# Patient Record
Sex: FEMALE | Race: WHITE | NOT HISPANIC OR LATINO | Employment: OTHER | URBAN - METROPOLITAN AREA
[De-identification: names, ages, dates, MRNs, and addresses within clinical notes are randomized per-mention and may not be internally consistent; named-entity substitution may affect disease eponyms.]

---

## 2018-08-19 PROCEDURE — 93005 ELECTROCARDIOGRAM TRACING: CPT | Mod: S$GLB,,, | Performed by: FAMILY MEDICINE

## 2018-08-19 PROCEDURE — 93010 ELECTROCARDIOGRAM REPORT: CPT | Mod: S$GLB,,, | Performed by: INTERNAL MEDICINE

## 2018-08-20 ENCOUNTER — OFFICE VISIT (OUTPATIENT)
Dept: FAMILY MEDICINE | Facility: CLINIC | Age: 58
End: 2018-08-20
Payer: COMMERCIAL

## 2018-08-20 ENCOUNTER — LAB VISIT (OUTPATIENT)
Dept: LAB | Facility: HOSPITAL | Age: 58
End: 2018-08-20
Attending: FAMILY MEDICINE
Payer: COMMERCIAL

## 2018-08-20 VITALS
WEIGHT: 153.69 LBS | SYSTOLIC BLOOD PRESSURE: 126 MMHG | HEART RATE: 86 BPM | DIASTOLIC BLOOD PRESSURE: 80 MMHG | HEIGHT: 64 IN | TEMPERATURE: 96 F | OXYGEN SATURATION: 99 % | BODY MASS INDEX: 26.24 KG/M2

## 2018-08-20 DIAGNOSIS — R42 DIZZINESS: Primary | ICD-10-CM

## 2018-08-20 DIAGNOSIS — R42 DIZZINESS: ICD-10-CM

## 2018-08-20 DIAGNOSIS — Z85.41 HX OF CERVICAL CANCER: ICD-10-CM

## 2018-08-20 DIAGNOSIS — Z85.43 HX OF OVARIAN CANCER: ICD-10-CM

## 2018-08-20 DIAGNOSIS — Z72.0 TOBACCO ABUSE: ICD-10-CM

## 2018-08-20 LAB
ALBUMIN SERPL BCP-MCNC: 3.6 G/DL
ALP SERPL-CCNC: 135 U/L
ALT SERPL W/O P-5'-P-CCNC: 14 U/L
ANION GAP SERPL CALC-SCNC: 8 MMOL/L
AST SERPL-CCNC: 18 U/L
BASOPHILS # BLD AUTO: 0.12 K/UL
BASOPHILS NFR BLD: 0.9 %
BILIRUB SERPL-MCNC: 0.3 MG/DL
BUN SERPL-MCNC: 10 MG/DL
CALCIUM SERPL-MCNC: 9.6 MG/DL
CHLORIDE SERPL-SCNC: 105 MMOL/L
CHOLEST SERPL-MCNC: 200 MG/DL
CHOLEST/HDLC SERPL: 4.1 {RATIO}
CO2 SERPL-SCNC: 27 MMOL/L
CREAT SERPL-MCNC: 0.8 MG/DL
DIFFERENTIAL METHOD: ABNORMAL
EOSINOPHIL # BLD AUTO: 0.1 K/UL
EOSINOPHIL NFR BLD: 0.7 %
ERYTHROCYTE [DISTWIDTH] IN BLOOD BY AUTOMATED COUNT: 13.8 %
EST. GFR  (AFRICAN AMERICAN): >60 ML/MIN/1.73 M^2
EST. GFR  (NON AFRICAN AMERICAN): >60 ML/MIN/1.73 M^2
ESTIMATED AVG GLUCOSE: 105 MG/DL
GLUCOSE SERPL-MCNC: 88 MG/DL
HBA1C MFR BLD HPLC: 5.3 %
HCT VFR BLD AUTO: 39.2 %
HDLC SERPL-MCNC: 49 MG/DL
HDLC SERPL: 24.5 %
HGB BLD-MCNC: 12.1 G/DL
IMM GRANULOCYTES # BLD AUTO: 0.06 K/UL
IMM GRANULOCYTES NFR BLD AUTO: 0.5 %
LDLC SERPL CALC-MCNC: 110.6 MG/DL
LYMPHOCYTES # BLD AUTO: 2.7 K/UL
LYMPHOCYTES NFR BLD: 21.3 %
MCH RBC QN AUTO: 29.7 PG
MCHC RBC AUTO-ENTMCNC: 30.9 G/DL
MCV RBC AUTO: 96 FL
MONOCYTES # BLD AUTO: 0.8 K/UL
MONOCYTES NFR BLD: 6.3 %
NEUTROPHILS # BLD AUTO: 9 K/UL
NEUTROPHILS NFR BLD: 70.3 %
NONHDLC SERPL-MCNC: 151 MG/DL
NRBC BLD-RTO: 0 /100 WBC
PLATELET # BLD AUTO: 366 K/UL
PMV BLD AUTO: 11.5 FL
POTASSIUM SERPL-SCNC: 5 MMOL/L
PROT SERPL-MCNC: 7.3 G/DL
RBC # BLD AUTO: 4.07 M/UL
SODIUM SERPL-SCNC: 140 MMOL/L
TRIGL SERPL-MCNC: 202 MG/DL
WBC # BLD AUTO: 12.79 K/UL

## 2018-08-20 PROCEDURE — 3008F BODY MASS INDEX DOCD: CPT | Mod: CPTII,S$GLB,, | Performed by: FAMILY MEDICINE

## 2018-08-20 PROCEDURE — 83036 HEMOGLOBIN GLYCOSYLATED A1C: CPT

## 2018-08-20 PROCEDURE — 99999 PR PBB SHADOW E&M-NEW PATIENT-LVL IV: CPT | Mod: PBBFAC,,, | Performed by: FAMILY MEDICINE

## 2018-08-20 PROCEDURE — 99204 OFFICE O/P NEW MOD 45 MIN: CPT | Mod: S$GLB,,, | Performed by: FAMILY MEDICINE

## 2018-08-20 PROCEDURE — 36415 COLL VENOUS BLD VENIPUNCTURE: CPT | Mod: PO

## 2018-08-20 PROCEDURE — 80061 LIPID PANEL: CPT

## 2018-08-20 PROCEDURE — 85025 COMPLETE CBC W/AUTO DIFF WBC: CPT

## 2018-08-20 PROCEDURE — 80053 COMPREHEN METABOLIC PANEL: CPT

## 2018-08-20 RX ORDER — OFLOXACIN 3 MG/ML
5 SOLUTION AURICULAR (OTIC) 2 TIMES DAILY
Refills: 0 | COMMUNITY
Start: 2018-08-13 | End: 2018-08-21

## 2018-08-20 NOTE — PROGRESS NOTES
Subjective:       Patient ID: Iza Esquivel is a 58 y.o. female.    Chief Complaint: Dizziness and Walking To the Left     58 y old female with Tobacco abuse now with Dizzy speells over the last 4 weeks . Mainly when she stands up . She feels  That she sways to the left . Last 5 -10 min .  Also has occur while she walks . Seen at  last week and she was told her r ear was clogged. Started on topical otic abx which  Might have help . No falls, no n/v . + decreased hearing (R ear)       Review of Systems   Constitutional: Negative.    HENT: Negative.    Eyes: Negative.    Respiratory: Negative.    Cardiovascular: Negative.    Gastrointestinal: Negative.    Genitourinary: Negative.    Musculoskeletal: Negative.    Skin: Negative.    Hematological: Negative.        Objective:      Physical Exam   Constitutional: She is oriented to person, place, and time. She appears well-developed and well-nourished. No distress.   HENT:   Head: Normocephalic and atraumatic.   Right Ear: External ear normal.   Left Ear: External ear normal.   Mouth/Throat: No oropharyngeal exudate.   Eyes: Conjunctivae and EOM are normal. Pupils are equal, round, and reactive to light. Right eye exhibits no discharge. Left eye exhibits no discharge. No scleral icterus.   Neck: Normal range of motion. Neck supple. No JVD present. No tracheal deviation present. No thyromegaly present.   Cardiovascular: Normal rate, regular rhythm and normal heart sounds. Exam reveals no gallop and no friction rub.   No murmur heard.  Pulmonary/Chest: Effort normal and breath sounds normal. No stridor. No respiratory distress. She has no wheezes. She has no rales. She exhibits no tenderness.   Abdominal: Soft. Bowel sounds are normal. She exhibits no distension. There is no tenderness. There is no rebound and no guarding.   Musculoskeletal: Normal range of motion.   Lymphadenopathy:     She has no cervical adenopathy.   Neurological: She is alert and oriented to  person, place, and time.   Skin: Skin is warm and dry. She is not diaphoretic.   Psychiatric: She has a normal mood and affect. Her behavior is normal. Judgment and thought content normal.       Assessment:       Iza was seen today for dizziness and walking to the left.    Diagnoses and all orders for this visit:    Dizziness  -     CBC auto differential; Future  -     Comprehensive metabolic panel; Future  -     Hemoglobin A1c; Future  -     Lipid panel; Future  -     IN OFFICE EKG 12-LEAD (to Muse)    Tobacco abuse    Hx of ovarian cancer    Hx of cervical cancer      Plan:     Iza was seen today for dizziness and walking to the left.    Diagnoses and all orders for this visit:    Tobacco abuse    Hx of ovarian cancer    Hx of cervical cancer     Neg orthostatics  Assistance with smoking cessation was offered, including:  []  Medications  []  Counseling  []  Printed Information on Smoking Cessation  []  Referral to a Smoking Cessation Program    Patient was counseled regarding smoking for 15  Minutes.    Declined f.u with GYN at this time

## 2018-08-21 ENCOUNTER — TELEPHONE (OUTPATIENT)
Dept: FAMILY MEDICINE | Facility: CLINIC | Age: 58
End: 2018-08-21

## 2018-08-21 ENCOUNTER — OFFICE VISIT (OUTPATIENT)
Dept: CARDIOLOGY | Facility: CLINIC | Age: 58
End: 2018-08-21
Payer: COMMERCIAL

## 2018-08-21 VITALS
HEIGHT: 64 IN | DIASTOLIC BLOOD PRESSURE: 78 MMHG | SYSTOLIC BLOOD PRESSURE: 154 MMHG | WEIGHT: 153.25 LBS | BODY MASS INDEX: 26.16 KG/M2 | HEART RATE: 76 BPM

## 2018-08-21 DIAGNOSIS — Z72.0 TOBACCO ABUSE: ICD-10-CM

## 2018-08-21 DIAGNOSIS — Z85.41 HX OF CERVICAL CANCER: ICD-10-CM

## 2018-08-21 DIAGNOSIS — E78.49 OTHER HYPERLIPIDEMIA: ICD-10-CM

## 2018-08-21 DIAGNOSIS — R94.31 NONSPECIFIC ABNORMAL ELECTROCARDIOGRAM (ECG) (EKG): ICD-10-CM

## 2018-08-21 DIAGNOSIS — R94.31 ABNORMAL EKG: Primary | ICD-10-CM

## 2018-08-21 DIAGNOSIS — R09.89 CAROTID BRUIT, UNSPECIFIED LATERALITY: ICD-10-CM

## 2018-08-21 DIAGNOSIS — R42 DIZZINESS: Primary | ICD-10-CM

## 2018-08-21 PROCEDURE — 99244 OFF/OP CNSLTJ NEW/EST MOD 40: CPT | Mod: S$GLB,,, | Performed by: INTERNAL MEDICINE

## 2018-08-21 PROCEDURE — 99999 PR PBB SHADOW E&M-EST. PATIENT-LVL III: CPT | Mod: PBBFAC,,, | Performed by: INTERNAL MEDICINE

## 2018-08-21 RX ORDER — PRAVASTATIN SODIUM 40 MG/1
40 TABLET ORAL DAILY
Qty: 90 TABLET | Refills: 3 | Status: ON HOLD | OUTPATIENT
Start: 2018-08-21 | End: 2020-05-26 | Stop reason: HOSPADM

## 2018-08-21 RX ORDER — ASPIRIN 81 MG/1
81 TABLET ORAL DAILY
Qty: 30 TABLET | Refills: 0 | Status: SHIPPED | OUTPATIENT
Start: 2018-08-21 | End: 2024-03-12

## 2018-08-21 NOTE — PROGRESS NOTES
Subjective:   Patient ID:  Iza Esquivel is a 58 y.o. female who presents for cardiac consult of Abnormal ECG      HPI  The patient came in today for cardiac consult of Abnormal ECG    Referring Physician: Park Gomez MD   Reason for consult: abrnomal ECG and dizziness    8/21/18  This is a 58 year old female pt with h/o cervical cancer, tobacco abuse, HLD presents for initial CV evaluation for abnormal ECG and dizziness.    Pt had been evaluated by Dr. Fink for dizziness, had ECG sinus rhythm abnormal R wave progression which prompted her to visit here.  Has not visited a doctor > 30-40 years.   Pt has been getting up and walking to the left due to dizziness. Symptoms have been occurring a few times while getting up. Unsure if she is dry or drinks a lot of water. No syncope but felt presyncopal.   Smokes about a pack a day, since age 12-13 years old. Bp elevated today, was normal yesterday, has high salt intake.     Patient feels no chest pain, no sob, no leg swelling, no PND, no palpitation,no syncope.     FH - father - had TIAs with [facility],     History reviewed. No pertinent past medical history.    Past Surgical History:   Procedure Laterality Date    LYMPH NODE BIOPSY         Social History     Tobacco Use    Smoking status: Current Every Day Smoker     Packs/day: 1.00     Years: 40.00     Pack years: 40.00    Smokeless tobacco: Never Used   Substance Use Topics    Alcohol use: No     Frequency: Never    Drug use: Not on file       Family History   Problem Relation Age of Onset    Cancer Mother         ?    Cancer Father         ?    Diabetes Brother     Hypertension Brother        Patient's Medications   New Prescriptions    ASPIRIN (ECOTRIN) 81 MG EC TABLET    Take 1 tablet (81 mg total) by mouth once daily.    PRAVASTATIN (PRAVACHOL) 40 MG TABLET    Take 1 tablet (40 mg total) by mouth once daily.   Previous Medications    No medications on file   Modified Medications    No  "medications on file   Discontinued Medications    OFLOXACIN (FLOXIN) 0.3 % OTIC SOLUTION    Place 5 drops into both ears 2 (two) times daily.       Review of Systems   Constitutional: Negative.  Negative for chills, diaphoresis and weight loss.   HENT: Negative.    Eyes: Negative.    Respiratory: Negative.    Cardiovascular: Negative.  Negative for chest pain and leg swelling.   Gastrointestinal: Negative.  Negative for heartburn and nausea.   Genitourinary: Negative.    Musculoskeletal: Negative.    Skin: Negative.    Neurological: Positive for dizziness. Negative for headaches.   Endo/Heme/Allergies: Negative.    Psychiatric/Behavioral: Negative.    All 12 systems otherwise negative.      Wt Readings from Last 3 Encounters:   08/21/18 69.5 kg (153 lb 3.5 oz)   08/20/18 69.7 kg (153 lb 10.6 oz)     Temp Readings from Last 3 Encounters:   08/20/18 96.1 °F (35.6 °C) (Tympanic)     BP Readings from Last 3 Encounters:   08/21/18 (!) 154/78   08/20/18 126/80     Pulse Readings from Last 3 Encounters:   08/21/18 76   08/20/18 86       BP (!) 154/78 (BP Method: Medium (Manual))   Pulse 76   Ht 5' 4" (1.626 m)   Wt 69.5 kg (153 lb 3.5 oz)   BMI 26.30 kg/m²     Objective:   Physical Exam   Constitutional: She is oriented to person, place, and time. She appears well-developed and well-nourished. No distress.   HENT:   Head: Normocephalic and atraumatic.   Nose: Nose normal.   Mouth/Throat: Oropharynx is clear and moist.   Eyes: Conjunctivae and EOM are normal. No scleral icterus.   Neck: Normal range of motion. Neck supple. No JVD present. Carotid bruit is present. No thyromegaly present.   Cardiovascular: Normal rate, regular rhythm, S1 normal and S2 normal. Exam reveals no gallop, no S3, no S4 and no friction rub.   Murmur heard.  Pulmonary/Chest: Effort normal and breath sounds normal. No stridor. No respiratory distress. She has no wheezes. She has no rales. She exhibits no tenderness.   Abdominal: Soft. Bowel sounds " are normal. She exhibits no distension and no mass. There is no tenderness. There is no rebound.   Genitourinary:   Genitourinary Comments: Deferred   Musculoskeletal: Normal range of motion. She exhibits no edema, tenderness or deformity.   Lymphadenopathy:     She has no cervical adenopathy.   Neurological: She is alert and oriented to person, place, and time. She exhibits normal muscle tone. Coordination normal.   Skin: Skin is warm and dry. No rash noted. She is not diaphoretic. No erythema. No pallor.   Psychiatric: She has a normal mood and affect. Her behavior is normal. Judgment and thought content normal.   Nursing note and vitals reviewed.      Lab Results   Component Value Date     08/20/2018    K 5.0 08/20/2018     08/20/2018    CO2 27 08/20/2018    BUN 10 08/20/2018    CREATININE 0.8 08/20/2018    GLU 88 08/20/2018    HGBA1C 5.3 08/20/2018    AST 18 08/20/2018    ALT 14 08/20/2018    ALBUMIN 3.6 08/20/2018    PROT 7.3 08/20/2018    BILITOT 0.3 08/20/2018    WBC 12.79 (H) 08/20/2018    HGB 12.1 08/20/2018    HCT 39.2 08/20/2018    MCV 96 08/20/2018     (H) 08/20/2018    CHOL 200 (H) 08/20/2018    HDL 49 08/20/2018    LDLCALC 110.6 08/20/2018    TRIG 202 (H) 08/20/2018     Assessment:      1. Dizziness    2. Nonspecific abnormal electrocardiogram (ECG) (EKG)    3. Tobacco abuse    4. Hx of cervical cancer    5. Other hyperlipidemia    6. Carotid bruit, unspecified laterality        Plan:   1. Dizziness  - check Carotid U/S - bruit on right  - check 2D ECHO to r/o valve disease   - discussed may need ENT eval if workup neg  - start low dose asa and statin for suspected carotid artery disease    2. HLD  - start statin, CMP, CK, Lipids in 3 months  - low jan diet    3. Tobacco abuse  - discussed smoking cessation, pt is not interested    4. Elevated BP  - low salt diet  - f/u next week with PCP to recheck    5. Carotid bruit  - work up as above     6. Abnormal ECG   - poor RWP, check 2D  ECHO, discussed will order stress test if any LV dysfunction or WMA    Thank you for allowing me to participate in this patient's care. Please do not hesitate to contact me with any questions or concerns. Consult note has been forwarded to the referral physician.

## 2018-08-28 ENCOUNTER — OFFICE VISIT (OUTPATIENT)
Dept: FAMILY MEDICINE | Facility: CLINIC | Age: 58
End: 2018-08-28
Payer: COMMERCIAL

## 2018-08-28 ENCOUNTER — LAB VISIT (OUTPATIENT)
Dept: LAB | Facility: HOSPITAL | Age: 58
End: 2018-08-28
Attending: FAMILY MEDICINE
Payer: COMMERCIAL

## 2018-08-28 VITALS
SYSTOLIC BLOOD PRESSURE: 138 MMHG | TEMPERATURE: 97 F | DIASTOLIC BLOOD PRESSURE: 74 MMHG | HEART RATE: 77 BPM | BODY MASS INDEX: 25.45 KG/M2 | WEIGHT: 149.06 LBS | HEIGHT: 64 IN | OXYGEN SATURATION: 99 %

## 2018-08-28 DIAGNOSIS — D72.829 LEUKOCYTOSIS, UNSPECIFIED TYPE: ICD-10-CM

## 2018-08-28 DIAGNOSIS — D72.829 LEUKOCYTOSIS, UNSPECIFIED TYPE: Primary | ICD-10-CM

## 2018-08-28 DIAGNOSIS — Z72.0 TOBACCO ABUSE: ICD-10-CM

## 2018-08-28 DIAGNOSIS — E78.5 DYSLIPIDEMIA: ICD-10-CM

## 2018-08-28 DIAGNOSIS — I10 HYPERTENSION, UNSPECIFIED TYPE: ICD-10-CM

## 2018-08-28 PROBLEM — R42 DIZZINESS: Status: RESOLVED | Noted: 2018-08-21 | Resolved: 2018-08-28

## 2018-08-28 LAB
BASOPHILS # BLD AUTO: 0.09 K/UL
BASOPHILS NFR BLD: 1.1 %
DIFFERENTIAL METHOD: ABNORMAL
EOSINOPHIL # BLD AUTO: 0.1 K/UL
EOSINOPHIL NFR BLD: 1.4 %
ERYTHROCYTE [DISTWIDTH] IN BLOOD BY AUTOMATED COUNT: 13.6 %
HCT VFR BLD AUTO: 38.7 %
HGB BLD-MCNC: 12.2 G/DL
IMM GRANULOCYTES # BLD AUTO: 0.02 K/UL
IMM GRANULOCYTES NFR BLD AUTO: 0.2 %
LYMPHOCYTES # BLD AUTO: 2.7 K/UL
LYMPHOCYTES NFR BLD: 32 %
MCH RBC QN AUTO: 29.4 PG
MCHC RBC AUTO-ENTMCNC: 31.5 G/DL
MCV RBC AUTO: 93 FL
MONOCYTES # BLD AUTO: 0.7 K/UL
MONOCYTES NFR BLD: 8.4 %
NEUTROPHILS # BLD AUTO: 4.9 K/UL
NEUTROPHILS NFR BLD: 56.9 %
NRBC BLD-RTO: 0 /100 WBC
PLATELET # BLD AUTO: 371 K/UL
PMV BLD AUTO: 11.4 FL
RBC # BLD AUTO: 4.15 M/UL
WBC # BLD AUTO: 8.57 K/UL

## 2018-08-28 PROCEDURE — 85025 COMPLETE CBC W/AUTO DIFF WBC: CPT

## 2018-08-28 PROCEDURE — 36415 COLL VENOUS BLD VENIPUNCTURE: CPT | Mod: PO

## 2018-08-28 PROCEDURE — 99999 PR PBB SHADOW E&M-EST. PATIENT-LVL III: CPT | Mod: PBBFAC,,, | Performed by: FAMILY MEDICINE

## 2018-08-28 PROCEDURE — 3008F BODY MASS INDEX DOCD: CPT | Mod: CPTII,S$GLB,, | Performed by: FAMILY MEDICINE

## 2018-08-28 PROCEDURE — 99214 OFFICE O/P EST MOD 30 MIN: CPT | Mod: S$GLB,,, | Performed by: FAMILY MEDICINE

## 2018-08-28 NOTE — PROGRESS NOTES
Subjective:       Patient ID: Iza Esquivel is a 58 y.o. female.    Chief Complaint: Follow-up    58 y old female with tobacco abuse her to discuss most recent labs which showed dlp and leukocytosis , Seen by card . Started on statin and aspirin . NO longer feeling dizzy. Not interested in smoking cessation.Denies any URI like symptoms when she had labs done . No  Night sweats , fever ,weight loss etc .       Review of Systems   Constitutional: Negative.  Negative for activity change and unexpected weight change.   HENT: Negative.  Negative for hearing loss, rhinorrhea and trouble swallowing.    Eyes: Negative.  Negative for discharge and visual disturbance.   Respiratory: Negative.  Negative for chest tightness and wheezing.    Cardiovascular: Negative.  Negative for chest pain and palpitations.   Gastrointestinal: Negative.  Negative for blood in stool, constipation, diarrhea and vomiting.   Endocrine: Negative for polydipsia and polyuria.   Genitourinary: Negative.  Negative for difficulty urinating, dysuria, hematuria and menstrual problem.   Musculoskeletal: Negative.  Negative for arthralgias, joint swelling and neck pain.   Skin: Negative.    Neurological: Negative for weakness and headaches.   Hematological: Negative.    Psychiatric/Behavioral: Negative for confusion and dysphoric mood.       Objective:      Physical Exam   Constitutional: She is oriented to person, place, and time. She appears well-developed and well-nourished. No distress.   HENT:   Head: Normocephalic and atraumatic.   Right Ear: External ear normal.   Left Ear: External ear normal.   Mouth/Throat: No oropharyngeal exudate.   Eyes: Conjunctivae and EOM are normal. Pupils are equal, round, and reactive to light. Right eye exhibits no discharge. Left eye exhibits no discharge. No scleral icterus.   Neck: Normal range of motion. Neck supple. No JVD present. No tracheal deviation present. No thyromegaly present.   Cardiovascular: Normal  rate, regular rhythm and normal heart sounds. Exam reveals no gallop and no friction rub.   No murmur heard.  Pulmonary/Chest: Effort normal and breath sounds normal. No stridor. No respiratory distress. She has no wheezes. She has no rales. She exhibits no tenderness.   Abdominal: Soft. Bowel sounds are normal. She exhibits no distension. There is no tenderness. There is no rebound and no guarding.   Musculoskeletal: Normal range of motion.   Lymphadenopathy:     She has no cervical adenopathy.   Neurological: She is alert and oriented to person, place, and time.   Skin: Skin is warm and dry. She is not diaphoretic.   Psychiatric: She has a normal mood and affect. Her behavior is normal. Judgment and thought content normal.       Assessment:       1. Leukocytosis, unspecified type    2. Dyslipidemia    3. Tobacco abuse    4. Hypertension, unspecified type        Plan:     Iza was seen today for follow-up.    Diagnoses and all orders for this visit:    Leukocytosis, unspecified type  -     CBC auto differential; Future    Dyslipidemia    Tobacco abuse    Hypertension, unspecified type     Rep CBC   Cont statin and aspirin  Assistance with smoking cessation was offered, including:  []  Medications  []  Counseling  []  Printed Information on Smoking Cessation  []  Referral to a Smoking Cessation Program    Patient was counseled regarding smoking for 15 minutes.  Keep log at home . F.u in 2 w

## 2018-08-31 DIAGNOSIS — Z12.39 BREAST CANCER SCREENING: ICD-10-CM

## 2018-09-04 ENCOUNTER — PATIENT MESSAGE (OUTPATIENT)
Dept: CARDIOLOGY | Facility: CLINIC | Age: 58
End: 2018-09-04

## 2018-09-11 ENCOUNTER — PATIENT MESSAGE (OUTPATIENT)
Dept: ADMINISTRATIVE | Facility: OTHER | Age: 58
End: 2018-09-11

## 2018-11-27 ENCOUNTER — PATIENT OUTREACH (OUTPATIENT)
Dept: ADMINISTRATIVE | Facility: HOSPITAL | Age: 58
End: 2018-11-27

## 2019-01-23 ENCOUNTER — PATIENT OUTREACH (OUTPATIENT)
Dept: ADMINISTRATIVE | Facility: HOSPITAL | Age: 59
End: 2019-01-23

## 2019-02-09 ENCOUNTER — HOSPITAL ENCOUNTER (OUTPATIENT)
Facility: HOSPITAL | Age: 59
Discharge: HOME | End: 2019-02-10
Attending: FAMILY MEDICINE | Admitting: INTERNAL MEDICINE

## 2019-02-09 DIAGNOSIS — R55 SYNCOPE: ICD-10-CM

## 2019-02-09 DIAGNOSIS — R94.31 EKG ABNORMALITIES: ICD-10-CM

## 2019-02-09 DIAGNOSIS — R55 NEAR SYNCOPE: Primary | ICD-10-CM

## 2019-02-09 PROBLEM — E87.1 HYPONATREMIA: Status: ACTIVE | Noted: 2019-02-09

## 2019-02-09 PROBLEM — Z72.0 TOBACCO ABUSE: Chronic | Status: ACTIVE | Noted: 2018-08-20

## 2019-02-09 PROBLEM — E78.2 MIXED HYPERLIPIDEMIA: Chronic | Status: ACTIVE | Noted: 2018-08-21

## 2019-02-09 PROBLEM — N28.9 ACUTE RENAL INSUFFICIENCY: Status: ACTIVE | Noted: 2019-02-09

## 2019-02-09 LAB
ALBUMIN SERPL BCP-MCNC: 3.6 G/DL
ALLENS TEST: ABNORMAL
ALP SERPL-CCNC: 112 U/L
ALT SERPL W/O P-5'-P-CCNC: 15 U/L
AMPHET+METHAMPHET UR QL: NEGATIVE
ANION GAP SERPL CALC-SCNC: 10 MMOL/L
APAP SERPL-MCNC: <3 UG/ML
APTT BLDCRRT: 33.7 SEC
AST SERPL-CCNC: 46 U/L
BARBITURATES UR QL SCN>200 NG/ML: NEGATIVE
BASOPHILS # BLD AUTO: 0.02 K/UL
BASOPHILS NFR BLD: 0.2 %
BENZODIAZ UR QL SCN>200 NG/ML: NEGATIVE
BILIRUB SERPL-MCNC: 0.3 MG/DL
BILIRUB UR QL STRIP: NEGATIVE
BNP SERPL-MCNC: 13 PG/ML
BUN SERPL-MCNC: 21 MG/DL
BZE UR QL SCN: NEGATIVE
CALCIUM SERPL-MCNC: 9.2 MG/DL
CANNABINOIDS UR QL SCN: NEGATIVE
CHLORIDE SERPL-SCNC: 96 MMOL/L
CLARITY UR: CLEAR
CO2 SERPL-SCNC: 25 MMOL/L
COLOR UR: YELLOW
CREAT SERPL-MCNC: 1.2 MG/DL
CREAT UR-MCNC: 229 MG/DL
DELSYS: ABNORMAL
DIFFERENTIAL METHOD: ABNORMAL
EOSINOPHIL # BLD AUTO: 0 K/UL
EOSINOPHIL NFR BLD: 0 %
ERYTHROCYTE [DISTWIDTH] IN BLOOD BY AUTOMATED COUNT: 14.6 %
EST. GFR  (AFRICAN AMERICAN): 57 ML/MIN/1.73 M^2
EST. GFR  (NON AFRICAN AMERICAN): 49 ML/MIN/1.73 M^2
ETHANOL SERPL-MCNC: <10 MG/DL
GLUCOSE SERPL-MCNC: 94 MG/DL
GLUCOSE UR QL STRIP: NEGATIVE
HCO3 UR-SCNC: 22.5 MMOL/L (ref 24–28)
HCT VFR BLD AUTO: 38.1 %
HGB BLD-MCNC: 12.5 G/DL
HGB UR QL STRIP: NEGATIVE
INR PPP: 1
KETONES UR QL STRIP: ABNORMAL
LEUKOCYTE ESTERASE UR QL STRIP: NEGATIVE
LYMPHOCYTES # BLD AUTO: 1.2 K/UL
LYMPHOCYTES NFR BLD: 12 %
MAGNESIUM SERPL-MCNC: 2.2 MG/DL
MCH RBC QN AUTO: 29.4 PG
MCHC RBC AUTO-ENTMCNC: 32.8 G/DL
MCV RBC AUTO: 90 FL
METHADONE UR QL SCN>300 NG/ML: NEGATIVE
MODE: ABNORMAL
MONOCYTES # BLD AUTO: 0.7 K/UL
MONOCYTES NFR BLD: 6.6 %
NEUTROPHILS # BLD AUTO: 8 K/UL
NEUTROPHILS NFR BLD: 81.2 %
NITRITE UR QL STRIP: NEGATIVE
OPIATES UR QL SCN: NEGATIVE
PCO2 BLDA: 32.2 MMHG (ref 35–45)
PCP UR QL SCN>25 NG/ML: NEGATIVE
PH SMN: 7.45 [PH] (ref 7.35–7.45)
PH UR STRIP: 6 [PH] (ref 5–8)
PLATELET # BLD AUTO: 253 K/UL
PMV BLD AUTO: 10.7 FL
PO2 BLDA: 57 MMHG (ref 80–100)
POC BE: -1 MMOL/L
POC SATURATED O2: 91 % (ref 95–100)
POTASSIUM SERPL-SCNC: 3.8 MMOL/L
PROT SERPL-MCNC: 7.5 G/DL
PROT UR QL STRIP: ABNORMAL
PROTHROMBIN TIME: 10.8 SEC
RBC # BLD AUTO: 4.25 M/UL
SALICYLATES SERPL-MCNC: <5 MG/DL
SAMPLE: ABNORMAL
SITE: ABNORMAL
SODIUM SERPL-SCNC: 131 MMOL/L
SP GR UR STRIP: >=1.03 (ref 1–1.03)
T4 FREE SERPL-MCNC: 0.71 NG/DL
TOXICOLOGY INFORMATION: NORMAL
TROPONIN I SERPL DL<=0.01 NG/ML-MCNC: 0.01 NG/ML
TSH SERPL DL<=0.005 MIU/L-ACNC: 4.4 UIU/ML
URN SPEC COLLECT METH UR: ABNORMAL
UROBILINOGEN UR STRIP-ACNC: NEGATIVE EU/DL
WBC # BLD AUTO: 9.8 K/UL

## 2019-02-09 PROCEDURE — 99285 EMERGENCY DEPT VISIT HI MDM: CPT | Mod: 25

## 2019-02-09 PROCEDURE — 81003 URINALYSIS AUTO W/O SCOPE: CPT | Mod: 59

## 2019-02-09 PROCEDURE — 82607 VITAMIN B-12: CPT

## 2019-02-09 PROCEDURE — 85730 THROMBOPLASTIN TIME PARTIAL: CPT

## 2019-02-09 PROCEDURE — 25000003 PHARM REV CODE 250: Performed by: NURSE PRACTITIONER

## 2019-02-09 PROCEDURE — 84443 ASSAY THYROID STIM HORMONE: CPT

## 2019-02-09 PROCEDURE — 80053 COMPREHEN METABOLIC PANEL: CPT

## 2019-02-09 PROCEDURE — 83880 ASSAY OF NATRIURETIC PEPTIDE: CPT

## 2019-02-09 PROCEDURE — G0378 HOSPITAL OBSERVATION PER HR: HCPCS

## 2019-02-09 PROCEDURE — 84484 ASSAY OF TROPONIN QUANT: CPT

## 2019-02-09 PROCEDURE — 93010 ELECTROCARDIOGRAM REPORT: CPT | Mod: 76,,, | Performed by: INTERNAL MEDICINE

## 2019-02-09 PROCEDURE — 96372 THER/PROPH/DIAG INJ SC/IM: CPT

## 2019-02-09 PROCEDURE — 84439 ASSAY OF FREE THYROXINE: CPT

## 2019-02-09 PROCEDURE — 80307 DRUG TEST PRSMV CHEM ANLYZR: CPT

## 2019-02-09 PROCEDURE — 82746 ASSAY OF FOLIC ACID SERUM: CPT

## 2019-02-09 PROCEDURE — 99900035 HC TECH TIME PER 15 MIN (STAT)

## 2019-02-09 PROCEDURE — 93010 EKG 12-LEAD: ICD-10-PCS | Mod: 76,,, | Performed by: INTERNAL MEDICINE

## 2019-02-09 PROCEDURE — 85610 PROTHROMBIN TIME: CPT

## 2019-02-09 PROCEDURE — 80329 ANALGESICS NON-OPIOID 1 OR 2: CPT

## 2019-02-09 PROCEDURE — 36600 WITHDRAWAL OF ARTERIAL BLOOD: CPT

## 2019-02-09 PROCEDURE — 63600175 PHARM REV CODE 636 W HCPCS: Performed by: NURSE PRACTITIONER

## 2019-02-09 PROCEDURE — 25000003 PHARM REV CODE 250: Performed by: FAMILY MEDICINE

## 2019-02-09 PROCEDURE — 83735 ASSAY OF MAGNESIUM: CPT

## 2019-02-09 PROCEDURE — 80320 DRUG SCREEN QUANTALCOHOLS: CPT

## 2019-02-09 PROCEDURE — 85025 COMPLETE CBC W/AUTO DIFF WBC: CPT

## 2019-02-09 RX ORDER — ONDANSETRON 2 MG/ML
4 INJECTION INTRAMUSCULAR; INTRAVENOUS EVERY 6 HOURS PRN
Status: DISCONTINUED | OUTPATIENT
Start: 2019-02-09 | End: 2019-02-10 | Stop reason: HOSPADM

## 2019-02-09 RX ORDER — FAMOTIDINE 20 MG/1
20 TABLET, FILM COATED ORAL DAILY
Status: DISCONTINUED | OUTPATIENT
Start: 2019-02-09 | End: 2019-02-10 | Stop reason: HOSPADM

## 2019-02-09 RX ORDER — SODIUM CHLORIDE 0.9 % (FLUSH) 0.9 %
5 SYRINGE (ML) INJECTION
Status: DISCONTINUED | OUTPATIENT
Start: 2019-02-09 | End: 2019-02-10 | Stop reason: HOSPADM

## 2019-02-09 RX ORDER — ASPIRIN 81 MG/1
81 TABLET ORAL DAILY
Status: DISCONTINUED | OUTPATIENT
Start: 2019-02-10 | End: 2019-02-10 | Stop reason: HOSPADM

## 2019-02-09 RX ORDER — IBUPROFEN 200 MG
1 TABLET ORAL DAILY
Status: DISCONTINUED | OUTPATIENT
Start: 2019-02-10 | End: 2019-02-10 | Stop reason: HOSPADM

## 2019-02-09 RX ORDER — PRAVASTATIN SODIUM 20 MG/1
40 TABLET ORAL DAILY
Status: DISCONTINUED | OUTPATIENT
Start: 2019-02-10 | End: 2019-02-10 | Stop reason: HOSPADM

## 2019-02-09 RX ORDER — ENOXAPARIN SODIUM 100 MG/ML
40 INJECTION SUBCUTANEOUS EVERY 24 HOURS
Status: DISCONTINUED | OUTPATIENT
Start: 2019-02-09 | End: 2019-02-10 | Stop reason: HOSPADM

## 2019-02-09 RX ORDER — ACETAMINOPHEN 325 MG/1
650 TABLET ORAL EVERY 6 HOURS PRN
Status: DISCONTINUED | OUTPATIENT
Start: 2019-02-09 | End: 2019-02-10 | Stop reason: HOSPADM

## 2019-02-09 RX ORDER — SODIUM CHLORIDE 9 MG/ML
INJECTION, SOLUTION INTRAVENOUS CONTINUOUS
Status: DISCONTINUED | OUTPATIENT
Start: 2019-02-09 | End: 2019-02-10 | Stop reason: HOSPADM

## 2019-02-09 RX ADMIN — SODIUM CHLORIDE 1000 ML: 0.9 INJECTION, SOLUTION INTRAVENOUS at 05:02

## 2019-02-09 RX ADMIN — FAMOTIDINE 20 MG: 20 TABLET, FILM COATED ORAL at 09:02

## 2019-02-09 RX ADMIN — ENOXAPARIN SODIUM 40 MG: 100 INJECTION SUBCUTANEOUS at 09:02

## 2019-02-09 NOTE — ED PROVIDER NOTES
SCRIBE #1 NOTE: I, Tiara Hendricks, am scribing for, and in the presence of, Ramandeep Harvey MD. I have scribed the entire note.      History      Chief Complaint   Patient presents with    Dizziness     reports dizziness with fatigue       Review of patient's allergies indicates:  No Known Allergies     HPI   HPI    2/9/2019, 3:58 PM   History obtained from the patient      History of Present Illness: Iza Esquivel is a 58 y.o. female patient who presents to the Emergency Department for light-headedness which onset gradually 2 days ago. Symptoms are constant and moderate in severity. Exacerbated by positional changes, no mitigating factors. Pt reports she feels off-balance and as if she is about to pass out when she walks. Associated sxs include fatigue. Pt reports she has slept most of the past 24 hours. Patient denies any fever, chills, diaphoresis, CP, palpitations, SOB, BLE edema/pain, n/v/d, cough, congestion, sore throat, HA, extremity weakness/numbness, visual disturbances, dysuria, and all other sxs at this time. Pt denies any medications changes or herbal supplement use. Pt reports she was told she may have a carotid blockage in august and was told to f/u for an US but she was unable to afford it. She has had no f/u with her PCP or cardiology since. No further complaints or concerns at this time.       Arrival mode: Personal vehicle     PCP: Park Gomez MD       Past Medical History:  History reviewed. No pertinent medical history.    Past Surgical History:  Past Surgical History:   Procedure Laterality Date    LYMPH NODE BIOPSY           Family History:  Family History   Problem Relation Age of Onset    Cancer Mother         ?    Cancer Father         ?    Transient ischemic attack Father         Carotid artery stenosis, CEA    Diabetes Brother     Hypertension Brother        Social History:  Social History     Tobacco Use    Smoking status: Current Every Day Smoker     Packs/day:  1.00     Years: 40.00     Pack years: 40.00    Smokeless tobacco: Never Used   Substance and Sexual Activity    Alcohol use: No     Frequency: Never    Drug use: unknown    Sexual activity: No       ROS   Review of Systems   Constitutional: Positive for fatigue. Negative for chills, diaphoresis and fever.   HENT: Negative for congestion and sore throat.    Eyes: Negative for visual disturbance.   Respiratory: Negative for cough and shortness of breath.    Cardiovascular: Negative for chest pain, palpitations and leg swelling.   Gastrointestinal: Negative for diarrhea, nausea and vomiting.   Genitourinary: Negative for dysuria.   Musculoskeletal: Negative for back pain and myalgias.   Skin: Negative for rash.   Neurological: Positive for light-headedness. Negative for weakness and headaches.   Hematological: Does not bruise/bleed easily.   All other systems reviewed and are negative.    Physical Exam      Initial Vitals [02/09/19 1556]   BP Pulse Resp Temp SpO2   129/66 93 20 98.8 °F (37.1 °C) (!) 94 %      MAP       --          Physical Exam  Nursing Notes and Vital Signs Reviewed.  Constitutional: Patient is in no acute distress. Well-developed and well-nourished.  Head: Atraumatic. Normocephalic.  Eyes: PERRL. EOM intact. Conjunctivae are not pale. No scleral icterus.  ENT: Mucous membranes are moist. Oropharynx is clear and symmetric.    Neck: Supple. Full ROM. No lymphadenopathy. No carotid bruit.   Cardiovascular: Regular rate. Regular rhythm. No murmurs, rubs, or gallops. Distal pulses are 2+ and symmetric.  Pulmonary/Chest: No respiratory distress. Clear to auscultation bilaterally. No wheezing or rales.  Abdominal: Soft and non-distended.  There is no tenderness.  No rebound, guarding, or rigidity.   Musculoskeletal: Moves all extremities. No obvious deformities. No edema. No calf tenderness.  Skin: Warm and dry.  Neurological: Patient is alert and oriented to person, place and time. Pupils ERRL and EOM  "normal. Cranial nerves II-XII are intact. Strength is full bilaterally; it is equal and 5/5 in bilateral upper and lower extremities. There is no pronator drift of outstretched arms. Light touch sense is intact. No dysmetria. Speech is clear and normal. No acute focal neurological deficits noted.  Psychiatric: Normal affect. Good eye contact. Appropriate in content.    ED Course    Procedures  ED Vital Signs:  Vitals:    02/09/19 1556 02/09/19 1647 02/09/19 1703 02/09/19 1725   BP: 129/66   (!) 133/58   Pulse: 93 81 79 77   Resp: 20  (!) 28    Temp: 98.8 °F (37.1 °C)      TempSrc: Oral      SpO2: (!) 94%  (!) 93%    Weight: 66.3 kg (146 lb 0.9 oz)      Height: 5' 4" (1.626 m)       02/09/19 1727 02/09/19 1729 02/09/19 1731 02/09/19 1801   BP: (!) 118/59 (!) 114/57 132/60 (!) 112/59   Pulse: 89 92 78 73   Resp:   20 20   Temp:       TempSrc:       SpO2:   99% 99%   Weight:       Height:        02/09/19 1816 02/09/19 1901 02/09/19 2100 02/09/19 2151   BP: 126/60 (!) 125/59  (!) 151/63   Pulse: 72 74 63 74   Resp: (!) 24 20  14   Temp:    96.8 °F (36 °C)   TempSrc:       SpO2: 99% 100%  96%   Weight:       Height:        02/09/19 2300 02/09/19 2323   BP:  (!) 95/46   Pulse: 62 69   Resp:  18   Temp:  98.9 °F (37.2 °C)   TempSrc:  Oral   SpO2:  98%   Weight:     Height:         Abnormal Lab Results:  Labs Reviewed   APTT - Abnormal; Notable for the following components:       Result Value    aPTT 33.7 (*)     All other components within normal limits   CBC W/ AUTO DIFFERENTIAL - Abnormal; Notable for the following components:    RDW 14.6 (*)     Gran # (ANC) 8.0 (*)     Gran% 81.2 (*)     Lymph% 12.0 (*)     All other components within normal limits   COMPREHENSIVE METABOLIC PANEL - Abnormal; Notable for the following components:    Sodium 131 (*)     BUN, Bld 21 (*)     AST 46 (*)     eGFR if  57 (*)     eGFR if non  49 (*)     All other components within normal limits   SALICYLATE LEVEL " - Abnormal; Notable for the following components:    Salicylate Lvl <5.0 (*)     All other components within normal limits   TSH - Abnormal; Notable for the following components:    TSH 4.399 (*)     All other components within normal limits   URINALYSIS, REFLEX TO URINE CULTURE - Abnormal; Notable for the following components:    Specific Gravity, UA >=1.030 (*)     Protein, UA Trace (*)     Ketones, UA Trace (*)     All other components within normal limits    Narrative:     Preferred Collection Type->Urine, Clean Catch   ACETAMINOPHEN LEVEL - Abnormal; Notable for the following components:    Acetaminophen (Tylenol), Serum <3.0 (*)     All other components within normal limits   ISTAT PROCEDURE - Abnormal; Notable for the following components:    POC PH 7.452 (*)     POC PCO2 32.2 (*)     POC PO2 57 (*)     POC HCO3 22.5 (*)     POC SATURATED O2 91 (*)     All other components within normal limits   B-TYPE NATRIURETIC PEPTIDE   MAGNESIUM   DRUG SCREEN PANEL, URINE EMERGENCY    Narrative:     Preferred Collection Type->Urine, Clean Catch   ALCOHOL,MEDICAL (ETHANOL)   PROTIME-INR   TROPONIN I   T4, FREE   VITAMIN B12   FOLATE        All Lab Results:  Results for orders placed or performed during the hospital encounter of 02/09/19   APTT   Result Value Ref Range    aPTT 33.7 (H) 21.0 - 32.0 sec   Brain natriuretic peptide   Result Value Ref Range    BNP 13 0 - 99 pg/mL   Magnesium   Result Value Ref Range    Magnesium 2.2 1.6 - 2.6 mg/dL   CBC auto differential   Result Value Ref Range    WBC 9.80 3.90 - 12.70 K/uL    RBC 4.25 4.00 - 5.40 M/uL    Hemoglobin 12.5 12.0 - 16.0 g/dL    Hematocrit 38.1 37.0 - 48.5 %    MCV 90 82 - 98 fL    MCH 29.4 27.0 - 31.0 pg    MCHC 32.8 32.0 - 36.0 g/dL    RDW 14.6 (H) 11.5 - 14.5 %    Platelets 253 150 - 350 K/uL    MPV 10.7 9.2 - 12.9 fL    Gran # (ANC) 8.0 (H) 1.8 - 7.7 K/uL    Lymph # 1.2 1.0 - 4.8 K/uL    Mono # 0.7 0.3 - 1.0 K/uL    Eos # 0.0 0.0 - 0.5 K/uL    Baso # 0.02  0.00 - 0.20 K/uL    Gran% 81.2 (H) 38.0 - 73.0 %    Lymph% 12.0 (L) 18.0 - 48.0 %    Mono% 6.6 4.0 - 15.0 %    Eosinophil% 0.0 0.0 - 8.0 %    Basophil% 0.2 0.0 - 1.9 %    Differential Method Automated    Comprehensive metabolic panel   Result Value Ref Range    Sodium 131 (L) 136 - 145 mmol/L    Potassium 3.8 3.5 - 5.1 mmol/L    Chloride 96 95 - 110 mmol/L    CO2 25 23 - 29 mmol/L    Glucose 94 70 - 110 mg/dL    BUN, Bld 21 (H) 6 - 20 mg/dL    Creatinine 1.2 0.5 - 1.4 mg/dL    Calcium 9.2 8.7 - 10.5 mg/dL    Total Protein 7.5 6.0 - 8.4 g/dL    Albumin 3.6 3.5 - 5.2 g/dL    Total Bilirubin 0.3 0.1 - 1.0 mg/dL    Alkaline Phosphatase 112 55 - 135 U/L    AST 46 (H) 10 - 40 U/L    ALT 15 10 - 44 U/L    Anion Gap 10 8 - 16 mmol/L    eGFR if African American 57 (A) >60 mL/min/1.73 m^2    eGFR if non African American 49 (A) >60 mL/min/1.73 m^2   Drug screen panel, emergency   Result Value Ref Range    Benzodiazepines Negative     Methadone metabolites Negative     Cocaine (Metab.) Negative     Opiate Scrn, Ur Negative     Barbiturate Screen, Ur Negative     Amphetamine Screen, Ur Negative     THC Negative     Phencyclidine Negative     Creatinine, Random Ur 229.0 15.0 - 325.0 mg/dL    Toxicology Information SEE COMMENT    Ethanol   Result Value Ref Range    Alcohol, Medical, Serum <10 <10 mg/dL   Protime-INR   Result Value Ref Range    Prothrombin Time 10.8 9.0 - 12.5 sec    INR 1.0 0.8 - 1.2   Salicylate level   Result Value Ref Range    Salicylate Lvl <5.0 (L) 15.0 - 30.0 mg/dL   Troponin I   Result Value Ref Range    Troponin I 0.014 0.000 - 0.026 ng/mL   TSH   Result Value Ref Range    TSH 4.399 (H) 0.400 - 4.000 uIU/mL   Urinalysis, Reflex to Urine Culture Urine, Clean Catch   Result Value Ref Range    Specimen UA Urine, Clean Catch     Color, UA Yellow Yellow, Straw, [place]    Appearance, UA Clear Clear    pH, UA 6.0 5.0 - 8.0    Specific Gravity, UA >=1.030 (A) 1.005 - 1.030    Protein, UA Trace (A) Negative     Glucose, UA Negative Negative    Ketones, UA Trace (A) Negative    Bilirubin (UA) Negative Negative    Occult Blood UA Negative Negative    Nitrite, UA Negative Negative    Urobilinogen, UA Negative <2.0 EU/dL    Leukocytes, UA Negative Negative   Acetaminophen level   Result Value Ref Range    Acetaminophen (Tylenol), Serum <3.0 (L) 10.0 - 20.0 ug/mL   T4, free   Result Value Ref Range    Free T4 0.71 0.71 - 1.51 ng/dL   ISTAT PROCEDURE   Result Value Ref Range    POC PH 7.452 (H) 7.35 - 7.45    POC PCO2 32.2 (L) 35 - 45 mmHg    POC PO2 57 (LL) 80 - 100 mmHg    POC HCO3 22.5 (L) 24 - 28 mmol/L    POC BE -1 -2 to 2 mmol/L    POC SATURATED O2 91 (L) 95 - 100 %    Sample ARTERIAL     Site LR     Allens Test Pass     DelSys Room Air     Mode SPONT        Imaging Results:  Imaging Results          US Carotid Bilateral (Final result)  Result time 02/09/19 17:56:59    Final result by Patrice Colvin MD (02/09/19 17:56:59)                 Impression:      Impression:    1. Calcific plaque in the right internal carotid artery causing a 50-69% luminal diameter stenosis.  2. Calcific atherosclerotic plaque in the left internal carotid artery causing a 50-69% luminal diameter stenosis.  Stenosis of 50-69%-validated velocity measurements with angiographic measurements, velocity criteria are extrapolated from diameter data as defined by the Society of Radiologists in Ultrasound Consensus Conference Radiology 2003; 229;340-346.      Electronically signed by: Patrice Colvin MD  Date:    02/09/2019  Time:    17:56             Narrative:    EXAMINATION:  US CAROTID BILATERAL    CLINICAL HISTORY:  Syncope and collapse    COMPARISON:  None    FINDINGS:  Sonographic evaluation of the carotid systems was performed.    There is some marginal shadowing calcific plaque in the origin of the right internal carotid artery.  There is some shadowing calcific plaque in the origin of the left internal carotid artery.    The peak systolic velocity in  the right internal carotid artery was approximately 126 cm/sec.  The right ICA to CCA peak systolic velocity measurement is 1.7.    The peak systolic velocity in the left internal carotid artery was approximately 147 cm/sec.  The left ICA to CCA peak systolic velocity ratio is 1.4.    Antegrade flow noted in both vertebral arteries.                               CT Head Without Contrast (Final result)  Result time 02/09/19 17:31:36    Final result by Patrice Colvin MD (02/09/19 17:31:36)                 Impression:      1. No acute intracranial process.  2. Bilaterally symmetric white matter disease as described above which can be seen with chronic small vessel ischemic changes of the white matter.  All CT scans at this facility are performed  using dose modulation techniques as appropriate to performed exam including the following:  automated exposure control; adjustment of mA and/or kV according to the patients size (this includes techniques or standardized protocols for targeted exams where dose is matched to indication/reason for exam: i.e. extremities or head);  iterative reconstruction technique.      Electronically signed by: Patrice Colvin MD  Date:    02/09/2019  Time:    17:31             Narrative:    EXAMINATION:  CT HEAD WITHOUT CONTRAST    CLINICAL HISTORY:  Confusion/delirium, altered LOC, unexplained;    TECHNIQUE:  Axial CT imaging was performed through the head without intravenous contrast.    COMPARISON:  None    FINDINGS:  No hydrocephalus, midline shift, mass effect, or acute intracranial hemorrhage. There is symmetric marked low density seen within the subcortical and periventricular white matter.  Basilar cisterns and posterior fossa are normal. The mastoid air cells are clear.  There is mucosal thickening throughout the ethmoid sinuses, sphenoid sinuses, and maxillary sinuses.  There are small air-fluid levels in the bilateral maxillary sinuses.  The skull is intact.                                X-Ray Chest AP Portable (Final result)  Result time 02/09/19 16:41:00    Final result by Patrice Colvin MD (02/09/19 16:41:00)                 Impression:      A chest CT can be performed for further evaluation of the possible nodule/infiltrates in the right upper lobe.      Electronically signed by: Patrice Colvin MD  Date:    02/09/2019  Time:    16:41             Narrative:    EXAMINATION:  XR CHEST AP PORTABLE    CLINICAL HISTORY:  near syncope;    COMPARISON:  None.    FINDINGS:  There is a focal area of subtle increased density which overlies the costal cartilage of the right 1st rib which may be some heavy calcification of the cartilage or could be a true pulmonary nodule.  There is some subtle reticular nodular opacity in the periphery of the right upper lobe.  Left lung appears clear.  No pleural effusion or pneumothorax.  Normal heart size.                               The EKG was ordered, reviewed, and independently interpreted by the ED provider.  Interpretation time: 1619  Rate: 80 BPM  Rhythm: normal sinus rhythm  Interpretation: Possible LAE. Incomplete RBBB. Septal infarct. Inferior lead ischemia. No STEMI.  When compared to EKG performed 8/2018, ischemia is now present in inferior leads.     The EKG was ordered, reviewed, and independently interpreted by the ED provider.  Interpretation time: 1647  Rate: 81 BPM  Rhythm: normal sinus rhythm  Interpretation: Possible LAE. Septal infarct. No STEMI.         The Emergency Provider reviewed the vital signs and test results, which are outlined above.    ED Discussion     6:11 PM: Discussed case with Florentino Villanueva NP ([facility]). Dr. Fabian agrees with current care and management of pt and accepts admission.   Admitting Service: Hospital medicine   Admitting Physician: Dr. Fabian  Admit to: Telemetry (Observation)    6:18 PM: Re-evaluated pt. I have discussed test results, shared treatment plan, and the need for admission with patient and family at  bedside. Pt and family express understanding at this time and agree with all information. All questions answered. Pt and family have no further questions or concerns at this time. Pt is ready for admit.    ED Course as of Feb 10 0035   Sat Feb 09, 2019   4067 Have sent message to hospitalist on-call for admission, awaiting reply  [YOLIS]      ED Course User Index  [YOLIS] Ramandeep Harvey MD       ED Medication(s):  Medications   sodium chloride 0.9% flush 5 mL (not administered)   acetaminophen tablet 650 mg (not administered)   ondansetron injection 4 mg (not administered)   promethazine (PHENERGAN) 6.25 mg in dextrose 5 % 50 mL IVPB (not administered)   enoxaparin injection 40 mg (40 mg Subcutaneous Given 2/9/19 2153)   aspirin EC tablet 81 mg (not administered)   pravastatin tablet 40 mg (not administered)   famotidine tablet 20 mg (20 mg Oral Given 2/9/19 2153)   nicotine 21 mg/24 hr 1 patch (not administered)   0.9%  NaCl infusion (not administered)   sodium chloride 0.9% bolus 1,000 mL (1,000 mLs Intravenous New Bag 2/9/19 1749)   sodium chloride 0.9% bolus 1,000 mL (1,000 mLs Intravenous New Bag 2/9/19 1749)             Medical Decision Making    Medical Decision Making:   Clinical Tests:   Lab Tests: Ordered and Reviewed  Radiological Study: Ordered and Reviewed  Medical Tests: Ordered and Reviewed  ED Management:  This is a 58-year-old  female with history of hypertension presented to emergency department for generalized weakness and near syncopal episodes for the past 2 days.  Patient had a syncopal workup done which assessed laboratory and radiology of neurologic, cardiac and most other etiologies.  Patient was found to have have positive orthostatics and was given IV fluids.  However upon my evaluation of patient's EKG, there was a noted EKG change from previous EKGs done here in the emergency department.  Initial 1 showed inferior wall ST depressions with slight reciprocal changes however patient did  not have any chest pain and was re-evaluated for this approximately 2-3 times.  Repeat EKG had improved however.  Patient had a negative CT head, carotid Dopplers.  I voiced my concern about patient's clinical status to her and family at bedside and recommended admission.  At time of admission, I voiced my concern of a potential cardiac etiology of her near syncopal episode as well as weakness. In the emergency department patient was vitally stable. Patient was admitted to Hospital Medicine and was stable upon admission.           Scribe Attestation:   Scribe #1: I performed the above scribed service and the documentation accurately describes the services I performed. I attest to the accuracy of the note.    Attending:   Physician Attestation Statement for Scribe #1: I, Ramandeep Harvey MD, personally performed the services described in this documentation, as scribed by Tiara Hendricks, in my presence, and it is both accurate and complete.          Clinical Impression       ICD-10-CM ICD-9-CM   1. Near syncope R55 780.2   2. EKG abnormalities R94.31 794.31       Disposition:   Disposition: Placed in Observation  Condition: Ramandeep Ferguson MD  02/10/19 0036

## 2019-02-10 VITALS
WEIGHT: 146.06 LBS | HEART RATE: 84 BPM | OXYGEN SATURATION: 91 % | TEMPERATURE: 98 F | RESPIRATION RATE: 16 BRPM | DIASTOLIC BLOOD PRESSURE: 51 MMHG | HEIGHT: 64 IN | BODY MASS INDEX: 24.94 KG/M2 | SYSTOLIC BLOOD PRESSURE: 81 MMHG

## 2019-02-10 LAB
ANION GAP SERPL CALC-SCNC: 9 MMOL/L
BASOPHILS # BLD AUTO: 0.02 K/UL
BASOPHILS NFR BLD: 0.3 %
BUN SERPL-MCNC: 15 MG/DL
CALCIUM SERPL-MCNC: 7.9 MG/DL
CHLORIDE SERPL-SCNC: 105 MMOL/L
CHOLEST SERPL-MCNC: 131 MG/DL
CHOLEST/HDLC SERPL: 4 {RATIO}
CO2 SERPL-SCNC: 22 MMOL/L
CREAT SERPL-MCNC: 0.8 MG/DL
DIASTOLIC DYSFUNCTION: YES
DIFFERENTIAL METHOD: ABNORMAL
EOSINOPHIL # BLD AUTO: 0 K/UL
EOSINOPHIL NFR BLD: 0 %
ERYTHROCYTE [DISTWIDTH] IN BLOOD BY AUTOMATED COUNT: 14.8 %
EST. GFR  (AFRICAN AMERICAN): >60 ML/MIN/1.73 M^2
EST. GFR  (NON AFRICAN AMERICAN): >60 ML/MIN/1.73 M^2
ESTIMATED PA SYSTOLIC PRESSURE: 16.16
FOLATE SERPL-MCNC: 12 NG/ML
GLUCOSE SERPL-MCNC: 79 MG/DL
HCT VFR BLD AUTO: 33.1 %
HDLC SERPL-MCNC: 33 MG/DL
HDLC SERPL: 25.2 %
HGB BLD-MCNC: 10.6 G/DL
LDLC SERPL CALC-MCNC: 74 MG/DL
LYMPHOCYTES # BLD AUTO: 1.5 K/UL
LYMPHOCYTES NFR BLD: 25.2 %
MCH RBC QN AUTO: 28.9 PG
MCHC RBC AUTO-ENTMCNC: 32 G/DL
MCV RBC AUTO: 90 FL
MONOCYTES # BLD AUTO: 0.5 K/UL
MONOCYTES NFR BLD: 7.9 %
NEUTROPHILS # BLD AUTO: 4.1 K/UL
NEUTROPHILS NFR BLD: 66.6 %
NONHDLC SERPL-MCNC: 98 MG/DL
PLATELET # BLD AUTO: 219 K/UL
PMV BLD AUTO: 10.7 FL
POTASSIUM SERPL-SCNC: 3.6 MMOL/L
RBC # BLD AUTO: 3.67 M/UL
RETIRED EF AND QEF - SEE NOTES: 60 (ref 55–65)
SODIUM SERPL-SCNC: 136 MMOL/L
TRIGL SERPL-MCNC: 120 MG/DL
VIT B12 SERPL-MCNC: 174 PG/ML
WBC # BLD AUTO: 6.1 K/UL

## 2019-02-10 PROCEDURE — 25000003 PHARM REV CODE 250: Performed by: NURSE PRACTITIONER

## 2019-02-10 PROCEDURE — 93306 TTE W/DOPPLER COMPLETE: CPT | Mod: 26,,, | Performed by: INTERNAL MEDICINE

## 2019-02-10 PROCEDURE — 93306 2D ECHO WITH COLOR FLOW DOPPLER: ICD-10-PCS | Mod: 26,,, | Performed by: INTERNAL MEDICINE

## 2019-02-10 PROCEDURE — 93306 TTE W/DOPPLER COMPLETE: CPT

## 2019-02-10 PROCEDURE — 36415 COLL VENOUS BLD VENIPUNCTURE: CPT

## 2019-02-10 PROCEDURE — 80061 LIPID PANEL: CPT

## 2019-02-10 PROCEDURE — 85025 COMPLETE CBC W/AUTO DIFF WBC: CPT

## 2019-02-10 PROCEDURE — G0378 HOSPITAL OBSERVATION PER HR: HCPCS

## 2019-02-10 PROCEDURE — 27000221 HC OXYGEN, UP TO 24 HOURS

## 2019-02-10 PROCEDURE — 94761 N-INVAS EAR/PLS OXIMETRY MLT: CPT

## 2019-02-10 PROCEDURE — 80048 BASIC METABOLIC PNL TOTAL CA: CPT

## 2019-02-10 RX ADMIN — FAMOTIDINE 20 MG: 20 TABLET, FILM COATED ORAL at 08:02

## 2019-02-10 RX ADMIN — PRAVASTATIN SODIUM 40 MG: 20 TABLET ORAL at 08:02

## 2019-02-10 RX ADMIN — ASPIRIN 81 MG: 81 TABLET, COATED ORAL at 08:02

## 2019-02-10 RX ADMIN — SODIUM CHLORIDE: 0.9 INJECTION, SOLUTION INTRAVENOUS at 10:02

## 2019-02-10 NOTE — PLAN OF CARE
Problem: Adult Inpatient Plan of Care  Goal: Patient-Specific Goal (Individualization)  Outcome: Ongoing (interventions implemented as appropriate)  Pt AAO x4.  VSS  Pt able to make needs known.  Pt remained afebrile throughout this shift.   Pt ambulates in room, gait steady   Pt remained free of falls this shift.   Pt denies pain this shift.  Plan of care reviewed. Patient verbalizes understanding.   Pt moving/turing independent. Frequent weight shifting encouraged.  Patient sinus rhythm on monitor.   Bed low, side rails up x 2, wheels locked, call light in reach.   Hourly rounding completed.   Will continue to monitor.

## 2019-02-10 NOTE — ASSESSMENT & PLAN NOTE
- Initial cr. 1.2 (mildly up from 0.8 in 8/2018) GFR 49, likely secondary to mild dehydration.  - Gentle IV hydration.  - Repeat BMP in AM.

## 2019-02-10 NOTE — DISCHARGE SUMMARY
"[facility] Hospital Medicine  Discharge Summary      Patient Name: Iza Esquivel  MRN: [id number]  Admission Date: 2/9/2019  Hospital Length of Stay: 0 days  Discharge Date and Time:  02/10/2019 1:30 PM  Attending Physician: No att. providers found   Discharging Provider: XIMENA Shabazz  Primary Care Provider: Park Gomez MD      HPI:   Ms. Esquivel is a 59 yo female  with a PMHx of HLD, right carotid bruit, and tobacco use, who presented to the ED with c/o intermittent lightheadedness that has progressively worsened over the past 2 days.  Associated fatigue.  Aggravated by positional changes, no alleviating factors.  She reports lightheadedness occurs when she goes from sitting to standing position, and feels like she is off balance like she is "about to pass out".  Episodes last ~5-10 minutes before spontaneously resolving.  Denies any HA, AMS, visual disturbances, dizziness/vertigo, syncope, focal deficits, weakness, CP, palpitations, SOB, N/V/D, dysuria, hematuria, back pain, neck pain or stiffness, rhinorrhea, PND, ear pain or drainage, sore throat, sinus pressure, diaphoresis, fever, or chills.  Patient was evaluated by Dr. Goldstein (Cardiology) in 8/2018 for similar symptoms, and was noted to have a right carotid bruit.  Carotid US and TTE were ordered, but patient failed to complete testing.  She has not f/u with Cardiology or PCP since 8/2018.  Work-up in ED resulted Na 131, cr. 1.2, BUN 21, troponin negative, CXR unremarkable, CT of head negative for acute process, EKG unrevealing.  Patient received 2L IVF bolus in ED.  Hospital Medicine was called for admission.  Currently, patient appears comfortable in NAD.  Denies any lightheadedness at present.         * No surgery found *      Hospital Course:   Patient was kept on OBS for near syncope under the care of Hospital Medicine. CT head showed No acute intracranial process. Carotid US showed calcific plaque in the right " internal carotid artery causing a 50-69% luminal diameter stenosis. Calcific atherosclerotic plaque in the left internal carotid artery causing a 50-69% luminal diameter stenosis. Orthostatic bp with signif. BAN improved with IV fluids - BUN decreased to 15, eGFR increased to >60. 2D echo with normal EF and no diastolic or systolic dysfunction. Patient and sister report pt is back to baseline. She reports she has felt bad for about a week - thinks she had the flu. She was sleeping 23 hours/day and did not eat or drink, which caused her to get weak. Sister confirms information. We discussed importance of keeping hydrated - she verbalized understanding. Discussed orthostatic hypotension and need to rise slowly, she reports she recently read a magazine article about that and has been rising slowly since then. Discussed pts carotid arteries - she will f/u OP with her PCP and/or vascular. Discussed possible consequences in detail and pt reports she understands consequences of not following up. Patient was seen and examined today and deemed stable for discharge home.      Assistance with smoking cessation was offered, including:  [x]  Medications  [x]  Counseling  [x]  Printed Information on Smoking Cessation  []  Referral to a Smoking Cessation Program    Patient was counseled regarding smoking for >10 minutes.       Consults:     No new Assessment & Plan notes have been filed under this hospital service since the last note was generated.  Service: Hospital Medicine    Final Active Diagnoses:    Diagnosis Date Noted POA    PRINCIPAL PROBLEM:  Near syncope [R55] 02/09/2019 Yes    Hyponatremia [E87.1] 02/09/2019 Yes    Acute renal insufficiency [N28.9] 02/09/2019 Yes    Bruit of right carotid artery [R09.89] 08/21/2018 Yes    Mixed hyperlipidemia [E78.2] 08/21/2018 Yes     Chronic    Tobacco abuse [Z72.0] 08/20/2018 Yes     Chronic      Problems Resolved During this Admission:       Discharged Condition:  stable    Disposition: Home or Self Care    Follow Up:  Follow-up Information     Park Gomez MD In 3 days.    Specialty:  Family Medicine  Why:  hospital follow up  Contact information:  [address] [facility] [address] [phone]                 Patient Instructions:      Diet Cardiac     Notify your health care provider if you experience any of the following:  increased confusion or weakness     Notify your health care provider if you experience any of the following:  persistent dizziness, light-headedness, or visual disturbances     Activity as tolerated       Significant Diagnostic Studies: Labs:   BMP:   Recent Labs   Lab 02/09/19  1700 02/10/19  0454   GLU 94 79   * 136   K 3.8 3.6   CL 96 105   CO2 25 22*   BUN 21* 15   CREATININE 1.2 0.8   CALCIUM 9.2 7.9*   MG 2.2  --    , CMP   Recent Labs   Lab 02/09/19  1700 02/10/19  0454   * 136   K 3.8 3.6   CL 96 105   CO2 25 22*   GLU 94 79   BUN 21* 15   CREATININE 1.2 0.8   CALCIUM 9.2 7.9*   PROT 7.5  --    ALBUMIN 3.6  --    BILITOT 0.3  --    ALKPHOS 112  --    AST 46*  --    ALT 15  --    ANIONGAP 10 9   ESTGFRAFRICA 57* >60   EGFRNONAA 49* >60   , CBC   Recent Labs   Lab 02/09/19  1700 02/10/19  0454   WBC 9.80 6.10   HGB 12.5 10.6*   HCT 38.1 33.1*    219    and All labs within the past 24 hours have been reviewed    Pending Diagnostic Studies:     None         Medications:  Reconciled Home Medications:      Medication List      CONTINUE taking these medications    aspirin 81 MG EC tablet  Commonly known as:  ECOTRIN  Take 1 tablet (81 mg total) by mouth once daily.     pravastatin 40 MG tablet  Commonly known as:  PRAVACHOL  Take 1 tablet (40 mg total) by mouth once daily.            Indwelling Lines/Drains at time of discharge:   Lines/Drains/Airways          None          Time spent on the discharge of patient: 50 minutes  Patient was seen and examined on the date of discharge and determined to be suitable  for discharge.         BLAYNE ShabazzP-C  Department of Hospital Medicine  [facility]

## 2019-02-10 NOTE — ASSESSMENT & PLAN NOTE
- Appears to be a chronic issue over the past ~7 months.  Possible orthostasis vs. SOCORRO?  - CT of head negative for acute process.  - Check orthostatic VS.  - IV hydration.  - Neuro checks.  - Carotid US and TTE.

## 2019-02-10 NOTE — HOSPITAL COURSE
Patient was kept on OBS for near syncope under the care of [facility] Medicine. CT head showed No acute intracranial process. Carotid US showed calcific plaque in the right internal carotid artery causing a 50-69% luminal diameter stenosis. Calcific atherosclerotic plaque in the left internal carotid artery causing a 50-69% luminal diameter stenosis. Orthostatic bp with signif. BAN improved with IV fluids - BUN decreased to 15, eGFR increased to >60. 2D echo with normal EF and no diastolic or systolic dysfunction. Patient and sister report pt is back to baseline. She reports she has felt bad for about a week - thinks she had the flu. She was sleeping 23 hours/day and did not eat or drink, which caused her to get weak. Sister confirms information. We discussed importance of keeping hydrated - she verbalized understanding. Discussed orthostatic hypotension and need to rise slowly, she reports she recently read a magazine article about that and has been rising slowly since then. Discussed pts carotid arteries - she will f/u OP with her PCP and/or vascular. Discussed possible consequences in detail and pt reports she understands consequences of not following up. Patient was seen and examined today and deemed stable for discharge home.      Assistance with smoking cessation was offered, including:  [x]  Medications  [x]  Counseling  [x]  Printed Information on Smoking Cessation  []  Referral to a Smoking Cessation Program    Patient was counseled regarding smoking for >10 minutes.

## 2019-02-10 NOTE — SUBJECTIVE & OBJECTIVE
Past Medical History:   Diagnosis Date    Cervical cancer     Hyperlipidemia     Right carotid bruit        Past Surgical History:   Procedure Laterality Date    LYMPH NODE BIOPSY         Review of patient's allergies indicates:  No Known Allergies    No current facility-administered medications on file prior to encounter.      Current Outpatient Medications on File Prior to Encounter   Medication Sig    aspirin (ECOTRIN) 81 MG EC tablet Take 1 tablet (81 mg total) by mouth once daily.    pravastatin (PRAVACHOL) 40 MG tablet Take 1 tablet (40 mg total) by mouth once daily.     Family History     Problem Relation (Age of Onset)    Cancer Mother, Father    TIA, SOCORRO, CEA Father    Diabetes Brother    Hypertension Brother        Tobacco Use    Smoking status: Current Every Day Smoker     Packs/day: 1.00     Years: 40.00     Pack years: 40.00    Smokeless tobacco: Never Used   Substance and Sexual Activity    Alcohol use: No     Frequency: Never    Drug use: Not on file    Sexual activity: No     Review of Systems   Constitutional: Positive for fatigue. Negative for activity change, chills, diaphoresis, fever and unexpected weight change.   HENT: Negative for congestion and sore throat.    Eyes: Negative for photophobia and visual disturbance.   Respiratory: Negative for cough, shortness of breath and wheezing.    Cardiovascular: Negative for chest pain, palpitations and leg swelling.   Gastrointestinal: Negative for abdominal distention, abdominal pain, blood in stool, constipation, diarrhea, nausea and vomiting.   Genitourinary: Negative for difficulty urinating, dysuria, frequency, hematuria and urgency.   Musculoskeletal: Negative for arthralgias, back pain, gait problem, myalgias, neck pain and neck stiffness.   Skin: Negative for pallor, rash and wound.   Neurological: Positive for light-headedness. Negative for dizziness, tremors, syncope, facial asymmetry, speech difficulty, weakness, numbness and  headaches.   Psychiatric/Behavioral: Negative for confusion. The patient is not nervous/anxious.    All other systems reviewed and are negative.    Objective:     Vital Signs (Most Recent):  Temp: 98.8 °F (37.1 °C) (02/09/19 1556)  Pulse: 74 (02/09/19 1901)  Resp: 20 (02/09/19 1901)  BP: (!) 125/59 (02/09/19 1901)  SpO2: 100 % (02/09/19 1901) Vital Signs (24h Range):  Temp:  [98.8 °F (37.1 °C)] 98.8 °F (37.1 °C)  Pulse:  [72-93] 74  Resp:  [20-28] 20  SpO2:  [93 %-100 %] 100 %  BP: (112-133)/(57-66) 125/59     Weight: 66.3 kg (146 lb 0.9 oz)  Body mass index is 25.07 kg/m².    Physical Exam   Constitutional: She is oriented to person, place, and time. She appears well-developed and well-nourished. No distress.   HENT:   Head: Normocephalic and atraumatic.   Eyes: Conjunctivae are normal.   PERRL; EOM intact.   Neck: Normal range of motion. Neck supple. Normal carotid pulses and no JVD present. Carotid bruit is present (right).   Cardiovascular: Normal rate, regular rhythm, S1 normal, S2 normal and intact distal pulses.  No extrasystoles are present. Exam reveals no gallop and no friction rub.   No murmur heard.  Pulses:       Carotid pulses are 2+ on the right side, and 2+ on the left side.       Radial pulses are 2+ on the right side, and 2+ on the left side.        Dorsalis pedis pulses are 2+ on the right side, and 2+ on the left side.        Posterior tibial pulses are 2+ on the right side, and 2+ on the left side.   Pulmonary/Chest: Effort normal and breath sounds normal. No accessory muscle usage. No tachypnea. No respiratory distress. She has no wheezes. She has no rhonchi. She has no rales.   Abdominal: Soft. Bowel sounds are normal. She exhibits no distension. There is no tenderness. There is no rebound, no guarding and no CVA tenderness.   Musculoskeletal: Normal range of motion. She exhibits no edema, tenderness or deformity.   Neurological: She is alert and oriented to person, place, and time. She has  normal strength. No cranial nerve deficit or sensory deficit. Coordination and gait normal. GCS eye subscore is 4. GCS verbal subscore is 5. GCS motor subscore is 6.   No acute focal deficits appreciated.    Skin: Skin is warm, dry and intact. Capillary refill takes less than 2 seconds. No rash noted. She is not diaphoretic. No cyanosis or erythema.   Psychiatric: She has a normal mood and affect. Her speech is normal and behavior is normal. Cognition and memory are normal.   Nursing note and vitals reviewed.          Significant Labs:   Results for orders placed or performed during the hospital encounter of 02/09/19   APTT   Result Value Ref Range    aPTT 33.7 (H) 21.0 - 32.0 sec   Brain natriuretic peptide   Result Value Ref Range    BNP 13 0 - 99 pg/mL   Magnesium   Result Value Ref Range    Magnesium 2.2 1.6 - 2.6 mg/dL   CBC auto differential   Result Value Ref Range    WBC 9.80 3.90 - 12.70 K/uL    RBC 4.25 4.00 - 5.40 M/uL    Hemoglobin 12.5 12.0 - 16.0 g/dL    Hematocrit 38.1 37.0 - 48.5 %    MCV 90 82 - 98 fL    MCH 29.4 27.0 - 31.0 pg    MCHC 32.8 32.0 - 36.0 g/dL    RDW 14.6 (H) 11.5 - 14.5 %    Platelets 253 150 - 350 K/uL    MPV 10.7 9.2 - 12.9 fL    Gran # (ANC) 8.0 (H) 1.8 - 7.7 K/uL    Lymph # 1.2 1.0 - 4.8 K/uL    Mono # 0.7 0.3 - 1.0 K/uL    Eos # 0.0 0.0 - 0.5 K/uL    Baso # 0.02 0.00 - 0.20 K/uL    Gran% 81.2 (H) 38.0 - 73.0 %    Lymph% 12.0 (L) 18.0 - 48.0 %    Mono% 6.6 4.0 - 15.0 %    Eosinophil% 0.0 0.0 - 8.0 %    Basophil% 0.2 0.0 - 1.9 %    Differential Method Automated    Comprehensive metabolic panel   Result Value Ref Range    Sodium 131 (L) 136 - 145 mmol/L    Potassium 3.8 3.5 - 5.1 mmol/L    Chloride 96 95 - 110 mmol/L    CO2 25 23 - 29 mmol/L    Glucose 94 70 - 110 mg/dL    BUN, Bld 21 (H) 6 - 20 mg/dL    Creatinine 1.2 0.5 - 1.4 mg/dL    Calcium 9.2 8.7 - 10.5 mg/dL    Total Protein 7.5 6.0 - 8.4 g/dL    Albumin 3.6 3.5 - 5.2 g/dL    Total Bilirubin 0.3 0.1 - 1.0 mg/dL    Alkaline  Phosphatase 112 55 - 135 U/L    AST 46 (H) 10 - 40 U/L    ALT 15 10 - 44 U/L    Anion Gap 10 8 - 16 mmol/L    eGFR if African American 57 (A) >60 mL/min/1.73 m^2    eGFR if non African American 49 (A) >60 mL/min/1.73 m^2   Drug screen panel, emergency   Result Value Ref Range    Benzodiazepines Negative     Methadone metabolites Negative     Cocaine (Metab.) Negative     Opiate Scrn, Ur Negative     Barbiturate Screen, Ur Negative     Amphetamine Screen, Ur Negative     THC Negative     Phencyclidine Negative     Creatinine, Random Ur 229.0 15.0 - 325.0 mg/dL    Toxicology Information SEE COMMENT    Ethanol   Result Value Ref Range    Alcohol, Medical, Serum <10 <10 mg/dL   Protime-INR   Result Value Ref Range    Prothrombin Time 10.8 9.0 - 12.5 sec    INR 1.0 0.8 - 1.2   Salicylate level   Result Value Ref Range    Salicylate Lvl <5.0 (L) 15.0 - 30.0 mg/dL   Troponin I   Result Value Ref Range    Troponin I 0.014 0.000 - 0.026 ng/mL   TSH   Result Value Ref Range    TSH 4.399 (H) 0.400 - 4.000 uIU/mL   Urinalysis, Reflex to Urine Culture Urine, Clean Catch   Result Value Ref Range    Specimen UA Urine, Clean Catch     Color, UA Yellow Yellow, Straw, [place]    Appearance, UA Clear Clear    pH, UA 6.0 5.0 - 8.0    Specific Gravity, UA >=1.030 (A) 1.005 - 1.030    Protein, UA Trace (A) Negative    Glucose, UA Negative Negative    Ketones, UA Trace (A) Negative    Bilirubin (UA) Negative Negative    Occult Blood UA Negative Negative    Nitrite, UA Negative Negative    Urobilinogen, UA Negative <2.0 EU/dL    Leukocytes, UA Negative Negative   Acetaminophen level   Result Value Ref Range    Acetaminophen (Tylenol), Serum <3.0 (L) 10.0 - 20.0 ug/mL   T4, free   Result Value Ref Range    Free T4 0.71 0.71 - 1.51 ng/dL   ISTAT PROCEDURE   Result Value Ref Range    POC PH 7.452 (H) 7.35 - 7.45    POC PCO2 32.2 (L) 35 - 45 mmHg    POC PO2 57 (LL) 80 - 100 mmHg    POC HCO3 22.5 (L) 24 - 28 mmol/L    POC BE -1 -2 to 2 mmol/L     POC SATURATED O2 91 (L) 95 - 100 %    Sample ARTERIAL     Site LR     Allens Test Pass     DelSys Room Air     Mode SPONT       All pertinent labs within the past 24 hours have been reviewed.    Significant Imaging:   Imaging Results          US Carotid Bilateral (Final result)  Result time 02/09/19 17:56:59    Final result by Patrice Colvin MD (02/09/19 17:56:59)                 Impression:      Impression:    1. Calcific plaque in the right internal carotid artery causing a 50-69% luminal diameter stenosis.  2. Calcific atherosclerotic plaque in the left internal carotid artery causing a 50-69% luminal diameter stenosis.  Stenosis of 50-69%-validated velocity measurements with angiographic measurements, velocity criteria are extrapolated from diameter data as defined by the Society of Radiologists in Ultrasound Consensus Conference Radiology 2003; 229;340-346.      Electronically signed by: Patrice Colvin MD  Date:    02/09/2019  Time:    17:56             Narrative:    EXAMINATION:  US CAROTID BILATERAL    CLINICAL HISTORY:  Syncope and collapse    COMPARISON:  None    FINDINGS:  Sonographic evaluation of the carotid systems was performed.    There is some marginal shadowing calcific plaque in the origin of the right internal carotid artery.  There is some shadowing calcific plaque in the origin of the left internal carotid artery.    The peak systolic velocity in the right internal carotid artery was approximately 126 cm/sec.  The right ICA to CCA peak systolic velocity measurement is 1.7.    The peak systolic velocity in the left internal carotid artery was approximately 147 cm/sec.  The left ICA to CCA peak systolic velocity ratio is 1.4.    Antegrade flow noted in both vertebral arteries.                               CT Head Without Contrast (Final result)  Result time 02/09/19 17:31:36    Final result by Patrice Colvin MD (02/09/19 17:31:36)                 Impression:      1. No acute intracranial  process.  2. Bilaterally symmetric white matter disease as described above which can be seen with chronic small vessel ischemic changes of the white matter.  All CT scans at this facility are performed  using dose modulation techniques as appropriate to performed exam including the following:  automated exposure control; adjustment of mA and/or kV according to the patients size (this includes techniques or standardized protocols for targeted exams where dose is matched to indication/reason for exam: i.e. extremities or head);  iterative reconstruction technique.      Electronically signed by: Patrice Colvin MD  Date:    02/09/2019  Time:    17:31             Narrative:    EXAMINATION:  CT HEAD WITHOUT CONTRAST    CLINICAL HISTORY:  Confusion/delirium, altered LOC, unexplained;    TECHNIQUE:  Axial CT imaging was performed through the head without intravenous contrast.    COMPARISON:  None    FINDINGS:  No hydrocephalus, midline shift, mass effect, or acute intracranial hemorrhage. There is symmetric marked low density seen within the subcortical and periventricular white matter.  Basilar cisterns and posterior fossa are normal. The mastoid air cells are clear.  There is mucosal thickening throughout the ethmoid sinuses, sphenoid sinuses, and maxillary sinuses.  There are small air-fluid levels in the bilateral maxillary sinuses.  The skull is intact.                               X-Ray Chest AP Portable (Final result)  Result time 02/09/19 16:41:00    Final result by Patrice Colvin MD (02/09/19 16:41:00)                 Impression:      A chest CT can be performed for further evaluation of the possible nodule/infiltrates in the right upper lobe.      Electronically signed by: Patrice Colvin MD  Date:    02/09/2019  Time:    16:41             Narrative:    EXAMINATION:  XR CHEST AP PORTABLE    CLINICAL HISTORY:  near syncope;    COMPARISON:  None.    FINDINGS:  There is a focal area of subtle increased density which  overlies the costal cartilage of the right 1st rib which may be some heavy calcification of the cartilage or could be a true pulmonary nodule.  There is some subtle reticular nodular opacity in the periphery of the right upper lobe.  Left lung appears clear.  No pleural effusion or pneumothorax.  Normal heart size.                               I have reviewed all pertinent imaging results/findings within the past 24 hours.     EKG: (personally reviewed)  Normal sinus rhythm, unchanged septal T wave inversions, no acute ischemic ST-T changes.  No significant change from previous tracings.

## 2019-02-10 NOTE — ASSESSMENT & PLAN NOTE
- Carotid bruit noted in 8/2018 during cardiology evaluation.  Carotid US and TTE were ordered, however, patient failed to complete testing.  - Will obtain 2D echo and carotid US.

## 2019-02-10 NOTE — PROGRESS NOTES
02/10/19 1123 02/10/19 1125 02/10/19 1127   Vital Signs   Pulse 65 71 84   SpO2 --  (!) 91 % --    O2 Device (Oxygen Therapy) --  room air --    BP (!) 92/51 (!) 95/51 (!) 81/51   MAP (mmHg) 68 67 62   Patient Position Lying Sitting Standing     Notified ASHLEY Solomon NP that of pt's orthostatic BP results. Okay to discharge per SHAWNA Solomon. Confirmed through read back.

## 2019-02-10 NOTE — HPI
"Ms. Esquivel is a 57 yo female with a PMHx of HLD, right carotid bruit, and tobacco use, who presented to the ED with c/o intermittent lightheadedness that has progressively worsened over the past 2 days.  Associated fatigue.  Aggravated by positional changes, no alleviating factors.  She reports lightheadedness occurs when she goes from sitting to standing position, and feels like she is off balance like she is "about to pass out".  Episodes last ~5-10 minutes before spontaneously resolving.  Denies any HA, AMS, visual disturbances, dizziness/vertigo, syncope, focal deficits, weakness, CP, palpitations, SOB, N/V/D, dysuria, hematuria, back pain, neck pain or stiffness, rhinorrhea, PND, ear pain or drainage, sore throat, sinus pressure, diaphoresis, fever, or chills.  Patient was evaluated by Dr. Goldstein (Cardiology) in 8/2018 for similar symptoms, and was noted to have a right carotid bruit.  Carotid US and TTE were ordered, but patient failed to complete testing.  She has not f/u with Cardiology or PCP since 8/2018.  Work-up in ED resulted Na 131, cr. 1.2, BUN 21, troponin negative, CXR unremarkable, CT of head negative for acute process, EKG unrevealing.  Patient received 2L IVF bolus in ED.  Hospital Medicine was called for admission.  Currently, patient appears comfortable in NAD.  Denies any lightheadedness at present.       "

## 2019-02-10 NOTE — H&P
"[facility] Hospital Medicine  History & Physical    Patient Name: Iza Esquivel  MRN: [id number]  Admission Date: 2/9/2019  Attending Physician: Renan Fabian MD   Primary Care Provider: Park Gomez MD         Patient information was obtained from patient, past medical records and ER records.     Subjective:     Principal Problem:Near syncope    Chief Complaint:   Chief Complaint   Patient presents with    Dizziness     reports dizziness with fatigue        HPI: Ms. Esquivel is a 59 yo female  with a PMHx of HLD, right carotid bruit, and tobacco use, who presented to the ED with c/o intermittent lightheadedness that has progressively worsened over the past 2 days.  Associated fatigue.  Aggravated by positional changes, no alleviating factors.  She reports lightheadedness occurs when she goes from sitting to standing position, and feels like she is off balance like she is "about to pass out".  Episodes last ~5-10 minutes before spontaneously resolving.  Denies any HA, AMS, visual disturbances, dizziness/vertigo, syncope, focal deficits, weakness, CP, palpitations, SOB, N/V/D, dysuria, hematuria, back pain, neck pain or stiffness, rhinorrhea, PND, ear pain or drainage, sore throat, sinus pressure, diaphoresis, fever, or chills.  Patient was evaluated by Dr. Goldstein (Cardiology) in 8/2018 for similar symptoms, and was noted to have a right carotid bruit.  Carotid US and TTE were ordered, but patient failed to complete testing.  She has not f/u with Cardiology or PCP since 8/2018.  Work-up in ED resulted Na 131, cr. 1.2, BUN 21, troponin negative, CXR unremarkable, CT of head negative for acute process, EKG unrevealing.  Patient received 2L IVF bolus in ED.  Hospital Medicine was called for admission.  Currently, patient appears comfortable in NAD.  Denies any lightheadedness at present.         Past Medical History:   Diagnosis Date    Cervical cancer     Hyperlipidemia     Right carotid " bruit        Past Surgical History:   Procedure Laterality Date    LYMPH NODE BIOPSY         Review of patient's allergies indicates:  No Known Allergies    No current facility-administered medications on file prior to encounter.      Current Outpatient Medications on File Prior to Encounter   Medication Sig    aspirin (ECOTRIN) 81 MG EC tablet Take 1 tablet (81 mg total) by mouth once daily.    pravastatin (PRAVACHOL) 40 MG tablet Take 1 tablet (40 mg total) by mouth once daily.     Family History     Problem Relation (Age of Onset)    Cancer Mother, Father    TIA, SOCORRO, CEA Father    Diabetes Brother    Hypertension Brother        Tobacco Use    Smoking status: Current Every Day Smoker     Packs/day: 1.00     Years: 40.00     Pack years: 40.00    Smokeless tobacco: Never Used   Substance and Sexual Activity    Alcohol use: No     Frequency: Never    Drug use: Not on file    Sexual activity: No     Review of Systems   Constitutional: Positive for fatigue. Negative for activity change, chills, diaphoresis, fever and unexpected weight change.   HENT: Negative for congestion and sore throat.    Eyes: Negative for photophobia and visual disturbance.   Respiratory: Negative for cough, shortness of breath and wheezing.    Cardiovascular: Negative for chest pain, palpitations and leg swelling.   Gastrointestinal: Negative for abdominal distention, abdominal pain, blood in stool, constipation, diarrhea, nausea and vomiting.   Genitourinary: Negative for difficulty urinating, dysuria, frequency, hematuria and urgency.   Musculoskeletal: Negative for arthralgias, back pain, gait problem, myalgias, neck pain and neck stiffness.   Skin: Negative for pallor, rash and wound.   Neurological: Positive for light-headedness. Negative for dizziness, tremors, syncope, facial asymmetry, speech difficulty, weakness, numbness and headaches.   Psychiatric/Behavioral: Negative for confusion. The patient is not nervous/anxious.     All other systems reviewed and are negative.    Objective:     Vital Signs (Most Recent):  Temp: 98.8 °F (37.1 °C) (02/09/19 1556)  Pulse: 74 (02/09/19 1901)  Resp: 20 (02/09/19 1901)  BP: (!) 125/59 (02/09/19 1901)  SpO2: 100 % (02/09/19 1901) Vital Signs (24h Range):  Temp:  [98.8 °F (37.1 °C)] 98.8 °F (37.1 °C)  Pulse:  [72-93] 74  Resp:  [20-28] 20  SpO2:  [93 %-100 %] 100 %  BP: (112-133)/(57-66) 125/59     Weight: 66.3 kg (146 lb 0.9 oz)  Body mass index is 25.07 kg/m².    Physical Exam   Constitutional: She is oriented to person, place, and time. She appears well-developed and well-nourished. No distress.   HENT:   Head: Normocephalic and atraumatic.   Eyes: Conjunctivae are normal.   PERRL; EOM intact.   Neck: Normal range of motion. Neck supple. Normal carotid pulses and no JVD present. Carotid bruit is present (right).   Cardiovascular: Normal rate, regular rhythm, S1 normal, S2 normal and intact distal pulses.  No extrasystoles are present. Exam reveals no gallop and no friction rub.   No murmur heard.  Pulses:       Carotid pulses are 2+ on the right side, and 2+ on the left side.       Radial pulses are 2+ on the right side, and 2+ on the left side.        Dorsalis pedis pulses are 2+ on the right side, and 2+ on the left side.        Posterior tibial pulses are 2+ on the right side, and 2+ on the left side.   Pulmonary/Chest: Effort normal and breath sounds normal. No accessory muscle usage. No tachypnea. No respiratory distress. She has no wheezes. She has no rhonchi. She has no rales.   Abdominal: Soft. Bowel sounds are normal. She exhibits no distension. There is no tenderness. There is no rebound, no guarding and no CVA tenderness.   Musculoskeletal: Normal range of motion. She exhibits no edema, tenderness or deformity.   Neurological: She is alert and oriented to person, place, and time. She has normal strength. No cranial nerve deficit or sensory deficit. Coordination and gait normal. GCS eye  subscore is 4. GCS verbal subscore is 5. GCS motor subscore is 6.   No acute focal deficits appreciated.    Skin: Skin is warm, dry and intact. Capillary refill takes less than 2 seconds. No rash noted. She is not diaphoretic. No cyanosis or erythema.   Psychiatric: She has a normal mood and affect. Her speech is normal and behavior is normal. Cognition and memory are normal.   Nursing note and vitals reviewed.          Significant Labs:   Results for orders placed or performed during the hospital encounter of 02/09/19   APTT   Result Value Ref Range    aPTT 33.7 (H) 21.0 - 32.0 sec   Brain natriuretic peptide   Result Value Ref Range    BNP 13 0 - 99 pg/mL   Magnesium   Result Value Ref Range    Magnesium 2.2 1.6 - 2.6 mg/dL   CBC auto differential   Result Value Ref Range    WBC 9.80 3.90 - 12.70 K/uL    RBC 4.25 4.00 - 5.40 M/uL    Hemoglobin 12.5 12.0 - 16.0 g/dL    Hematocrit 38.1 37.0 - 48.5 %    MCV 90 82 - 98 fL    MCH 29.4 27.0 - 31.0 pg    MCHC 32.8 32.0 - 36.0 g/dL    RDW 14.6 (H) 11.5 - 14.5 %    Platelets 253 150 - 350 K/uL    MPV 10.7 9.2 - 12.9 fL    Gran # (ANC) 8.0 (H) 1.8 - 7.7 K/uL    Lymph # 1.2 1.0 - 4.8 K/uL    Mono # 0.7 0.3 - 1.0 K/uL    Eos # 0.0 0.0 - 0.5 K/uL    Baso # 0.02 0.00 - 0.20 K/uL    Gran% 81.2 (H) 38.0 - 73.0 %    Lymph% 12.0 (L) 18.0 - 48.0 %    Mono% 6.6 4.0 - 15.0 %    Eosinophil% 0.0 0.0 - 8.0 %    Basophil% 0.2 0.0 - 1.9 %    Differential Method Automated    Comprehensive metabolic panel   Result Value Ref Range    Sodium 131 (L) 136 - 145 mmol/L    Potassium 3.8 3.5 - 5.1 mmol/L    Chloride 96 95 - 110 mmol/L    CO2 25 23 - 29 mmol/L    Glucose 94 70 - 110 mg/dL    BUN, Bld 21 (H) 6 - 20 mg/dL    Creatinine 1.2 0.5 - 1.4 mg/dL    Calcium 9.2 8.7 - 10.5 mg/dL    Total Protein 7.5 6.0 - 8.4 g/dL    Albumin 3.6 3.5 - 5.2 g/dL    Total Bilirubin 0.3 0.1 - 1.0 mg/dL    Alkaline Phosphatase 112 55 - 135 U/L    AST 46 (H) 10 - 40 U/L    ALT 15 10 - 44 U/L    Anion Gap 10 8 - 16  mmol/L    eGFR if African American 57 (A) >60 mL/min/1.73 m^2    eGFR if non African American 49 (A) >60 mL/min/1.73 m^2   Drug screen panel, emergency   Result Value Ref Range    Benzodiazepines Negative     Methadone metabolites Negative     Cocaine (Metab.) Negative     Opiate Scrn, Ur Negative     Barbiturate Screen, Ur Negative     Amphetamine Screen, Ur Negative     THC Negative     Phencyclidine Negative     Creatinine, Random Ur 229.0 15.0 - 325.0 mg/dL    Toxicology Information SEE COMMENT    Ethanol   Result Value Ref Range    Alcohol, Medical, Serum <10 <10 mg/dL   Protime-INR   Result Value Ref Range    Prothrombin Time 10.8 9.0 - 12.5 sec    INR 1.0 0.8 - 1.2   Salicylate level   Result Value Ref Range    Salicylate Lvl <5.0 (L) 15.0 - 30.0 mg/dL   Troponin I   Result Value Ref Range    Troponin I 0.014 0.000 - 0.026 ng/mL   TSH   Result Value Ref Range    TSH 4.399 (H) 0.400 - 4.000 uIU/mL   Urinalysis, Reflex to Urine Culture Urine, Clean Catch   Result Value Ref Range    Specimen UA Urine, Clean Catch     Color, UA Yellow Yellow, Straw, [place]    Appearance, UA Clear Clear    pH, UA 6.0 5.0 - 8.0    Specific Gravity, UA >=1.030 (A) 1.005 - 1.030    Protein, UA Trace (A) Negative    Glucose, UA Negative Negative    Ketones, UA Trace (A) Negative    Bilirubin (UA) Negative Negative    Occult Blood UA Negative Negative    Nitrite, UA Negative Negative    Urobilinogen, UA Negative <2.0 EU/dL    Leukocytes, UA Negative Negative   Acetaminophen level   Result Value Ref Range    Acetaminophen (Tylenol), Serum <3.0 (L) 10.0 - 20.0 ug/mL   T4, free   Result Value Ref Range    Free T4 0.71 0.71 - 1.51 ng/dL   ISTAT PROCEDURE   Result Value Ref Range    POC PH 7.452 (H) 7.35 - 7.45    POC PCO2 32.2 (L) 35 - 45 mmHg    POC PO2 57 (LL) 80 - 100 mmHg    POC HCO3 22.5 (L) 24 - 28 mmol/L    POC BE -1 -2 to 2 mmol/L    POC SATURATED O2 91 (L) 95 - 100 %    Sample ARTERIAL     Site LR     Allens Test Pass     DelSys  Room Air     Mode SPONT       All pertinent labs within the past 24 hours have been reviewed.    Significant Imaging:   Imaging Results          US Carotid Bilateral (Final result)  Result time 02/09/19 17:56:59    Final result by Patrice Colvin MD (02/09/19 17:56:59)                 Impression:      Impression:    1. Calcific plaque in the right internal carotid artery causing a 50-69% luminal diameter stenosis.  2. Calcific atherosclerotic plaque in the left internal carotid artery causing a 50-69% luminal diameter stenosis.  Stenosis of 50-69%-validated velocity measurements with angiographic measurements, velocity criteria are extrapolated from diameter data as defined by the Society of Radiologists in Ultrasound Consensus Conference Radiology 2003; 229;340-346.      Electronically signed by: Patrice Colvin MD  Date:    02/09/2019  Time:    17:56             Narrative:    EXAMINATION:  US CAROTID BILATERAL    CLINICAL HISTORY:  Syncope and collapse    COMPARISON:  None    FINDINGS:  Sonographic evaluation of the carotid systems was performed.    There is some marginal shadowing calcific plaque in the origin of the right internal carotid artery.  There is some shadowing calcific plaque in the origin of the left internal carotid artery.    The peak systolic velocity in the right internal carotid artery was approximately 126 cm/sec.  The right ICA to CCA peak systolic velocity measurement is 1.7.    The peak systolic velocity in the left internal carotid artery was approximately 147 cm/sec.  The left ICA to CCA peak systolic velocity ratio is 1.4.    Antegrade flow noted in both vertebral arteries.                               CT Head Without Contrast (Final result)  Result time 02/09/19 17:31:36    Final result by Patrice Colvin MD (02/09/19 17:31:36)                 Impression:      1. No acute intracranial process.  2. Bilaterally symmetric white matter disease as described above which can be seen with chronic  small vessel ischemic changes of the white matter.  All CT scans at this facility are performed  using dose modulation techniques as appropriate to performed exam including the following:  automated exposure control; adjustment of mA and/or kV according to the patients size (this includes techniques or standardized protocols for targeted exams where dose is matched to indication/reason for exam: i.e. extremities or head);  iterative reconstruction technique.      Electronically signed by: Patrice Colvin MD  Date:    02/09/2019  Time:    17:31             Narrative:    EXAMINATION:  CT HEAD WITHOUT CONTRAST    CLINICAL HISTORY:  Confusion/delirium, altered LOC, unexplained;    TECHNIQUE:  Axial CT imaging was performed through the head without intravenous contrast.    COMPARISON:  None    FINDINGS:  No hydrocephalus, midline shift, mass effect, or acute intracranial hemorrhage. There is symmetric marked low density seen within the subcortical and periventricular white matter.  Basilar cisterns and posterior fossa are normal. The mastoid air cells are clear.  There is mucosal thickening throughout the ethmoid sinuses, sphenoid sinuses, and maxillary sinuses.  There are small air-fluid levels in the bilateral maxillary sinuses.  The skull is intact.                               X-Ray Chest AP Portable (Final result)  Result time 02/09/19 16:41:00    Final result by Patrice Colvin MD (02/09/19 16:41:00)                 Impression:      A chest CT can be performed for further evaluation of the possible nodule/infiltrates in the right upper lobe.      Electronically signed by: Patrice Colvin MD  Date:    02/09/2019  Time:    16:41             Narrative:    EXAMINATION:  XR CHEST AP PORTABLE    CLINICAL HISTORY:  near syncope;    COMPARISON:  None.    FINDINGS:  There is a focal area of subtle increased density which overlies the costal cartilage of the right 1st rib which may be some heavy calcification of the cartilage or  could be a true pulmonary nodule.  There is some subtle reticular nodular opacity in the periphery of the right upper lobe.  Left lung appears clear.  No pleural effusion or pneumothorax.  Normal heart size.                               I have reviewed all pertinent imaging results/findings within the past 24 hours.     EKG: (personally reviewed)  Normal sinus rhythm, unchanged septal T wave inversions, no acute ischemic ST-T changes.  No significant change from previous tracings.           Assessment/Plan:     * Near syncope    - Appears to be a chronic issue over the past ~7 months.  Possible orthostasis vs. SOCORRO?  - CT of head negative for acute process.  - Check orthostatic VS.  - IV hydration.  - Neuro checks.  - Carotid US and TTE.     Acute renal insufficiency    - Initial cr. 1.2 (mildly up from 0.8 in 8/2018) GFR 49, likely secondary to mild dehydration.  - Gentle IV hydration.  - Repeat BMP in AM.     Hyponatremia    - Initial Na 131, likely due to dehydration.  - IV hydration.  - Repeat BMP in AM.     Bruit of right carotid artery    - Carotid bruit noted in 8/2018 during cardiology evaluation.  Carotid US and TTE were ordered, however, patient failed to complete testing.  - Will obtain 2D echo and carotid US.     Mixed hyperlipidemia    - Continue statin.  - Repeat FLP.     Tobacco abuse    - Counseled on cessation.  - Nicotine patch.       VTE Risk Mitigation (From admission, onward)        Ordered     enoxaparin injection 40 mg  Daily      02/09/19 2042     IP VTE LOW RISK PATIENT  Once      02/09/19 1828     Place sequential compression device  Until discontinued      02/09/19 1828             Chela Trejo NP  Department of Hospital Medicine   [facility]

## 2019-05-14 DIAGNOSIS — Z12.11 COLON CANCER SCREENING: ICD-10-CM

## 2019-10-29 ENCOUNTER — PATIENT OUTREACH (OUTPATIENT)
Dept: ADMINISTRATIVE | Facility: HOSPITAL | Age: 59
End: 2019-10-29

## 2019-12-06 ENCOUNTER — PATIENT OUTREACH (OUTPATIENT)
Dept: ADMINISTRATIVE | Facility: HOSPITAL | Age: 59
End: 2019-12-06

## 2019-12-27 ENCOUNTER — PATIENT OUTREACH (OUTPATIENT)
Dept: ADMINISTRATIVE | Facility: HOSPITAL | Age: 59
End: 2019-12-27

## 2020-01-22 ENCOUNTER — PATIENT OUTREACH (OUTPATIENT)
Dept: ADMINISTRATIVE | Facility: HOSPITAL | Age: 60
End: 2020-01-22

## 2020-03-12 ENCOUNTER — PATIENT OUTREACH (OUTPATIENT)
Dept: ADMINISTRATIVE | Facility: HOSPITAL | Age: 60
End: 2020-03-12

## 2020-05-25 ENCOUNTER — HOSPITAL ENCOUNTER (INPATIENT)
Facility: HOSPITAL | Age: 60
LOS: 1 days | Discharge: HOME | DRG: 247 | End: 2020-05-26
Attending: EMERGENCY MEDICINE | Admitting: INTERNAL MEDICINE
Payer: COMMERCIAL

## 2020-05-25 DIAGNOSIS — I21.3 STEMI (ST ELEVATION MYOCARDIAL INFARCTION): Primary | ICD-10-CM

## 2020-05-25 DIAGNOSIS — I25.10 CAD (CORONARY ARTERY DISEASE): ICD-10-CM

## 2020-05-25 DIAGNOSIS — R07.9 CHEST PAIN: ICD-10-CM

## 2020-05-25 DIAGNOSIS — I21.3 ST ELEVATION MYOCARDIAL INFARCTION (STEMI), UNSPECIFIED ARTERY: ICD-10-CM

## 2020-05-25 PROBLEM — I70.219 ATHEROSCLEROTIC PVD WITH INTERMITTENT CLAUDICATION: Status: ACTIVE | Noted: 2020-05-25

## 2020-05-25 LAB
ALBUMIN SERPL BCP-MCNC: 3.2 G/DL (ref 3.5–5.2)
ALP SERPL-CCNC: 126 U/L (ref 55–135)
ALT SERPL W/O P-5'-P-CCNC: 11 U/L (ref 10–44)
ANION GAP SERPL CALC-SCNC: 10 MMOL/L (ref 8–16)
ANISOCYTOSIS BLD QL SMEAR: SLIGHT
AST SERPL-CCNC: 21 U/L (ref 10–40)
BASOPHILS # BLD AUTO: 0.08 K/UL (ref 0–0.2)
BASOPHILS NFR BLD: 0.7 % (ref 0–1.9)
BILIRUB SERPL-MCNC: 0.1 MG/DL (ref 0.1–1)
BUN SERPL-MCNC: 7 MG/DL (ref 8–23)
CALCIUM SERPL-MCNC: 8.8 MG/DL (ref 8.7–10.5)
CATH EF QUANTITATIVE: 55 %
CHLORIDE SERPL-SCNC: 105 MMOL/L (ref 95–110)
CO2 SERPL-SCNC: 27 MMOL/L (ref 23–29)
CREAT SERPL-MCNC: 1.1 MG/DL (ref 0.5–1.4)
DACRYOCYTES BLD QL SMEAR: ABNORMAL
DIFFERENTIAL METHOD: ABNORMAL
EOSINOPHIL # BLD AUTO: 0.1 K/UL (ref 0–0.5)
EOSINOPHIL NFR BLD: 0.7 % (ref 0–8)
ERYTHROCYTE [DISTWIDTH] IN BLOOD BY AUTOMATED COUNT: 13.7 % (ref 11.5–14.5)
EST. GFR  (AFRICAN AMERICAN): >60 ML/MIN/1.73 M^2
EST. GFR  (NON AFRICAN AMERICAN): 54 ML/MIN/1.73 M^2
GLUCOSE SERPL-MCNC: 106 MG/DL (ref 70–110)
HCT VFR BLD AUTO: 35.5 % (ref 37–48.5)
HGB BLD-MCNC: 11.1 G/DL (ref 12–16)
IMM GRANULOCYTES # BLD AUTO: 0.04 K/UL (ref 0–0.04)
IMM GRANULOCYTES NFR BLD AUTO: 0.3 % (ref 0–0.5)
INR PPP: 1 (ref 0.8–1.2)
LYMPHOCYTES # BLD AUTO: 4.2 K/UL (ref 1–4.8)
LYMPHOCYTES NFR BLD: 34.1 % (ref 18–48)
MCH RBC QN AUTO: 28.7 PG (ref 27–31)
MCHC RBC AUTO-ENTMCNC: 31.3 G/DL (ref 32–36)
MCV RBC AUTO: 92 FL (ref 82–98)
MONOCYTES # BLD AUTO: 0.7 K/UL (ref 0.3–1)
MONOCYTES NFR BLD: 5.7 % (ref 4–15)
NEUTROPHILS # BLD AUTO: 7.1 K/UL (ref 1.8–7.7)
NEUTROPHILS NFR BLD: 58.5 % (ref 38–73)
NRBC BLD-RTO: 0 /100 WBC
PLATELET # BLD AUTO: 358 K/UL (ref 150–350)
PLATELET BLD QL SMEAR: ABNORMAL
PMV BLD AUTO: 10.1 FL (ref 9.2–12.9)
POC ACTIVATED CLOTTING TIME K: 186 SEC (ref 74–137)
POC ACTIVATED CLOTTING TIME K: 268 SEC (ref 74–137)
POIKILOCYTOSIS BLD QL SMEAR: SLIGHT
POTASSIUM SERPL-SCNC: 3.3 MMOL/L (ref 3.5–5.1)
PROT SERPL-MCNC: 6.9 G/DL (ref 6–8.4)
PROTHROMBIN TIME: 10.5 SEC (ref 9–12.5)
RBC # BLD AUTO: 3.87 M/UL (ref 4–5.4)
SAMPLE: ABNORMAL
SAMPLE: ABNORMAL
SARS-COV-2 RDRP RESP QL NAA+PROBE: NEGATIVE
SODIUM SERPL-SCNC: 142 MMOL/L (ref 136–145)
SPHEROCYTES BLD QL SMEAR: ABNORMAL
STOMATOCYTES BLD QL SMEAR: PRESENT
TROPONIN I SERPL DL<=0.01 NG/ML-MCNC: 1.64 NG/ML (ref 0–0.03)
WBC # BLD AUTO: 12.18 K/UL (ref 3.9–12.7)

## 2020-05-25 PROCEDURE — 25000003 PHARM REV CODE 250

## 2020-05-25 PROCEDURE — 93458 PR CATH PLACE/CORON ANGIO, IMG SUPER/INTERP,W LEFT HEART VENTRICULOGRAPHY: ICD-10-PCS | Mod: 26,59,51, | Performed by: INTERNAL MEDICINE

## 2020-05-25 PROCEDURE — 63600175 PHARM REV CODE 636 W HCPCS: Mod: JG | Performed by: INTERNAL MEDICINE

## 2020-05-25 PROCEDURE — 85025 COMPLETE CBC W/AUTO DIFF WBC: CPT

## 2020-05-25 PROCEDURE — U0002 COVID-19 LAB TEST NON-CDC: HCPCS

## 2020-05-25 PROCEDURE — C1874 STENT, COATED/COV W/DEL SYS: HCPCS | Performed by: INTERNAL MEDICINE

## 2020-05-25 PROCEDURE — 99291 CRITICAL CARE FIRST HOUR: CPT | Mod: 25

## 2020-05-25 PROCEDURE — 75625 CONTRAST EXAM ABDOMINL AORTA: CPT | Mod: 26,59,, | Performed by: INTERNAL MEDICINE

## 2020-05-25 PROCEDURE — 93010 EKG 12-LEAD: ICD-10-PCS | Mod: 59,,, | Performed by: INTERNAL MEDICINE

## 2020-05-25 PROCEDURE — 36415 COLL VENOUS BLD VENIPUNCTURE: CPT

## 2020-05-25 PROCEDURE — 85347 COAGULATION TIME ACTIVATED: CPT | Performed by: INTERNAL MEDICINE

## 2020-05-25 PROCEDURE — 92941 PRQ TRLML REVSC TOT OCCL AMI: CPT | Mod: LC,,, | Performed by: INTERNAL MEDICINE

## 2020-05-25 PROCEDURE — 25000003 PHARM REV CODE 250: Performed by: INTERNAL MEDICINE

## 2020-05-25 PROCEDURE — 99152 PR MOD CONSCIOUS SEDATION, SAME PHYS, 5+ YRS, FIRST 15 MIN: ICD-10-PCS | Mod: ,,, | Performed by: INTERNAL MEDICINE

## 2020-05-25 PROCEDURE — C1769 GUIDE WIRE: HCPCS | Performed by: INTERNAL MEDICINE

## 2020-05-25 PROCEDURE — 75625 CONTRAST EXAM ABDOMINL AORTA: CPT | Mod: 59 | Performed by: INTERNAL MEDICINE

## 2020-05-25 PROCEDURE — 75716 PR  ANGIO EXTERMITY BILAT: ICD-10-PCS | Mod: 26,59,, | Performed by: INTERNAL MEDICINE

## 2020-05-25 PROCEDURE — 84484 ASSAY OF TROPONIN QUANT: CPT

## 2020-05-25 PROCEDURE — 25000003 PHARM REV CODE 250: Performed by: EMERGENCY MEDICINE

## 2020-05-25 PROCEDURE — 99153 MOD SED SAME PHYS/QHP EA: CPT | Performed by: INTERNAL MEDICINE

## 2020-05-25 PROCEDURE — 63600175 PHARM REV CODE 636 W HCPCS: Performed by: INTERNAL MEDICINE

## 2020-05-25 PROCEDURE — 75716 ARTERY X-RAYS ARMS/LEGS: CPT | Mod: 59 | Performed by: INTERNAL MEDICINE

## 2020-05-25 PROCEDURE — 93010 ELECTROCARDIOGRAM REPORT: CPT | Mod: 59,76,, | Performed by: INTERNAL MEDICINE

## 2020-05-25 PROCEDURE — 93010 ELECTROCARDIOGRAM REPORT: CPT | Mod: 59,,, | Performed by: INTERNAL MEDICINE

## 2020-05-25 PROCEDURE — C9606 PERC D-E COR REVASC W AMI S: HCPCS | Mod: LC | Performed by: INTERNAL MEDICINE

## 2020-05-25 PROCEDURE — 99152 MOD SED SAME PHYS/QHP 5/>YRS: CPT | Performed by: INTERNAL MEDICINE

## 2020-05-25 PROCEDURE — 99223 PR INITIAL HOSPITAL CARE,LEVL III: ICD-10-PCS | Mod: 25,,, | Performed by: INTERNAL MEDICINE

## 2020-05-25 PROCEDURE — 75625 PR  ANGIO AORTOGRAM ABD SERIAL: ICD-10-PCS | Mod: 26,59,, | Performed by: INTERNAL MEDICINE

## 2020-05-25 PROCEDURE — 93458 L HRT ARTERY/VENTRICLE ANGIO: CPT | Mod: 59 | Performed by: INTERNAL MEDICINE

## 2020-05-25 PROCEDURE — 27201423 OPTIME MED/SURG SUP & DEVICES STERILE SUPPLY: Performed by: INTERNAL MEDICINE

## 2020-05-25 PROCEDURE — 63600175 PHARM REV CODE 636 W HCPCS: Mod: JG

## 2020-05-25 PROCEDURE — 93005 ELECTROCARDIOGRAM TRACING: CPT

## 2020-05-25 PROCEDURE — 92941 PR AMI ANY METHOD: ICD-10-PCS | Mod: LC,,, | Performed by: INTERNAL MEDICINE

## 2020-05-25 PROCEDURE — C1887 CATHETER, GUIDING: HCPCS | Performed by: INTERNAL MEDICINE

## 2020-05-25 PROCEDURE — 99223 1ST HOSP IP/OBS HIGH 75: CPT | Mod: 25,,, | Performed by: INTERNAL MEDICINE

## 2020-05-25 PROCEDURE — 75716 ARTERY X-RAYS ARMS/LEGS: CPT | Mod: 26,59,, | Performed by: INTERNAL MEDICINE

## 2020-05-25 PROCEDURE — 25500020 PHARM REV CODE 255

## 2020-05-25 PROCEDURE — 93458 L HRT ARTERY/VENTRICLE ANGIO: CPT | Mod: 26,59,51, | Performed by: INTERNAL MEDICINE

## 2020-05-25 PROCEDURE — 85610 PROTHROMBIN TIME: CPT

## 2020-05-25 PROCEDURE — 80053 COMPREHEN METABOLIC PANEL: CPT

## 2020-05-25 PROCEDURE — 99152 MOD SED SAME PHYS/QHP 5/>YRS: CPT | Mod: ,,, | Performed by: INTERNAL MEDICINE

## 2020-05-25 PROCEDURE — C1725 CATH, TRANSLUMIN NON-LASER: HCPCS | Performed by: INTERNAL MEDICINE

## 2020-05-25 PROCEDURE — 20000000 HC ICU ROOM

## 2020-05-25 PROCEDURE — C1894 INTRO/SHEATH, NON-LASER: HCPCS | Performed by: INTERNAL MEDICINE

## 2020-05-25 DEVICE — STENT RONYX30018UX RESOLUTE ONYX 3.00X18
Type: IMPLANTABLE DEVICE | Site: CORONARY | Status: FUNCTIONAL
Brand: RESOLUTE ONYX™

## 2020-05-25 RX ORDER — ASPIRIN 81 MG/1
81 TABLET ORAL DAILY
Status: DISCONTINUED | OUTPATIENT
Start: 2020-05-26 | End: 2020-05-26 | Stop reason: HOSPADM

## 2020-05-25 RX ORDER — HEPARIN SODIUM 1000 [USP'U]/ML
INJECTION INTRAVENOUS; SUBCUTANEOUS
Status: DISCONTINUED | OUTPATIENT
Start: 2020-05-25 | End: 2020-05-25

## 2020-05-25 RX ORDER — SODIUM CHLORIDE 9 MG/ML
INJECTION, SOLUTION INTRAVENOUS
Status: DISCONTINUED | OUTPATIENT
Start: 2020-05-25 | End: 2020-05-25

## 2020-05-25 RX ORDER — MIDAZOLAM HYDROCHLORIDE 1 MG/ML
INJECTION, SOLUTION INTRAMUSCULAR; INTRAVENOUS
Status: DISCONTINUED | OUTPATIENT
Start: 2020-05-25 | End: 2020-05-25

## 2020-05-25 RX ORDER — LIDOCAINE HYDROCHLORIDE 20 MG/ML
INJECTION, SOLUTION EPIDURAL; INFILTRATION; INTRACAUDAL; PERINEURAL
Status: DISCONTINUED | OUTPATIENT
Start: 2020-05-25 | End: 2020-05-25

## 2020-05-25 RX ORDER — ACETAMINOPHEN 325 MG/1
650 TABLET ORAL EVERY 6 HOURS PRN
Status: DISCONTINUED | OUTPATIENT
Start: 2020-05-25 | End: 2020-05-26 | Stop reason: HOSPADM

## 2020-05-25 RX ORDER — METOPROLOL TARTRATE 25 MG/1
25 TABLET, FILM COATED ORAL 2 TIMES DAILY
Status: DISCONTINUED | OUTPATIENT
Start: 2020-05-25 | End: 2020-05-26 | Stop reason: HOSPADM

## 2020-05-25 RX ORDER — TIROFIBAN HYDROCHLORIDE 50 UG/ML
0.07 INJECTION INTRAVENOUS CONTINUOUS
Status: ACTIVE | OUTPATIENT
Start: 2020-05-25 | End: 2020-05-26

## 2020-05-25 RX ORDER — TIROFIBAN HYDROCHLORIDE 50 UG/ML
INJECTION INTRAVENOUS
Status: DISCONTINUED | OUTPATIENT
Start: 2020-05-25 | End: 2020-05-25

## 2020-05-25 RX ORDER — DIPHENHYDRAMINE HYDROCHLORIDE 50 MG/ML
INJECTION INTRAMUSCULAR; INTRAVENOUS
Status: DISCONTINUED | OUTPATIENT
Start: 2020-05-25 | End: 2020-05-25

## 2020-05-25 RX ORDER — FENTANYL CITRATE 50 UG/ML
INJECTION, SOLUTION INTRAMUSCULAR; INTRAVENOUS
Status: DISCONTINUED | OUTPATIENT
Start: 2020-05-25 | End: 2020-05-25

## 2020-05-25 RX ORDER — ATORVASTATIN CALCIUM 40 MG/1
80 TABLET, FILM COATED ORAL DAILY
Status: DISCONTINUED | OUTPATIENT
Start: 2020-05-26 | End: 2020-05-26 | Stop reason: HOSPADM

## 2020-05-25 RX ORDER — SODIUM CHLORIDE 9 MG/ML
INJECTION, SOLUTION INTRAVENOUS CONTINUOUS
Status: ACTIVE | OUTPATIENT
Start: 2020-05-25 | End: 2020-05-26

## 2020-05-25 RX ORDER — TIROFIBAN HYDROCHLORIDE 50 UG/ML
25 INJECTION INTRAVENOUS ONCE
Status: COMPLETED | OUTPATIENT
Start: 2020-05-25 | End: 2020-05-25

## 2020-05-25 RX ADMIN — TIROFIBAN 0.07 MCG/KG/MIN: 5 INJECTION, SOLUTION INTRAVENOUS at 09:05

## 2020-05-25 RX ADMIN — TIROFIBAN 1655 MCG: 5 INJECTION, SOLUTION INTRAVENOUS at 09:05

## 2020-05-25 RX ADMIN — SODIUM CHLORIDE: 0.9 INJECTION, SOLUTION INTRAVENOUS at 09:05

## 2020-05-25 RX ADMIN — METOPROLOL TARTRATE 25 MG: 25 TABLET ORAL at 09:05

## 2020-05-25 RX ADMIN — NITROGLYCERIN 1 INCH: 20 OINTMENT TOPICAL at 07:05

## 2020-05-25 NOTE — Clinical Note
210 ml injected throughout the case. 140 mL total wasted during the case. 350 mL total used in the case.

## 2020-05-26 ENCOUNTER — TELEPHONE (OUTPATIENT)
Dept: CARDIOLOGY | Facility: HOSPITAL | Age: 60
End: 2020-05-26

## 2020-05-26 VITALS
TEMPERATURE: 99 F | WEIGHT: 146 LBS | DIASTOLIC BLOOD PRESSURE: 59 MMHG | SYSTOLIC BLOOD PRESSURE: 125 MMHG | OXYGEN SATURATION: 96 % | HEIGHT: 64 IN | HEART RATE: 65 BPM | RESPIRATION RATE: 21 BRPM | BODY MASS INDEX: 24.92 KG/M2

## 2020-05-26 LAB
ANION GAP SERPL CALC-SCNC: 10 MMOL/L (ref 8–16)
AORTIC ROOT ANNULUS: 3.06 CM
ASCENDING AORTA: 2.67 CM
AV INDEX (PROSTH): 0.81
AV MEAN GRADIENT: 4 MMHG
AV PEAK GRADIENT: 8 MMHG
AV VALVE AREA: 2.18 CM2
AV VELOCITY RATIO: 0.74
BASOPHILS # BLD AUTO: 0.08 K/UL (ref 0–0.2)
BASOPHILS NFR BLD: 0.7 % (ref 0–1.9)
BILIRUB UR QL STRIP: NEGATIVE
BSA FOR ECHO PROCEDURE: 1.73 M2
BUN SERPL-MCNC: 7 MG/DL (ref 8–23)
CALCIUM SERPL-MCNC: 8.5 MG/DL (ref 8.7–10.5)
CHLORIDE SERPL-SCNC: 104 MMOL/L (ref 95–110)
CHOLEST SERPL-MCNC: 164 MG/DL (ref 120–199)
CHOLEST/HDLC SERPL: 3.7 {RATIO} (ref 2–5)
CLARITY UR: CLEAR
CO2 SERPL-SCNC: 27 MMOL/L (ref 23–29)
COLOR UR: YELLOW
CREAT SERPL-MCNC: 0.8 MG/DL (ref 0.5–1.4)
CV ECHO LV RWT: 0.54 CM
DIFFERENTIAL METHOD: ABNORMAL
DOP CALC AO PEAK VEL: 1.42 M/S
DOP CALC AO VTI: 28.94 CM
DOP CALC LVOT AREA: 2.7 CM2
DOP CALC LVOT DIAMETER: 1.85 CM
DOP CALC LVOT PEAK VEL: 1.05 M/S
DOP CALC LVOT STROKE VOLUME: 63.16 CM3
DOP CALCLVOT PEAK VEL VTI: 23.51 CM
E WAVE DECELERATION TIME: 181.13 MSEC
E/A RATIO: 0.84
E/E' RATIO: 9.8 M/S
ECHO LV POSTERIOR WALL: 1.16 CM (ref 0.6–1.1)
EOSINOPHIL # BLD AUTO: 0 K/UL (ref 0–0.5)
EOSINOPHIL NFR BLD: 0.1 % (ref 0–8)
ERYTHROCYTE [DISTWIDTH] IN BLOOD BY AUTOMATED COUNT: 13.9 % (ref 11.5–14.5)
EST. GFR  (AFRICAN AMERICAN): >60 ML/MIN/1.73 M^2
EST. GFR  (NON AFRICAN AMERICAN): >60 ML/MIN/1.73 M^2
FRACTIONAL SHORTENING: 30 % (ref 28–44)
GLUCOSE SERPL-MCNC: 100 MG/DL (ref 70–110)
GLUCOSE UR QL STRIP: NEGATIVE
HCT VFR BLD AUTO: 35 % (ref 37–48.5)
HDLC SERPL-MCNC: 44 MG/DL (ref 40–75)
HDLC SERPL: 26.8 % (ref 20–50)
HGB BLD-MCNC: 10.6 G/DL (ref 12–16)
HGB UR QL STRIP: NEGATIVE
IMM GRANULOCYTES # BLD AUTO: 0.05 K/UL (ref 0–0.04)
IMM GRANULOCYTES NFR BLD AUTO: 0.4 % (ref 0–0.5)
INTERVENTRICULAR SEPTUM: 1.1 CM (ref 0.6–1.1)
IVRT: 88.49 MSEC
KETONES UR QL STRIP: NEGATIVE
LA MAJOR: 4.34 CM
LA MINOR: 3.43 CM
LA WIDTH: 3.83 CM
LDLC SERPL CALC-MCNC: 87.8 MG/DL (ref 63–159)
LEFT ATRIUM SIZE: 2.42 CM
LEFT ATRIUM VOLUME INDEX: 17.6 ML/M2
LEFT ATRIUM VOLUME: 30.19 CM3
LEFT INTERNAL DIMENSION IN SYSTOLE: 3.01 CM (ref 2.1–4)
LEFT VENTRICLE DIASTOLIC VOLUME INDEX: 47.79 ML/M2
LEFT VENTRICLE DIASTOLIC VOLUME: 81.78 ML
LEFT VENTRICLE MASS INDEX: 98 G/M2
LEFT VENTRICLE SYSTOLIC VOLUME INDEX: 20.6 ML/M2
LEFT VENTRICLE SYSTOLIC VOLUME: 35.18 ML
LEFT VENTRICULAR INTERNAL DIMENSION IN DIASTOLE: 4.27 CM (ref 3.5–6)
LEFT VENTRICULAR MASS: 167.49 G
LEUKOCYTE ESTERASE UR QL STRIP: NEGATIVE
LV LATERAL E/E' RATIO: 9.8 M/S
LV SEPTAL E/E' RATIO: 9.8 M/S
LYMPHOCYTES # BLD AUTO: 2.4 K/UL (ref 1–4.8)
LYMPHOCYTES NFR BLD: 19.9 % (ref 18–48)
MCH RBC QN AUTO: 28.3 PG (ref 27–31)
MCHC RBC AUTO-ENTMCNC: 30.3 G/DL (ref 32–36)
MCV RBC AUTO: 93 FL (ref 82–98)
MONOCYTES # BLD AUTO: 0.5 K/UL (ref 0.3–1)
MONOCYTES NFR BLD: 4.3 % (ref 4–15)
MV PEAK A VEL: 1.17 M/S
MV PEAK E VEL: 0.98 M/S
NEUTROPHILS # BLD AUTO: 8.9 K/UL (ref 1.8–7.7)
NEUTROPHILS NFR BLD: 74.6 % (ref 38–73)
NITRITE UR QL STRIP: NEGATIVE
NONHDLC SERPL-MCNC: 120 MG/DL
NRBC BLD-RTO: 0 /100 WBC
PH UR STRIP: 7 [PH] (ref 5–8)
PISA TR MAX VEL: 2.44 M/S
PLATELET # BLD AUTO: 360 K/UL (ref 150–350)
PMV BLD AUTO: 10.4 FL (ref 9.2–12.9)
POC ACTIVATED CLOTTING TIME K: 142 SEC (ref 74–137)
POC ACTIVATED CLOTTING TIME K: 164 SEC (ref 74–137)
POC ACTIVATED CLOTTING TIME K: 219 SEC (ref 74–137)
POTASSIUM SERPL-SCNC: 3.7 MMOL/L (ref 3.5–5.1)
PROT UR QL STRIP: NEGATIVE
PULM VEIN S/D RATIO: 1.36
PV PEAK D VEL: 0.47 M/S
PV PEAK S VEL: 0.64 M/S
RA MAJOR: 3.14 CM
RA PRESSURE: 3 MMHG
RBC # BLD AUTO: 3.75 M/UL (ref 4–5.4)
SAMPLE: ABNORMAL
SINUS: 2.97 CM
SODIUM SERPL-SCNC: 141 MMOL/L (ref 136–145)
SP GR UR STRIP: <=1.005 (ref 1–1.03)
STJ: 2.81 CM
TDI LATERAL: 0.1 M/S
TDI SEPTAL: 0.1 M/S
TDI: 0.1 M/S
TR MAX PG: 24 MMHG
TRICUSPID ANNULAR PLANE SYSTOLIC EXCURSION: 2.39 CM
TRIGL SERPL-MCNC: 161 MG/DL (ref 30–150)
TROPONIN I SERPL DL<=0.01 NG/ML-MCNC: 5.05 NG/ML (ref 0–0.03)
TROPONIN I SERPL DL<=0.01 NG/ML-MCNC: 6.07 NG/ML (ref 0–0.03)
TV REST PULMONARY ARTERY PRESSURE: 27 MMHG
URN SPEC COLLECT METH UR: ABNORMAL
UROBILINOGEN UR STRIP-ACNC: NEGATIVE EU/DL
WBC # BLD AUTO: 11.93 K/UL (ref 3.9–12.7)

## 2020-05-26 PROCEDURE — 93010 EKG 12-LEAD: ICD-10-PCS | Mod: ,,, | Performed by: INTERNAL MEDICINE

## 2020-05-26 PROCEDURE — 93005 ELECTROCARDIOGRAM TRACING: CPT

## 2020-05-26 PROCEDURE — 84484 ASSAY OF TROPONIN QUANT: CPT | Mod: 91

## 2020-05-26 PROCEDURE — 99238 PR HOSPITAL DISCHARGE DAY,<30 MIN: ICD-10-PCS | Mod: ,,, | Performed by: INTERNAL MEDICINE

## 2020-05-26 PROCEDURE — 25000003 PHARM REV CODE 250: Performed by: INTERNAL MEDICINE

## 2020-05-26 PROCEDURE — 81003 URINALYSIS AUTO W/O SCOPE: CPT

## 2020-05-26 PROCEDURE — 80048 BASIC METABOLIC PNL TOTAL CA: CPT

## 2020-05-26 PROCEDURE — 25000003 PHARM REV CODE 250: Performed by: PHYSICIAN ASSISTANT

## 2020-05-26 PROCEDURE — 99238 HOSP IP/OBS DSCHRG MGMT 30/<: CPT | Mod: ,,, | Performed by: INTERNAL MEDICINE

## 2020-05-26 PROCEDURE — 85025 COMPLETE CBC W/AUTO DIFF WBC: CPT

## 2020-05-26 PROCEDURE — 36415 COLL VENOUS BLD VENIPUNCTURE: CPT

## 2020-05-26 PROCEDURE — 99233 SBSQ HOSP IP/OBS HIGH 50: CPT | Mod: ,,, | Performed by: PHYSICIAN ASSISTANT

## 2020-05-26 PROCEDURE — 99233 PR SUBSEQUENT HOSPITAL CARE,LEVL III: ICD-10-PCS | Mod: ,,, | Performed by: PHYSICIAN ASSISTANT

## 2020-05-26 PROCEDURE — 93010 ELECTROCARDIOGRAM REPORT: CPT | Mod: ,,, | Performed by: INTERNAL MEDICINE

## 2020-05-26 PROCEDURE — 80061 LIPID PANEL: CPT

## 2020-05-26 RX ORDER — ATORVASTATIN CALCIUM 80 MG/1
80 TABLET, FILM COATED ORAL DAILY
Qty: 90 TABLET | Refills: 3 | Status: SHIPPED | OUTPATIENT
Start: 2020-05-27 | End: 2020-06-18 | Stop reason: SDUPTHER

## 2020-05-26 RX ORDER — METOPROLOL TARTRATE 25 MG/1
25 TABLET, FILM COATED ORAL 2 TIMES DAILY
Qty: 60 TABLET | Refills: 11 | Status: SHIPPED | OUTPATIENT
Start: 2020-05-26 | End: 2020-06-18 | Stop reason: SDUPTHER

## 2020-05-26 RX ORDER — FAMOTIDINE 20 MG/1
20 TABLET, FILM COATED ORAL 2 TIMES DAILY
Status: DISCONTINUED | OUTPATIENT
Start: 2020-05-26 | End: 2020-05-26 | Stop reason: HOSPADM

## 2020-05-26 RX ORDER — ENOXAPARIN SODIUM 100 MG/ML
40 INJECTION SUBCUTANEOUS EVERY 24 HOURS
Status: DISCONTINUED | OUTPATIENT
Start: 2020-05-26 | End: 2020-05-26 | Stop reason: HOSPADM

## 2020-05-26 RX ORDER — LOSARTAN POTASSIUM 25 MG/1
25 TABLET ORAL DAILY
Qty: 90 TABLET | Refills: 3 | Status: SHIPPED | OUTPATIENT
Start: 2020-05-27 | End: 2020-06-18 | Stop reason: SDUPTHER

## 2020-05-26 RX ADMIN — FAMOTIDINE 20 MG: 20 TABLET ORAL at 09:05

## 2020-05-26 RX ADMIN — METOPROLOL TARTRATE 25 MG: 25 TABLET ORAL at 09:05

## 2020-05-26 RX ADMIN — ASPIRIN 81 MG: 81 TABLET, COATED ORAL at 09:05

## 2020-05-26 RX ADMIN — SODIUM CHLORIDE: 0.9 INJECTION, SOLUTION INTRAVENOUS at 02:05

## 2020-05-26 RX ADMIN — TICAGRELOR 90 MG: 90 TABLET ORAL at 09:05

## 2020-05-26 RX ADMIN — LOSARTAN POTASSIUM 25 MG: 25 TABLET, FILM COATED ORAL at 01:05

## 2020-05-26 RX ADMIN — ATORVASTATIN CALCIUM 80 MG: 40 TABLET, FILM COATED ORAL at 09:05

## 2020-05-26 NOTE — ASSESSMENT & PLAN NOTE
5/25:   Status post left heart catheterization  100% occlusion of left circumflex corrected with PCI   2 vessel coronary artery disease  Currently on aspirin and Brilinta  High-intensity statin  Currently on Aggrastat infusion   metoprolol b.i.d.

## 2020-05-26 NOTE — SUBJECTIVE & OBJECTIVE
Review of Systems   Constitutional: Negative for fever and malaise/fatigue.   HENT: Negative for sinus pain and sore throat.    Respiratory: Negative for cough, shortness of breath and wheezing.    Cardiovascular: Negative for chest pain, palpitations, claudication and leg swelling.   Gastrointestinal: Negative for abdominal pain, nausea and vomiting.   Genitourinary: Negative for dysuria and urgency.   Musculoskeletal: Negative for back pain and neck pain.   Neurological: Negative for sensory change, weakness and headaches.   Psychiatric/Behavioral: The patient is not nervous/anxious and does not have insomnia.          Objective:     Vital Signs (Most Recent):  Temp: 98 °F (36.7 °C) (05/26/20 0715)  Pulse: 67 (05/26/20 0800)  Resp: (!) 22 (05/26/20 0800)  BP: (!) 163/73 (05/26/20 0800)  SpO2: 99 % (05/26/20 0800) Vital Signs (24h Range):  Temp:  [98 °F (36.7 °C)-98.4 °F (36.9 °C)] 98 °F (36.7 °C)  Pulse:  [53-84] 67  Resp:  [13-33] 22  SpO2:  [98 %-100 %] 99 %  BP: (103-166)/(51-99) 163/73     Weight: 66.2 kg (146 lb)  Body mass index is 25.06 kg/m².      Intake/Output Summary (Last 24 hours) at 5/26/2020 0839  Last data filed at 5/26/2020 0800  Gross per 24 hour   Intake 966.61 ml   Output 825 ml   Net 141.61 ml       Physical Exam   Constitutional: She is oriented to person, place, and time. She appears well-developed and well-nourished.   HENT:   Head: Normocephalic and atraumatic.   Eyes: Pupils are equal, round, and reactive to light. Conjunctivae and EOM are normal.   Neck: Normal range of motion.   Cardiovascular: Normal rate, regular rhythm, normal heart sounds and intact distal pulses. Exam reveals no friction rub.   No murmur heard.  Pulmonary/Chest: Effort normal and breath sounds normal. No respiratory distress. She has no wheezes. She has no rales.   Abdominal: Soft. Bowel sounds are normal. She exhibits no distension. There is no tenderness.   Musculoskeletal: Normal range of motion. She exhibits no  edema or deformity.   Neurological: She is alert and oriented to person, place, and time. No cranial nerve deficit.   Skin: Skin is warm and dry. No rash noted.   Psychiatric: She has a normal mood and affect. Her behavior is normal. Judgment and thought content normal.   Nursing note and vitals reviewed.      Lines/Drains/Airways     Peripheral Intravenous Line                 Peripheral IV - Single Lumen 05/25/20 1917 20 G Right Hand less than 1 day         Peripheral IV - Single Lumen 05/25/20 1919 18 G Left Hand less than 1 day                Significant Labs:    CBC/Anemia Profile:  Recent Labs   Lab 05/25/20 1927 05/26/20  0203   WBC 12.18 11.93   HGB 11.1* 10.6*   HCT 35.5* 35.0*   * 360*   MCV 92 93   RDW 13.7 13.9        Chemistries:  Recent Labs   Lab 05/25/20 1927 05/26/20  0203    141   K 3.3* 3.7    104   CO2 27 27   BUN 7* 7*   CREATININE 1.1 0.8   CALCIUM 8.8 8.5*   ALBUMIN 3.2*  --    PROT 6.9  --    BILITOT 0.1  --    ALKPHOS 126  --    ALT 11  --    AST 21  --        All pertinent labs within the past 24 hours have been reviewed.    Significant Imaging:  I have reviewed all pertinent imaging results/findings within the past 24 hours.

## 2020-05-26 NOTE — CONSULTS
[facility] -   Critical Care Medicine  Consult Note    Patient Name: Iza Esquivel  MRN: [id number]  Admission Date: 5/25/2020  Hospital Length of Stay: 0 days  Code Status: Full Code  Attending Physician: Shelly Jarvis MD   Primary Care Provider: Provider Notinsystem   Principal Problem: STEMI (ST elevation myocardial infarction)      Subjective:     HPI:  Patient is a 59-year-old  female with no reported PMH who presents to the Emergency Department for chest pain, onset 3 days PTA.  Patient reports symptoms have been on and off for the past 2-3 months however worsened on Friday.  She states that she went to the ED in [place], [place] and was discharged home from the ED. Symptoms initially improved however starting earlier today began to progressively worsened.  She reports that pain is substernal and radiates to the right arm.  Describes pain as being both sharp and dull in nature without any alleviating or exacerbating factors.  She was given ASA and 2 doses of nitro EN route to the hospital. Patient is a current smoker and reports smoking 1 pack per day since she was 10.    In the ED, EKG was concerning for ST elevation in lateral leads and troponin: 1.6.  Code STEMI was activated and she was taken to cath lab.  Catheterization showed 100% occlusion of left circumflex artery which was corrected with PCI.  Patient admitted to ICU further monitoring.              Hospital/ICU Course:  No notes on file    Past Medical History:   Diagnosis Date    Atherosclerotic PVD with intermittent claudication 5/25/2020    Cervical cancer     Hyperlipidemia     Right carotid bruit        Past Surgical History:   Procedure Laterality Date    LYMPH NODE BIOPSY         Review of patient's allergies indicates:  No Known Allergies    Family History     Problem Relation (Age of Onset)    Cancer Mother, Father    Diabetes Brother    Hypertension Brother    Transient ischemic attack Father        Tobacco Use     Smoking status: Current Every Day Smoker     Packs/day: 1.00     Years: 40.00     Pack years: 40.00    Smokeless tobacco: Never Used   Substance and Sexual Activity    Alcohol use: No     Frequency: Never    Drug use: Not on file    Sexual activity: Never         Review of Systems   Constitutional: Negative for fatigue and fever.   HENT: Negative for sinus pressure.    Eyes: Negative for visual disturbance.   Respiratory: Negative for shortness of breath.    Cardiovascular: Positive for chest pain.   Gastrointestinal: Negative for nausea and vomiting.   Genitourinary: Negative for difficulty urinating.   Musculoskeletal: Negative for back pain.   Skin: Negative for rash.   Neurological: Negative for headaches.     Objective:     Vital Signs (Most Recent):  Temp: 98.3 °F (36.8 °C) (05/25/20 2200)  Pulse: 70 (05/25/20 2200)  Resp: (!) 21 (05/25/20 2200)  BP: (!) 145/62 (05/25/20 2200)  SpO2: 100 % (05/25/20 2200) Vital Signs (24h Range):  Temp:  [98.2 °F (36.8 °C)-98.4 °F (36.9 °C)] 98.3 °F (36.8 °C)  Pulse:  [70-84] 70  Resp:  [18-33] 21  SpO2:  [98 %-100 %] 100 %  BP: (116-166)/(62-99) 145/62     Weight: 66.2 kg (146 lb)  Body mass index is 25.06 kg/m².    No intake or output data in the 24 hours ending 05/25/20 2217    Physical Exam   Constitutional: She is oriented to person, place, and time. She appears well-developed and well-nourished. She is cooperative. No distress.   HENT:   Head: Normocephalic and atraumatic.   Eyes: Pupils are equal, round, and reactive to light. Conjunctivae are normal. Right eye exhibits no discharge. Left eye exhibits no discharge.   Neck: Normal range of motion. No thyromegaly present.   Cardiovascular: Normal rate, regular rhythm and normal heart sounds. Exam reveals no gallop and no friction rub.   No murmur heard.  Pulmonary/Chest: Effort normal and breath sounds normal. No stridor. No respiratory distress. She has no wheezes. She has no rales.   Abdominal: Soft. Bowel sounds  are normal. She exhibits no distension and no mass. There is no tenderness. There is no guarding.   Musculoskeletal: Normal range of motion. She exhibits no edema.   Neurological: She is alert and oriented to person, place, and time.   Skin: Skin is warm. She is not diaphoretic. No erythema.   Psychiatric: She has a normal mood and affect. Her behavior is normal. Judgment and thought content normal.       Vents:       Lines/Drains/Airways     Peripheral Intravenous Line                 Peripheral IV - Single Lumen 05/25/20 1917 20 G Right Hand less than 1 day         Peripheral IV - Single Lumen 05/25/20 1919 18 G Left Hand less than 1 day                Significant Labs:    CBC/Anemia Profile:  Recent Labs   Lab 05/25/20 1927   WBC 12.18   HGB 11.1*   HCT 35.5*   *   MCV 92   RDW 13.7        Chemistries:  Recent Labs   Lab 05/25/20 1927      K 3.3*      CO2 27   BUN 7*   CREATININE 1.1   CALCIUM 8.8   ALBUMIN 3.2*   PROT 6.9   BILITOT 0.1   ALKPHOS 126   ALT 11   AST 21       Results for orders placed or performed during the hospital encounter of 05/25/20   CBC auto differential   Result Value Ref Range    WBC 12.18 3.90 - 12.70 K/uL    RBC 3.87 (L) 4.00 - 5.40 M/uL    Hemoglobin 11.1 (L) 12.0 - 16.0 g/dL    Hematocrit 35.5 (L) 37.0 - 48.5 %    Mean Corpuscular Volume 92 82 - 98 fL    Mean Corpuscular Hemoglobin 28.7 27.0 - 31.0 pg    Mean Corpuscular Hemoglobin Conc 31.3 (L) 32.0 - 36.0 g/dL    RDW 13.7 11.5 - 14.5 %    Platelets 358 (H) 150 - 350 K/uL    MPV 10.1 9.2 - 12.9 fL    Immature Granulocytes 0.3 0.0 - 0.5 %    Gran # (ANC) 7.1 1.8 - 7.7 K/uL    Immature Grans (Abs) 0.04 0.00 - 0.04 K/uL    Lymph # 4.2 1.0 - 4.8 K/uL    Mono # 0.7 0.3 - 1.0 K/uL    Eos # 0.1 0.0 - 0.5 K/uL    Baso # 0.08 0.00 - 0.20 K/uL    nRBC 0 0 /100 WBC    Gran% 58.5 38.0 - 73.0 %    Lymph% 34.1 18.0 - 48.0 %    Mono% 5.7 4.0 - 15.0 %    Eosinophil% 0.7 0.0 - 8.0 %    Basophil% 0.7 0.0 - 1.9 %    Platelet  Estimate Appears normal     Aniso Slight     Poik Slight     Tear Drop Cells Occasional     Stomatocytes Present     Spherocytes Occasional     Differential Method Automated    Comprehensive metabolic panel   Result Value Ref Range    Sodium 142 136 - 145 mmol/L    Potassium 3.3 (L) 3.5 - 5.1 mmol/L    Chloride 105 95 - 110 mmol/L    CO2 27 23 - 29 mmol/L    Glucose 106 70 - 110 mg/dL    BUN, Bld 7 (L) 8 - 23 mg/dL    Creatinine 1.1 0.5 - 1.4 mg/dL    Calcium 8.8 8.7 - 10.5 mg/dL    Total Protein 6.9 6.0 - 8.4 g/dL    Albumin 3.2 (L) 3.5 - 5.2 g/dL    Total Bilirubin 0.1 0.1 - 1.0 mg/dL    Alkaline Phosphatase 126 55 - 135 U/L    AST 21 10 - 40 U/L    ALT 11 10 - 44 U/L    Anion Gap 10 8 - 16 mmol/L    eGFR if African American >60 >60 mL/min/1.73 m^2    eGFR if non African American 54 (A) >60 mL/min/1.73 m^2   Protime-INR   Result Value Ref Range    Prothrombin Time 10.5 9.0 - 12.5 sec    INR 1.0 0.8 - 1.2   Troponin I   Result Value Ref Range    Troponin I 1.643 (H) 0.000 - 0.026 ng/mL   COVID-19 Rapid Screening   Result Value Ref Range    SARS-CoV-2 RNA, Amplification, Qual Negative Negative   Cardiac catheterization   Result Value Ref Range    Cath EF Quantitative 55 %   ISTAT ACT-K   Result Value Ref Range    POC ACTIVATED CLOTTING TIME K 186 (H) 74 - 137 sec    Sample unknown    ISTAT ACT-K   Result Value Ref Range    POC ACTIVATED CLOTTING TIME K 268 (H) 74 - 137 sec    Sample unknown         Significant Imaging:   Cardiac catheterization  · LVEDP (Pre): 16  · The left ventricular systolic function is normal.  · LVEDP (Post): 18  · LV end diastolic pressure is elevated.  · The ejection fraction is calculated to be 55%.  · The left ventricular ejection fraction is grossly normal.  · No aortic valve stenosis.  · No mitral valve stenosis.  · No mitral valve prolapse evident. The annulus is normal. There is normal   mitral valve motion.  · Mid Cx lesion , 100% stenosed reduced to 0%..  · A stent was successfully  placed at 14 GREGORIO for 20 sec.  · Prox R AMADEO to Mid R AMADEO lesion , 90% stenosed.  · Mid L AMADEO to Dist L AMADEO lesion , 80% stenosed.  · Mid R CFA to Dist R CFA lesion , 50% stenosed.  · Prox L SFA lesion , 100% stenosed.  · Estimated blood loss: none  · Two vessel coronary artery disease.  · Successful PCI.     I certify that I was present for catheter insertion, catheter   manipulation, angiography, and angiographic interpretation of this   patient.    Procedure Log documented by Documenter: Wendi Card RN and verified by   Shabana Jarvis MD.    Date: 5/25/2020  Time: 9:04 PM          ABG  No results for input(s): PH, PO2, PCO2, HCO3, BE in the last 168 hours.  Assessment/Plan:     Cardiac/Vascular  * STEMI (ST elevation myocardial infarction)   5/25:   Status post left heart catheterization  100% occlusion of left circumflex corrected with PCI   2 vessel coronary artery disease  Currently on aspirin and Brilinta  High-intensity statin  Currently on Aggrastat infusion   metoprolol b.i.d.      Atherosclerotic PVD with intermittent claudication  5/25:    100% occluded L SFA   currently on DATP and statin   will need outpatient follow-up    Mixed hyperlipidemia   5/25:   Lipitor 80 mg daily    Other  Chest pain   5/25:   See STEMI    Tobacco abuse   5/25:   Long smoking history  Counseled on cessation  May need nicotine patch        Critical Care Daily Checklist:    A: Awake: RASS Goal/Actual Goal:    Actual:     B: Spontaneous Breathing Trial Performed?     C: SAT & SBT Coordinated?  no                      D: Delirium: CAM-ICU     E: Early Mobility Performed? No   F: Feeding Goal:    Status:     Current Diet Order   Procedures    Diet Cardiac      AS: Analgesia/Sedation Tylenol PRN   T: Thromboembolic Prophylaxis aggrastat   H: HOB > 300 No   U: Stress Ulcer Prophylaxis (if needed) none   G: Glucose Control none   B: Bowel Function     I: Indwelling Catheter (Lines & Villegas) Necessity peripheral   D: De-escalation of  Antimicrobials/Pharmacotherapies aggrastat drip    Plan for the day/ETD Monitor in ICU overnight, possible step down in am    Code Status:  Family/Goals of Care: Full Code       Critical Care Time: 60 minutes  Critical secondary to Patient has a condition that poses threat to life and bodily function: Acute Myocardial Infarction     Critical care was time spent personally by me on the following activities: development of treatment plan with patient or surrogate and bedside caregivers, discussions with consultants, evaluation of patient's response to treatment, examination of patient, ordering and performing treatments and interventions, ordering and review of laboratory studies, ordering and review of radiographic studies, pulse oximetry, re-evaluation of patient's condition. This critical care time did not overlap with that of any other provider or involve time for any procedures.    Thank you for your consult. I will follow-up with patient. Please contact us if you have any additional questions.     Brett Sorenson MD  Critical Care Medicine  [facility]

## 2020-05-26 NOTE — DISCHARGE SUMMARY
[facility] -   Cardiology  Discharge Summary      Patient Name: Iza Esquivel  MRN: [id number]  Admission Date: 5/25/2020  Hospital Length of Stay: 1 days  Discharge Date and Time:  05/26/2020 3:15 PM  Attending Physician: Shelly Jarvis MD    Discharging Provider: Erica Dunne PA-C  Primary Care Physician: Urszula Notinsystem    HPI:   A 60 yo female with tobacco use cervical cancer hlp presents with stutterng symptom sof chest pain and shortness of breath since Friday that was aggravated today associated with rt arm pain that was severe she presented via [email or web address] showed posterolateral stemi .she was taken emergently to cath lab.she has also limiting claudication no hsitory of bleeding or bruising.    Procedure(s) (LRB):  CATHETERIZATION, HEART, LEFT (Left)  Angioplasty-coronary  Repair, Chronic Total Occlusion, Coronary  Stent, Drug Eluting, Single Vessel, Coronary  Aortogram, Abdominal (N/A)     Indwelling Lines/Drains at time of discharge:  Lines/Drains/Airways     None                 Hospital Course:  5/26/2020-Patient seen and examined today, s/p [facility] with successful PCI of LCX. Feels well. No chest pain or SOB. Labs reviewed. Troponin 1.643>6.072, repeat pending. Echo ordered. Needs carotid U/S and BLE arterial U/S/GONZÁLEZ.    Patient seen and examined today by myself and Dr. Jarvis and deemed suitable for discharge. Ambulated in ICU without any issues. Troponin trending down. Carotid U/S and BLE arterial U/S reviewed and results discussed with patient. She was educated about post-cath restrictions and importance of medication compliance. She will be discharged home on ASA, Brilinta, Losartan, BB, and statin and will need to follow-up with Dr. Jarvis in 1 week. Counseled on smoking cessation. Cardiac rehab to be addressed as OP.    Consults:   Consults (From admission, onward)        Status Ordering Provider     Inpatient consult to Pulmonology  Once     Provider:  Brett Sorenson MD    Completed  KEVAN MIRAMONTES     Inpatient consult to Social Work  Once     Provider:  (Not yet assigned)    Acknowledged MATILDA HAND          Significant Diagnostic Studies: LHC, EKG, labs    Pending Diagnostic Studies:     None          Final Active Diagnoses:    Diagnosis Date Noted POA    PRINCIPAL PROBLEM:  STEMI (ST elevation myocardial infarction) [I21.3] 05/25/2020 Yes    Chest pain [R07.9] 05/25/2020 Yes    Atherosclerotic PVD with intermittent claudication [I70.219] 05/25/2020 Yes    Mixed hyperlipidemia [E78.2] 08/21/2018 Yes     Chronic    Bruit of right carotid artery [R09.89] 08/21/2018 Yes    Tobacco abuse [Z72.0] 08/20/2018 Yes     Chronic      Problems Resolved During this Admission:     No new Assessment & Plan notes have been filed under this hospital service since the last note was generated.  Service: Cardiology      Discharged Condition: good    Disposition: Home or Self Care    Follow Up:  Follow-up Information     Shabana Miramontes MD In 1 week.    Specialty:  Cardiology  Contact information:  [address]  [place] [address] [phone]                 Patient Instructions:      Other restrictions (specify):   Order Comments: No heavy bending or lifting  No strenuous activity  Showers only x 5 days; no baths  Not cleared to return to work until seen in clinic     Notify your health care provider if you experience any of the following:  temperature >100.4     Notify your health care provider if you experience any of the following:  persistent nausea and vomiting or diarrhea     Notify your health care provider if you experience any of the following:  severe uncontrolled pain     Notify your health care provider if you experience any of the following:  redness, tenderness, or signs of infection (pain, swelling, redness, odor or green/yellow discharge around incision site)     Notify your health care provider if you experience any of the following:  difficulty breathing or increased  cough     Notify your health care provider if you experience any of the following:  severe persistent headache     Notify your health care provider if you experience any of the following:  worsening rash     Notify your health care provider if you experience any of the following:  persistent dizziness, light-headedness, or visual disturbances     Notify your health care provider if you experience any of the following:  increased confusion or weakness     Notify your health care provider if you experience any of the following:     Remove dressing in 24 hours     Activity as tolerated     Medications:  Reconciled Home Medications:      Medication List      START taking these medications    atorvastatin 80 MG tablet  Commonly known as:  LIPITOR  Take 1 tablet (80 mg total) by mouth once daily.  Start taking on:  May 27, 2020  Replaces:  pravastatin 40 MG tablet     losartan 25 MG tablet  Commonly known as:  COZAAR  Take 1 tablet (25 mg total) by mouth once daily.  Start taking on:  May 27, 2020     metoprolol tartrate 25 MG tablet  Commonly known as:  LOPRESSOR  Take 1 tablet (25 mg total) by mouth 2 (two) times daily.     ticagrelor 90 mg tablet  Commonly known as:  BRILINTA  Take 1 tablet (90 mg total) by mouth 2 (two) times daily.        CONTINUE taking these medications    aspirin 81 MG EC tablet  Commonly known as:  ECOTRIN  Take 1 tablet (81 mg total) by mouth once daily.        STOP taking these medications    pravastatin 40 MG tablet  Commonly known as:  PRAVACHOL  Replaced by:  atorvastatin 80 MG tablet            Time spent on the discharge of patient: 25 minutes    Erica Dunne PA-C  Cardiology  [facility]

## 2020-05-26 NOTE — SUBJECTIVE & OBJECTIVE
Review of Systems   Constitution: Negative.   HENT: Negative.    Eyes: Negative.    Cardiovascular: Positive for claudication.   Respiratory: Negative.    Endocrine: Negative.    Hematologic/Lymphatic: Negative.    Skin: Negative.    Musculoskeletal: Positive for arthritis.   Gastrointestinal: Negative.    Genitourinary: Negative.    Neurological: Negative.    Psychiatric/Behavioral: Negative.    Allergic/Immunologic: Negative.      Objective:     Vital Signs (Most Recent):  Temp: 98 °F (36.7 °C) (05/26/20 0715)  Pulse: 70 (05/26/20 0939)  Resp: (!) 22 (05/26/20 0800)  BP: 132/66 (05/26/20 0939)  SpO2: 99 % (05/26/20 0800) Vital Signs (24h Range):  Temp:  [98 °F (36.7 °C)-98.4 °F (36.9 °C)] 98 °F (36.7 °C)  Pulse:  [53-84] 70  Resp:  [13-33] 22  SpO2:  [98 %-100 %] 99 %  BP: (103-166)/(51-99) 132/66     Weight: 66.2 kg (146 lb)  Body mass index is 25.06 kg/m².     SpO2: 99 %  O2 Device (Oxygen Therapy): nasal cannula      Intake/Output Summary (Last 24 hours) at 5/26/2020 1051  Last data filed at 5/26/2020 0800  Gross per 24 hour   Intake 966.61 ml   Output 825 ml   Net 141.61 ml       Lines/Drains/Airways     Peripheral Intravenous Line                 Peripheral IV - Single Lumen 05/25/20 1917 20 G Right Hand less than 1 day         Peripheral IV - Single Lumen 05/25/20 1919 18 G Left Hand less than 1 day                Physical Exam   Constitutional: She is oriented to person, place, and time. She appears well-developed and well-nourished. No distress.   HENT:   Head: Normocephalic and atraumatic.   Eyes: Pupils are equal, round, and reactive to light. Right eye exhibits no discharge. Left eye exhibits no discharge.   Neck: Neck supple. No JVD present.   Cardiovascular: Normal rate, regular rhythm, S1 normal, S2 normal, normal heart sounds and intact distal pulses.   No murmur heard.  Pulses:       Carotid pulses are on the right side with bruit.  Pulmonary/Chest: Effort normal and breath sounds normal. No  respiratory distress. She has no wheezes. She has no rales.   Abdominal: Soft. She exhibits no distension.   Musculoskeletal: She exhibits no edema.   Neurological: She is alert and oriented to person, place, and time.   Skin: Skin is warm and dry. She is not diaphoretic. No erythema.   Right groin access site C/D/I; no bleeding erythema or drainage   Psychiatric: She has a normal mood and affect. Her behavior is normal. Thought content normal.   Nursing note and vitals reviewed.      Significant Labs:   CMP   Recent Labs   Lab 05/25/20 1927 05/26/20  0203    141   K 3.3* 3.7    104   CO2 27 27    100   BUN 7* 7*   CREATININE 1.1 0.8   CALCIUM 8.8 8.5*   PROT 6.9  --    ALBUMIN 3.2*  --    BILITOT 0.1  --    ALKPHOS 126  --    AST 21  --    ALT 11  --    ANIONGAP 10 10   ESTGFRAFRICA >60 >60   EGFRNONAA 54* >60   , CBC   Recent Labs   Lab 05/25/20 1927 05/26/20  0203   WBC 12.18 11.93   HGB 11.1* 10.6*   HCT 35.5* 35.0*   * 360*   , Troponin   Recent Labs   Lab 05/25/20 1927 05/26/20  0203   TROPONINI 1.643* 6.072*    and All pertinent lab results from the last 24 hours have been reviewed.    Significant Imaging: Echocardiogram:   2D echo with color flow doppler:   Results for orders placed or performed during the hospital encounter of 02/09/19   2D echo with color flow doppler   Result Value Ref Range    QEF 60 55 - 65    Diastolic Dysfunction Yes (A)     Est. PA Systolic Pressure 16.16     Narrative    Date of Procedure: 02/10/2019        TEST DESCRIPTION       Aorta: The aortic root is normal in size, measuring 2.9 cm at sinotubular junction and 2.9 cm at Sinuses of Valsalva. The proximal ascending aorta is normal in size, measuring 2.9 cm across.     Left Atrium: The left atrial volume index is normal, measuring 22.55 cc/m2.     Left Ventricle: The left ventricle is normal in size, with an end-diastolic diameter of 4.3 cm, and an end-systolic diameter of 2.8 cm. LV wall thickness is  normal, with the septum measuring 1.0 cm and the posterior wall measuring 0.7 cm across. Relative   wall thickness was normal at 0.33, and the LV mass index was 75.1 g/m2 consistent with normal left ventricular mass. There are no regional wall motion abnormalities. Left ventricular systolic function appears normal. Visually estimated ejection fraction   is 60-65%. The LV Doppler derived stroke volume equals 61.0 ccs.     Diastolic indices: E wave velocity 0.8 m/s, E/A ratio 0.9,  msec., E/e' ratio(avg) 7. There is diastolic dysfunction secondary to relaxation abnormality.     Right Atrium: The right atrium is normal in size, measuring 4.5 cm in length and 3.4 cm in width in the apical view.     Right Ventricle: The right ventricle is normal in size. Global right ventricular systolic function appears normal. Tricuspid annular plane systolic excursion (TAPSE) is 1.7 cm. The estimated PA systolic pressure is greater than 16 mmHg.     Aortic Valve:  Aortic valve is normal in structure with normal leaflet mobility. The mean gradient obtained across the aortic valve is 3 mmHg.     Mitral Valve:  Mitral valve is normal in structure with normal leaflet mobility. The pressure half time is 84 msec. The calculated mitral valve area is 2.62 cm2.     Tricuspid Valve:  Tricuspid valve is normal in structure with normal leaflet mobility.     Pulmonary Valve:  Pulmonary valve is normal in structure with normal leaflet mobility.     Intracavitary: There is no evidence of pericardial effusion, intracavity mass, thrombi, or vegetation.         CONCLUSIONS     1 - No wall motion abnormalities.     2 - Normal left ventricular systolic function (EF 60-65%).     3 - Impaired LV relaxation, normal LAP (grade 1 diastolic dysfunction).     4 - Normal right ventricular systolic function .     5 - The estimated PA systolic pressure is greater than 16 mmHg.             This document has been electronically    SIGNED BY: Damian Estrada  MD On: 02/10/2019 12:10   , EKG: Reviewed and X-Ray: CXR: X-Ray Chest 1 View (CXR): No results found for this visit on 05/25/20. and X-Ray Chest PA and Lateral (CXR): No results found for this visit on 05/25/20.

## 2020-05-26 NOTE — PLAN OF CARE
"SW met with patient at bedside to complete discharge planning assessment. Patient lives with her sister and niece and she states she's independent with her needs. Patient denies any medical assistive equipment use at this time. Patient denies any other Home Health and Outpatient services at this time. Patient would like to send all her medications to [facility] in [place]. Patient denies having a PCP and denies any help needed with one. SW asked patient if she had insurance, She stated: "Yes, I have [employer] I pay for it every month." However, No insurance listed in the system. Patient denies LW/POA. No other CM needs identified at this time. Patient denies any post hospital needs or services at this time. Transitional Care Folder, Discharge Planning Begins on Admission pamphlet, [facility] Bedside Delivery pamphlet, Advance Directive information given to patient. Sw placed contact information on white board. Sw instructed patient or family to call with any questions or concerns.     D/C plan: Home with Family  Transportation:[place]  Bedside: No             05/26/20 1429   Discharge Assessment   Assessment Type Discharge Planning Assessment   Confirmed/corrected address and phone number on facesheet? Yes   Assessment information obtained from? Patient   Expected Length of Stay (days) 1   Communicated expected length of stay with patient/caregiver yes   Prior to hospitilization cognitive status: Alert/Oriented   Prior to hospitalization functional status: Independent   Current cognitive status: Alert/Oriented   Current Functional Status: Independent;Assistive Equipment   Lives With sibling(s)   Able to Return to Prior Arrangements yes   Is patient able to care for self after discharge? Yes   Who are your caregiver(s) and their phone number(s)? Ninoska Thomson [phone]   Patient's perception of discharge disposition admitted as an inpatient   Readmission Within the Last 30 Days no previous admission in last 30 days "   Patient currently being followed by outpatient case management? No   Patient currently receives any other outside agency services? No   Equipment Currently Used at Home none   Do you have any problems affording any of your prescribed medications? TBD   Is the patient taking medications as prescribed? yes   Does the patient have transportation home? Yes   Transportation Anticipated family or friend will provide   Does the patient receive services at the [facility]? No   Discharge Plan A Home   Discharge Plan B Home with family   DME Needed Upon Discharge  none   Patient/Family in Agreement with Plan yes

## 2020-05-26 NOTE — SUBJECTIVE & OBJECTIVE
Past Medical History:   Diagnosis Date    Cervical cancer     Hyperlipidemia     Right carotid bruit        Past Surgical History:   Procedure Laterality Date    LYMPH NODE BIOPSY         Review of patient's allergies indicates:  No Known Allergies    No current facility-administered medications on file prior to encounter.      Current Outpatient Medications on File Prior to Encounter   Medication Sig    aspirin (ECOTRIN) 81 MG EC tablet Take 1 tablet (81 mg total) by mouth once daily.    pravastatin (PRAVACHOL) 40 MG tablet Take 1 tablet (40 mg total) by mouth once daily.     Family History     Problem Relation (Age of Onset)    Cancer Mother, Father    Diabetes Brother    Hypertension Brother    Transient ischemic attack Father        Tobacco Use    Smoking status: Current Every Day Smoker     Packs/day: 1.00     Years: 40.00     Pack years: 40.00    Smokeless tobacco: Never Used   Substance and Sexual Activity    Alcohol use: No     Frequency: Never    Drug use: Not on file    Sexual activity: Never     Review of Systems   Constitution: Negative for diaphoresis, malaise/fatigue and weight gain.   HENT: Negative for hoarse voice.    Eyes: Negative for double vision and visual disturbance.   Cardiovascular: Positive for chest pain and claudication. Negative for cyanosis, dyspnea on exertion, irregular heartbeat, leg swelling, near-syncope, orthopnea, palpitations, paroxysmal nocturnal dyspnea and syncope.   Respiratory: Positive for shortness of breath. Negative for cough, hemoptysis and snoring.    Hematologic/Lymphatic: Negative for bleeding problem. Does not bruise/bleed easily.   Skin: Negative for color change and poor wound healing.   Musculoskeletal: Negative for muscle cramps, muscle weakness and myalgias.   Gastrointestinal: Negative for bloating, abdominal pain, change in bowel habit, diarrhea, heartburn, hematemesis, hematochezia, melena and nausea.   Neurological: Negative for excessive  daytime sleepiness, dizziness, headaches, light-headedness, loss of balance, numbness and weakness.   Psychiatric/Behavioral: Negative for memory loss. The patient does not have insomnia.    Allergic/Immunologic: Negative for hives.     Objective:     Vital Signs (Most Recent):  Temp: 98.2 °F (36.8 °C) (05/25/20 1918)  Pulse: 70 (05/25/20 1918)  Resp: (!) 33 (05/25/20 1918)  BP: 116/64 (05/25/20 1918)  SpO2: 100 % (05/25/20 1918) Vital Signs (24h Range):  Temp:  [98.2 °F (36.8 °C)-98.4 °F (36.9 °C)] 98.2 °F (36.8 °C)  Pulse:  [70-84] 70  Resp:  [18-33] 33  SpO2:  [98 %-100 %] 100 %  BP: (116-166)/(64-99) 116/64     Weight: 66.2 kg (146 lb)  Body mass index is 25.06 kg/m².    SpO2: 100 %  O2 Device (Oxygen Therapy): nasal cannula    No intake or output data in the 24 hours ending 05/25/20 2049    Lines/Drains/Airways     Peripheral Intravenous Line                 Peripheral IV - Single Lumen 05/25/20 1917 20 G Right Hand less than 1 day         Peripheral IV - Single Lumen 05/25/20 1919 18 G Left Hand less than 1 day                Physical Exam   Constitutional: She is oriented to person, place, and time. She appears well-developed and well-nourished. She does not appear ill. She appears distressed.   HENT:   Head: Normocephalic and atraumatic.   Eyes: Pupils are equal, round, and reactive to light. EOM are normal. No scleral icterus.   Neck: Normal range of motion. Neck supple. Normal carotid pulses, no hepatojugular reflux and no JVD present. Carotid bruit is not present. No tracheal deviation present. No thyromegaly present.   Cardiovascular: Normal rate, regular rhythm and normal heart sounds. Exam reveals no gallop and no friction rub.   No murmur heard.  Pulses:       Carotid pulses are 2+ on the right side, and 2+ on the left side.       Radial pulses are 2+ on the right side, and 2+ on the left side.        Femoral pulses are 1+ on the right side, and 1+ on the left side.       Popliteal pulses are 1+ on  the right side, and 1+ on the left side.        Dorsalis pedis pulses are 1+ on the right side, and 1+ on the left side.        Posterior tibial pulses are 1+ on the right side, and 1+ on the left side.   Pulmonary/Chest: Effort normal and breath sounds normal. No respiratory distress. She has no wheezes. She has no rhonchi. She has no rales. She exhibits no tenderness.   Abdominal: Soft. Normal appearance, normal aorta and bowel sounds are normal. She exhibits no distension, no abdominal bruit, no ascites and no pulsatile midline mass. There is no hepatomegaly. There is no tenderness.   Musculoskeletal: She exhibits no edema.        Right shoulder: She exhibits no deformity.   Neurological: She is alert and oriented to person, place, and time. She has normal strength. No cranial nerve deficit. Coordination normal.   Skin: Skin is warm and dry. No rash noted. She is not diaphoretic. No cyanosis or erythema. Nails show no clubbing.   Psychiatric: She has a normal mood and affect. Her speech is normal and behavior is normal.   Nursing note and vitals reviewed.      Significant Labs:   Recent Lab Results       05/25/20 2018 05/25/20 2000 05/25/20 1927 05/25/20  1925        Albumin     3.2       Alkaline Phosphatase     126       ALT     11       Anion Gap     10       Aniso     Slight       AST     21       Baso #     0.08       Basophil%     0.7       BILIRUBIN TOTAL     0.1  Comment:  For infants and newborns, interpretation of results should be based  on gestational age, weight and in agreement with clinical  observations.  Premature Infant recommended reference ranges:  Up to 24 hours.............<8.0 mg/dL  Up to 48 hours............<12.0 mg/dL  3-5 days..................<15.0 mg/dL  6-29 days.................<15.0 mg/dL         BUN, Bld     7       Calcium     8.8       Chloride     105       CO2     27       Creatinine     1.1       Differential Method     Automated       eGFR if       >60       eGFR if non      54  Comment:  Calculation used to obtain the estimated glomerular filtration  rate (eGFR) is the CKD-EPI equation.          Eos #     0.1       Eosinophil%     0.7       Glucose     106       Gran # (ANC)     7.1       Gran%     58.5       Hematocrit     35.5       Hemoglobin     11.1       Immature Grans (Abs)     0.04  Comment:  Mild elevation in immature granulocytes is non specific and   can be seen in a variety of conditions including stress response,   acute inflammation, trauma and pregnancy. Correlation with other   laboratory and clinical findings is essential.         Immature Granulocytes     0.3       INR     1.0  Comment:  Coumadin Therapy:  2.0 - 3.0 for INR for all indicators except mechanical heart valves  and antiphospholipid syndromes which should use 2.5 - 3.5.         Lymph #     4.2       Lymph%     34.1       MCH     28.7       MCHC     31.3       MCV     92       Mono #     0.7       Mono%     5.7       MPV     10.1       nRBC     0       Platelet Estimate     Appears normal       Platelets     358       POC ACTIVATED CLOTTING TIME K 268 186         Poik     Slight       Potassium     3.3       PROTEIN TOTAL     6.9       Protime     10.5       RBC     3.87       RDW     13.7       Sample unknown unknown         SARS-CoV-2 RNA, Amplification, Qual       Negative  Comment:  This test utilizes isothermal nucleic acid amplification   technology to detect the SARS-CoV-2 RdRp nucleic acid segment.   The analytical sensitivity (limit of detection) is 125 genome   equivalents/mL.   A POSITIVE result implies infection with the SARS-CoV-2 virus;  the patient is presumed to be contagious.    A NEGATIVE result means that SARS-CoV-2 nucleic acids are not  present above the limit of detection. A NEGATIVE result should be   treated as presumptive. It does not rule out the possibility of   COVID-19 and should not be the sole basis for treatment decisions.   If  COVID-19 is strongly suspected based on clinical and exposure   history, re-testing using an alternate molecular assay should be   considered.   This test is only for use under the Food and Drug   Administration s Emergency Use Authorization (EUA).   Commercial kits are provided by [employer].   Performance characteristics of the EUA have been independently  verified by [facility] Department of  Pathology and Laboratory Medicine.   _________________________________________________________________  The ID NOW COVID-19 Letter of Authorization, along with the   authorized Fact Sheet for Healthcare Providers, the authorized Fact  Sheet for Patients, and authorized labeling are available on the FDA   website:  www.fda.gov/MedicalDevices/Safety/EmergencySituations/mdz210749.htm       Sodium     142       Spherocytes     Occasional       Stomatocytes     Present       Tear Drop Cells     Occasional       Troponin I     1.643  Comment:  The reference interval for Troponin I represents the 99th percentile   cutoff   for our facility and is consistent with 3rd generation assay   performance.         WBC     12.18             Significant Imaging:

## 2020-05-26 NOTE — SUBJECTIVE & OBJECTIVE
Past Medical History:   Diagnosis Date    Atherosclerotic PVD with intermittent claudication 5/25/2020    Cervical cancer     Hyperlipidemia     Right carotid bruit        Past Surgical History:   Procedure Laterality Date    LYMPH NODE BIOPSY         Review of patient's allergies indicates:  No Known Allergies    Family History     Problem Relation (Age of Onset)    Cancer Mother, Father    Diabetes Brother    Hypertension Brother    Transient ischemic attack Father        Tobacco Use    Smoking status: Current Every Day Smoker     Packs/day: 1.00     Years: 40.00     Pack years: 40.00    Smokeless tobacco: Never Used   Substance and Sexual Activity    Alcohol use: No     Frequency: Never    Drug use: Not on file    Sexual activity: Never         Review of Systems   Constitutional: Negative for fatigue and fever.   HENT: Negative for sinus pressure.    Eyes: Negative for visual disturbance.   Respiratory: Negative for shortness of breath.    Cardiovascular: Positive for chest pain.   Gastrointestinal: Negative for nausea and vomiting.   Genitourinary: Negative for difficulty urinating.   Musculoskeletal: Negative for back pain.   Skin: Negative for rash.   Neurological: Negative for headaches.     Objective:     Vital Signs (Most Recent):  Temp: 98.3 °F (36.8 °C) (05/25/20 2200)  Pulse: 70 (05/25/20 2200)  Resp: (!) 21 (05/25/20 2200)  BP: (!) 145/62 (05/25/20 2200)  SpO2: 100 % (05/25/20 2200) Vital Signs (24h Range):  Temp:  [98.2 °F (36.8 °C)-98.4 °F (36.9 °C)] 98.3 °F (36.8 °C)  Pulse:  [70-84] 70  Resp:  [18-33] 21  SpO2:  [98 %-100 %] 100 %  BP: (116-166)/(62-99) 145/62     Weight: 66.2 kg (146 lb)  Body mass index is 25.06 kg/m².    No intake or output data in the 24 hours ending 05/25/20 2217    Physical Exam   Constitutional: She is oriented to person, place, and time. She appears well-developed and well-nourished. She is cooperative. No distress.   HENT:   Head: Normocephalic and atraumatic.    Eyes: Pupils are equal, round, and reactive to light. Conjunctivae are normal. Right eye exhibits no discharge. Left eye exhibits no discharge.   Neck: Normal range of motion. No thyromegaly present.   Cardiovascular: Normal rate, regular rhythm and normal heart sounds. Exam reveals no gallop and no friction rub.   No murmur heard.  Pulmonary/Chest: Effort normal and breath sounds normal. No stridor. No respiratory distress. She has no wheezes. She has no rales.   Abdominal: Soft. Bowel sounds are normal. She exhibits no distension and no mass. There is no tenderness. There is no guarding.   Musculoskeletal: Normal range of motion. She exhibits no edema.   Neurological: She is alert and oriented to person, place, and time.   Skin: Skin is warm. She is not diaphoretic. No erythema.   Psychiatric: She has a normal mood and affect. Her behavior is normal. Judgment and thought content normal.       Vents:       Lines/Drains/Airways     Peripheral Intravenous Line                 Peripheral IV - Single Lumen 05/25/20 1917 20 G Right Hand less than 1 day         Peripheral IV - Single Lumen 05/25/20 1919 18 G Left Hand less than 1 day                Significant Labs:    CBC/Anemia Profile:  Recent Labs   Lab 05/25/20 1927   WBC 12.18   HGB 11.1*   HCT 35.5*   *   MCV 92   RDW 13.7        Chemistries:  Recent Labs   Lab 05/25/20 1927      K 3.3*      CO2 27   BUN 7*   CREATININE 1.1   CALCIUM 8.8   ALBUMIN 3.2*   PROT 6.9   BILITOT 0.1   ALKPHOS 126   ALT 11   AST 21       Results for orders placed or performed during the hospital encounter of 05/25/20   CBC auto differential   Result Value Ref Range    WBC 12.18 3.90 - 12.70 K/uL    RBC 3.87 (L) 4.00 - 5.40 M/uL    Hemoglobin 11.1 (L) 12.0 - 16.0 g/dL    Hematocrit 35.5 (L) 37.0 - 48.5 %    Mean Corpuscular Volume 92 82 - 98 fL    Mean Corpuscular Hemoglobin 28.7 27.0 - 31.0 pg    Mean Corpuscular Hemoglobin Conc 31.3 (L) 32.0 - 36.0 g/dL    RDW  13.7 11.5 - 14.5 %    Platelets 358 (H) 150 - 350 K/uL    MPV 10.1 9.2 - 12.9 fL    Immature Granulocytes 0.3 0.0 - 0.5 %    Gran # (ANC) 7.1 1.8 - 7.7 K/uL    Immature Grans (Abs) 0.04 0.00 - 0.04 K/uL    Lymph # 4.2 1.0 - 4.8 K/uL    Mono # 0.7 0.3 - 1.0 K/uL    Eos # 0.1 0.0 - 0.5 K/uL    Baso # 0.08 0.00 - 0.20 K/uL    nRBC 0 0 /100 WBC    Gran% 58.5 38.0 - 73.0 %    Lymph% 34.1 18.0 - 48.0 %    Mono% 5.7 4.0 - 15.0 %    Eosinophil% 0.7 0.0 - 8.0 %    Basophil% 0.7 0.0 - 1.9 %    Platelet Estimate Appears normal     Aniso Slight     Poik Slight     Tear Drop Cells Occasional     Stomatocytes Present     Spherocytes Occasional     Differential Method Automated    Comprehensive metabolic panel   Result Value Ref Range    Sodium 142 136 - 145 mmol/L    Potassium 3.3 (L) 3.5 - 5.1 mmol/L    Chloride 105 95 - 110 mmol/L    CO2 27 23 - 29 mmol/L    Glucose 106 70 - 110 mg/dL    BUN, Bld 7 (L) 8 - 23 mg/dL    Creatinine 1.1 0.5 - 1.4 mg/dL    Calcium 8.8 8.7 - 10.5 mg/dL    Total Protein 6.9 6.0 - 8.4 g/dL    Albumin 3.2 (L) 3.5 - 5.2 g/dL    Total Bilirubin 0.1 0.1 - 1.0 mg/dL    Alkaline Phosphatase 126 55 - 135 U/L    AST 21 10 - 40 U/L    ALT 11 10 - 44 U/L    Anion Gap 10 8 - 16 mmol/L    eGFR if African American >60 >60 mL/min/1.73 m^2    eGFR if non African American 54 (A) >60 mL/min/1.73 m^2   Protime-INR   Result Value Ref Range    Prothrombin Time 10.5 9.0 - 12.5 sec    INR 1.0 0.8 - 1.2   Troponin I   Result Value Ref Range    Troponin I 1.643 (H) 0.000 - 0.026 ng/mL   COVID-19 Rapid Screening   Result Value Ref Range    SARS-CoV-2 RNA, Amplification, Qual Negative Negative   Cardiac catheterization   Result Value Ref Range    Cath EF Quantitative 55 %   ISTAT ACT-K   Result Value Ref Range    POC ACTIVATED CLOTTING TIME K 186 (H) 74 - 137 sec    Sample unknown    ISTAT ACT-K   Result Value Ref Range    POC ACTIVATED CLOTTING TIME K 268 (H) 74 - 137 sec    Sample unknown         Significant Imaging:    Cardiac catheterization  · LVEDP (Pre): 16  · The left ventricular systolic function is normal.  · LVEDP (Post): 18  · LV end diastolic pressure is elevated.  · The ejection fraction is calculated to be 55%.  · The left ventricular ejection fraction is grossly normal.  · No aortic valve stenosis.  · No mitral valve stenosis.  · No mitral valve prolapse evident. The annulus is normal. There is normal   mitral valve motion.  · Mid Cx lesion , 100% stenosed reduced to 0%..  · A stent was successfully placed at 14 GREGORIO for 20 sec.  · Prox R AMADEO to Mid R AMADEO lesion , 90% stenosed.  · Mid L AMADEO to Dist L AMADEO lesion , 80% stenosed.  · Mid R CFA to Dist R CFA lesion , 50% stenosed.  · Prox L SFA lesion , 100% stenosed.  · Estimated blood loss: none  · Two vessel coronary artery disease.  · Successful PCI.     I certify that I was present for catheter insertion, catheter   manipulation, angiography, and angiographic interpretation of this   patient.    Procedure Log documented by Documenter: Wendi Card RN and verified by   Shabana Jarvis MD.    Date: 5/25/2020  Time: 9:04 PM

## 2020-05-26 NOTE — HPI
Patient is a 59-year-old  female with no reported PMH who presents to the Emergency Department for chest pain, onset 3 days PTA.  Patient reports symptoms have been on and off for the past 2-3 months however worsened on Friday.  She states that she went to the ED in [place], [place] and was discharged home from the ED. Symptoms initially improved however starting earlier today began to progressively worsened.  She reports that pain is substernal and radiates to the right arm.  Describes pain as being both sharp and dull in nature without any alleviating or exacerbating factors.  She was given ASA and 2 doses of nitro EN route to the hospital. Patient is a current smoker and reports smoking 1 pack per day since she was 10.    In the ED, EKG was concerning for ST elevation in lateral leads and troponin: 1.6.  Code STEMI was activated and she was taken to cath lab.  Catheterization showed 100% occlusion of left circumflex artery which was corrected with PCI.  Patient admitted to ICU further monitoring.

## 2020-05-26 NOTE — OP NOTE
INPATIENT Operative Note         SUMMARY     Surgery Date: 5/25/2020     Surgeon(s) and Role:     * Shelly Jarvis MD - Primary    ASSISTANT:none    Pre-op Diagnosis:  stemi      Post-op Diagnosis:  stemi lcx stent cad    Procedure(s) (LRB):  CATHETERIZATION, HEART, LEFT (Left)  Angioplasty-coronary  Repair, Chronic Total Occlusion, Coronary  Stent, Drug Eluting, Single Vessel, Coronary  Aortogram, Abdominal (N/A)    COMPLICATION:none    Anesthesia: RN IV Sedation    Findings/Key Components:  Occluded lcx treated with stent jodi resolute 3.0x18.  Lad non obs diseaase.  rca non obs cad  lvf normal  Occluded lsfa   severe calcified stenosis of both ilioac arteries.   Estimated Blood Loss: < 50 ML.         SPECIMEN: NONE    Devices/Prostetics: resolute 3.0x18    PLAN:  Routine post stent care.

## 2020-05-26 NOTE — PROGRESS NOTES
[facility] -   Cardiology  Progress Note    Patient Name: zIa Esquivel  MRN: [id number]  Admission Date: 5/25/2020  Hospital Length of Stay: 1 days  Code Status: Full Code   Attending Physician: Shelly Jarvis MD   Primary Care Physician: Provider Notinsystem  Expected Discharge Date:   Principal Problem:STEMI (ST elevation myocardial infarction)    Subjective:   HPI:  A 58 yo female with tobacco use cervical cancer hlp presents with stutterng symptom sof chest pain and shortness of breath since Friday that was aggravated today associated with rt arm pain that was severe she presented via [email or web address] showed posterolateral stemi .she was taken emergently to cath lab.she has also limiting claudication no hsitory of bleeding or bruising.    Hospital Course:   5/26/2020-Patient seen and examined today, s/p [facility] with successful PCI of LCX. Feels well. No chest pain or SOB. Labs reviewed. Troponin 1.643>6.072, repeat pending. Echo ordered. Needs carotid U/S and BLE arterial U/S/GONZÁLEZ.        Review of Systems   Constitution: Negative.   HENT: Negative.    Eyes: Negative.    Cardiovascular: Positive for claudication.   Respiratory: Negative.    Endocrine: Negative.    Hematologic/Lymphatic: Negative.    Skin: Negative.    Musculoskeletal: Positive for arthritis.   Gastrointestinal: Negative.    Genitourinary: Negative.    Neurological: Negative.    Psychiatric/Behavioral: Negative.    Allergic/Immunologic: Negative.      Objective:     Vital Signs (Most Recent):  Temp: 98 °F (36.7 °C) (05/26/20 0715)  Pulse: 70 (05/26/20 0939)  Resp: (!) 22 (05/26/20 0800)  BP: 132/66 (05/26/20 0939)  SpO2: 99 % (05/26/20 0800) Vital Signs (24h Range):  Temp:  [98 °F (36.7 °C)-98.4 °F (36.9 °C)] 98 °F (36.7 °C)  Pulse:  [53-84] 70  Resp:  [13-33] 22  SpO2:  [98 %-100 %] 99 %  BP: (103-166)/(51-99) 132/66     Weight: 66.2 kg (146 lb)  Body mass index is 25.06 kg/m².     SpO2: 99 %  O2 Device (Oxygen Therapy): nasal  cannula      Intake/Output Summary (Last 24 hours) at 5/26/2020 1051  Last data filed at 5/26/2020 0800  Gross per 24 hour   Intake 966.61 ml   Output 825 ml   Net 141.61 ml       Lines/Drains/Airways     Peripheral Intravenous Line                 Peripheral IV - Single Lumen 05/25/20 1917 20 G Right Hand less than 1 day         Peripheral IV - Single Lumen 05/25/20 1919 18 G Left Hand less than 1 day                Physical Exam   Constitutional: She is oriented to person, place, and time. She appears well-developed and well-nourished. No distress.   HENT:   Head: Normocephalic and atraumatic.   Eyes: Pupils are equal, round, and reactive to light. Right eye exhibits no discharge. Left eye exhibits no discharge.   Neck: Neck supple. No JVD present.   Cardiovascular: Normal rate, regular rhythm, S1 normal, S2 normal, normal heart sounds and intact distal pulses.   No murmur heard.  Pulses:       Carotid pulses are on the right side with bruit.  Pulmonary/Chest: Effort normal and breath sounds normal. No respiratory distress. She has no wheezes. She has no rales.   Abdominal: Soft. She exhibits no distension.   Musculoskeletal: She exhibits no edema.   Neurological: She is alert and oriented to person, place, and time.   Skin: Skin is warm and dry. She is not diaphoretic. No erythema.   Right groin access site C/D/I; no bleeding erythema or drainage   Psychiatric: She has a normal mood and affect. Her behavior is normal. Thought content normal.   Nursing note and vitals reviewed.      Significant Labs:   CMP   Recent Labs   Lab 05/25/20 1927 05/26/20  0203    141   K 3.3* 3.7    104   CO2 27 27    100   BUN 7* 7*   CREATININE 1.1 0.8   CALCIUM 8.8 8.5*   PROT 6.9  --    ALBUMIN 3.2*  --    BILITOT 0.1  --    ALKPHOS 126  --    AST 21  --    ALT 11  --    ANIONGAP 10 10   ESTGFRAFRICA >60 >60   EGFRNONAA 54* >60   , CBC   Recent Labs   Lab 05/25/20 1927 05/26/20  0203   WBC 12.18 11.93   HGB  11.1* 10.6*   HCT 35.5* 35.0*   * 360*   , Troponin   Recent Labs   Lab 05/25/20  1927 05/26/20  0203   TROPONINI 1.643* 6.072*    and All pertinent lab results from the last 24 hours have been reviewed.    Significant Imaging: Echocardiogram:   2D echo with color flow doppler:   Results for orders placed or performed during the hospital encounter of 02/09/19   2D echo with color flow doppler   Result Value Ref Range    QEF 60 55 - 65    Diastolic Dysfunction Yes (A)     Est. PA Systolic Pressure 16.16     Narrative    Date of Procedure: 02/10/2019        TEST DESCRIPTION       Aorta: The aortic root is normal in size, measuring 2.9 cm at sinotubular junction and 2.9 cm at Sinuses of Valsalva. The proximal ascending aorta is normal in size, measuring 2.9 cm across.     Left Atrium: The left atrial volume index is normal, measuring 22.55 cc/m2.     Left Ventricle: The left ventricle is normal in size, with an end-diastolic diameter of 4.3 cm, and an end-systolic diameter of 2.8 cm. LV wall thickness is normal, with the septum measuring 1.0 cm and the posterior wall measuring 0.7 cm across. Relative   wall thickness was normal at 0.33, and the LV mass index was 75.1 g/m2 consistent with normal left ventricular mass. There are no regional wall motion abnormalities. Left ventricular systolic function appears normal. Visually estimated ejection fraction   is 60-65%. The LV Doppler derived stroke volume equals 61.0 ccs.     Diastolic indices: E wave velocity 0.8 m/s, E/A ratio 0.9,  msec., E/e' ratio(avg) 7. There is diastolic dysfunction secondary to relaxation abnormality.     Right Atrium: The right atrium is normal in size, measuring 4.5 cm in length and 3.4 cm in width in the apical view.     Right Ventricle: The right ventricle is normal in size. Global right ventricular systolic function appears normal. Tricuspid annular plane systolic excursion (TAPSE) is 1.7 cm. The estimated PA systolic pressure is  greater than 16 mmHg.     Aortic Valve:  Aortic valve is normal in structure with normal leaflet mobility. The mean gradient obtained across the aortic valve is 3 mmHg.     Mitral Valve:  Mitral valve is normal in structure with normal leaflet mobility. The pressure half time is 84 msec. The calculated mitral valve area is 2.62 cm2.     Tricuspid Valve:  Tricuspid valve is normal in structure with normal leaflet mobility.     Pulmonary Valve:  Pulmonary valve is normal in structure with normal leaflet mobility.     Intracavitary: There is no evidence of pericardial effusion, intracavity mass, thrombi, or vegetation.         CONCLUSIONS     1 - No wall motion abnormalities.     2 - Normal left ventricular systolic function (EF 60-65%).     3 - Impaired LV relaxation, normal LAP (grade 1 diastolic dysfunction).     4 - Normal right ventricular systolic function .     5 - The estimated PA systolic pressure is greater than 16 mmHg.             This document has been electronically    SIGNED BY: Damian Estrada MD On: 02/10/2019 12:10   , EKG: Reviewed and X-Ray: CXR: X-Ray Chest 1 View (CXR): No results found for this visit on 05/25/20. and X-Ray Chest PA and Lateral (CXR): No results found for this visit on 05/25/20.    Assessment and Plan:   Patient who presents with STEMI s/p [facility] with PCI of LCX. Stable, no chest pain. Continue meds. Add Losartan 25 mg daily. Check carotid U/S and BLE arterial US/GONZÁLEZ. Repeat troponin, will d/c this afternoon if trending down.    * STEMI (ST elevation myocardial infarction)  Stuttering course since Friday . Has acute posterolateral infarct.for direct pci per stemi protocol.    5/26/2020  -s/p [facility] with successful PCI of LCX  -Remains stable, no chest pain or SOB  -Continue ASA, BB, Brilinta, statin  -Add Losartan 25 mg daily  -Smoking cessation  -Cardiac rehab to be addressed as OP  -Repeat troponin pending, will plan on d/c this afternoon if trending down    Atherosclerotic PVD with  intermittent claudication  -Very limiting  -Check GONZÁLEZ/BLE arterial U/S  -Continue ASA, statin  -Smoking cessation    Chest pain  -See plan under STEMI    Bruit of right carotid artery  -Asymptomatic  -Continue ASA, statin  -Check carotid U/S  -Smoking cessation    Mixed hyperlipidemia  -Continue statin    Tobacco abuse  -Counsled about smoking cessation        VTE Risk Mitigation (From admission, onward)         Ordered     enoxaparin injection 40 mg  Daily      05/26/20 0806                Erica Dunne PA-C  Cardiology  [facility]

## 2020-05-26 NOTE — ASSESSMENT & PLAN NOTE
Stuttering course since Friday . Has acute posterolateral infarct.for direct pci per stemi protocol.    5/26/2020  -s/p [facility] with successful PCI of LCX  -Remains stable, no chest pain or SOB  -Continue ASA, BB, Brilinta, statin  -Add Losartan 25 mg daily  -Smoking cessation  -Cardiac rehab to be addressed as OP  -Repeat troponin pending, will plan on d/c this afternoon if trending down

## 2020-05-26 NOTE — HPI
A 58 yo female with tobacco use cervical cancer hlp presents with stutterng symptom sof chest pain and shortness of breath since Friday that was aggravated today associated with rt arm pain that was severe she presented via ems.ekg showed posterolateral stemi .she was taken emergently to cath lab.she has also limiting claudication no hsitory of bleeding or bruising.

## 2020-05-26 NOTE — ASSESSMENT & PLAN NOTE
100% occluded L SFA  Currently on DATP and statin  GONZÁLEZ today.  Will need outpatient follow-up

## 2020-05-26 NOTE — PROGRESS NOTES
[facility] -   Critical Care Medicine  Progress Note    Patient Name: Iza Esquivel  MRN: [id number]  Admission Date: 5/25/2020  Hospital Length of Stay: 1 days  Code Status: Full Code  Attending Provider: Shelly Jarvis MD  Primary Care Provider: Provider Notinsystem   Principal Problem: STEMI (ST elevation myocardial infarction)    Subjective:     HPI:  Patient is a 59-year-old  female with no reported PMH who presents to the Emergency Department for chest pain, onset 3 days PTA.  Patient reports symptoms have been on and off for the past 2-3 months however worsened on Friday.  She states that she went to the ED in [place], [place] and was discharged home from the ED. Symptoms initially improved however starting earlier today began to progressively worsened.  She reports that pain is substernal and radiates to the right arm.  Describes pain as being both sharp and dull in nature without any alleviating or exacerbating factors.  She was given ASA and 2 doses of nitro EN route to the hospital. Patient is a current smoker and reports smoking 1 pack per day since she was 10.    In the ED, EKG was concerning for ST elevation in lateral leads and troponin: 1.6.  Code STEMI was activated and she was taken to cath lab.  Catheterization showed 100% occlusion of left circumflex artery which was corrected with PCI.  Patient admitted to ICU further monitoring.              Hospital/ICU Course:  5/25: Admitted to ICU for monitoring following PCI  5/26: Seen and examined at bedside. Uneventful night and reports feeling well without complaints. She is thankful for the intervention last night and is worried about what would have happened if she had stayed at home any longer. Will work to wean oxygen today. GONZÁLEZ today. Plan to move to floor or d/c home per cardiology recs.    Review of Systems   Constitutional: Negative for fever and malaise/fatigue.   HENT: Negative for sinus pain and sore throat.    Respiratory:  Negative for cough, shortness of breath and wheezing.    Cardiovascular: Negative for chest pain, palpitations, claudication and leg swelling.   Gastrointestinal: Negative for abdominal pain, nausea and vomiting.   Genitourinary: Negative for dysuria and urgency.   Musculoskeletal: Negative for back pain and neck pain.   Neurological: Negative for sensory change, weakness and headaches.   Psychiatric/Behavioral: The patient is not nervous/anxious and does not have insomnia.          Objective:     Vital Signs (Most Recent):  Temp: 98 °F (36.7 °C) (05/26/20 0715)  Pulse: 67 (05/26/20 0800)  Resp: (!) 22 (05/26/20 0800)  BP: (!) 163/73 (05/26/20 0800)  SpO2: 99 % (05/26/20 0800) Vital Signs (24h Range):  Temp:  [98 °F (36.7 °C)-98.4 °F (36.9 °C)] 98 °F (36.7 °C)  Pulse:  [53-84] 67  Resp:  [13-33] 22  SpO2:  [98 %-100 %] 99 %  BP: (103-166)/(51-99) 163/73     Weight: 66.2 kg (146 lb)  Body mass index is 25.06 kg/m².      Intake/Output Summary (Last 24 hours) at 5/26/2020 0839  Last data filed at 5/26/2020 0800  Gross per 24 hour   Intake 966.61 ml   Output 825 ml   Net 141.61 ml       Physical Exam   Constitutional: She is oriented to person, place, and time. She appears well-developed and well-nourished.   HENT:   Head: Normocephalic and atraumatic.   Eyes: Pupils are equal, round, and reactive to light. Conjunctivae and EOM are normal.   Neck: Normal range of motion.   Cardiovascular: Normal rate, regular rhythm, normal heart sounds and intact distal pulses. Exam reveals no friction rub.   No murmur heard.  Pulmonary/Chest: Effort normal and breath sounds normal. No respiratory distress. She has no wheezes. She has no rales.   Abdominal: Soft. Bowel sounds are normal. She exhibits no distension. There is no tenderness.   Musculoskeletal: Normal range of motion. She exhibits no edema or deformity.   Neurological: She is alert and oriented to person, place, and time. No cranial nerve deficit.   Skin: Skin is warm and  dry. No rash noted.   Psychiatric: She has a normal mood and affect. Her behavior is normal. Judgment and thought content normal.   Nursing note and vitals reviewed.      Lines/Drains/Airways     Peripheral Intravenous Line                 Peripheral IV - Single Lumen 05/25/20 1917 20 G Right Hand less than 1 day         Peripheral IV - Single Lumen 05/25/20 1919 18 G Left Hand less than 1 day                Significant Labs:    CBC/Anemia Profile:  Recent Labs   Lab 05/25/20 1927 05/26/20  0203   WBC 12.18 11.93   HGB 11.1* 10.6*   HCT 35.5* 35.0*   * 360*   MCV 92 93   RDW 13.7 13.9        Chemistries:  Recent Labs   Lab 05/25/20 1927 05/26/20  0203    141   K 3.3* 3.7    104   CO2 27 27   BUN 7* 7*   CREATININE 1.1 0.8   CALCIUM 8.8 8.5*   ALBUMIN 3.2*  --    PROT 6.9  --    BILITOT 0.1  --    ALKPHOS 126  --    ALT 11  --    AST 21  --        All pertinent labs within the past 24 hours have been reviewed.    Significant Imaging:  I have reviewed all pertinent imaging results/findings within the past 24 hours.      ABG  No results for input(s): PH, PO2, PCO2, HCO3, BE in the last 168 hours.  Assessment/Plan:     Cardiac/Vascular  * STEMI (ST elevation myocardial infarction)  S/p left heart cath and PCI to L circ  2 vessel coronary artery disease  Currently on DAP with ASA and Brilinta  High-intensity statin and BB  Completed Aggrastat infusion per post- cath protocol  Carotid US today        Atherosclerotic PVD with intermittent claudication  100% occluded L SFA  Currently on DATP and statin  GONZÁLEZ today.  Will need outpatient follow-up    Mixed hyperlipidemia  Lipitor 80 mg daily    Other  Chest pain  Resolved post cath    Tobacco abuse  Long smoking history  Counseled on cessation  May need nicotine patch     Critical Care Daily Checklist:    A: Awake: RASS Goal/Actual Goal:    Actual: Alex Agitation Sedation Scale (RASS): Alert and calm   B: Spontaneous Breathing Trial Performed?     C:  SAT & SBT Coordinated?  n/a                      D: Delirium: CAM-ICU Overall CAM-ICU: Negative   E: Early Mobility Performed? Yes   F: Feeding Goal:    Status:     Current Diet Order   Procedures    Diet Cardiac      AS: Analgesia/Sedation n/a   T: Thromboembolic Prophylaxis DAP + Lovenox SC   H: HOB > 300 Yes   U: Stress Ulcer Prophylaxis (if needed) n/a   G: Glucose Control Well controlled without correction   B: Bowel Function     I: Indwelling Catheter (Lines & Villegas) Necessity reviewed   D: De-escalation of Antimicrobials/Pharmacotherapies n/a    Plan for the day/ETD Plan to move to the floor    Code Status:  Family/Goals of Care: Full Code  Possible discharge home today per cardiology.       40 min total time spent. >50% time spent in face to face discussion, coordination of care and treatment plan, symptom assessment, chart review, and discussion with medical team.     I discussed case in detail with Dr. Zavala.     Praveena Le PA-C  Critical Care Medicine  [facility]

## 2020-05-26 NOTE — HOSPITAL COURSE
5/26/2020-Patient seen and examined today, s/p [facility] with successful PCI of LCX. Feels well. No chest pain or SOB. Labs reviewed. Troponin 1.643>6.072, repeat pending. Echo ordered. Needs carotid U/S and BLE arterial U/S/GONZÁLEZ.    Patient seen and examined today by myself and Dr. Jarvis and deemed suitable for discharge. Ambulated in ICU without any issues. Troponin trending down. Carotid U/S and BLE arterial U/S reviewed and results discussed with patient. She was educated about post-cath restrictions and importance of medication compliance. She will be discharged home on ASA, Brilinta, Losartan, BB, and statin and will need to follow-up with Dr. Jarvis in 1 week. Counseled on smoking cessation. Cardiac rehab to be addressed as OP.

## 2020-05-26 NOTE — NURSING
PT DISCHARGED HOME WITH ALL BELONGINGS INCLUDING CELL PHONE,  AND WALLET.  PT MEDICATIONS DELIVERED TO BEDSIDE BY [facility].  PT DISCHARGED VIA WHEELCHAIR TO PERSONAL VEHICLE.  SISTER (EULOGIO) PRESENT TO PICK PT UP.  DISCHARGE INSTRUCTIONS REVIEWED AND IV'S REMOVED PRIOR TO DISCHARGE.

## 2020-05-26 NOTE — HOSPITAL COURSE
5/25: Admitted to ICU for monitoring following PCI  5/26: Seen and examined at bedside. Uneventful night and reports feeling well without complaints. She is thankful for the intervention last night and is worried about what would have happened if she had stayed at home any longer. Will work to wean oxygen today. GONZÁLEZ today. Plan to move to floor or d/c home per cardiology recs.

## 2020-05-26 NOTE — ASSESSMENT & PLAN NOTE
S/p left heart cath and PCI to L circ  2 vessel coronary artery disease  Currently on DAP with ASA and Brilinta  High-intensity statin and BB  Completed Aggrastat infusion per post- cath protocol  Carotid US today

## 2020-05-26 NOTE — ED PROVIDER NOTES
SCRIBE #1 NOTE: I, Albert Sanchez, am scribing for, and in the presence of, Cole Trivedi Jr., MD. I have scribed the entire note.      History      Chief Complaint   Patient presents with    Chest Pain       Review of patient's allergies indicates:  No Known Allergies     HPI   HPI    5/25/2020, 7:14 PM   History obtained from the patient      History of Present Illness: Iza Esquivel is a 59 y.o. female patient who presents to the Emergency Department for chest pain, onset 3 days PTA. Pt states that her pain radiates the her RUE. Symptoms are constant and moderate in severity. No mitigating or exacerbating factors reported. Patient denies any fever, chills, n/v/d, SOB, weakness, numbness, dizziness, headache, and all other sxs at this time. Pt was given ASA and 2 doses of NTG en route to the ED, with mild improvement of sxs. Pt states that she presented to another hospital 3 days ago for her chest pain, and was discharged after a negative workup. No further complaints or concerns at this time.     Arrival mode: AASI    PCP: Provider Notinsystem       Past Medical History:  Past Medical History:   Diagnosis Date    Atherosclerotic PVD with intermittent claudication 5/25/2020    Cervical cancer     Hyperlipidemia     Right carotid bruit        Past Surgical History:  Past Surgical History:   Procedure Laterality Date    ABDOMINAL AORTOGRAPHY N/A 5/25/2020    Procedure: Aortogram, Abdominal;  Surgeon: Shelly Jarvis MD;  Location: [facility] CATH LAB;  Service: Cardiology;  Laterality: N/A;    LEFT HEART CATHETERIZATION Left 5/25/2020    Procedure: CATHETERIZATION, HEART, LEFT;  Surgeon: Shelly Jarvis MD;  Location: [facility] CATH LAB;  Service: Cardiology;  Laterality: Left;    LYMPH NODE BIOPSY      PERCUTANEOUS TRANSLUMINAL BALLOON ANGIOPLASTY OF CORONARY ARTERY  5/25/2020    Procedure: Angioplasty-coronary;  Surgeon: Shelly Jarvis MD;  Location: [facility] CATH LAB;  Service: Cardiology;;         Family  History:  Family History   Problem Relation Age of Onset    Cancer Mother         ?    Cancer Father         ?    Transient ischemic attack Father         Carotid artery stenosis, CEA    Diabetes Brother     Hypertension Brother        Social History:  Social History     Tobacco Use    Smoking status: Current Every Day Smoker     Packs/day: 1.00     Years: 40.00     Pack years: 40.00    Smokeless tobacco: Never Used   Substance and Sexual Activity    Alcohol use: No     Frequency: Never    Drug use: Not on file    Sexual activity: Never       ROS   Review of Systems   Constitutional: Negative for chills, diaphoresis, fatigue and fever.   HENT: Negative for sore throat.    Respiratory: Negative for shortness of breath.    Cardiovascular: Positive for chest pain.   Gastrointestinal: Negative for diarrhea, nausea and vomiting.   Genitourinary: Negative for dysuria.   Musculoskeletal: Positive for myalgias (RUE). Negative for back pain.   Skin: Negative for rash.   Neurological: Negative for dizziness, seizures, weakness, light-headedness, numbness and headaches.   Hematological: Does not bruise/bleed easily.   All other systems reviewed and are negative.    Physical Exam      Initial Vitals [05/25/20 1903]   BP Pulse Resp Temp SpO2   (!) 166/99 84 18 98.4 °F (36.9 °C) 98 %      MAP       --          Physical Exam  Nursing Notes and Vital Signs Reviewed.  Constitutional: Patient is in no acute distress. Well-developed and well-nourished.  Head: Atraumatic. Normocephalic.  Eyes: PERRL. EOM intact. Conjunctivae are not pale. No scleral icterus.  ENT: Mucous membranes are moist. Oropharynx is clear and symmetric.    Neck: Supple. Full ROM. No lymphadenopathy.  Cardiovascular: Regular rate. Regular rhythm. No murmurs, rubs, or gallops. Distal pulses are 2+ and symmetric.  Pulmonary/Chest: No respiratory distress. Clear to auscultation bilaterally. No wheezing or rales.  Abdominal: Soft and non-distended.  There is  no tenderness.  No rebound, guarding, or rigidity.   Musculoskeletal: Moves all extremities. No obvious deformities. No edema.  Skin: Warm and dry.  Neurological:  Alert, awake, and appropriate.  Normal speech.  No acute focal neurological deficits are appreciated.  Psychiatric: Normal affect. Good eye contact. Appropriate in content.    ED Course    Critical Care  Date/Time: 5/25/2020 7:31 PM  Performed by: Cole Trivedi Jr., MD  Authorized by: Cole Trivedi Jr., MD   Direct patient critical care time: 15 minutes  Additional history critical care time: 5 minutes  Ordering / reviewing critical care time: 5 minutes  Documentation critical care time: 5 minutes  Consulting other physicians critical care time: 5 minutes  Other critical care time: 10 minutes  Total critical care time (exclusive of procedural time) : 45 minutes  Critical care time was exclusive of separately billable procedures and treating other patients and teaching time.  Critical care was necessary to treat or prevent imminent or life-threatening deterioration of the following conditions: cardiac failure and circulatory failure (STEMI).  Critical care was time spent personally by me on the following activities: blood draw for specimens, development of treatment plan with patient or surrogate, discussions with consultants, interpretation of cardiac output measurements, evaluation of patient's response to treatment, examination of patient, obtaining history from patient or surrogate, ordering and performing treatments and interventions, pulse oximetry and re-evaluation of patient's condition.        ED Vital Signs:  Vitals:    05/26/20 0615 05/26/20 0630 05/26/20 0700 05/26/20 0715   BP: 126/62 132/62 (!) 147/69 (!) 135/56   Pulse: 61 60 60 63   Resp: 20 19 19 (!) 22   Temp:    98 °F (36.7 °C)   TempSrc:    Oral   SpO2: 98% 98% 99% 99%   Weight:       Height:        05/26/20 0800 05/26/20 0900 05/26/20 0905 05/26/20 0939   BP: (!) 163/73 132/66  132/66  "  Pulse: 67 67 61 70   Resp: (!) 22 (!) 22 19    Temp:       TempSrc:       SpO2: 99% 99% 99%    Weight: 66.2 kg (146 lb)      Height: 5' 4" (1.626 m)       05/26/20 1000 05/26/20 1105 05/26/20 1200 05/26/20 1300   BP: (!) 149/61 (!) 110/59 (!) 125/59 (!) 120/101   Pulse: 62 64 (!) 59 69   Resp: (!) 26 (!) 23 (!) 24 (!) 23   Temp:  98.8 °F (37.1 °C)     TempSrc:  Oral     SpO2: 97% 96% 96% 95%   Weight:       Height:        05/26/20 1400 05/26/20 1505 05/26/20 1622   BP: (!) 115/48 (!) 104/42 (!) 125/59   Pulse: 61 60 65   Resp: (!) 28 20 (!) 21   Temp:  98.5 °F (36.9 °C)    TempSrc:  Oral    SpO2: 97% 95% 96%   Weight:      Height:          Abnormal Lab Results:  Labs Reviewed - No data to display       Imaging Results:  Imaging Results    None        The EKG was ordered, reviewed, and independently interpreted by the ED provider.  Interpretation time: 19:10  Rate: 55 BPM  Rhythm: sinus bradycardia  Interpretation: ST elevation consider lateral injury or acute infarct. Acute MI.           The Emergency Provider reviewed the vital signs and test results, which are outlined above.    ED Discussion     7:28 PM: Discussed pt's case with Dr. Jarvis (Cardiology) who recommends taking the pt to cath lab. Dr. Jarvis agrees with current care and management of pt and accepts admission.   Admitting Service: Cardiology  Admitting Physician: Dr. Jarvis  Admit to: Cath Lab    7:29 PM: Re-evaluated pt. I have discussed test results, shared treatment plan, and the need for admission with patient at bedside. Pt expresses understanding at this time and agree with all information. All questions answered. Pt has no further questions or concerns at this time. Pt is ready for admit.         ED Medication(s):  Medications   0.9%  NaCl infusion ( Intravenous Stopped 5/26/20 0600)   tirofiban 12.5 mg in sodium chloride 0.9% 250 mL infusion (1,655 mcg Intravenous New Bag 5/25/20 2392)     Followed by   tirofiban 12.5 mg in sodium chloride 0.9% " 250 mL infusion (0 mcg/kg/min × 66.2 kg Intravenous Stopped 5/26/20 0830)   nitroGLYCERIN 2% TD oint ointment 1 inch (1 inch Topical (Top) Given 5/25/20 1920)       Follow-up Information     Shabana Jarvis MD In 1 week.    Specialty:  Cardiology  Contact information:  [address] [address] [facility]  [place] [address]  [phone]                  Discharge Medication List as of 5/26/2020  3:44 PM      START taking these medications    Details   atorvastatin (LIPITOR) 80 MG tablet Take 1 tablet (80 mg total) by mouth once daily., Starting Wed 5/27/2020, Until Thu 5/27/2021, Normal      losartan (COZAAR) 25 MG tablet Take 1 tablet (25 mg total) by mouth once daily., Starting Wed 5/27/2020, Until Thu 5/27/2021, Normal      metoprolol tartrate (LOPRESSOR) 25 MG tablet Take 1 tablet (25 mg total) by mouth 2 (two) times daily., Starting Tue 5/26/2020, Until Wed 5/26/2021, Normal      ticagrelor (BRILINTA) 90 mg tablet Take 1 tablet (90 mg total) by mouth 2 (two) times daily., Starting Tue 5/26/2020, Until Wed 5/26/2021, Normal               Medical Decision Making    Medical Decision Making:   Clinical Tests:   Lab Tests: Ordered  Medical Tests: Ordered and Reviewed           Scribe Attestation:   Scribe #1: I performed the above scribed service and the documentation accurately describes the services I performed. I attest to the accuracy of the note.    Attending:   Physician Attestation Statement for Scribe #1: I, Cole Trivedi Jr., MD, personally performed the services described in this documentation, as scribed by Albert Sanchez, in my presence, and it is both accurate and complete.          Clinical Impression       ICD-10-CM ICD-9-CM   1. STEMI (ST elevation myocardial infarction) I21.3 410.90   2. Chest pain R07.9 786.50   3. ST elevation myocardial infarction (STEMI), unspecified artery I21.3 410.90   4. CAD (coronary artery disease) I25.10 414.00       Disposition:   Disposition: Admitted  Condition: Serious         Cole  Shikha Resendez MD  05/29/20 7455

## 2020-05-26 NOTE — H&P
[facility] -   Cardiology  History and Physical     Patient Name: Iza Esquivel  MRN: [id number]  Admission Date: 5/25/2020  Code Status: Prior   Attending Provider: No att. providers found   Primary Care Physician: Provider [facility]  Principal Problem:STEMI (ST elevation myocardial infarction)    Patient information was obtained from patient and ER records.     Subjective:     Chief Complaint:  stemi      HPI:  A 60 yo female with tobacco use cervical cancer hlp presents with stutterng symptom sof chest pain and shortness of breath since Friday that was aggravated today associated with rt arm pain that was severe she presented via ems.ekg showed posterolateral stemi .she was taken emergently to cath lab.she has also limiting claudication no hsitory of bleeding or bruising.    Past Medical History:   Diagnosis Date    Cervical cancer     Hyperlipidemia     Right carotid bruit        Past Surgical History:   Procedure Laterality Date    LYMPH NODE BIOPSY         Review of patient's allergies indicates:  No Known Allergies    No current facility-administered medications on file prior to encounter.      Current Outpatient Medications on File Prior to Encounter   Medication Sig    aspirin (ECOTRIN) 81 MG EC tablet Take 1 tablet (81 mg total) by mouth once daily.    pravastatin (PRAVACHOL) 40 MG tablet Take 1 tablet (40 mg total) by mouth once daily.     Family History     Problem Relation (Age of Onset)    Cancer Mother, Father    Diabetes Brother    Hypertension Brother    Transient ischemic attack Father        Tobacco Use    Smoking status: Current Every Day Smoker     Packs/day: 1.00     Years: 40.00     Pack years: 40.00    Smokeless tobacco: Never Used   Substance and Sexual Activity    Alcohol use: No     Frequency: Never    Drug use: Not on file    Sexual activity: Never     Review of Systems   Constitution: Negative for diaphoresis, malaise/fatigue and weight gain.   HENT: Negative  for hoarse voice.    Eyes: Negative for double vision and visual disturbance.   Cardiovascular: Positive for chest pain and claudication. Negative for cyanosis, dyspnea on exertion, irregular heartbeat, leg swelling, near-syncope, orthopnea, palpitations, paroxysmal nocturnal dyspnea and syncope.   Respiratory: Positive for shortness of breath. Negative for cough, hemoptysis and snoring.    Hematologic/Lymphatic: Negative for bleeding problem. Does not bruise/bleed easily.   Skin: Negative for color change and poor wound healing.   Musculoskeletal: Negative for muscle cramps, muscle weakness and myalgias.   Gastrointestinal: Negative for bloating, abdominal pain, change in bowel habit, diarrhea, heartburn, hematemesis, hematochezia, melena and nausea.   Neurological: Negative for excessive daytime sleepiness, dizziness, headaches, light-headedness, loss of balance, numbness and weakness.   Psychiatric/Behavioral: Negative for memory loss. The patient does not have insomnia.    Allergic/Immunologic: Negative for hives.     Objective:     Vital Signs (Most Recent):  Temp: 98.2 °F (36.8 °C) (05/25/20 1918)  Pulse: 70 (05/25/20 1918)  Resp: (!) 33 (05/25/20 1918)  BP: 116/64 (05/25/20 1918)  SpO2: 100 % (05/25/20 1918) Vital Signs (24h Range):  Temp:  [98.2 °F (36.8 °C)-98.4 °F (36.9 °C)] 98.2 °F (36.8 °C)  Pulse:  [70-84] 70  Resp:  [18-33] 33  SpO2:  [98 %-100 %] 100 %  BP: (116-166)/(64-99) 116/64     Weight: 66.2 kg (146 lb)  Body mass index is 25.06 kg/m².    SpO2: 100 %  O2 Device (Oxygen Therapy): nasal cannula    No intake or output data in the 24 hours ending 05/25/20 2049    Lines/Drains/Airways     Peripheral Intravenous Line                 Peripheral IV - Single Lumen 05/25/20 1917 20 G Right Hand less than 1 day         Peripheral IV - Single Lumen 05/25/20 1919 18 G Left Hand less than 1 day                Physical Exam   Constitutional: She is oriented to person, place, and time. She appears  well-developed and well-nourished. She does not appear ill. She appears distressed.   HENT:   Head: Normocephalic and atraumatic.   Eyes: Pupils are equal, round, and reactive to light. EOM are normal. No scleral icterus.   Neck: Normal range of motion. Neck supple. Normal carotid pulses, no hepatojugular reflux and no JVD present. Carotid bruit is not present. No tracheal deviation present. No thyromegaly present.   Cardiovascular: Normal rate, regular rhythm and normal heart sounds. Exam reveals no gallop and no friction rub.   No murmur heard.  Pulses:       Carotid pulses are 2+ on the right side, and 2+ on the left side.       Radial pulses are 2+ on the right side, and 2+ on the left side.        Femoral pulses are 1+ on the right side, and 1+ on the left side.       Popliteal pulses are 1+ on the right side, and 1+ on the left side.        Dorsalis pedis pulses are 1+ on the right side, and 1+ on the left side.        Posterior tibial pulses are 1+ on the right side, and 1+ on the left side.   Pulmonary/Chest: Effort normal and breath sounds normal. No respiratory distress. She has no wheezes. She has no rhonchi. She has no rales. She exhibits no tenderness.   Abdominal: Soft. Normal appearance, normal aorta and bowel sounds are normal. She exhibits no distension, no abdominal bruit, no ascites and no pulsatile midline mass. There is no hepatomegaly. There is no tenderness.   Musculoskeletal: She exhibits no edema.        Right shoulder: She exhibits no deformity.   Neurological: She is alert and oriented to person, place, and time. She has normal strength. No cranial nerve deficit. Coordination normal.   Skin: Skin is warm and dry. No rash noted. She is not diaphoretic. No cyanosis or erythema. Nails show no clubbing.   Psychiatric: She has a normal mood and affect. Her speech is normal and behavior is normal.   Nursing note and vitals reviewed.      Significant Labs:   Recent Lab Results        05/25/20 2018 05/25/20 2000 05/25/20 1927 05/25/20  1925        Albumin     3.2       Alkaline Phosphatase     126       ALT     11       Anion Gap     10       Aniso     Slight       AST     21       Baso #     0.08       Basophil%     0.7       BILIRUBIN TOTAL     0.1  Comment:  For infants and newborns, interpretation of results should be based  on gestational age, weight and in agreement with clinical  observations.  Premature Infant recommended reference ranges:  Up to 24 hours.............<8.0 mg/dL  Up to 48 hours............<12.0 mg/dL  3-5 days..................<15.0 mg/dL  6-29 days.................<15.0 mg/dL         BUN, Bld     7       Calcium     8.8       Chloride     105       CO2     27       Creatinine     1.1       Differential Method     Automated       eGFR if      >60       eGFR if non      54  Comment:  Calculation used to obtain the estimated glomerular filtration  rate (eGFR) is the CKD-EPI equation.          Eos #     0.1       Eosinophil%     0.7       Glucose     106       Gran # (ANC)     7.1       Gran%     58.5       Hematocrit     35.5       Hemoglobin     11.1       Immature Grans (Abs)     0.04  Comment:  Mild elevation in immature granulocytes is non specific and   can be seen in a variety of conditions including stress response,   acute inflammation, trauma and pregnancy. Correlation with other   laboratory and clinical findings is essential.         Immature Granulocytes     0.3       INR     1.0  Comment:  Coumadin Therapy:  2.0 - 3.0 for INR for all indicators except mechanical heart valves  and antiphospholipid syndromes which should use 2.5 - 3.5.         Lymph #     4.2       Lymph%     34.1       MCH     28.7       MCHC     31.3       MCV     92       Mono #     0.7       Mono%     5.7       MPV     10.1       nRBC     0       Platelet Estimate     Appears normal       Platelets     358       POC ACTIVATED CLOTTING TIME K 268 129          Poik     Slight       Potassium     3.3       PROTEIN TOTAL     6.9       Protime     10.5       RBC     3.87       RDW     13.7       Sample unknown unknown         SARS-CoV-2 RNA, Amplification, Qual       Negative  Comment:  This test utilizes isothermal nucleic acid amplification   technology to detect the SARS-CoV-2 RdRp nucleic acid segment.   The analytical sensitivity (limit of detection) is 125 genome   equivalents/mL.   A POSITIVE result implies infection with the SARS-CoV-2 virus;  the patient is presumed to be contagious.    A NEGATIVE result means that SARS-CoV-2 nucleic acids are not  present above the limit of detection. A NEGATIVE result should be   treated as presumptive. It does not rule out the possibility of   COVID-19 and should not be the sole basis for treatment decisions.   If COVID-19 is strongly suspected based on clinical and exposure   history, re-testing using an alternate molecular assay should be   considered.   This test is only for use under the Food and Drug   Administration s Emergency Use Authorization (EUA).   Commercial kits are provided by [employer].   Performance characteristics of the EUA have been independently  verified by [facility] Department of  Pathology and Laboratory Medicine.   _________________________________________________________________  The ID NOW COVID-19 Letter of Authorization, along with the   authorized Fact Sheet for Healthcare Providers, the authorized Fact  Sheet for Patients, and authorized labeling are available on the FDA   website:  www.fda.gov/MedicalDevices/Safety/EmergencySituations/heh830788.htm       Sodium     142       Spherocytes     Occasional       Stomatocytes     Present       Tear Drop Cells     Occasional       Troponin I     1.643  Comment:  The reference interval for Troponin I represents the 99th percentile   cutoff   for our facility and is consistent with 3rd generation assay   performance.         WBC      12.18             Significant Imaging:     Assessment and Plan:     * STEMI (ST elevation myocardial infarction)  Stuttering course since Friday . Has acute posterolateral infarct.for direct pci per stemi protocol.    Atherosclerotic PVD with intermittent claudication  Very limiting    Bruit of right carotid artery  Asymptomatic on statins counsled about smoking cessation    Mixed hyperlipidemia  On statins    Tobacco abuse  counsled about smoking cessation        VTE Risk Mitigation (From admission, onward)    None        I have explained the risks, benefits , and alternatives of the procedure in detail.the patient voices understanding and all questions have been answered.the patient agrees to proceed as planned.    Due to her extensive pvd and carotid disease will need non invasive eval and statins high dose as well as smoking cessation and antiplatelet therapy.  Shabana Jarvis MD  Cardiology   [facility]

## 2020-05-26 NOTE — ASSESSMENT & PLAN NOTE
Stuttering course since Friday . Has acute posterolateral infarct.for direct pci per stemi protocol.

## 2020-05-27 ENCOUNTER — PATIENT OUTREACH (OUTPATIENT)
Dept: ADMINISTRATIVE | Facility: CLINIC | Age: 60
End: 2020-05-27

## 2020-05-27 NOTE — PATIENT INSTRUCTIONS
Discharge Instructions for Heart Attack  You have had a heart attack (acute myocardial infarction). A heart attack occurs when a vessel that sends blood to your heart suddenly becomes blocked. This causes your heart not to work as well as it should. Follow these guidelines for home care and lifestyle changes.  Home care  · Take your medicines exactly as directed. Dont skip doses. Talk with your healthcare provider if your medicines aren't working for you. Together you can come up with another treatment plan.  · Remember that recovery after a heart attack takes time. Plan to rest for at least 4 to 8 weeks while you recover. Then return to normal activity when your doctor says its OK.  · Ask your doctor about joining a heart rehabilitation program. This can help strengthen your heart and lungs and give you more energy and confidence.  · Tell your doctor if you are feeling depressed. Feelings of sadness are common after a heart attack. But it is important to speak to someone or seek counseling if you are feeling overwhelmed by these feelings.  · Call 911 right away if you have chest pain or pain that goes to your shoulder, neck, or back. Don't drive yourself to the hospital.  · Ask your family members to learn CPR. This is an important skill that can save lives when it's needed.  · Learn to take your own blood pressure and pulse. Keep a record of your results. Ask your doctor when you should seek emergency medical attention. He or she will tell you which blood pressure reading is dangerous.  Lifestyle changes  Your heart attack might have been caused by cardiovascular disease. Your healthcare provider will work with you to make changes to your lifestyle. This will help the heart disease from getting worse. These changes will most likely be a combination of diet and exercise.  Diet  Your healthcare provider will tell you what changes you need to make to your diet. You may need to see a registered dietitian for help  with these diet changes. These changes may include:  · Cutting back on how much fat and cholesterol you eat  · Cutting back on how much salt (sodium) you eat, especially if you have high blood pressure  · Eating more fresh vegetables and fruits  · Eating lean proteins such as fish, poultry, beans, and peas, and eating less red meat and processed meats  · Using low-fat dairy products  · Using vegetable and nut oils in limited amounts  · Limiting how many sweets and processed foods such as chips, cookies, and baked goods you eat  · Limiting how often you eat out. And when you do eat out, making better food choices.  · Not eating fried or greasy foods, or foods high in saturated fat  Exercise  Your healthcare provider may tell you to get more exercise if you haven't been physically active. Depending on your case, your provider may recommend that you get moderate to vigorous physical activity for at least 40 minutes each day, and for at least 3 to 4 days each week. A few examples of moderate to vigorous activity include:  · Walking at a brisk pace, about 3 to 4 miles per hour  · Jogging or running  · Swimming or water aerobics  · Hiking  · Dancing  · Martial arts  · Tennis  · Riding a bicycle or stationary bike  Other changes  Your healthcare provider may also recommend that you:  · Lose weight. If you are overweight or obese, your provider will work with you to lose extra pounds. Making diet changes and getting more exercise can help. A good goal is to lose your 10% of your body weight in one year.  · Stop smoking. Sign up for a stop-smoking program to make it more likely for you to quit for good. You can join a stop-smoking support group. Or ask your doctor about nicotine replacement products.  · Learn to manage stress. Stress management techniques to help you deal with stress in your home and work life. This will help you feel better emotionally and ease the strain on your heart.  Follow-up  Make a follow-up  appointment as directed by our staff.     When to seek medical advice  Call 911 right away if you have:  · Chest pain that goes to your neck, jaw, back, or shoulder  · Shortness of breath  Otherwise, call your doctor immediately if you have:  · Lightheadedness, dizziness, or fainting  · Feeling of irregular heartbeat or fast pulse.   Date Last Reviewed: 10/1/2016  © 0489-7156 [employer]. [address] [place] [address], [place] [address]. All rights reserved. This information is not intended as a substitute for professional medical care. Always follow your healthcare professional's instructions.

## 2020-06-01 ENCOUNTER — TELEPHONE (OUTPATIENT)
Dept: FAMILY MEDICINE | Facility: CLINIC | Age: 60
End: 2020-06-01

## 2020-06-01 ENCOUNTER — OFFICE VISIT (OUTPATIENT)
Dept: FAMILY MEDICINE | Facility: CLINIC | Age: 60
End: 2020-06-01
Attending: FAMILY MEDICINE
Payer: COMMERCIAL

## 2020-06-01 ENCOUNTER — HOSPITAL ENCOUNTER (OUTPATIENT)
Dept: RADIOLOGY | Facility: HOSPITAL | Age: 60
Discharge: HOME | End: 2020-06-01
Attending: FAMILY MEDICINE
Payer: COMMERCIAL

## 2020-06-01 VITALS
TEMPERATURE: 98 F | DIASTOLIC BLOOD PRESSURE: 80 MMHG | HEART RATE: 81 BPM | WEIGHT: 158.94 LBS | BODY MASS INDEX: 27.28 KG/M2 | OXYGEN SATURATION: 98 % | SYSTOLIC BLOOD PRESSURE: 130 MMHG

## 2020-06-01 DIAGNOSIS — I73.9 CLAUDICATION IN PERIPHERAL VASCULAR DISEASE: ICD-10-CM

## 2020-06-01 DIAGNOSIS — R06.02 SOB (SHORTNESS OF BREATH): Primary | ICD-10-CM

## 2020-06-01 DIAGNOSIS — I25.10 CORONARY ARTERY DISEASE, ANGINA PRESENCE UNSPECIFIED, UNSPECIFIED VESSEL OR LESION TYPE, UNSPECIFIED WHETHER NATIVE OR TRANSPLANTED HEART: ICD-10-CM

## 2020-06-01 DIAGNOSIS — Z72.0 TOBACCO ABUSE: ICD-10-CM

## 2020-06-01 DIAGNOSIS — R06.02 SOB (SHORTNESS OF BREATH): ICD-10-CM

## 2020-06-01 PROCEDURE — 99214 PR OFFICE/OUTPT VISIT, EST, LEVL IV, 30-39 MIN: ICD-10-PCS | Mod: S$GLB,,, | Performed by: FAMILY MEDICINE

## 2020-06-01 PROCEDURE — 99999 PR PBB SHADOW E&M-EST. PATIENT-LVL III: CPT | Mod: PBBFAC,,, | Performed by: FAMILY MEDICINE

## 2020-06-01 PROCEDURE — 71046 X-RAY EXAM CHEST 2 VIEWS: CPT | Mod: TC,PO

## 2020-06-01 PROCEDURE — 93010 EKG 12-LEAD: ICD-10-PCS | Mod: S$GLB,,, | Performed by: INTERNAL MEDICINE

## 2020-06-01 PROCEDURE — 93005 ELECTROCARDIOGRAM TRACING: CPT | Mod: S$GLB,,, | Performed by: FAMILY MEDICINE

## 2020-06-01 PROCEDURE — 99999 PR PBB SHADOW E&M-EST. PATIENT-LVL III: ICD-10-PCS | Mod: PBBFAC,,, | Performed by: FAMILY MEDICINE

## 2020-06-01 PROCEDURE — 71046 X-RAY EXAM CHEST 2 VIEWS: CPT | Mod: 26,,, | Performed by: RADIOLOGY

## 2020-06-01 PROCEDURE — 93010 ELECTROCARDIOGRAM REPORT: CPT | Mod: S$GLB,,, | Performed by: INTERNAL MEDICINE

## 2020-06-01 PROCEDURE — 99214 OFFICE O/P EST MOD 30 MIN: CPT | Mod: S$GLB,,, | Performed by: FAMILY MEDICINE

## 2020-06-01 PROCEDURE — 71046 XR CHEST PA AND LATERAL: ICD-10-PCS | Mod: 26,,, | Performed by: RADIOLOGY

## 2020-06-01 PROCEDURE — 93005 EKG 12-LEAD: ICD-10-PCS | Mod: S$GLB,,, | Performed by: FAMILY MEDICINE

## 2020-06-01 NOTE — PROGRESS NOTES
Subjective:       Patient ID: Iza Esquivel is a 59 y.o. female.    Chief Complaint: Follow-up    59 y old female with tobacco abuse, dlp and htn here for f.u on Hosp on 5/25 due to a 3 d hx of CP and sob . Ems done by EKG  demonstrated posterolateral stemi. She was transferred to cath labs which demonstrated  And  Occluded lcx  , s/p stent jodi resolute 3.0x18. No obstructive dx on LAD and RCA , normal EF and Occluded lsfa with severely calcified  iliac artery stenosis. Discharged on 5/26 . Doing well except for orthopnea   . No cp . She does have sever calf pain with ambulation . She prefers to quit smoking on her own. Labs during hosp were also remarkable for reduced GFR and normocytic anemia      Review of Systems   Constitutional: Negative.  Negative for activity change and unexpected weight change.   HENT: Negative.  Negative for hearing loss, rhinorrhea and trouble swallowing.    Eyes: Negative.  Negative for discharge and visual disturbance.   Respiratory: Positive for chest tightness. Negative for wheezing.    Cardiovascular: Positive for chest pain and palpitations.   Gastrointestinal: Positive for diarrhea. Negative for blood in stool, constipation and vomiting.   Endocrine: Negative for polydipsia and polyuria.   Genitourinary: Negative.  Negative for difficulty urinating, dysuria, hematuria and menstrual problem.   Musculoskeletal: Negative.  Negative for arthralgias, joint swelling and neck pain.   Skin: Negative.    Neurological: Negative for weakness and headaches.   Hematological: Negative.    Psychiatric/Behavioral: Negative for confusion and dysphoric mood.       Objective:      Physical Exam   Constitutional: She is oriented to person, place, and time. She appears well-developed and well-nourished. No distress.   HENT:   Head: Normocephalic and atraumatic.   Right Ear: External ear normal.   Left Ear: External ear normal.   Mouth/Throat: No oropharyngeal exudate.   Eyes: Pupils are equal, round,  and reactive to light. Conjunctivae and EOM are normal. Right eye exhibits no discharge. Left eye exhibits no discharge. No scleral icterus.   Neck: Normal range of motion. Neck supple. No JVD present. No tracheal deviation present. No thyromegaly present.   Cardiovascular: Normal rate, regular rhythm and normal heart sounds. Exam reveals no gallop and no friction rub.   No murmur heard.  Pulmonary/Chest: Effort normal and breath sounds normal. No stridor. No respiratory distress. She has no wheezes. She has no rales. She exhibits no tenderness.   Abdominal: Soft. Bowel sounds are normal. She exhibits no distension. There is no tenderness. There is no rebound and no guarding.   Musculoskeletal: Normal range of motion.   Lymphadenopathy:     She has no cervical adenopathy.   Neurological: She is alert and oriented to person, place, and time.   Skin: Skin is warm and dry. She is not diaphoretic.   Psychiatric: She has a normal mood and affect. Her behavior is normal. Judgment and thought content normal.       Assessment:       Iza was seen today for follow-up.    Diagnoses and all orders for this visit:    SOB (shortness of breath)  -     X-Ray Chest PA And Lateral; Future  -     IN OFFICE EKG 12-LEAD (to Muse)  -     CBC auto differential; Future  -     Comprehensive metabolic panel; Future    Coronary artery disease, angina presence unspecified, unspecified vessel or lesion type, unspecified whether native or transplanted heart    Tobacco abuse    Claudication in peripheral vascular disease      Plan:     Iza was seen today for follow-up.    Diagnoses and all orders for this visit:    SOB (shortness of breath)  -     X-Ray Chest PA And Lateral; Future  -     IN OFFICE EKG 12-LEAD (to Muse)  -     CBC auto differential; Future  -     Comprehensive metabolic panel; Future    Coronary artery disease, angina presence unspecified, unspecified vessel or lesion type, unspecified whether native or transplanted  heart    Tobacco abuse     sinus bradycardia noticed on ekg   Cont meds. F.u with card   Stop smoking

## 2020-06-02 ENCOUNTER — TELEPHONE (OUTPATIENT)
Dept: FAMILY MEDICINE | Facility: CLINIC | Age: 60
End: 2020-06-02

## 2020-06-02 ENCOUNTER — PATIENT MESSAGE (OUTPATIENT)
Dept: PULMONOLOGY | Facility: CLINIC | Age: 60
End: 2020-06-02

## 2020-06-02 DIAGNOSIS — R93.89 ABNORMAL CXR: Primary | ICD-10-CM

## 2020-06-09 ENCOUNTER — PATIENT OUTREACH (OUTPATIENT)
Dept: ADMINISTRATIVE | Facility: OTHER | Age: 60
End: 2020-06-09

## 2020-06-09 DIAGNOSIS — Z12.11 COLON CANCER SCREENING: ICD-10-CM

## 2020-06-09 DIAGNOSIS — Z12.31 BREAST CANCER SCREENING BY MAMMOGRAM: Primary | ICD-10-CM

## 2020-06-11 ENCOUNTER — TELEPHONE (OUTPATIENT)
Dept: CARDIOLOGY | Facility: CLINIC | Age: 60
End: 2020-06-11

## 2020-06-11 ENCOUNTER — HOSPITAL ENCOUNTER (OUTPATIENT)
Facility: HOSPITAL | Age: 60
Discharge: HOME | End: 2020-06-12
Attending: EMERGENCY MEDICINE | Admitting: INTERNAL MEDICINE
Payer: COMMERCIAL

## 2020-06-11 ENCOUNTER — HOSPITAL ENCOUNTER (OUTPATIENT)
Dept: CARDIOLOGY | Facility: HOSPITAL | Age: 60
Discharge: HOME | End: 2020-06-11
Attending: INTERNAL MEDICINE
Payer: COMMERCIAL

## 2020-06-11 ENCOUNTER — OFFICE VISIT (OUTPATIENT)
Dept: CARDIOLOGY | Facility: CLINIC | Age: 60
End: 2020-06-11
Payer: COMMERCIAL

## 2020-06-11 VITALS
DIASTOLIC BLOOD PRESSURE: 78 MMHG | SYSTOLIC BLOOD PRESSURE: 156 MMHG | HEART RATE: 57 BPM | BODY MASS INDEX: 26.52 KG/M2 | HEIGHT: 64 IN | WEIGHT: 155.31 LBS | OXYGEN SATURATION: 99 %

## 2020-06-11 VITALS
HEART RATE: 55 BPM | DIASTOLIC BLOOD PRESSURE: 78 MMHG | WEIGHT: 155 LBS | SYSTOLIC BLOOD PRESSURE: 155 MMHG | BODY MASS INDEX: 26.46 KG/M2 | HEIGHT: 64 IN

## 2020-06-11 DIAGNOSIS — E78.2 MIXED HYPERLIPIDEMIA: Chronic | ICD-10-CM

## 2020-06-11 DIAGNOSIS — I21.01 ST ELEVATION MYOCARDIAL INFARCTION INVOLVING LEFT MAIN CORONARY ARTERY: ICD-10-CM

## 2020-06-11 DIAGNOSIS — R94.31 NONSPECIFIC ABNORMAL ELECTROCARDIOGRAM (ECG) (EKG): ICD-10-CM

## 2020-06-11 DIAGNOSIS — R09.89 BRUIT OF RIGHT CAROTID ARTERY: ICD-10-CM

## 2020-06-11 DIAGNOSIS — E78.2 MIXED HYPERLIPIDEMIA: Primary | ICD-10-CM

## 2020-06-11 DIAGNOSIS — I70.219 ATHEROSCLEROTIC PVD WITH INTERMITTENT CLAUDICATION: ICD-10-CM

## 2020-06-11 DIAGNOSIS — I25.10 CORONARY ARTERY DISEASE INVOLVING NATIVE CORONARY ARTERY OF NATIVE HEART WITHOUT ANGINA PECTORIS: ICD-10-CM

## 2020-06-11 DIAGNOSIS — R55 NEAR SYNCOPE: ICD-10-CM

## 2020-06-11 DIAGNOSIS — I20.0 UNSTABLE ANGINA: ICD-10-CM

## 2020-06-11 DIAGNOSIS — Z72.0 TOBACCO ABUSE DISORDER: ICD-10-CM

## 2020-06-11 DIAGNOSIS — I21.21 ST ELEVATION MYOCARDIAL INFARCTION INVOLVING LEFT CIRCUMFLEX CORONARY ARTERY: ICD-10-CM

## 2020-06-11 DIAGNOSIS — R07.9 CHEST PAIN: Primary | ICD-10-CM

## 2020-06-11 DIAGNOSIS — I25.10 CORONARY ARTERY DISEASE, ANGINA PRESENCE UNSPECIFIED, UNSPECIFIED VESSEL OR LESION TYPE, UNSPECIFIED WHETHER NATIVE OR TRANSPLANTED HEART: Primary | ICD-10-CM

## 2020-06-11 DIAGNOSIS — I25.10 CORONARY ARTERY DISEASE INVOLVING NATIVE CORONARY ARTERY OF NATIVE HEART WITHOUT ANGINA PECTORIS: Primary | ICD-10-CM

## 2020-06-11 DIAGNOSIS — Z72.0 TOBACCO ABUSE: Chronic | ICD-10-CM

## 2020-06-11 DIAGNOSIS — R00.1 BRADYCARDIA: ICD-10-CM

## 2020-06-11 LAB
ALBUMIN SERPL BCP-MCNC: 3.5 G/DL (ref 3.5–5.2)
ALP SERPL-CCNC: 149 U/L (ref 55–135)
ALT SERPL W/O P-5'-P-CCNC: 10 U/L (ref 10–44)
ANION GAP SERPL CALC-SCNC: 12 MMOL/L (ref 8–16)
APTT BLDCRRT: 33.2 SEC (ref 21–32)
AST SERPL-CCNC: 14 U/L (ref 10–40)
AV INDEX (PROSTH): 0.86
AV MEAN GRADIENT: 5 MMHG
AV PEAK GRADIENT: 12 MMHG
AV VALVE AREA: 2.69 CM2
AV VELOCITY RATIO: 0.82
BASOPHILS # BLD AUTO: 0.08 K/UL (ref 0–0.2)
BASOPHILS # BLD AUTO: 0.08 K/UL (ref 0–0.2)
BASOPHILS NFR BLD: 0.9 % (ref 0–1.9)
BASOPHILS NFR BLD: 1.3 % (ref 0–1.9)
BILIRUB SERPL-MCNC: 0.3 MG/DL (ref 0.1–1)
BNP SERPL-MCNC: 186 PG/ML (ref 0–99)
BSA FOR ECHO PROCEDURE: 1.78 M2
BUN SERPL-MCNC: 7 MG/DL (ref 8–23)
CALCIUM SERPL-MCNC: 9.5 MG/DL (ref 8.7–10.5)
CHLORIDE SERPL-SCNC: 104 MMOL/L (ref 95–110)
CO2 SERPL-SCNC: 26 MMOL/L (ref 23–29)
CREAT SERPL-MCNC: 1 MG/DL (ref 0.5–1.4)
CV ECHO LV RWT: 0.56 CM
DIFFERENTIAL METHOD: ABNORMAL
DIFFERENTIAL METHOD: ABNORMAL
DOP CALC AO PEAK VEL: 1.7 M/S
DOP CALC AO VTI: 36.59 CM
DOP CALC LVOT AREA: 3.1 CM2
DOP CALC LVOT DIAMETER: 2 CM
DOP CALC LVOT PEAK VEL: 1.39 M/S
DOP CALC LVOT STROKE VOLUME: 98.34 CM3
DOP CALCLVOT PEAK VEL VTI: 31.32 CM
E WAVE DECELERATION TIME: 220.19 MSEC
E/A RATIO: 1.05
E/E' RATIO: 11.53 M/S
ECHO LV POSTERIOR WALL: 1.06 CM (ref 0.6–1.1)
EOSINOPHIL # BLD AUTO: 0.1 K/UL (ref 0–0.5)
EOSINOPHIL # BLD AUTO: 0.1 K/UL (ref 0–0.5)
EOSINOPHIL NFR BLD: 0.8 % (ref 0–8)
EOSINOPHIL NFR BLD: 1.1 % (ref 0–8)
ERYTHROCYTE [DISTWIDTH] IN BLOOD BY AUTOMATED COUNT: 13.3 % (ref 11.5–14.5)
ERYTHROCYTE [DISTWIDTH] IN BLOOD BY AUTOMATED COUNT: 13.3 % (ref 11.5–14.5)
EST. GFR  (AFRICAN AMERICAN): >60 ML/MIN/1.73 M^2
EST. GFR  (NON AFRICAN AMERICAN): >60 ML/MIN/1.73 M^2
FRACTIONAL SHORTENING: 33 % (ref 28–44)
GLUCOSE SERPL-MCNC: 111 MG/DL (ref 70–110)
HCT VFR BLD AUTO: 28.6 % (ref 37–48.5)
HCT VFR BLD AUTO: 35.5 % (ref 37–48.5)
HCV AB SERPL QL IA: NEGATIVE
HGB BLD-MCNC: 11.2 G/DL (ref 12–16)
HGB BLD-MCNC: 8.8 G/DL (ref 12–16)
HIV 1+2 AB+HIV1 P24 AG SERPL QL IA: NEGATIVE
IMM GRANULOCYTES # BLD AUTO: 0.02 K/UL (ref 0–0.04)
IMM GRANULOCYTES # BLD AUTO: 0.03 K/UL (ref 0–0.04)
IMM GRANULOCYTES NFR BLD AUTO: 0.3 % (ref 0–0.5)
IMM GRANULOCYTES NFR BLD AUTO: 0.4 % (ref 0–0.5)
INR PPP: 1.1 (ref 0.8–1.2)
INTERVENTRICULAR SEPTUM: 1.09 CM (ref 0.6–1.1)
IVRT: 82.78 MSEC
LA MAJOR: 4.13 CM
LA MINOR: 4.24 CM
LA WIDTH: 3.26 CM
LEFT ATRIUM SIZE: 3.59 CM
LEFT ATRIUM VOLUME INDEX: 23.7 ML/M2
LEFT ATRIUM VOLUME: 41.62 CM3
LEFT INTERNAL DIMENSION IN SYSTOLE: 2.53 CM (ref 2.1–4)
LEFT VENTRICLE DIASTOLIC VOLUME INDEX: 34.87 ML/M2
LEFT VENTRICLE DIASTOLIC VOLUME: 61.21 ML
LEFT VENTRICLE MASS INDEX: 74 G/M2
LEFT VENTRICLE SYSTOLIC VOLUME INDEX: 13.1 ML/M2
LEFT VENTRICLE SYSTOLIC VOLUME: 22.97 ML
LEFT VENTRICULAR INTERNAL DIMENSION IN DIASTOLE: 3.78 CM (ref 3.5–6)
LEFT VENTRICULAR MASS: 129.16 G
LV LATERAL E/E' RATIO: 12.25 M/S
LV SEPTAL E/E' RATIO: 10.89 M/S
LYMPHOCYTES # BLD AUTO: 2 K/UL (ref 1–4.8)
LYMPHOCYTES # BLD AUTO: 2.2 K/UL (ref 1–4.8)
LYMPHOCYTES NFR BLD: 25.4 % (ref 18–48)
LYMPHOCYTES NFR BLD: 32.1 % (ref 18–48)
MAGNESIUM SERPL-MCNC: 2.2 MG/DL (ref 1.6–2.6)
MCH RBC QN AUTO: 28 PG (ref 27–31)
MCH RBC QN AUTO: 28.4 PG (ref 27–31)
MCHC RBC AUTO-ENTMCNC: 30.8 G/DL (ref 32–36)
MCHC RBC AUTO-ENTMCNC: 31.5 G/DL (ref 32–36)
MCV RBC AUTO: 90 FL (ref 82–98)
MCV RBC AUTO: 91 FL (ref 82–98)
MONOCYTES # BLD AUTO: 0.4 K/UL (ref 0.3–1)
MONOCYTES # BLD AUTO: 0.4 K/UL (ref 0.3–1)
MONOCYTES NFR BLD: 4.7 % (ref 4–15)
MONOCYTES NFR BLD: 6 % (ref 4–15)
MV PEAK A VEL: 0.93 M/S
MV PEAK E VEL: 0.98 M/S
NEUTROPHILS # BLD AUTO: 3.8 K/UL (ref 1.8–7.7)
NEUTROPHILS # BLD AUTO: 5.7 K/UL (ref 1.8–7.7)
NEUTROPHILS NFR BLD: 59.2 % (ref 38–73)
NEUTROPHILS NFR BLD: 67.8 % (ref 38–73)
NRBC BLD-RTO: 0 /100 WBC
NRBC BLD-RTO: 0 /100 WBC
PISA TR MAX VEL: 2.3 M/S
PLATELET # BLD AUTO: 336 K/UL (ref 150–350)
PLATELET # BLD AUTO: 401 K/UL (ref 150–350)
PMV BLD AUTO: 10.3 FL (ref 9.2–12.9)
PMV BLD AUTO: 10.5 FL (ref 9.2–12.9)
POTASSIUM SERPL-SCNC: 3.7 MMOL/L (ref 3.5–5.1)
PROT SERPL-MCNC: 7.5 G/DL (ref 6–8.4)
PROTHROMBIN TIME: 11.4 SEC (ref 9–12.5)
PULM VEIN S/D RATIO: 1.08
PV PEAK D VEL: 0.63 M/S
PV PEAK S VEL: 0.68 M/S
RA MAJOR: 3.95 CM
RA PRESSURE: 3 MMHG
RA WIDTH: 3.19 CM
RBC # BLD AUTO: 3.14 M/UL (ref 4–5.4)
RBC # BLD AUTO: 3.95 M/UL (ref 4–5.4)
RIGHT VENTRICULAR END-DIASTOLIC DIMENSION: 2.88 CM
SARS-COV-2 RDRP RESP QL NAA+PROBE: NEGATIVE
SODIUM SERPL-SCNC: 142 MMOL/L (ref 136–145)
TDI LATERAL: 0.08 M/S
TDI SEPTAL: 0.09 M/S
TDI: 0.09 M/S
TR MAX PG: 21 MMHG
TROPONIN I SERPL DL<=0.01 NG/ML-MCNC: 0.01 NG/ML (ref 0–0.03)
TROPONIN I SERPL DL<=0.01 NG/ML-MCNC: 0.02 NG/ML (ref 0–0.03)
TV REST PULMONARY ARTERY PRESSURE: 24 MMHG
WBC # BLD AUTO: 6.36 K/UL (ref 3.9–12.7)
WBC # BLD AUTO: 8.47 K/UL (ref 3.9–12.7)

## 2020-06-11 PROCEDURE — 86703 HIV-1/HIV-2 1 RESULT ANTBDY: CPT

## 2020-06-11 PROCEDURE — 25500020 PHARM REV CODE 255: Performed by: INTERNAL MEDICINE

## 2020-06-11 PROCEDURE — 86803 HEPATITIS C AB TEST: CPT

## 2020-06-11 PROCEDURE — 85610 PROTHROMBIN TIME: CPT

## 2020-06-11 PROCEDURE — 25000003 PHARM REV CODE 250: Performed by: INTERNAL MEDICINE

## 2020-06-11 PROCEDURE — 93010 EKG 12-LEAD: ICD-10-PCS | Mod: ,,, | Performed by: INTERNAL MEDICINE

## 2020-06-11 PROCEDURE — 93010 ELECTROCARDIOGRAM REPORT: CPT | Mod: ,,, | Performed by: INTERNAL MEDICINE

## 2020-06-11 PROCEDURE — 85025 COMPLETE CBC W/AUTO DIFF WBC: CPT | Mod: 91

## 2020-06-11 PROCEDURE — U0002 COVID-19 LAB TEST NON-CDC: HCPCS

## 2020-06-11 PROCEDURE — G0378 HOSPITAL OBSERVATION PER HR: HCPCS

## 2020-06-11 PROCEDURE — 93005 ELECTROCARDIOGRAM TRACING: CPT

## 2020-06-11 PROCEDURE — 84484 ASSAY OF TROPONIN QUANT: CPT | Mod: 91

## 2020-06-11 PROCEDURE — 93010 ELECTROCARDIOGRAM REPORT: CPT | Mod: 59,77,, | Performed by: INTERNAL MEDICINE

## 2020-06-11 PROCEDURE — 93005 ELECTROCARDIOGRAM TRACING: CPT | Mod: 59

## 2020-06-11 PROCEDURE — 93307 TTE W/O DOPPLER COMPLETE: CPT | Mod: 26,,, | Performed by: INTERNAL MEDICINE

## 2020-06-11 PROCEDURE — C1769 GUIDE WIRE: HCPCS | Performed by: INTERNAL MEDICINE

## 2020-06-11 PROCEDURE — 93010 EKG 12-LEAD: ICD-10-PCS | Mod: 59,77,, | Performed by: INTERNAL MEDICINE

## 2020-06-11 PROCEDURE — C1894 INTRO/SHEATH, NON-LASER: HCPCS | Performed by: INTERNAL MEDICINE

## 2020-06-11 PROCEDURE — 25000003 PHARM REV CODE 250: Performed by: NURSE PRACTITIONER

## 2020-06-11 PROCEDURE — 99152 MOD SED SAME PHYS/QHP 5/>YRS: CPT | Performed by: INTERNAL MEDICINE

## 2020-06-11 PROCEDURE — 99152 PR MOD CONSCIOUS SEDATION, SAME PHYS, 5+ YRS, FIRST 15 MIN: ICD-10-PCS | Mod: ,,, | Performed by: INTERNAL MEDICINE

## 2020-06-11 PROCEDURE — 83880 ASSAY OF NATRIURETIC PEPTIDE: CPT

## 2020-06-11 PROCEDURE — 93458 L HRT ARTERY/VENTRICLE ANGIO: CPT | Performed by: INTERNAL MEDICINE

## 2020-06-11 PROCEDURE — 99285 EMERGENCY DEPT VISIT HI MDM: CPT | Mod: 25

## 2020-06-11 PROCEDURE — 25000003 PHARM REV CODE 250: Performed by: PHYSICIAN ASSISTANT

## 2020-06-11 PROCEDURE — 93307 TTE W/O DOPPLER COMPLETE: CPT

## 2020-06-11 PROCEDURE — 93458 L HRT ARTERY/VENTRICLE ANGIO: CPT | Mod: 26,,, | Performed by: INTERNAL MEDICINE

## 2020-06-11 PROCEDURE — 25000003 PHARM REV CODE 250: Performed by: EMERGENCY MEDICINE

## 2020-06-11 PROCEDURE — 84484 ASSAY OF TROPONIN QUANT: CPT

## 2020-06-11 PROCEDURE — 99999 PR PBB SHADOW E&M-EST. PATIENT-LVL III: ICD-10-PCS | Mod: PBBFAC,,, | Performed by: INTERNAL MEDICINE

## 2020-06-11 PROCEDURE — 80053 COMPREHEN METABOLIC PANEL: CPT

## 2020-06-11 PROCEDURE — 36415 COLL VENOUS BLD VENIPUNCTURE: CPT

## 2020-06-11 PROCEDURE — 99153 MOD SED SAME PHYS/QHP EA: CPT | Performed by: INTERNAL MEDICINE

## 2020-06-11 PROCEDURE — 83735 ASSAY OF MAGNESIUM: CPT

## 2020-06-11 PROCEDURE — 93458 PR CATH PLACE/CORON ANGIO, IMG SUPER/INTERP,W LEFT HEART VENTRICULOGRAPHY: ICD-10-PCS | Mod: 26,,, | Performed by: INTERNAL MEDICINE

## 2020-06-11 PROCEDURE — 99152 MOD SED SAME PHYS/QHP 5/>YRS: CPT | Mod: ,,, | Performed by: INTERNAL MEDICINE

## 2020-06-11 PROCEDURE — 99999 PR PBB SHADOW E&M-EST. PATIENT-LVL III: CPT | Mod: PBBFAC,,, | Performed by: INTERNAL MEDICINE

## 2020-06-11 PROCEDURE — 99214 OFFICE O/P EST MOD 30 MIN: CPT | Mod: 25,S$GLB,, | Performed by: INTERNAL MEDICINE

## 2020-06-11 PROCEDURE — 63600175 PHARM REV CODE 636 W HCPCS: Performed by: INTERNAL MEDICINE

## 2020-06-11 PROCEDURE — 93307 ECHO (CUPID ONLY): ICD-10-PCS | Mod: 26,,, | Performed by: INTERNAL MEDICINE

## 2020-06-11 PROCEDURE — 99214 PR OFFICE/OUTPT VISIT, EST, LEVL IV, 30-39 MIN: ICD-10-PCS | Mod: 25,S$GLB,, | Performed by: INTERNAL MEDICINE

## 2020-06-11 PROCEDURE — 27201423 OPTIME MED/SURG SUP & DEVICES STERILE SUPPLY: Performed by: INTERNAL MEDICINE

## 2020-06-11 PROCEDURE — 85730 THROMBOPLASTIN TIME PARTIAL: CPT

## 2020-06-11 RX ORDER — LIDOCAINE HYDROCHLORIDE 20 MG/ML
INJECTION, SOLUTION EPIDURAL; INFILTRATION; INTRACAUDAL; PERINEURAL
Status: DISCONTINUED | OUTPATIENT
Start: 2020-06-11 | End: 2020-06-11 | Stop reason: HOSPADM

## 2020-06-11 RX ORDER — DIPHENHYDRAMINE HYDROCHLORIDE 50 MG/ML
INJECTION INTRAMUSCULAR; INTRAVENOUS
Status: DISCONTINUED | OUTPATIENT
Start: 2020-06-11 | End: 2020-06-11 | Stop reason: HOSPADM

## 2020-06-11 RX ORDER — GLUCAGON 1 MG
1 KIT INJECTION
Status: DISCONTINUED | OUTPATIENT
Start: 2020-06-11 | End: 2020-06-12 | Stop reason: HOSPADM

## 2020-06-11 RX ORDER — IBUPROFEN 200 MG
16 TABLET ORAL
Status: DISCONTINUED | OUTPATIENT
Start: 2020-06-11 | End: 2020-06-12 | Stop reason: HOSPADM

## 2020-06-11 RX ORDER — LOSARTAN POTASSIUM 25 MG/1
25 TABLET ORAL DAILY
Status: DISCONTINUED | OUTPATIENT
Start: 2020-06-12 | End: 2020-06-12 | Stop reason: HOSPADM

## 2020-06-11 RX ORDER — ACETAMINOPHEN 325 MG/1
650 TABLET ORAL EVERY 6 HOURS PRN
Status: DISCONTINUED | OUTPATIENT
Start: 2020-06-11 | End: 2020-06-12 | Stop reason: HOSPADM

## 2020-06-11 RX ORDER — METOPROLOL TARTRATE 25 MG/1
25 TABLET, FILM COATED ORAL 2 TIMES DAILY
Status: DISCONTINUED | OUTPATIENT
Start: 2020-06-11 | End: 2020-06-12 | Stop reason: HOSPADM

## 2020-06-11 RX ORDER — FENTANYL CITRATE 50 UG/ML
INJECTION, SOLUTION INTRAMUSCULAR; INTRAVENOUS
Status: DISCONTINUED | OUTPATIENT
Start: 2020-06-11 | End: 2020-06-11 | Stop reason: HOSPADM

## 2020-06-11 RX ORDER — ASPIRIN 81 MG/1
81 TABLET ORAL DAILY
Status: DISCONTINUED | OUTPATIENT
Start: 2020-06-12 | End: 2020-06-12 | Stop reason: HOSPADM

## 2020-06-11 RX ORDER — SODIUM CHLORIDE 0.9 % (FLUSH) 0.9 %
10 SYRINGE (ML) INJECTION
Status: DISCONTINUED | OUTPATIENT
Start: 2020-06-11 | End: 2020-06-12 | Stop reason: HOSPADM

## 2020-06-11 RX ORDER — ONDANSETRON 2 MG/ML
4 INJECTION INTRAMUSCULAR; INTRAVENOUS EVERY 8 HOURS PRN
Status: DISCONTINUED | OUTPATIENT
Start: 2020-06-11 | End: 2020-06-12 | Stop reason: HOSPADM

## 2020-06-11 RX ORDER — ASPIRIN 325 MG
325 TABLET ORAL
Status: COMPLETED | OUTPATIENT
Start: 2020-06-11 | End: 2020-06-11

## 2020-06-11 RX ORDER — ATORVASTATIN CALCIUM 40 MG/1
80 TABLET, FILM COATED ORAL DAILY
Status: DISCONTINUED | OUTPATIENT
Start: 2020-06-12 | End: 2020-06-12 | Stop reason: HOSPADM

## 2020-06-11 RX ORDER — IBUPROFEN 200 MG
24 TABLET ORAL
Status: DISCONTINUED | OUTPATIENT
Start: 2020-06-11 | End: 2020-06-12 | Stop reason: HOSPADM

## 2020-06-11 RX ORDER — DEXTROSE MONOHYDRATE 100 MG/ML
12.5 INJECTION, SOLUTION INTRAVENOUS
Status: DISCONTINUED | OUTPATIENT
Start: 2020-06-11 | End: 2020-06-12 | Stop reason: HOSPADM

## 2020-06-11 RX ORDER — NITROGLYCERIN 5 MG/ML
INJECTION, SOLUTION INTRAVENOUS
Status: DISCONTINUED | OUTPATIENT
Start: 2020-06-11 | End: 2020-06-11 | Stop reason: HOSPADM

## 2020-06-11 RX ORDER — VERAPAMIL HYDROCHLORIDE 2.5 MG/ML
INJECTION, SOLUTION INTRAVENOUS
Status: DISCONTINUED | OUTPATIENT
Start: 2020-06-11 | End: 2020-06-11 | Stop reason: HOSPADM

## 2020-06-11 RX ORDER — MIDAZOLAM HYDROCHLORIDE 1 MG/ML
INJECTION, SOLUTION INTRAMUSCULAR; INTRAVENOUS
Status: DISCONTINUED | OUTPATIENT
Start: 2020-06-11 | End: 2020-06-11 | Stop reason: HOSPADM

## 2020-06-11 RX ORDER — HYDROMORPHONE HYDROCHLORIDE 2 MG/ML
INJECTION, SOLUTION INTRAMUSCULAR; INTRAVENOUS; SUBCUTANEOUS
Status: DISCONTINUED | OUTPATIENT
Start: 2020-06-11 | End: 2020-06-11 | Stop reason: HOSPADM

## 2020-06-11 RX ORDER — HEPARIN SODIUM 1000 [USP'U]/ML
INJECTION INTRAVENOUS; SUBCUTANEOUS
Status: DISCONTINUED | OUTPATIENT
Start: 2020-06-11 | End: 2020-06-11 | Stop reason: HOSPADM

## 2020-06-11 RX ORDER — DEXTROSE MONOHYDRATE 100 MG/ML
25 INJECTION, SOLUTION INTRAVENOUS
Status: DISCONTINUED | OUTPATIENT
Start: 2020-06-11 | End: 2020-06-12 | Stop reason: HOSPADM

## 2020-06-11 RX ORDER — SODIUM CHLORIDE 9 MG/ML
INJECTION, SOLUTION INTRAVENOUS CONTINUOUS
Status: DISCONTINUED | OUTPATIENT
Start: 2020-06-11 | End: 2020-06-12

## 2020-06-11 RX ORDER — SODIUM CHLORIDE 9 MG/ML
INJECTION, SOLUTION INTRAVENOUS
Status: DISCONTINUED | OUTPATIENT
Start: 2020-06-11 | End: 2020-06-12

## 2020-06-11 RX ADMIN — METOPROLOL TARTRATE 25 MG: 25 TABLET ORAL at 09:06

## 2020-06-11 RX ADMIN — ASPIRIN 325 MG: 325 TABLET, FILM COATED ORAL at 11:06

## 2020-06-11 RX ADMIN — SODIUM CHLORIDE: 0.9 INJECTION, SOLUTION INTRAVENOUS at 04:06

## 2020-06-11 RX ADMIN — TICAGRELOR 90 MG: 90 TABLET ORAL at 09:06

## 2020-06-11 RX ADMIN — NITROGLYCERIN 1 INCH: 20 OINTMENT TOPICAL at 12:06

## 2020-06-11 NOTE — HPI
"Ms. Esquivel is a 59 year old female patient whose current medical conditions include HTN, hyperlipidemia, carotid bruit, severe PAD, and CAD s/p recent STEMI on 5/25/20 with successful PCI of LCX who was sent to [facility] ED from cardiology clinic due to chest pain. Patient complained of intermittent bouts of substernal chest tightness that became more intense yesterday morning and persisted throughout the day. Worse with lying down and associated with SOB. Patient denied any radiation of pain or any associated nausea, vomiting, diaphoresis, palpitations, near syncope, or syncope. Reported some mild relief with nitropaste given in ED. Initial workup unremarkable with exception of elevated BP and patient was subsequently admitted for further evaluation and treatment. Cardiology consulted to assist with management. Patient seen and examined today, sitting up in bed. Still has some residual discomfort, improved since arrival to ED. Feels pain is somewhat dissimilar to what she experienced prior to MI. She reports compliance with her medications. Still smoking but claims she has "cut back". Chart reviewed. Troponin x 1 negative. EKG from clinic showed no acute ischemic changes. Echo report pending.     Discussed with Dr. Jarvis. In light of patient's history and continued symptoms/possible component of angina decubitis will proceed with [facility] today for further evaluation. Further rec's to follow.     "

## 2020-06-11 NOTE — PROGRESS NOTES
Subjective:   Patient ID:  Iza Esquivel is a 59 y.o. female who presents for follow up of STEMI (ST elevation myocardial infarction) (hospital f/u)      HPI  A 58 yo female with htn hlp tobacco use carotid bruit severe pvd bilaterally with inflow disease. She had sustained a stemi had lcx stent placed . She has also severe pvd with inflow disease heavy calcification and  sfa occlusion.has significant claudication bilaterally worse on the left.limiting ability to walk. Has also still smoking. Has  Leg pain at nite . She drinks significant soft drinks. She claims compliance with med.s she also gets mad and has chest pain. Had chest pain all  day yesterday. It is worse when she lies down at nite or anytime lying down it feels tight she gest short of breath with it.  Sitting up makes it better.it wakes up from sleep. She also gest chest pain when she exerts herself. She had chest pain when trying to move limbs.  ekg shows no ischemia. She felt her rt arm had pain also radiating from chest .  Past Medical History:   Diagnosis Date    Atherosclerotic PVD with intermittent claudication 5/25/2020    Cervical cancer     Hyperlipidemia     Right carotid bruit        Past Surgical History:   Procedure Laterality Date    ABDOMINAL AORTOGRAPHY N/A 5/25/2020    Procedure: Aortogram, Abdominal;  Surgeon: Shelly Jarvis MD;  Location: [facility] CATH LAB;  Service: Cardiology;  Laterality: N/A;    LEFT HEART CATHETERIZATION Left 5/25/2020    Procedure: CATHETERIZATION, HEART, LEFT;  Surgeon: Shelly Jarvis MD;  Location: [facility] CATH LAB;  Service: Cardiology;  Laterality: Left;    LYMPH NODE BIOPSY      PERCUTANEOUS TRANSLUMINAL BALLOON ANGIOPLASTY OF CORONARY ARTERY  5/25/2020    Procedure: Angioplasty-coronary;  Surgeon: Shelly Jarvis MD;  Location: [facility] CATH LAB;  Service: Cardiology;;       Social History     Tobacco Use    Smoking status: Current Every Day Smoker     Packs/day: 1.00     Years: 40.00     Pack years:  40.00    Smokeless tobacco: Never Used   Substance Use Topics    Alcohol use: No     Frequency: Never     Binge frequency: Never    Drug use: Not on file       Family History   Problem Relation Age of Onset    Cancer Mother         ?    Cancer Father         ?    Transient ischemic attack Father         Carotid artery stenosis, CEA    Diabetes Brother     Hypertension Brother        Current Outpatient Medications   Medication Sig    aspirin (ECOTRIN) 81 MG EC tablet Take 1 tablet (81 mg total) by mouth once daily.    atorvastatin (LIPITOR) 80 MG tablet Take 1 tablet (80 mg total) by mouth once daily.    losartan (COZAAR) 25 MG tablet Take 1 tablet (25 mg total) by mouth once daily.    metoprolol tartrate (LOPRESSOR) 25 MG tablet Take 1 tablet (25 mg total) by mouth 2 (two) times daily.    ticagrelor (BRILINTA) 90 mg tablet Take 1 tablet (90 mg total) by mouth 2 (two) times daily.     No current facility-administered medications for this visit.      Current Outpatient Medications on File Prior to Visit   Medication Sig    aspirin (ECOTRIN) 81 MG EC tablet Take 1 tablet (81 mg total) by mouth once daily.    atorvastatin (LIPITOR) 80 MG tablet Take 1 tablet (80 mg total) by mouth once daily.    losartan (COZAAR) 25 MG tablet Take 1 tablet (25 mg total) by mouth once daily.    metoprolol tartrate (LOPRESSOR) 25 MG tablet Take 1 tablet (25 mg total) by mouth 2 (two) times daily.    ticagrelor (BRILINTA) 90 mg tablet Take 1 tablet (90 mg total) by mouth 2 (two) times daily.     No current facility-administered medications on file prior to visit.      Review of patient's allergies indicates:  No Known Allergies  Review of Systems   Constitution: Negative for diaphoresis, malaise/fatigue and weight gain.   HENT: Negative for hoarse voice.    Eyes: Negative for double vision and visual disturbance.   Cardiovascular: Positive for chest pain and claudication. Negative for cyanosis, dyspnea on exertion,  irregular heartbeat, leg swelling, near-syncope, orthopnea, palpitations, paroxysmal nocturnal dyspnea and syncope.   Respiratory: Positive for shortness of breath. Negative for cough, hemoptysis and snoring.    Hematologic/Lymphatic: Negative for bleeding problem. Does not bruise/bleed easily.   Skin: Negative for color change and poor wound healing.   Musculoskeletal: Negative for muscle cramps, muscle weakness and myalgias.   Gastrointestinal: Negative for bloating, abdominal pain, change in bowel habit, diarrhea, heartburn, hematemesis, hematochezia, melena and nausea.   Neurological: Negative for excessive daytime sleepiness, dizziness, headaches, light-headedness, loss of balance, numbness and weakness.   Psychiatric/Behavioral: Negative for memory loss. The patient does not have insomnia.    Allergic/Immunologic: Negative for hives.       Objective:   Physical Exam   Constitutional: She is oriented to person, place, and time. She appears well-developed and well-nourished. She does not appear ill. No distress.   HENT:   Head: Normocephalic and atraumatic.   Eyes: Pupils are equal, round, and reactive to light. EOM are normal. No scleral icterus.   Neck: Normal range of motion. Neck supple. Normal carotid pulses, no hepatojugular reflux and no JVD present. Carotid bruit is not present. No tracheal deviation present. No thyromegaly present.   Cardiovascular: Normal rate, regular rhythm and normal heart sounds. Exam reveals no gallop and no friction rub.   No murmur heard.  Pulses:       Carotid pulses are 2+ on the right side with bruit, and 2+ on the left side with bruit.       Radial pulses are 2+ on the right side, and 2+ on the left side.        Femoral pulses are 2+ on the right side with bruit, and 2+ on the left side with bruit.       Popliteal pulses are 0 on the right side, and 0 on the left side.        Dorsalis pedis pulses are 0 on the right side, and 0 on the left side.        Posterior tibial  "pulses are 0 on the right side, and 0 on the left side.   bilateral subclavian bruits  Abdominal bruit   No abdominal aneurysm   Pulmonary/Chest: Effort normal and breath sounds normal. No respiratory distress. She has no wheezes. She has no rhonchi. She has no rales. She exhibits no tenderness.   Abdominal: Soft. Normal appearance, normal aorta and bowel sounds are normal. She exhibits no distension, no abdominal bruit, no ascites and no pulsatile midline mass. There is no hepatomegaly. There is no tenderness.   Musculoskeletal: She exhibits no edema.        Right shoulder: She exhibits no deformity.   Neurological: She is alert and oriented to person, place, and time. She has normal strength. No cranial nerve deficit. Coordination normal.   Skin: Skin is warm and dry. No rash noted. No cyanosis or erythema. Nails show no clubbing.   Psychiatric: She has a normal mood and affect. Her speech is normal and behavior is normal.     Vitals:    06/11/20 0844 06/11/20 0848   BP: (!) 160/80 (!) 156/78   BP Location: Right arm Left arm   Patient Position: Sitting Sitting   BP Method: Medium (Manual) Medium (Manual)   Pulse: (!) 57    SpO2: 99%    Weight: 70.5 kg (155 lb 5 oz)    Height: 5' 4" (1.626 m)      Lab Results   Component Value Date    CHOL 164 05/26/2020    CHOL 131 02/10/2019    CHOL 200 (H) 08/20/2018     Lab Results   Component Value Date    HDL 44 05/26/2020    HDL 33 (L) 02/10/2019    HDL 49 08/20/2018     Lab Results   Component Value Date    LDLCALC 87.8 05/26/2020    LDLCALC 74.0 02/10/2019    LDLCALC 110.6 08/20/2018     Lab Results   Component Value Date    TRIG 161 (H) 05/26/2020    TRIG 120 02/10/2019    TRIG 202 (H) 08/20/2018     Lab Results   Component Value Date    CHOLHDL 26.8 05/26/2020    CHOLHDL 25.2 02/10/2019    CHOLHDL 24.5 08/20/2018       Chemistry        Component Value Date/Time     05/26/2020 0203    K 3.7 05/26/2020 0203     05/26/2020 0203    CO2 27 05/26/2020 0203    BUN " 7 (L) 05/26/2020 0203    CREATININE 0.8 05/26/2020 0203     05/26/2020 0203        Component Value Date/Time    CALCIUM 8.5 (L) 05/26/2020 0203    ALKPHOS 126 05/25/2020 1927    AST 21 05/25/2020 1927    ALT 11 05/25/2020 1927    BILITOT 0.1 05/25/2020 1927    ESTGFRAFRICA >60 05/26/2020 0203    EGFRNONAA >60 05/26/2020 0203          Lab Results   Component Value Date    TSH 4.399 (H) 02/09/2019     Lab Results   Component Value Date    INR 1.0 05/25/2020    INR 1.0 02/09/2019     Lab Results   Component Value Date    WBC 11.93 05/26/2020    HGB 10.6 (L) 05/26/2020    HCT 35.0 (L) 05/26/2020    MCV 93 05/26/2020     (H) 05/26/2020     BMP  Sodium   Date Value Ref Range Status   05/26/2020 141 136 - 145 mmol/L Final     Potassium   Date Value Ref Range Status   05/26/2020 3.7 3.5 - 5.1 mmol/L Final     Chloride   Date Value Ref Range Status   05/26/2020 104 95 - 110 mmol/L Final     CO2   Date Value Ref Range Status   05/26/2020 27 23 - 29 mmol/L Final     BUN, Bld   Date Value Ref Range Status   05/26/2020 7 (L) 8 - 23 mg/dL Final     Creatinine   Date Value Ref Range Status   05/26/2020 0.8 0.5 - 1.4 mg/dL Final     Calcium   Date Value Ref Range Status   05/26/2020 8.5 (L) 8.7 - 10.5 mg/dL Final     Anion Gap   Date Value Ref Range Status   05/26/2020 10 8 - 16 mmol/L Final     eGFR if    Date Value Ref Range Status   05/26/2020 >60 >60 mL/min/1.73 m^2 Final     eGFR if non    Date Value Ref Range Status   05/26/2020 >60 >60 mL/min/1.73 m^2 Final     Comment:     Calculation used to obtain the estimated glomerular filtration  rate (eGFR) is the CKD-EPI equation.        CrCl cannot be calculated (Patient's most recent lab result is older than the maximum 7 days allowed.).    Assessment:     1. Coronary artery disease, angina presence unspecified, unspecified vessel or lesion type, unspecified whether native or transplanted heart    2. Tobacco abuse    3. Mixed  hyperlipidemia    4. Bruit of right carotid artery    5. Nonspecific abnormal electrocardiogram (ECG) (EKG)    6. Near syncope    7. ST elevation myocardial infarction involving left circumflex coronary artery    8. Atherosclerotic PVD with intermittent claudication    has severe pvd multilevel worse on the left very limiting needing invasive eval  She was counseled about smoking cessation statins antiplatelets walking exercise.  Has htn uncontrolled excessive intake of salt via soft drinks and caffeine   Has chest pain that seems to be exertional and at rest decubitus not sure component of gerd or spasm from smoking vs angina she will benefit from  Nitrates and will obtain echo . I think she needs to be admitted since this is daily pain and evaluate need of repeat angiography.     Plan:   Add imdur 60 mg   draw troponins   Admit for observation ? repeat cath

## 2020-06-11 NOTE — ED PROVIDER NOTES
"SCRIBE #1 NOTE: I, Shahbaz Kang, am scribing for, and in the presence of, Ioana Branch MD. I have scribed the entire note.       History     Chief Complaint   Patient presents with    Chest Pain     Pt states, "I am having some chest pain."     Review of patient's allergies indicates:  No Known Allergies      History of Present Illness     HPI    6/11/2020, 12:14 PM  History obtained from the patient      History of Present Illness: Iza Esquivel is a 59 y.o. female patient with a history of tobacco use, left heart cath who presents to the Emergency Department for evaluation of chest pain which onset yesterday after an argument. Patient states her pain has resolved but states she is still having chest tightness. Symptoms are constant and moderate in severity. No mitigating or exacerbating factors reported. Associated sxs include labored breathing when lying down flat. Patient denies any diaphoresis, cough, palpitations, leg swelling, n/v, and all other sxs at this time. Prior Tx includes none. No further complaints or concerns at this time. Patient seen by Dr. Jarvis this AM and was referred to ED for ACS rule out and possible heart cath.      Arrival mode: Personal transportation    PCP: Park Gomez MD      Past Medical History:  Past Medical History:   Diagnosis Date    Atherosclerotic PVD with intermittent claudication 5/25/2020    Cervical cancer     Hyperlipidemia     Hypertension     Right carotid bruit     S/P PTCA (percutaneous transluminal coronary angioplasty) 05/25/2020    STEMI: stenting of LCx       Past Surgical History:  Past Surgical History:   Procedure Laterality Date    ABDOMINAL AORTOGRAPHY N/A 5/25/2020    Procedure: Aortogram, Abdominal;  Surgeon: Shelly Jarvis MD;  Location: [facility] CATH LAB;  Service: Cardiology;  Laterality: N/A;    LEFT HEART CATHETERIZATION Left 5/25/2020    Procedure: CATHETERIZATION, HEART, LEFT;  Surgeon: Shelly Jarvis MD;  Location: " [facility] CATH LAB;  Service: Cardiology;  Laterality: Left;    LYMPH NODE BIOPSY      PERCUTANEOUS TRANSLUMINAL BALLOON ANGIOPLASTY OF CORONARY ARTERY  5/25/2020    Procedure: Angioplasty-coronary;  Surgeon: Shelly Jarvis MD;  Location: [facility] CATH LAB;  Service: Cardiology;;         Family History:  Family History   Problem Relation Age of Onset    Cancer Mother         ?    Cancer Father         ?    Transient ischemic attack Father         Carotid artery stenosis, CEA    Diabetes Brother     Hypertension Brother        Social History:   Social History     Tobacco Use    Smoking status: Current Every Day Smoker     Packs/day: 1.00     Years: 40.00     Pack years: 40.00    Smokeless tobacco: Never Used   Substance and Sexual Activity    Alcohol use: No     Frequency: Never     Binge frequency: Never    Drug use: Unknown     Sexual activity: Never        Review of Systems     Review of Systems   Constitutional: Negative for diaphoresis and fever.   HENT: Negative for sore throat.    Respiratory: Positive for shortness of breath. Negative for cough.    Cardiovascular: Positive for chest pain. Negative for palpitations and leg swelling.   Gastrointestinal: Negative for nausea and vomiting.   Genitourinary: Negative for dysuria.   Musculoskeletal: Negative for back pain.   Skin: Negative for rash.   Neurological: Negative for weakness.   Hematological: Does not bruise/bleed easily.   All other systems reviewed and are negative.       Physical Exam     Initial Vitals [06/11/20 1028]   BP Pulse Resp Temp SpO2   (!) 180/78 63 20 97.9 °F (36.6 °C) 99 %      MAP       --          Physical Exam  Nursing Notes and Vital Signs Reviewed.  Constitutional: Well-developed and well-nourished. Patient is in NAD.  Head: Atraumatic. Normocephalic.  Eyes: PERRL. EOM intact. Conjunctivae are not pale. No scleral icterus.  ENT: Mucous membranes are moist. Oropharynx is clear and symmetric.    Neck: Supple. Full ROM. No  lymphadenopathy.  Cardiovascular: Regular rate. Regular rhythm. No murmurs, rubs, or gallops. Distal pulses are 2+ and symmetric.  Pulmonary/Chest: No respiratory distress. Clear to auscultation bilaterally. No wheezing or rales.  Abdominal: Soft and non-distended.  There is no tenderness.  No rebound, guarding, or rigidity. Good bowel sounds.  Genitourinary: No CVA tenderness  Musculoskeletal: Moves all extremities. No obvious deformities. No calf tenderness.  Skin: Warm and dry.  Neurological:  Alert, awake, and appropriate.  Normal speech.  No acute focal neurological deficits are appreciated.  Psychiatric: Normal affect. Good eye contact. Appropriate in content.     ED Course   Procedures  ED Vital Signs:  Vitals:    06/11/20 1151 06/11/20 1202 06/11/20 1204 06/11/20 1232   BP:  (!) 188/82  (!) 187/80   Pulse: (!) 52 (!) 59     Resp: 19 20    Temp:       TempSrc:       SpO2: 99% 99%  97%   Weight:       Height:        06/11/20 1233 06/11/20 1302 06/11/20 1314 06/11/20 1332   BP:  (!) 161/98  (!) 158/70   Pulse: (!) 47 (!) 44  (!) 44   Resp: 17 13  17   Temp:   98.3 °F (36.8 °C)    TempSrc:   Oral    SpO2: 96% 98%  96%   Weight:       Height:        06/11/20 1401 06/11/20 1403 06/11/20 1420 06/11/20 1501   BP: (!) 145/75   (!) 144/64   Pulse: (!) 46  (!) 47 (!) 44   Resp:  20 16 18   Temp:       TempSrc:       SpO2: 97%  99% 97%   Weight:       Height:        06/11/20 1523 06/11/20 1615 06/11/20 1845   BP:  (!) 158/84 (!) 143/58   Pulse: (!) 46 (!) 52 (!) 52   Resp: 19 18 18   Temp:  96.2 °F (35.7 °C) 98.1 °F (36.7 °C)   TempSrc:  Oral Temporal   SpO2: 98% 99%    Weight:      Height:          Abnormal Lab Results:  Labs Reviewed   CBC W/ AUTO DIFFERENTIAL - Abnormal; Notable for the following components:       Result Value    RBC 3.95 (*)     Hemoglobin 11.2 (*)     Hematocrit 35.5 (*)     Mean Corpuscular Hemoglobin Conc 31.5 (*)     Platelets 401 (*)     All other components within normal limits    COMPREHENSIVE METABOLIC PANEL - Abnormal; Notable for the following components:    Glucose 111 (*)     BUN, Bld 7 (*)     Alkaline Phosphatase 149 (*)     All other components within normal limits   B-TYPE NATRIURETIC PEPTIDE - Abnormal; Notable for the following components:     (*)     All other components within normal limits   APTT - Abnormal; Notable for the following components:    aPTT 33.2 (*)     All other components within normal limits   TROPONIN I   HIV 1 / 2 ANTIBODY   HEPATITIS C ANTIBODY   SARS-COV-2 RNA AMPLIFICATION, QUAL   PROTIME-INR   MAGNESIUM        All Lab Results:  Results for orders placed or performed during the hospital encounter of 06/11/20   CBC auto differential   Result Value Ref Range    WBC 8.47 3.90 - 12.70 K/uL    RBC 3.95 (L) 4.00 - 5.40 M/uL    Hemoglobin 11.2 (L) 12.0 - 16.0 g/dL    Hematocrit 35.5 (L) 37.0 - 48.5 %    Mean Corpuscular Volume 90 82 - 98 fL    Mean Corpuscular Hemoglobin 28.4 27.0 - 31.0 pg    Mean Corpuscular Hemoglobin Conc 31.5 (L) 32.0 - 36.0 g/dL    RDW 13.3 11.5 - 14.5 %    Platelets 401 (H) 150 - 350 K/uL    MPV 10.3 9.2 - 12.9 fL    Immature Granulocytes 0.4 0.0 - 0.5 %    Gran # (ANC) 5.7 1.8 - 7.7 K/uL    Immature Grans (Abs) 0.03 0.00 - 0.04 K/uL    Lymph # 2.2 1.0 - 4.8 K/uL    Mono # 0.4 0.3 - 1.0 K/uL    Eos # 0.1 0.0 - 0.5 K/uL    Baso # 0.08 0.00 - 0.20 K/uL    nRBC 0 0 /100 WBC    Gran% 67.8 38.0 - 73.0 %    Lymph% 25.4 18.0 - 48.0 %    Mono% 4.7 4.0 - 15.0 %    Eosinophil% 0.8 0.0 - 8.0 %    Basophil% 0.9 0.0 - 1.9 %    Differential Method Automated    Comprehensive metabolic panel   Result Value Ref Range    Sodium 142 136 - 145 mmol/L    Potassium 3.7 3.5 - 5.1 mmol/L    Chloride 104 95 - 110 mmol/L    CO2 26 23 - 29 mmol/L    Glucose 111 (H) 70 - 110 mg/dL    BUN, Bld 7 (L) 8 - 23 mg/dL    Creatinine 1.0 0.5 - 1.4 mg/dL    Calcium 9.5 8.7 - 10.5 mg/dL    Total Protein 7.5 6.0 - 8.4 g/dL    Albumin 3.5 3.5 - 5.2 g/dL    Total Bilirubin  0.3 0.1 - 1.0 mg/dL    Alkaline Phosphatase 149 (H) 55 - 135 U/L    AST 14 10 - 40 U/L    ALT 10 10 - 44 U/L    Anion Gap 12 8 - 16 mmol/L    eGFR if African American >60 >60 mL/min/1.73 m^2    eGFR if non African American >60 >60 mL/min/1.73 m^2   Troponin I #1   Result Value Ref Range    Troponin I 0.016 0.000 - 0.026 ng/mL   B-Type natriuretic peptide (BNP)   Result Value Ref Range     (H) 0 - 99 pg/mL   HIV 1/2 Ag/Ab (4th Gen)   Result Value Ref Range    HIV 1/2 Ag/Ab Negative Negative   Hepatitis C antibody   Result Value Ref Range    Hepatitis C Ab Negative Negative   COVID-19 Rapid Screening   Result Value Ref Range    SARS-CoV-2 RNA, Amplification, Qual Negative Negative   Protime-INR   Result Value Ref Range    Prothrombin Time 11.4 9.0 - 12.5 sec    INR 1.1 0.8 - 1.2   APTT   Result Value Ref Range    aPTT 33.2 (H) 21.0 - 32.0 sec   CBC with Automated Differential   Result Value Ref Range    WBC 6.36 3.90 - 12.70 K/uL    RBC 3.14 (L) 4.00 - 5.40 M/uL    Hemoglobin 8.8 (L) 12.0 - 16.0 g/dL    Hematocrit 28.6 (L) 37.0 - 48.5 %    Mean Corpuscular Volume 91 82 - 98 fL    Mean Corpuscular Hemoglobin 28.0 27.0 - 31.0 pg    Mean Corpuscular Hemoglobin Conc 30.8 (L) 32.0 - 36.0 g/dL    RDW 13.3 11.5 - 14.5 %    Platelets 336 150 - 350 K/uL    MPV 10.5 9.2 - 12.9 fL    Immature Granulocytes 0.3 0.0 - 0.5 %    Gran # (ANC) 3.8 1.8 - 7.7 K/uL    Immature Grans (Abs) 0.02 0.00 - 0.04 K/uL    Lymph # 2.0 1.0 - 4.8 K/uL    Mono # 0.4 0.3 - 1.0 K/uL    Eos # 0.1 0.0 - 0.5 K/uL    Baso # 0.08 0.00 - 0.20 K/uL    nRBC 0 0 /100 WBC    Gran% 59.2 38.0 - 73.0 %    Lymph% 32.1 18.0 - 48.0 %    Mono% 6.0 4.0 - 15.0 %    Eosinophil% 1.1 0.0 - 8.0 %    Basophil% 1.3 0.0 - 1.9 %    Differential Method Automated    Magnesium   Result Value Ref Range    Magnesium 2.2 1.6 - 2.6 mg/dL         Imaging Results          X-Ray Chest PA And Lateral (Final result)  Result time 06/11/20 11:00:06    Final result by Calvin Vasquez MD  (06/11/20 11:00:06)                 Impression:      No acute process seen.      Electronically signed by: Calvin Vasquez MD  Date:    06/11/2020  Time:    11:00             Narrative:    EXAMINATION:  XR CHEST PA AND LATERAL    CLINICAL HISTORY:  Chest Pain;    COMPARISON:  06/01/2020    FINDINGS:  Cardiac silhouette is normal. Aorta demonstrates atherosclerotic disease. The lungs demonstrate no evidence of active disease.  Emphysematous changes suspected within the upper lobes.  No evidence of pleural effusion or pneumothorax.  Bones appear intact.                                 The EKG was ordered, reviewed, and independently interpreted by the ED provider.  Interpretation time: 1031  Rate: 54 BPM  Rhythm: sinus bradycardia  Interpretation: possible left atrial enlargement. Septal infarct, age undetermined. No STEMI.      The Emergency Provider reviewed the vital signs and test results, which are outlined above.     ED Discussion     12:50 PM: Discussed pt's case with Dr. Jarvis (Cardiology) who recommends observing with nitropaste on board, does not recommend heparin.    1:21 PM: Discussed case with Janice Masters NP (Hospital Medicine). Dr. Gleason agrees with current care and management of pt and accepts admission.   Admitting Service: Hospital Medicine  Admit to: obs / tele    Re-evaluated pt. I have discussed test results, shared treatment plan, and the need for admission with patient and family at bedside. Pt and family express understanding at this time and agree with all information. All questions answered. Pt and family have no further questions or concerns at this time. Pt is ready for admit.       MDM        Medical Decision Making:   Clinical Tests:   Lab Tests: Ordered and Reviewed  Radiological Study: Ordered and Reviewed  Medical Tests: Ordered and Reviewed     Additional MDM:   Smoking Cessation: The patient is a smoker. The patient was counseled on smoking cessation for: 4 minutes. The patient was  counseled on tobacco related  health complications.        ED Medication(s):  Medications   aspirin EC tablet 81 mg (has no administration in time range)   atorvastatin tablet 80 mg (has no administration in time range)   losartan tablet 25 mg (has no administration in time range)   metoprolol tartrate (LOPRESSOR) tablet 25 mg (has no administration in time range)   ticagrelor tablet 90 mg (has no administration in time range)   sodium chloride 0.9% flush 10 mL (has no administration in time range)   glucose chewable tablet 16 g (has no administration in time range)   glucose chewable tablet 24 g (has no administration in time range)   dextrose 10 % infusion (has no administration in time range)   dextrose 10 % infusion (has no administration in time range)   glucagon (human recombinant) injection 1 mg (has no administration in time range)   acetaminophen tablet 650 mg (has no administration in time range)   ondansetron injection 4 mg (has no administration in time range)   0.9%  NaCl infusion ( Intravenous New Bag 6/11/20 1635)   sodium chloride 0.9% bolus (250 mLs Intravenous New Bag 6/11/20 1754)   midazolam injection (1 mg Intravenous Given 6/11/20 1808)   fentaNYL injection (50 mcg Intravenous Given 6/11/20 1808)   diphenhydrAMINE injection (25 mg Intravenous Given 6/11/20 1758)   lidocaine (PF) 20 mg/ml (2%) injection (5 mLs Other Given 6/11/20 1809)   0.9%  NaCl infusion (100 mL/hr Intravenous New Bag 6/11/20 1809)   verapamiL injection (2.5 mg Intra-arterial Given 6/11/20 1831)   nitroGLYCERIN injection (200 mcg Intra-arterial Given 6/11/20 1831)   hydromorphone (PF) injection (1 mg Intravenous Given 6/11/20 1813)   heparin, porcine (PF) injection (4,000 Units Intravenous Given 6/11/20 1813)   iohexoL (OMNIPAQUE 350) injection (65 mLs Intra-arterial Given 6/11/20 1835)   aspirin tablet 325 mg (325 mg Oral Given 6/11/20 1152)   nitroGLYCERIN 2% TD oint ointment 1 inch (1 inch Topical (Top) Given 6/11/20 1242)        Current Discharge Medication List                  Scribe Attestation:   Scribe #1: I performed the above scribed service and the documentation accurately describes the services I performed. I attest to the accuracy of the note.     Attending:   Physician Attestation Statement for Scribe #1: I, Ioana Branch MD, personally performed the services described in this documentation, as scribed by Shahbaz Kang, in my presence, and it is both accurate and complete.           Clinical Impression       ICD-10-CM ICD-9-CM   1. Chest pain R07.9 786.50   2. Tobacco abuse disorder Z72.0 305.1   3. Bradycardia R00.1 427.89   4. Unstable angina I20.0 411.1       Disposition:   Disposition: Placed in Observation  Condition: Fair         Ioana Branch MD  06/11/20 9891

## 2020-06-11 NOTE — Clinical Note
Catheter is inserted into the ostium   right coronary artery. Angiography performed of the right coronary arteries in multiple views.Unable to engage.

## 2020-06-11 NOTE — ASSESSMENT & PLAN NOTE
-Patient with recent STEMI who presents with chest pain with typical and atypical features, concerned she may have element of angina decubitis  -In light of continued chest pain, will proceed with [facility] [place] for further evaluation and PCI if indicated. All risks, benefits, and treatment alternatives explained to patient in detail. All questions answered. She has agreed to proceed.   -Continue ASA, Brilinta, ARB, BB, statin, nitrates  -Trend troponin  -Further rec's to follow

## 2020-06-11 NOTE — ASSESSMENT & PLAN NOTE
"-Tobacco abuse  -smoking cessation counseling  -nicotine patch: REFUSED  -"Patient was counseled on smoking cessation for 10 minutes, RX for nicotine patch offered, patient refuses and states does not want to quit".      "

## 2020-06-11 NOTE — ASSESSMENT & PLAN NOTE
-outpatient management: Dr. Jarvis  -continue ASA, brilenta, BB, statin  -Stop Smoking: refused Nicotine patch

## 2020-06-11 NOTE — OP NOTE
INPATIENT Operative Note         SUMMARY     Surgery Date: 6/11/2020     Surgeon(s) and Role:     * Shelly Jarvis MD - Primary    ASSISTANT:none    Pre-op Diagnosis:  Chest pain      Post-op Diagnosis:  * No post-op diagnosis entered *    Procedure(s) (LRB):  CATHETERIZATION, HEART, LEFT (Left)    COMPLICATION:    Anesthesia: RN IV Sedation    Findings/Key Components:  Patent lcx stent   Non obs om rca and lad disease.  Normal lvedp.    Estimated Blood Loss: < 50 ML.         SPECIMEN: NONE    Devices/Prostetics: None    PLAN:reassure   Max meds   Smoking cessation.

## 2020-06-11 NOTE — ED NOTES
Pt AAOx3, resting in bed, side rails up x 2, call bell within reach. NAD at this time. Visitor at bedside. Will continue to monitor.

## 2020-06-11 NOTE — Clinical Note
Catheter hemodynamics recorded ostium   left main. Angiography performed of the left coronary arteries in multiple views.

## 2020-06-11 NOTE — SUBJECTIVE & OBJECTIVE
Past Medical History:   Diagnosis Date    Atherosclerotic PVD with intermittent claudication 5/25/2020    Cervical cancer     Hyperlipidemia     Right carotid bruit     S/P PTCA (percutaneous transluminal coronary angioplasty) 05/25/2020    STEMI: stenting of LCx       Past Surgical History:   Procedure Laterality Date    ABDOMINAL AORTOGRAPHY N/A 5/25/2020    Procedure: Aortogram, Abdominal;  Surgeon: Shelly Jarvis MD;  Location: [facility] CATH LAB;  Service: Cardiology;  Laterality: N/A;    LEFT HEART CATHETERIZATION Left 5/25/2020    Procedure: CATHETERIZATION, HEART, LEFT;  Surgeon: Shelly Jarvis MD;  Location: [facility] CATH LAB;  Service: Cardiology;  Laterality: Left;    LYMPH NODE BIOPSY      PERCUTANEOUS TRANSLUMINAL BALLOON ANGIOPLASTY OF CORONARY ARTERY  5/25/2020    Procedure: Angioplasty-coronary;  Surgeon: Shelly Jarvis MD;  Location: [facility] CATH LAB;  Service: Cardiology;;       Review of patient's allergies indicates:  No Known Allergies    No current facility-administered medications on file prior to encounter.      Current Outpatient Medications on File Prior to Encounter   Medication Sig    aspirin (ECOTRIN) 81 MG EC tablet Take 1 tablet (81 mg total) by mouth once daily.    atorvastatin (LIPITOR) 80 MG tablet Take 1 tablet (80 mg total) by mouth once daily.    losartan (COZAAR) 25 MG tablet Take 1 tablet (25 mg total) by mouth once daily.    metoprolol tartrate (LOPRESSOR) 25 MG tablet Take 1 tablet (25 mg total) by mouth 2 (two) times daily.    ticagrelor (BRILINTA) 90 mg tablet Take 1 tablet (90 mg total) by mouth 2 (two) times daily.     Family History     Problem Relation (Age of Onset)    Cancer Mother, Father    Diabetes Brother    Hypertension Brother    Transient ischemic attack Father        Tobacco Use    Smoking status: Current Every Day Smoker     Packs/day: 1.50     Years: 40.00     Pack years: 60.00    Smokeless tobacco: Never Used    Tobacco comment: now down to 10  cigarettes daily   Substance and Sexual Activity    Alcohol use: No     Frequency: Never     Binge frequency: Never    Drug use: Not on file    Sexual activity: Never     Review of Systems   Constitutional: Positive for activity change. Negative for appetite change, chills, fatigue and fever.   HENT: Negative.  Negative for congestion, ear discharge, facial swelling, sore throat and trouble swallowing.    Eyes: Negative.  Negative for photophobia, pain, discharge, redness and visual disturbance.   Respiratory: Positive for shortness of breath (with chest pain). Negative for cough, chest tightness, wheezing and stridor.    Cardiovascular: Positive for chest pain. Negative for palpitations and leg swelling.   Gastrointestinal: Negative for abdominal distention, abdominal pain, anal bleeding, blood in stool, constipation, diarrhea, nausea, rectal pain and vomiting.   Endocrine: Negative.  Negative for cold intolerance, heat intolerance, polydipsia, polyphagia and polyuria.   Genitourinary: Negative.  Negative for difficulty urinating, dysuria, flank pain, frequency, hematuria, pelvic pain, urgency, vaginal bleeding and vaginal discharge.   Musculoskeletal: Positive for myalgias. Negative for arthralgias, back pain, gait problem, joint swelling and neck pain.        Claudication with ambulation   Skin: Negative for color change, pallor, rash and wound.   Allergic/Immunologic: Negative.  Negative for food allergies and immunocompromised state.   Neurological: Negative for dizziness, tremors, seizures, syncope, facial asymmetry, speech difficulty, weakness, light-headedness, numbness and headaches.   Hematological: Negative.  Negative for adenopathy. Does not bruise/bleed easily.   Psychiatric/Behavioral: Negative.  Negative for agitation, confusion, hallucinations, sleep disturbance and suicidal ideas. The patient is not nervous/anxious.    All other systems reviewed and are negative.    Objective:     Vital Signs  (Most Recent):  Temp: 98.3 °F (36.8 °C) (06/11/20 1314)  Pulse: (!) 46 (06/11/20 1523)  Resp: 19 (06/11/20 1523)  BP: (!) 144/64 (06/11/20 1501)  SpO2: 98 % (06/11/20 1523) Vital Signs (24h Range):  Temp:  [97.9 °F (36.6 °C)-98.3 °F (36.8 °C)] 98.3 °F (36.8 °C)  Pulse:  [44-63] 46  Resp:  [13-20] 19  SpO2:  [96 %-100 %] 98 %  BP: (144-190)/(64-98) 144/64     Weight: 70.4 kg (155 lb 3.3 oz)  Body mass index is 26.64 kg/m².    Physical Exam   Constitutional: She is oriented to person, place, and time. She appears well-developed and well-nourished. No distress.   HENT:   Head: Normocephalic and atraumatic.   Nose: Nose normal.   Eyes: Pupils are equal, round, and reactive to light. Conjunctivae and EOM are normal. Right eye exhibits no discharge. Left eye exhibits no discharge.   Neck: Normal range of motion. Neck supple. No JVD present. No tracheal deviation present. No thyromegaly present.   Right carotid bruit   Cardiovascular: Regular rhythm and normal heart sounds. Exam reveals no gallop and no friction rub.   No murmur heard.  Bradycardia 40's   Pulmonary/Chest: Effort normal and breath sounds normal. No stridor. No respiratory distress. She has no wheezes. She has no rales. She exhibits no tenderness.   Abdominal: Soft. Bowel sounds are normal. She exhibits no distension and no mass. There is no tenderness. There is no guarding.   Musculoskeletal: Normal range of motion. She exhibits no edema, tenderness or deformity.   Lymphadenopathy:     She has no cervical adenopathy.   Neurological: She is alert and oriented to person, place, and time. She has normal reflexes. No cranial nerve deficit. Coordination normal.   Skin: Skin is warm and dry. No rash noted. She is not diaphoretic. No erythema. No pallor.   Psychiatric: She has a normal mood and affect. Her behavior is normal. Judgment and thought content normal.   Nursing note and vitals reviewed.        CRANIAL NERVES     CN III, IV, VI   Pupils are equal, round,  and reactive to light.  Extraocular motions are normal.        Significant Labs: All pertinent labs within the past 24 hours have been reviewed.  Results for orders placed or performed during the hospital encounter of 06/11/20   CBC auto differential   Result Value Ref Range    WBC 8.47 3.90 - 12.70 K/uL    RBC 3.95 (L) 4.00 - 5.40 M/uL    Hemoglobin 11.2 (L) 12.0 - 16.0 g/dL    Hematocrit 35.5 (L) 37.0 - 48.5 %    Mean Corpuscular Volume 90 82 - 98 fL    Mean Corpuscular Hemoglobin 28.4 27.0 - 31.0 pg    Mean Corpuscular Hemoglobin Conc 31.5 (L) 32.0 - 36.0 g/dL    RDW 13.3 11.5 - 14.5 %    Platelets 401 (H) 150 - 350 K/uL    MPV 10.3 9.2 - 12.9 fL    Immature Granulocytes 0.4 0.0 - 0.5 %    Gran # (ANC) 5.7 1.8 - 7.7 K/uL    Immature Grans (Abs) 0.03 0.00 - 0.04 K/uL    Lymph # 2.2 1.0 - 4.8 K/uL    Mono # 0.4 0.3 - 1.0 K/uL    Eos # 0.1 0.0 - 0.5 K/uL    Baso # 0.08 0.00 - 0.20 K/uL    nRBC 0 0 /100 WBC    Gran% 67.8 38.0 - 73.0 %    Lymph% 25.4 18.0 - 48.0 %    Mono% 4.7 4.0 - 15.0 %    Eosinophil% 0.8 0.0 - 8.0 %    Basophil% 0.9 0.0 - 1.9 %    Differential Method Automated    Comprehensive metabolic panel   Result Value Ref Range    Sodium 142 136 - 145 mmol/L    Potassium 3.7 3.5 - 5.1 mmol/L    Chloride 104 95 - 110 mmol/L    CO2 26 23 - 29 mmol/L    Glucose 111 (H) 70 - 110 mg/dL    BUN, Bld 7 (L) 8 - 23 mg/dL    Creatinine 1.0 0.5 - 1.4 mg/dL    Calcium 9.5 8.7 - 10.5 mg/dL    Total Protein 7.5 6.0 - 8.4 g/dL    Albumin 3.5 3.5 - 5.2 g/dL    Total Bilirubin 0.3 0.1 - 1.0 mg/dL    Alkaline Phosphatase 149 (H) 55 - 135 U/L    AST 14 10 - 40 U/L    ALT 10 10 - 44 U/L    Anion Gap 12 8 - 16 mmol/L    eGFR if African American >60 >60 mL/min/1.73 m^2    eGFR if non African American >60 >60 mL/min/1.73 m^2   Troponin I #1   Result Value Ref Range    Troponin I 0.016 0.000 - 0.026 ng/mL   B-Type natriuretic peptide (BNP)   Result Value Ref Range     (H) 0 - 99 pg/mL   HIV 1/2 Ag/Ab (4th Gen)   Result Value  Ref Range    HIV 1/2 Ag/Ab Negative Negative   Hepatitis C antibody   Result Value Ref Range    Hepatitis C Ab Negative Negative   COVID-19 Rapid Screening   Result Value Ref Range    SARS-CoV-2 RNA, Amplification, Qual Negative Negative   Protime-INR   Result Value Ref Range    Prothrombin Time 11.4 9.0 - 12.5 sec    INR 1.1 0.8 - 1.2   APTT   Result Value Ref Range    aPTT 33.2 (H) 21.0 - 32.0 sec     Significant Imaging: I have reviewed all pertinent imaging results/findings within the past 24 hours.   Imaging Results          X-Ray Chest PA And Lateral (Final result)  Result time 06/11/20 11:00:06    Final result by Calvin Vasquez MD (06/11/20 11:00:06)                 Impression:      No acute process seen.      Electronically signed by: Calvin Vasquez MD  Date:    06/11/2020  Time:    11:00             Narrative:    EXAMINATION:  XR CHEST PA AND LATERAL    CLINICAL HISTORY:  Chest Pain;    COMPARISON:  06/01/2020    FINDINGS:  Cardiac silhouette is normal. Aorta demonstrates atherosclerotic disease. The lungs demonstrate no evidence of active disease.  Emphysematous changes suspected within the upper lobes.  No evidence of pleural effusion or pneumothorax.  Bones appear intact.

## 2020-06-11 NOTE — SUBJECTIVE & OBJECTIVE
Past Medical History:   Diagnosis Date    Atherosclerotic PVD with intermittent claudication 5/25/2020    Cervical cancer     Hyperlipidemia     Right carotid bruit     S/P PTCA (percutaneous transluminal coronary angioplasty) 05/25/2020    STEMI: stenting of LCx       Past Surgical History:   Procedure Laterality Date    ABDOMINAL AORTOGRAPHY N/A 5/25/2020    Procedure: Aortogram, Abdominal;  Surgeon: Shelly Jarvis MD;  Location: [facility] CATH LAB;  Service: Cardiology;  Laterality: N/A;    LEFT HEART CATHETERIZATION Left 5/25/2020    Procedure: CATHETERIZATION, HEART, LEFT;  Surgeon: Shelly Jarvis MD;  Location: [facility] CATH LAB;  Service: Cardiology;  Laterality: Left;    LYMPH NODE BIOPSY      PERCUTANEOUS TRANSLUMINAL BALLOON ANGIOPLASTY OF CORONARY ARTERY  5/25/2020    Procedure: Angioplasty-coronary;  Surgeon: Shelly Jarvis MD;  Location: [facility] CATH LAB;  Service: Cardiology;;       Review of patient's allergies indicates:  No Known Allergies    No current facility-administered medications on file prior to encounter.      Current Outpatient Medications on File Prior to Encounter   Medication Sig    aspirin (ECOTRIN) 81 MG EC tablet Take 1 tablet (81 mg total) by mouth once daily.    atorvastatin (LIPITOR) 80 MG tablet Take 1 tablet (80 mg total) by mouth once daily.    losartan (COZAAR) 25 MG tablet Take 1 tablet (25 mg total) by mouth once daily.    metoprolol tartrate (LOPRESSOR) 25 MG tablet Take 1 tablet (25 mg total) by mouth 2 (two) times daily.    ticagrelor (BRILINTA) 90 mg tablet Take 1 tablet (90 mg total) by mouth 2 (two) times daily.     Family History     Problem Relation (Age of Onset)    Cancer Mother, Father    Diabetes Brother    Hypertension Brother    Transient ischemic attack Father        Tobacco Use    Smoking status: Current Every Day Smoker     Packs/day: 1.50     Years: 40.00     Pack years: 60.00    Smokeless tobacco: Never Used    Tobacco comment: now down to 10  cigarettes daily   Substance and Sexual Activity    Alcohol use: No     Frequency: Never     Binge frequency: Never    Drug use: Not on file    Sexual activity: Never     Review of Systems   Constitution: Negative.   HENT: Negative.    Eyes: Negative.    Cardiovascular: Positive for chest pain and claudication.   Respiratory: Positive for shortness of breath.    Endocrine: Negative.    Hematologic/Lymphatic: Negative.    Skin: Negative.    Musculoskeletal: Negative.    Gastrointestinal: Negative.    Genitourinary: Negative.    Neurological: Negative.    Psychiatric/Behavioral: Negative.    Allergic/Immunologic: Negative.      Objective:     Vital Signs (Most Recent):  Temp: 96.2 °F (35.7 °C) (06/11/20 1615)  Pulse: (!) 52 (06/11/20 1615)  Resp: 18 (06/11/20 1615)  BP: (!) 158/84 (06/11/20 1615)  SpO2: 99 % (06/11/20 1615) Vital Signs (24h Range):  Temp:  [96.2 °F (35.7 °C)-98.3 °F (36.8 °C)] 96.2 °F (35.7 °C)  Pulse:  [44-63] 52  Resp:  [13-20] 18  SpO2:  [96 %-100 %] 99 %  BP: (144-190)/(64-98) 158/84     Weight: 70.4 kg (155 lb 3.3 oz)  Body mass index is 26.64 kg/m².    SpO2: 99 %  O2 Device (Oxygen Therapy): room air    No intake or output data in the 24 hours ending 06/11/20 1632    Lines/Drains/Airways     Peripheral Intravenous Line                 Peripheral IV - Single Lumen 06/11/20 1150 20 G Right Antecubital less than 1 day                Physical Exam   Constitutional: She is oriented to person, place, and time. She appears well-developed and well-nourished. No distress.   HENT:   Head: Normocephalic and atraumatic.   Eyes: Pupils are equal, round, and reactive to light. Right eye exhibits no discharge. Left eye exhibits no discharge.   Neck: Neck supple. No JVD present.   Cardiovascular: Regular rhythm, S1 normal, S2 normal and normal heart sounds. Bradycardia present.   No murmur heard.  Pulmonary/Chest: Effort normal and breath sounds normal. No respiratory distress. She has no wheezes. She has no  rales.   Abdominal: Soft. She exhibits no distension.   Musculoskeletal: She exhibits no edema.   Neurological: She is alert and oriented to person, place, and time.   Skin: Skin is warm and dry. She is not diaphoretic. No erythema.   Psychiatric: She has a normal mood and affect. Her behavior is normal. Thought content normal.   Nursing note and vitals reviewed.      Significant Labs:   CMP   Recent Labs   Lab 06/11/20  1149      K 3.7      CO2 26   *   BUN 7*   CREATININE 1.0   CALCIUM 9.5   PROT 7.5   ALBUMIN 3.5   BILITOT 0.3   ALKPHOS 149*   AST 14   ALT 10   ANIONGAP 12   ESTGFRAFRICA >60   EGFRNONAA >60   , CBC   Recent Labs   Lab 06/11/20  1149   WBC 8.47   HGB 11.2*   HCT 35.5*   *   , Troponin   Recent Labs   Lab 06/11/20  1149   TROPONINI 0.016    and All pertinent lab results from the last 24 hours have been reviewed.    Significant Imaging: Echocardiogram:   2D echo with color flow doppler:   Results for orders placed or performed during the hospital encounter of 02/09/19   2D echo with color flow doppler   Result Value Ref Range    QEF 60 55 - 65    Diastolic Dysfunction Yes (A)     Est. PA Systolic Pressure 16.16     Narrative    Date of Procedure: 02/10/2019        TEST DESCRIPTION       Aorta: The aortic root is normal in size, measuring 2.9 cm at sinotubular junction and 2.9 cm at Sinuses of Valsalva. The proximal ascending aorta is normal in size, measuring 2.9 cm across.     Left Atrium: The left atrial volume index is normal, measuring 22.55 cc/m2.     Left Ventricle: The left ventricle is normal in size, with an end-diastolic diameter of 4.3 cm, and an end-systolic diameter of 2.8 cm. LV wall thickness is normal, with the septum measuring 1.0 cm and the posterior wall measuring 0.7 cm across. Relative   wall thickness was normal at 0.33, and the LV mass index was 75.1 g/m2 consistent with normal left ventricular mass. There are no regional wall motion abnormalities.  Left ventricular systolic function appears normal. Visually estimated ejection fraction   is 60-65%. The LV Doppler derived stroke volume equals 61.0 ccs.     Diastolic indices: E wave velocity 0.8 m/s, E/A ratio 0.9,  msec., E/e' ratio(avg) 7. There is diastolic dysfunction secondary to relaxation abnormality.     Right Atrium: The right atrium is normal in size, measuring 4.5 cm in length and 3.4 cm in width in the apical view.     Right Ventricle: The right ventricle is normal in size. Global right ventricular systolic function appears normal. Tricuspid annular plane systolic excursion (TAPSE) is 1.7 cm. The estimated PA systolic pressure is greater than 16 mmHg.     Aortic Valve:  Aortic valve is normal in structure with normal leaflet mobility. The mean gradient obtained across the aortic valve is 3 mmHg.     Mitral Valve:  Mitral valve is normal in structure with normal leaflet mobility. The pressure half time is 84 msec. The calculated mitral valve area is 2.62 cm2.     Tricuspid Valve:  Tricuspid valve is normal in structure with normal leaflet mobility.     Pulmonary Valve:  Pulmonary valve is normal in structure with normal leaflet mobility.     Intracavitary: There is no evidence of pericardial effusion, intracavity mass, thrombi, or vegetation.         CONCLUSIONS     1 - No wall motion abnormalities.     2 - Normal left ventricular systolic function (EF 60-65%).     3 - Impaired LV relaxation, normal LAP (grade 1 diastolic dysfunction).     4 - Normal right ventricular systolic function .     5 - The estimated PA systolic pressure is greater than 16 mmHg.             This document has been electronically    SIGNED BY: Damian Estrada MD On: 02/10/2019 12:10   , EKG: Reviewed and X-Ray: CXR: X-Ray Chest 1 View (CXR): No results found for this visit on 06/11/20. and X-Ray Chest PA and Lateral (CXR):   Results for orders placed or performed during the hospital encounter of 06/11/20   X-Ray Chest PA  And Lateral    Narrative    EXAMINATION:  XR CHEST PA AND LATERAL    CLINICAL HISTORY:  Chest Pain;    COMPARISON:  06/01/2020    FINDINGS:  Cardiac silhouette is normal. Aorta demonstrates atherosclerotic disease. The lungs demonstrate no evidence of active disease.  Emphysematous changes suspected within the upper lobes.  No evidence of pleural effusion or pneumothorax.  Bones appear intact.      Impression    No acute process seen.      Electronically signed by: Calvin Vasquez MD  Date:    06/11/2020  Time:    11:00

## 2020-06-11 NOTE — ED NOTES
Attempted to call report to telemetry. No answer after being transferred by . Will attempt again shortly.

## 2020-06-11 NOTE — ASSESSMENT & PLAN NOTE
NSTEMI/Chest Pain  -Case Discussed with subspecialities: Cardiology Consulted Dr. Simms. Patient was seen in clinic by Dr. Jarvis  -MARTINA  -metoprolol  -O2  -lipid panel 2 months ago and Statin  -ECHO pending  -Troponin q 6 hours

## 2020-06-11 NOTE — H&P
"[facility] Hospital Medicine  History & Physical    Patient Name: Iza Esquivel  MRN: [id number]  Admission Date: 6/11/2020  Attending Physician: Dr. Erich Gleason   Primary Care Provider: Park Gomez MD    Patient information was obtained from patient, spouse/SO, past medical records and ER records.     Subjective:     Principal Problem:Chest pain    Chief Complaint:   Chief Complaint   Patient presents with    Chest Pain     Pt states, "I am having some chest pain."        HPI: Iza Esquivel is a 60 yo female with PMHx of STEMI 5/25/20, LCx stent, HTN, HLP, ongoing tobacco abuse, severe PVD bilaterally, SFA occlusion, who presented to the ER with chest pain. She was seen in Dr. Jarvis's office c/o chest pain after getting mad yesterday and experiencing chest pain with radiating down right arm. Associated s/s includes worse chest pain while lying down, SOB, gets exertional chest pain. In ER, Labs showed , neg troponin, glucose 111, Chest Xray no acute issues. ECG showed sinus nabil 54 with septal infarct. Cardiac monitor showed bradycardia in the 40's. She reports she was 1.5 ppd before her MI, now 10 cigarettes a day. Refused Nicotine patch. "I'm not going to eat that Cardiac diet." Cardiology has been consulted. SDM: Ninoska Hicks, sister [phone]. Observation for chest pain.       Past Medical History:   Diagnosis Date    Atherosclerotic PVD with intermittent claudication 5/25/2020    Cervical cancer     Hyperlipidemia     Right carotid bruit     S/P PTCA (percutaneous transluminal coronary angioplasty) 05/25/2020    STEMI: stenting of LCx       Past Surgical History:   Procedure Laterality Date    ABDOMINAL AORTOGRAPHY N/A 5/25/2020    Procedure: Aortogram, Abdominal;  Surgeon: Shelly Jarvis MD;  [facility]: [facility] CATH LAB;  Service: Cardiology;  Laterality: N/A;    LEFT HEART CATHETERIZATION Left 5/25/2020    Procedure: CATHETERIZATION, HEART, LEFT;  Surgeon: " Shelly Jarvis MD;  Location: [facility] CATH LAB;  Service: Cardiology;  Laterality: Left;    LYMPH NODE BIOPSY      PERCUTANEOUS TRANSLUMINAL BALLOON ANGIOPLASTY OF CORONARY ARTERY  5/25/2020    Procedure: Angioplasty-coronary;  Surgeon: Shelly Jarvis MD;  Location: [facility] CATH LAB;  Service: Cardiology;;       Review of patient's allergies indicates:  No Known Allergies    No current facility-administered medications on file prior to encounter.      Current Outpatient Medications on File Prior to Encounter   Medication Sig    aspirin (ECOTRIN) 81 MG EC tablet Take 1 tablet (81 mg total) by mouth once daily.    atorvastatin (LIPITOR) 80 MG tablet Take 1 tablet (80 mg total) by mouth once daily.    losartan (COZAAR) 25 MG tablet Take 1 tablet (25 mg total) by mouth once daily.    metoprolol tartrate (LOPRESSOR) 25 MG tablet Take 1 tablet (25 mg total) by mouth 2 (two) times daily.    ticagrelor (BRILINTA) 90 mg tablet Take 1 tablet (90 mg total) by mouth 2 (two) times daily.     Family History     Problem Relation (Age of Onset)    Cancer Mother, Father    Diabetes Brother    Hypertension Brother    Transient ischemic attack Father        Tobacco Use    Smoking status: Current Every Day Smoker     Packs/day: 1.50     Years: 40.00     Pack years: 60.00    Smokeless tobacco: Never Used    Tobacco comment: now down to 10 cigarettes daily   Substance and Sexual Activity    Alcohol use: No     Frequency: Never     Binge frequency: Never    Drug use: Not on file    Sexual activity: Never     Review of Systems   Constitutional: Positive for activity change. Negative for appetite change, chills, fatigue and fever.   HENT: Negative.  Negative for congestion, ear discharge, facial swelling, sore throat and trouble swallowing.    Eyes: Negative.  Negative for photophobia, pain, discharge, redness and visual disturbance.   Respiratory: Positive for shortness of breath (with chest pain). Negative for cough, chest  tightness, wheezing and stridor.    Cardiovascular: Positive for chest pain. Negative for palpitations and leg swelling.   Gastrointestinal: Negative for abdominal distention, abdominal pain, anal bleeding, blood in stool, constipation, diarrhea, nausea, rectal pain and vomiting.   Endocrine: Negative.  Negative for cold intolerance, heat intolerance, polydipsia, polyphagia and polyuria.   Genitourinary: Negative.  Negative for difficulty urinating, dysuria, flank pain, frequency, hematuria, pelvic pain, urgency, vaginal bleeding and vaginal discharge.   Musculoskeletal: Positive for myalgias. Negative for arthralgias, back pain, gait problem, joint swelling and neck pain.        Claudication with ambulation   Skin: Negative for color change, pallor, rash and wound.   Allergic/Immunologic: Negative.  Negative for food allergies and immunocompromised state.   Neurological: Negative for dizziness, tremors, seizures, syncope, facial asymmetry, speech difficulty, weakness, light-headedness, numbness and headaches.   Hematological: Negative.  Negative for adenopathy. Does not bruise/bleed easily.   Psychiatric/Behavioral: Negative.  Negative for agitation, confusion, hallucinations, sleep disturbance and suicidal ideas. The patient is not nervous/anxious.    All other systems reviewed and are negative.    Objective:     Vital Signs (Most Recent):  Temp: 98.3 °F (36.8 °C) (06/11/20 1314)  Pulse: (!) 46 (06/11/20 1523)  Resp: 19 (06/11/20 1523)  BP: (!) 144/64 (06/11/20 1501)  SpO2: 98 % (06/11/20 1523) Vital Signs (24h Range):  Temp:  [97.9 °F (36.6 °C)-98.3 °F (36.8 °C)] 98.3 °F (36.8 °C)  Pulse:  [44-63] 46  Resp:  [13-20] 19  SpO2:  [96 %-100 %] 98 %  BP: (144-190)/(64-98) 144/64     Weight: 70.4 kg (155 lb 3.3 oz)  Body mass index is 26.64 kg/m².    Physical Exam   Constitutional: She is oriented to person, place, and time. She appears well-developed and well-nourished. No distress.   HENT:   Head: Normocephalic and  atraumatic.   Nose: Nose normal.   Eyes: Pupils are equal, round, and reactive to light. Conjunctivae and EOM are normal. Right eye exhibits no discharge. Left eye exhibits no discharge.   Neck: Normal range of motion. Neck supple. No JVD present. No tracheal deviation present. No thyromegaly present.   Right carotid bruit   Cardiovascular: Regular rhythm and normal heart sounds. Exam reveals no gallop and no friction rub.   No murmur heard.  Bradycardia 40's   Pulmonary/Chest: Effort normal and breath sounds normal. No stridor. No respiratory distress. She has no wheezes. She has no rales. She exhibits no tenderness.   Abdominal: Soft. Bowel sounds are normal. She exhibits no distension and no mass. There is no tenderness. There is no guarding.   Musculoskeletal: Normal range of motion. She exhibits no edema, tenderness or deformity.   Lymphadenopathy:     She has no cervical adenopathy.   Neurological: She is alert and oriented to person, place, and time. She has normal reflexes. No cranial nerve deficit. Coordination normal.   Skin: Skin is warm and dry. No rash noted. She is not diaphoretic. No erythema. No pallor.   Psychiatric: She has a normal mood and affect. Her behavior is normal. Judgment and thought content normal.   Nursing note and vitals reviewed.        CRANIAL NERVES     CN III, IV, VI   Pupils are equal, round, and reactive to light.  Extraocular motions are normal.        Significant Labs: All pertinent labs within the past 24 hours have been reviewed.  Results for orders placed or performed during the hospital encounter of 06/11/20   CBC auto differential   Result Value Ref Range    WBC 8.47 3.90 - 12.70 K/uL    RBC 3.95 (L) 4.00 - 5.40 M/uL    Hemoglobin 11.2 (L) 12.0 - 16.0 g/dL    Hematocrit 35.5 (L) 37.0 - 48.5 %    Mean Corpuscular Volume 90 82 - 98 fL    Mean Corpuscular Hemoglobin 28.4 27.0 - 31.0 pg    Mean Corpuscular Hemoglobin Conc 31.5 (L) 32.0 - 36.0 g/dL    RDW 13.3 11.5 - 14.5 %     Platelets 401 (H) 150 - 350 K/uL    MPV 10.3 9.2 - 12.9 fL    Immature Granulocytes 0.4 0.0 - 0.5 %    Gran # (ANC) 5.7 1.8 - 7.7 K/uL    Immature Grans (Abs) 0.03 0.00 - 0.04 K/uL    Lymph # 2.2 1.0 - 4.8 K/uL    Mono # 0.4 0.3 - 1.0 K/uL    Eos # 0.1 0.0 - 0.5 K/uL    Baso # 0.08 0.00 - 0.20 K/uL    nRBC 0 0 /100 WBC    Gran% 67.8 38.0 - 73.0 %    Lymph% 25.4 18.0 - 48.0 %    Mono% 4.7 4.0 - 15.0 %    Eosinophil% 0.8 0.0 - 8.0 %    Basophil% 0.9 0.0 - 1.9 %    Differential Method Automated    Comprehensive metabolic panel   Result Value Ref Range    Sodium 142 136 - 145 mmol/L    Potassium 3.7 3.5 - 5.1 mmol/L    Chloride 104 95 - 110 mmol/L    CO2 26 23 - 29 mmol/L    Glucose 111 (H) 70 - 110 mg/dL    BUN, Bld 7 (L) 8 - 23 mg/dL    Creatinine 1.0 0.5 - 1.4 mg/dL    Calcium 9.5 8.7 - 10.5 mg/dL    Total Protein 7.5 6.0 - 8.4 g/dL    Albumin 3.5 3.5 - 5.2 g/dL    Total Bilirubin 0.3 0.1 - 1.0 mg/dL    Alkaline Phosphatase 149 (H) 55 - 135 U/L    AST 14 10 - 40 U/L    ALT 10 10 - 44 U/L    Anion Gap 12 8 - 16 mmol/L    eGFR if African American >60 >60 mL/min/1.73 m^2    eGFR if non African American >60 >60 mL/min/1.73 m^2   Troponin I #1   Result Value Ref Range    Troponin I 0.016 0.000 - 0.026 ng/mL   B-Type natriuretic peptide (BNP)   Result Value Ref Range     (H) 0 - 99 pg/mL   HIV 1/2 Ag/Ab (4th Gen)   Result Value Ref Range    HIV 1/2 Ag/Ab Negative Negative   Hepatitis C antibody   Result Value Ref Range    Hepatitis C Ab Negative Negative   COVID-19 Rapid Screening   Result Value Ref Range    SARS-CoV-2 RNA, Amplification, Qual Negative Negative   Protime-INR   Result Value Ref Range    Prothrombin Time 11.4 9.0 - 12.5 sec    INR 1.1 0.8 - 1.2   APTT   Result Value Ref Range    aPTT 33.2 (H) 21.0 - 32.0 sec     Significant Imaging: I have reviewed all pertinent imaging results/findings within the past 24 hours.   Imaging Results          X-Ray Chest PA And Lateral (Final result)  Result time 06/11/20  "11:00:06    Final result by Calvin Vasquez MD (06/11/20 11:00:06)                 Impression:      No acute process seen.      Electronically signed by: Calvin Vasquez MD  Date:    06/11/2020  Time:    11:00             Narrative:    EXAMINATION:  XR CHEST PA AND LATERAL    CLINICAL HISTORY:  Chest Pain;    COMPARISON:  06/01/2020    FINDINGS:  Cardiac silhouette is normal. Aorta demonstrates atherosclerotic disease. The lungs demonstrate no evidence of active disease.  Emphysematous changes suspected within the upper lobes.  No evidence of pleural effusion or pneumothorax.  Bones appear intact.                              Assessment/Plan:     * Chest pain  NSTEMI/Chest Pain  -Case Discussed with subspecialities: Cardiology Consulted Dr. Simms. Patient was seen in clinic by Dr. Jarvis  -ASA  -metoprolol  -O2  -lipid panel 2 months ago and Statin  -ECHO pending  -Troponin q 6 hours        Coronary artery disease  --continue ASA, brilenta, BB, statin      Atherosclerotic PVD with intermittent claudication  -outpatient management: Dr. Jarvis  -continue ASA, brilenta, BB, statin  -Stop Smoking: refused Nicotine patch      Bruit of right carotid artery  Outpatient management      Mixed hyperlipidemia  -continue statin      Tobacco abuse  -Tobacco abuse  -smoking cessation counseling  -nicotine patch: REFUSED  -"Patient was counseled on smoking cessation for 10 minutes, RX for nicotine patch offered, patient refuses and states does not want to quit".        VTE Risk Mitigation (From admission, onward)         Ordered     IP VTE HIGH RISK PATIENT  Once      06/11/20 1535     Place sequential compression device  Until discontinued      06/11/20 1535                   Ann Obrien NP  Department of Hospital Medicine   [facility]  "

## 2020-06-11 NOTE — HPI
"Iza Esquivel is a 58 yo female with PMHx of STEMI 5/25/20, LCx stent, HTN, HLP, ongoing tobacco abuse, severe PVD bilaterally, SFA occlusion, who presented to the ER with chest pain. She was seen in Dr. Jarvis's office c/o chest pain after getting mad yesterday and experiencing chest pain with radiating down right arm. Associated s/s includes worse chest pain while lying down, SOB, gets exertional chest pain. In ER, Labs showed , neg troponin, glucose 111, Chest Xray no acute issues. ECG showed sinus nabil 54 with septal infarct. Cardiac monitor showed bradycardia in the 40's. She reports she was 1.5 ppd before her MI, now 10 cigarettes a day. Refused Nicotine patch. "I'm not going to eat that Cardiac diet." Cardiology has been consulted. SDM: Ninoska Hicks, sister [phone]. Observation for chest pain.     "

## 2020-06-11 NOTE — Clinical Note
The DP pulses are 1+ bilaterally. The PT pulses are 1+ bilaterally. The radial pulses are +2 bilaterally.

## 2020-06-11 NOTE — TELEPHONE ENCOUNTER
"Spoke to Ruth with [facility]. Dr. Jarvis is sending patient to the ED for observation and possible repeat cath.  Per Dr. Jarvis's note, "Pt has severe pvd multilevel worse on the left very limiting needing invasive eval.  She was counseled about smoking cessation statins antiplatelets walking exercise.  Has htn uncontrolled excessive intake of salt via soft drinks and caffeine   Has chest pain that seems to be exertional and at rest decubitus not sure component of gerd or spasm from smoking vs angina she will benefit from  Nitrates and will obtain echo . I think she needs to be admitted since this is daily pain and evaluate need of repeat angiography."  "

## 2020-06-12 ENCOUNTER — TELEPHONE (OUTPATIENT)
Dept: CARDIOLOGY | Facility: HOSPITAL | Age: 60
End: 2020-06-12

## 2020-06-12 VITALS
SYSTOLIC BLOOD PRESSURE: 124 MMHG | HEART RATE: 65 BPM | RESPIRATION RATE: 20 BRPM | BODY MASS INDEX: 27.43 KG/M2 | TEMPERATURE: 97 F | DIASTOLIC BLOOD PRESSURE: 60 MMHG | OXYGEN SATURATION: 96 % | WEIGHT: 160.69 LBS | HEIGHT: 64 IN

## 2020-06-12 PROBLEM — R07.9 CHEST PAIN: Status: RESOLVED | Noted: 2020-05-25 | Resolved: 2020-06-12

## 2020-06-12 LAB
ALBUMIN SERPL BCP-MCNC: 3.1 G/DL (ref 3.5–5.2)
ALP SERPL-CCNC: 128 U/L (ref 55–135)
ALT SERPL W/O P-5'-P-CCNC: 9 U/L (ref 10–44)
ANION GAP SERPL CALC-SCNC: 13 MMOL/L (ref 8–16)
APTT BLDCRRT: 31.9 SEC (ref 21–32)
AST SERPL-CCNC: 13 U/L (ref 10–40)
BASOPHILS # BLD AUTO: 0.08 K/UL (ref 0–0.2)
BASOPHILS NFR BLD: 0.8 % (ref 0–1.9)
BILIRUB SERPL-MCNC: 0.2 MG/DL (ref 0.1–1)
BUN SERPL-MCNC: 7 MG/DL (ref 8–23)
CALCIUM SERPL-MCNC: 8.6 MG/DL (ref 8.7–10.5)
CHLORIDE SERPL-SCNC: 108 MMOL/L (ref 95–110)
CO2 SERPL-SCNC: 21 MMOL/L (ref 23–29)
CREAT SERPL-MCNC: 0.8 MG/DL (ref 0.5–1.4)
DIFFERENTIAL METHOD: ABNORMAL
EOSINOPHIL # BLD AUTO: 0.1 K/UL (ref 0–0.5)
EOSINOPHIL NFR BLD: 0.6 % (ref 0–8)
ERYTHROCYTE [DISTWIDTH] IN BLOOD BY AUTOMATED COUNT: 13.6 % (ref 11.5–14.5)
EST. GFR  (AFRICAN AMERICAN): >60 ML/MIN/1.73 M^2
EST. GFR  (NON AFRICAN AMERICAN): >60 ML/MIN/1.73 M^2
GLUCOSE SERPL-MCNC: 79 MG/DL (ref 70–110)
HCT VFR BLD AUTO: 32.2 % (ref 37–48.5)
HGB BLD-MCNC: 9.7 G/DL (ref 12–16)
IMM GRANULOCYTES # BLD AUTO: 0.05 K/UL (ref 0–0.04)
IMM GRANULOCYTES NFR BLD AUTO: 0.5 % (ref 0–0.5)
INR PPP: 1 (ref 0.8–1.2)
LYMPHOCYTES # BLD AUTO: 1.6 K/UL (ref 1–4.8)
LYMPHOCYTES NFR BLD: 15 % (ref 18–48)
MCH RBC QN AUTO: 28.1 PG (ref 27–31)
MCHC RBC AUTO-ENTMCNC: 30.1 G/DL (ref 32–36)
MCV RBC AUTO: 93 FL (ref 82–98)
MONOCYTES # BLD AUTO: 0.5 K/UL (ref 0.3–1)
MONOCYTES NFR BLD: 4.4 % (ref 4–15)
NEUTROPHILS # BLD AUTO: 8.4 K/UL (ref 1.8–7.7)
NEUTROPHILS NFR BLD: 78.7 % (ref 38–73)
NRBC BLD-RTO: 0 /100 WBC
PLATELET # BLD AUTO: 369 K/UL (ref 150–350)
PMV BLD AUTO: 10.5 FL (ref 9.2–12.9)
POTASSIUM SERPL-SCNC: 3.7 MMOL/L (ref 3.5–5.1)
PROT SERPL-MCNC: 6.5 G/DL (ref 6–8.4)
PROTHROMBIN TIME: 10.9 SEC (ref 9–12.5)
RBC # BLD AUTO: 3.45 M/UL (ref 4–5.4)
SODIUM SERPL-SCNC: 142 MMOL/L (ref 136–145)
TROPONIN I SERPL DL<=0.01 NG/ML-MCNC: 0.04 NG/ML (ref 0–0.03)
TROPONIN I SERPL DL<=0.01 NG/ML-MCNC: 0.04 NG/ML (ref 0–0.03)
WBC # BLD AUTO: 10.64 K/UL (ref 3.9–12.7)

## 2020-06-12 PROCEDURE — 85025 COMPLETE CBC W/AUTO DIFF WBC: CPT

## 2020-06-12 PROCEDURE — 80053 COMPREHEN METABOLIC PANEL: CPT

## 2020-06-12 PROCEDURE — 25000003 PHARM REV CODE 250: Performed by: NURSE PRACTITIONER

## 2020-06-12 PROCEDURE — 36415 COLL VENOUS BLD VENIPUNCTURE: CPT

## 2020-06-12 PROCEDURE — 99225 PR SUBSEQUENT OBSERVATION CARE,LEVEL II: ICD-10-PCS | Mod: ,,, | Performed by: INTERNAL MEDICINE

## 2020-06-12 PROCEDURE — 85610 PROTHROMBIN TIME: CPT

## 2020-06-12 PROCEDURE — 25000003 PHARM REV CODE 250: Performed by: PHYSICIAN ASSISTANT

## 2020-06-12 PROCEDURE — 84484 ASSAY OF TROPONIN QUANT: CPT

## 2020-06-12 PROCEDURE — 85730 THROMBOPLASTIN TIME PARTIAL: CPT

## 2020-06-12 PROCEDURE — 99225 PR SUBSEQUENT OBSERVATION CARE,LEVEL II: CPT | Mod: ,,, | Performed by: INTERNAL MEDICINE

## 2020-06-12 PROCEDURE — G0378 HOSPITAL OBSERVATION PER HR: HCPCS

## 2020-06-12 RX ORDER — SODIUM CHLORIDE 9 MG/ML
INJECTION, SOLUTION INTRAVENOUS CONTINUOUS
Status: DISCONTINUED | OUTPATIENT
Start: 2020-06-12 | End: 2020-06-12

## 2020-06-12 RX ORDER — ISOSORBIDE MONONITRATE 30 MG/1
30 TABLET, EXTENDED RELEASE ORAL DAILY
Status: DISCONTINUED | OUTPATIENT
Start: 2020-06-12 | End: 2020-06-12 | Stop reason: HOSPADM

## 2020-06-12 RX ORDER — ISOSORBIDE MONONITRATE 30 MG/1
30 TABLET, EXTENDED RELEASE ORAL DAILY
Qty: 30 TABLET | Refills: 0 | Status: SHIPPED | OUTPATIENT
Start: 2020-06-13 | End: 2020-06-18 | Stop reason: SDUPTHER

## 2020-06-12 RX ADMIN — ASPIRIN 81 MG: 81 TABLET, COATED ORAL at 08:06

## 2020-06-12 RX ADMIN — LOSARTAN POTASSIUM 25 MG: 25 TABLET ORAL at 08:06

## 2020-06-12 RX ADMIN — TICAGRELOR 90 MG: 90 TABLET ORAL at 08:06

## 2020-06-12 RX ADMIN — METOPROLOL TARTRATE 25 MG: 25 TABLET ORAL at 08:06

## 2020-06-12 RX ADMIN — ATORVASTATIN CALCIUM 80 MG: 40 TABLET, FILM COATED ORAL at 08:06

## 2020-06-12 RX ADMIN — ISOSORBIDE MONONITRATE 30 MG: 30 TABLET, EXTENDED RELEASE ORAL at 08:06

## 2020-06-12 NOTE — ASSESSMENT & PLAN NOTE
-Patient with recent STEMI who presents with chest pain with typical and atypical features, concerned she may have element of angina decubitis  -In light of continued chest pain, will proceed with C for further evaluation and PCI if indicated. All risks, benefits, and treatment alternatives explained to patient in detail. All questions answered. She has agreed to proceed.   -Continue ASA, Brilinta, ARB, BB, statin, nitrates  -Trend troponin  -Further rec's to follow    6/12/2020  -s/p LHC that showed patent LCX stent, non-obstructive disease elsewhere  -Add Imdur 30 mg daily  -Continue ASA, Brilinta, ARB, BB, statin

## 2020-06-12 NOTE — ASSESSMENT & PLAN NOTE
-outpatient management: Dr. Jarvis  -continue ASA, keyur, BB, statin  -Stop Smoking: refused Nicotine patch  6/12/20: Encouraged to cut back on smoking (currently 8-10 cigs/day, which she promised to cut back to 8/d max for the next month)

## 2020-06-12 NOTE — ASSESSMENT & PLAN NOTE
"-Tobacco abuse  -smoking cessation counseling  -nicotine patch: REFUSED  -"Patient was counseled on smoking cessation for 10 minutes, RX for nicotine patch offered, patient refuses and states does not want to quit".    6/12/20: Encouraged to cut back on smoking (currently 8-10 cigs/day), which she promised to cut back to 8/d max for the next month.  "

## 2020-06-12 NOTE — PROGRESS NOTES
"[facility]  Cardiology  Progress Note    Patient Name: Iza Esquivel  MRN: [id number]  Admission Date: 6/11/2020  Hospital Length of Stay: 0 days  Code Status: Full Code   Attending Physician: Erich Gleason MD   Primary Care Physician: Park Gomez MD  Expected Discharge Date:   Principal Problem:Chest pain    Subjective:   HPI:  Ms. Esquivel is a 59 year old female patient whose current medical conditions include HTN, hyperlipidemia, carotid bruit, severe PAD, and CAD s/p recent STEMI on 5/25/20 with successful PCI of LCX who was sent to [facility] ED from cardiology clinic due to chest pain. Patient complained of intermittent bouts of substernal chest tightness that became more intense yesterday morning and persisted throughout the day. Worse with lying down and associated with SOB. Patient denied any radiation of pain or any associated nausea, vomiting, diaphoresis, palpitations, near syncope, or syncope. Reported some mild relief with nitropaste given in ED. Initial workup unremarkable with exception of elevated BP and patient was subsequently admitted for further evaluation and treatment. Cardiology consulted to assist with management. Patient seen and examined today, sitting up in bed. Still has some residual discomfort, improved since arrival to ED. Feels pain is somewhat dissimilar to what she experienced prior to MI. She reports compliance with her medications. Still smoking but claims she has "cut back". Chart reviewed. Troponin x 1 negative. EKG from clinic showed no acute ischemic changes. Echo report pending.     Discussed with Dr. Jarvis. In light of patient's history and continued symptoms/possible component of angina decubitis will proceed with [facility] today for further evaluation. Further rec's to follow.     Hospital Course:   6/12/2020-Patient seen and examined today, s/p C yesterday which showed patent LCX stent, non-obstructive disease elsewhere. Feels well. No chest pain " or SOB. Labs reviewed and are stable. Meds optimized.       Review of Systems   Constitution: Negative.   HENT: Negative.    Eyes: Negative.    Cardiovascular: Negative.    Respiratory: Negative.    Endocrine: Negative.    Hematologic/Lymphatic: Negative.    Skin: Negative.    Musculoskeletal: Negative.    Gastrointestinal: Negative.    Genitourinary: Negative.    Neurological: Negative.    Psychiatric/Behavioral: Negative.    Allergic/Immunologic: Negative.      Objective:     Vital Signs (Most Recent):  Temp: 97.3 °F (36.3 °C) (06/12/20 1125)  Pulse: (!) 54 (06/12/20 1125)  Resp: 20 (06/12/20 1125)  BP: 124/60 (06/12/20 1125)  SpO2: 96 % (06/12/20 1125) Vital Signs (24h Range):  Temp:  [96.2 °F (35.7 °C)-98.4 °F (36.9 °C)] 97.3 °F (36.3 °C)  Pulse:  [44-59] 54  Resp:  [12-28] 20  SpO2:  [95 %-100 %] 96 %  BP: (124-190)/(53-98) 124/60     Weight: 72.9 kg (160 lb 11.5 oz)  Body mass index is 27.59 kg/m².     SpO2: 96 %  O2 Device (Oxygen Therapy): room air      Intake/Output Summary (Last 24 hours) at 6/12/2020 1133  Last data filed at 6/11/2020 2000  Gross per 24 hour   Intake 184.08 ml   Output --   Net 184.08 ml       Lines/Drains/Airways     Peripheral Intravenous Line                 Peripheral IV - Single Lumen 06/11/20 1150 20 G Right Antecubital less than 1 day                Physical Exam   Constitutional: She is oriented to person, place, and time. She appears well-developed and well-nourished. No distress.   HENT:   Head: Normocephalic and atraumatic.   Eyes: Pupils are equal, round, and reactive to light. Right eye exhibits no discharge. Left eye exhibits no discharge.   Neck: Neck supple. No JVD present.   Cardiovascular: Normal rate, regular rhythm, S1 normal, S2 normal and normal heart sounds.   No murmur heard.  Pulmonary/Chest: Effort normal and breath sounds normal. No respiratory distress. She has no wheezes. She has no rales.   Abdominal: Soft. She exhibits no distension.   Musculoskeletal: She  exhibits no edema.   Neurological: She is alert and oriented to person, place, and time.   Skin: Skin is warm and dry. She is not diaphoretic. No erythema.   Left radial access site with mild swelling and bruising; no hematoma, or drainage, intact pulse   Psychiatric: She has a normal mood and affect. Her behavior is normal.   Nursing note and vitals reviewed.      Significant Labs:   CMP   Recent Labs   Lab 06/11/20  1149 06/12/20  0617    142   K 3.7 3.7    108   CO2 26 21*   * 79   BUN 7* 7*   CREATININE 1.0 0.8   CALCIUM 9.5 8.6*   PROT 7.5 6.5   ALBUMIN 3.5 3.1*   BILITOT 0.3 0.2   ALKPHOS 149* 128   AST 14 13   ALT 10 9*   ANIONGAP 12 13   ESTGFRAFRICA >60 >60   EGFRNONAA >60 >60   , CBC   Recent Labs   Lab 06/11/20  1149 06/11/20  1533 06/12/20  0617   WBC 8.47 6.36 10.64   HGB 11.2* 8.8* 9.7*   HCT 35.5* 28.6* 32.2*   * 336 369*   , Troponin   Recent Labs   Lab 06/11/20  1800 06/11/20  2351 06/12/20  0617   TROPONINI 0.010 0.040* 0.035*    and All pertinent lab results from the last 24 hours have been reviewed.    Significant Imaging: Echocardiogram:   2D echo with color flow doppler:   Results for orders placed or performed during the hospital encounter of 02/09/19   2D echo with color flow doppler   Result Value Ref Range    QEF 60 55 - 65    Diastolic Dysfunction Yes (A)     Est. PA Systolic Pressure 16.16     Narrative    Date of Procedure: 02/10/2019        TEST DESCRIPTION       Aorta: The aortic root is normal in size, measuring 2.9 cm at sinotubular junction and 2.9 cm at Sinuses of Valsalva. The proximal ascending aorta is normal in size, measuring 2.9 cm across.     Left Atrium: The left atrial volume index is normal, measuring 22.55 cc/m2.     Left Ventricle: The left ventricle is normal in size, with an end-diastolic diameter of 4.3 cm, and an end-systolic diameter of 2.8 cm. LV wall thickness is normal, with the septum measuring 1.0 cm and the posterior wall measuring  0.7 cm across. Relative   wall thickness was normal at 0.33, and the LV mass index was 75.1 g/m2 consistent with normal left ventricular mass. There are no regional wall motion abnormalities. Left ventricular systolic function appears normal. Visually estimated ejection fraction   is 60-65%. The LV Doppler derived stroke volume equals 61.0 ccs.     Diastolic indices: E wave velocity 0.8 m/s, E/A ratio 0.9,  msec., E/e' ratio(avg) 7. There is diastolic dysfunction secondary to relaxation abnormality.     Right Atrium: The right atrium is normal in size, measuring 4.5 cm in length and 3.4 cm in width in the apical view.     Right Ventricle: The right ventricle is normal in size. Global right ventricular systolic function appears normal. Tricuspid annular plane systolic excursion (TAPSE) is 1.7 cm. The estimated PA systolic pressure is greater than 16 mmHg.     Aortic Valve:  Aortic valve is normal in structure with normal leaflet mobility. The mean gradient obtained across the aortic valve is 3 mmHg.     Mitral Valve:  Mitral valve is normal in structure with normal leaflet mobility. The pressure half time is 84 msec. The calculated mitral valve area is 2.62 cm2.     Tricuspid Valve:  Tricuspid valve is normal in structure with normal leaflet mobility.     Pulmonary Valve:  Pulmonary valve is normal in structure with normal leaflet mobility.     Intracavitary: There is no evidence of pericardial effusion, intracavity mass, thrombi, or vegetation.         CONCLUSIONS     1 - No wall motion abnormalities.     2 - Normal left ventricular systolic function (EF 60-65%).     3 - Impaired LV relaxation, normal LAP (grade 1 diastolic dysfunction).     4 - Normal right ventricular systolic function .     5 - The estimated PA systolic pressure is greater than 16 mmHg.             This document has been electronically    SIGNED BY: Damian Estrada MD On: 02/10/2019 12:10   , EKG: Reviewed and X-Ray: CXR: X-Ray Chest 1  View (CXR): No results found for this visit on 06/11/20. and X-Ray Chest PA and Lateral (CXR):   Results for orders placed or performed during the hospital encounter of 06/11/20   X-Ray Chest PA And Lateral    Narrative    EXAMINATION:  XR CHEST PA AND LATERAL    CLINICAL HISTORY:  Chest Pain;    COMPARISON:  06/01/2020    FINDINGS:  Cardiac silhouette is normal. Aorta demonstrates atherosclerotic disease. The lungs demonstrate no evidence of active disease.  Emphysematous changes suspected within the upper lobes.  No evidence of pleural effusion or pneumothorax.  Bones appear intact.      Impression    No acute process seen.      Electronically signed by: Calvin Vasquez MD  Date:    06/11/2020  Time:    11:00     Assessment and Plan:   Patient with known CAD/recent STEMI who presents with chest pain concerning for angina. LHC showed patent LCX stent, non-obstructive disease elsewhere. Imdur added. Continue other meds. Follow-up in clinic.    * Chest pain  -Patient with recent STEMI who presents with chest pain with typical and atypical features, concerned she may have element of angina decubitis  -In light of continued chest pain, will proceed with LHC for further evaluation and PCI if indicated. All risks, benefits, and treatment alternatives explained to patient in detail. All questions answered. She has agreed to proceed.   -Continue ASA, Brilinta, ARB, BB, statin, nitrates  -Trend troponin  -Further rec's to follow    6/12/2020  -s/p LHC that showed patent LCX stent, non-obstructive disease elsewhere  -Add Imdur 30 mg daily  -Continue ASA, Brilinta, ARB, BB, statin    Coronary artery disease  -See plan under chest pain    Atherosclerotic PVD with intermittent claudication  -Continue ASA, statin  -Will need peripheral angiogram in future    Bruit of right carotid artery  -Continue ASA, statin    Mixed hyperlipidemia  -Continue statin    Tobacco abuse  -Smoking cessation counseling provided        VTE Risk  Mitigation (From admission, onward)         Ordered     IP VTE HIGH RISK PATIENT  Once      06/11/20 1535     Place sequential compression device  Until discontinued      06/11/20 1535                Erica Dunne PA-C  Cardiology  [facility]

## 2020-06-12 NOTE — PLAN OF CARE
Pt AAOx4 VSS. Pt remained free of falls this shift. No complaints of pain or discomfort. Medications administered as ordered. Pt is sinus nabil on monitor. PIV intact, running normal saline at 125 mL/hr  Hourly rounding completed. Pt instructed to call for assistance. POC reviewed. Pt verbalized understanding. Will continue to monitor.

## 2020-06-12 NOTE — HOSPITAL COURSE
6/12/2020-Patient seen and examined today, s/p C yesterday which showed patent LCX stent, non-obstructive disease elsewhere. Feels well. No chest pain or SOB. Labs reviewed and are stable. Meds optimized.

## 2020-06-12 NOTE — NURSING
TRANSFERRED BACK TO .  TELEMETRY MONITER ON.  IV FLUIDS ON PUMP AT PRESCRIBED RATE.  PROCEDURAL ACCESS SITE WITHOUT BLEEDING OR HEMATOMA.  DRESSING DRY AND INTACT.  CARE HANDED OFF TO MIGUEL

## 2020-06-12 NOTE — HOSPITAL COURSE
6/12/20: Patient found up out of bed standing; CP resolved; Trop 0.06>0.010>0.040>0.035; LHC: L CFX w/ stent, non obstructive OM, RCA & LAD; ECHO: EF 60-65%, gr 1 diastolic dysfunction; Encouraged to cut back on smoking (currently 8-10 cigs/day), which she promised to cut back to 8/d max for the next month; patient seen, examined, walking, eating, labs & imaging reviewed, and deemed appropriate for discharge to home, where she lives with her sister.

## 2020-06-12 NOTE — SUBJECTIVE & OBJECTIVE
Review of Systems   Constitution: Negative.   HENT: Negative.    Eyes: Negative.    Cardiovascular: Negative.    Respiratory: Negative.    Endocrine: Negative.    Hematologic/Lymphatic: Negative.    Skin: Negative.    Musculoskeletal: Negative.    Gastrointestinal: Negative.    Genitourinary: Negative.    Neurological: Negative.    Psychiatric/Behavioral: Negative.    Allergic/Immunologic: Negative.      Objective:     Vital Signs (Most Recent):  Temp: 97.3 °F (36.3 °C) (06/12/20 1125)  Pulse: (!) 54 (06/12/20 1125)  Resp: 20 (06/12/20 1125)  BP: 124/60 (06/12/20 1125)  SpO2: 96 % (06/12/20 1125) Vital Signs (24h Range):  Temp:  [96.2 °F (35.7 °C)-98.4 °F (36.9 °C)] 97.3 °F (36.3 °C)  Pulse:  [44-59] 54  Resp:  [12-28] 20  SpO2:  [95 %-100 %] 96 %  BP: (124-190)/(53-98) 124/60     Weight: 72.9 kg (160 lb 11.5 oz)  Body mass index is 27.59 kg/m².     SpO2: 96 %  O2 Device (Oxygen Therapy): room air      Intake/Output Summary (Last 24 hours) at 6/12/2020 1133  Last data filed at 6/11/2020 2000  Gross per 24 hour   Intake 184.08 ml   Output --   Net 184.08 ml       Lines/Drains/Airways     Peripheral Intravenous Line                 Peripheral IV - Single Lumen 06/11/20 1150 20 G Right Antecubital less than 1 day                Physical Exam   Constitutional: She is oriented to person, place, and time. She appears well-developed and well-nourished. No distress.   HENT:   Head: Normocephalic and atraumatic.   Eyes: Pupils are equal, round, and reactive to light. Right eye exhibits no discharge. Left eye exhibits no discharge.   Neck: Neck supple. No JVD present.   Cardiovascular: Normal rate, regular rhythm, S1 normal, S2 normal and normal heart sounds.   No murmur heard.  Pulmonary/Chest: Effort normal and breath sounds normal. No respiratory distress. She has no wheezes. She has no rales.   Abdominal: Soft. She exhibits no distension.   Musculoskeletal: She exhibits no edema.   Neurological: She is alert and  oriented to person, place, and time.   Skin: Skin is warm and dry. She is not diaphoretic. No erythema.   Left radial access site with mild swelling and bruising; no hematoma, or drainage, intact pulse   Psychiatric: She has a normal mood and affect. Her behavior is normal.   Nursing note and vitals reviewed.      Significant Labs:   CMP   Recent Labs   Lab 06/11/20  1149 06/12/20  0617    142   K 3.7 3.7    108   CO2 26 21*   * 79   BUN 7* 7*   CREATININE 1.0 0.8   CALCIUM 9.5 8.6*   PROT 7.5 6.5   ALBUMIN 3.5 3.1*   BILITOT 0.3 0.2   ALKPHOS 149* 128   AST 14 13   ALT 10 9*   ANIONGAP 12 13   ESTGFRAFRICA >60 >60   EGFRNONAA >60 >60   , CBC   Recent Labs   Lab 06/11/20  1149 06/11/20  1533 06/12/20  0617   WBC 8.47 6.36 10.64   HGB 11.2* 8.8* 9.7*   HCT 35.5* 28.6* 32.2*   * 336 369*   , Troponin   Recent Labs   Lab 06/11/20  1800 06/11/20  2351 06/12/20  0617   TROPONINI 0.010 0.040* 0.035*    and All pertinent lab results from the last 24 hours have been reviewed.    Significant Imaging: Echocardiogram:   2D echo with color flow doppler:   Results for orders placed or performed during the hospital encounter of 02/09/19   2D echo with color flow doppler   Result Value Ref Range    QEF 60 55 - 65    Diastolic Dysfunction Yes (A)     Est. PA Systolic Pressure 16.16     Narrative    Date of Procedure: 02/10/2019        TEST DESCRIPTION       Aorta: The aortic root is normal in size, measuring 2.9 cm at sinotubular junction and 2.9 cm at Sinuses of Valsalva. The proximal ascending aorta is normal in size, measuring 2.9 cm across.     Left Atrium: The left atrial volume index is normal, measuring 22.55 cc/m2.     Left Ventricle: The left ventricle is normal in size, with an end-diastolic diameter of 4.3 cm, and an end-systolic diameter of 2.8 cm. LV wall thickness is normal, with the septum measuring 1.0 cm and the posterior wall measuring 0.7 cm across. Relative   wall thickness was normal  at 0.33, and the LV mass index was 75.1 g/m2 consistent with normal left ventricular mass. There are no regional wall motion abnormalities. Left ventricular systolic function appears normal. Visually estimated ejection fraction   is 60-65%. The LV Doppler derived stroke volume equals 61.0 ccs.     Diastolic indices: E wave velocity 0.8 m/s, E/A ratio 0.9,  msec., E/e' ratio(avg) 7. There is diastolic dysfunction secondary to relaxation abnormality.     Right Atrium: The right atrium is normal in size, measuring 4.5 cm in length and 3.4 cm in width in the apical view.     Right Ventricle: The right ventricle is normal in size. Global right ventricular systolic function appears normal. Tricuspid annular plane systolic excursion (TAPSE) is 1.7 cm. The estimated PA systolic pressure is greater than 16 mmHg.     Aortic Valve:  Aortic valve is normal in structure with normal leaflet mobility. The mean gradient obtained across the aortic valve is 3 mmHg.     Mitral Valve:  Mitral valve is normal in structure with normal leaflet mobility. The pressure half time is 84 msec. The calculated mitral valve area is 2.62 cm2.     Tricuspid Valve:  Tricuspid valve is normal in structure with normal leaflet mobility.     Pulmonary Valve:  Pulmonary valve is normal in structure with normal leaflet mobility.     Intracavitary: There is no evidence of pericardial effusion, intracavity mass, thrombi, or vegetation.         CONCLUSIONS     1 - No wall motion abnormalities.     2 - Normal left ventricular systolic function (EF 60-65%).     3 - Impaired LV relaxation, normal LAP (grade 1 diastolic dysfunction).     4 - Normal right ventricular systolic function .     5 - The estimated PA systolic pressure is greater than 16 mmHg.             This document has been electronically    SIGNED BY: Damian Estrada MD On: 02/10/2019 12:10   , EKG: Reviewed and X-Ray: CXR: X-Ray Chest 1 View (CXR): No results found for this visit on  06/11/20. and X-Ray Chest PA and Lateral (CXR):   Results for orders placed or performed during the hospital encounter of 06/11/20   X-Ray Chest PA And Lateral    Narrative    EXAMINATION:  XR CHEST PA AND LATERAL    CLINICAL HISTORY:  Chest Pain;    COMPARISON:  06/01/2020    FINDINGS:  Cardiac silhouette is normal. Aorta demonstrates atherosclerotic disease. The lungs demonstrate no evidence of active disease.  Emphysematous changes suspected within the upper lobes.  No evidence of pleural effusion or pneumothorax.  Bones appear intact.      Impression    No acute process seen.      Electronically signed by: Calvin Vasquez MD  Date:    06/11/2020  Time:    11:00

## 2020-06-12 NOTE — PROGRESS NOTES
Discharge instructions given to patient, verbalized understanding - IV removed without difficulty, pressure dressing applied to site - cardiac monitor removed and returned to monitor tech - patient to car via wheelchair without distress noted, wearing facemask.

## 2020-06-12 NOTE — PLAN OF CARE
Pt is a 59 year old female who presented with chief complaint of chest pain.  Patient was seen outpatient by Dr. Jarvis in cardiology and encouraged to admit to the hospital.  Pt suffered heart attack on 5/25/20.  Medical records indicate non-compliance with cardiac diet and ongoing tobacco abuse.  Pt lives at home with her sister and niece and states that they cannot help her consistently but do offer some support.  Pt is independent with ADLs at home and does not anticipate any CM needs at this time.  CM provided a transitional care folder, information on advanced directives, information on pharmacy bedside delivery, and discharge planning begins on admission with contact information for any needs/questions.    D/C plan: home        06/12/20 1139   Discharge Assessment   Assessment Type Discharge Planning Assessment   Confirmed/corrected address and phone number on facesheet? Yes   Assessment information obtained from? Patient;Medical Record   Expected Length of Stay (days)   (2)   Prior to hospitilization cognitive status: Alert/Oriented   Prior to hospitalization functional status: Independent   Current cognitive status: Alert/Oriented   Current Functional Status: Independent   Facility Arrived From: home    Lives With sibling(s)   Able to Return to Prior Arrangements yes   Is patient able to care for self after discharge? Yes   Who are your caregiver(s) and their phone number(s)? Radha Whitlock, sister: [phone]   Patient's perception of discharge disposition home or selfcare   Readmission Within the Last 30 Days no previous admission in last 30 days   Patient currently being followed by outpatient case management? No   Patient currently receives any other outside agency services? No   Equipment Currently Used at Home none   Do you have any problems affording any of your prescribed medications? No   Is the patient taking medications as prescribed? yes   Does the patient have transportation home? Yes    Transportation Anticipated family or friend will provide   Does the patient receive services at the Coumadin Clinic? No   Discharge Plan A Home with family   Discharge Plan B Other  (tbd)   DME Needed Upon Discharge  none   Patient/Family in Agreement with Plan yes

## 2020-06-12 NOTE — DISCHARGE SUMMARY
"[facility] Hospital Medicine  Discharge Summary      Patient Name: Iza Esquivel  MRN: [id number]  Admission Date: 6/11/2020  Hospital Length of Stay: 0 days  Discharge Date and Time:  06/12/2020 12:32 PM  Attending Physician: Erich Gleason MD   Discharging Provider: Erich Gleason MD  Primary Care Provider: Park Gomez MD      HPI:   Iza Esquivel is a 60 yo female with PMHx of STEMI 5/25/20, LCx stent, HTN, HLP, ongoing tobacco abuse, severe PVD bilaterally, SFA occlusion, who presented to the ER with chest pain. She was seen in Dr. Jarvis's office c/o chest pain after getting mad yesterday and experiencing chest pain with radiating down right arm. Associated s/s includes worse chest pain while lying down, SOB, gets exertional chest pain. In ER, Labs showed , neg troponin, glucose 111, Chest Xray no acute issues. ECG showed sinus nabil 54 with septal infarct. Cardiac monitor showed bradycardia in the 40's. She reports she was 1.5 ppd before her MI, now 10 cigarettes a day. Refused Nicotine patch. "I'm not going to eat that Cardiac diet." Cardiology has been consulted. SDM: Ninoska Hicks, sister [phone]. Observation for chest pain.       Procedure(s) (LRB):  CATHETERIZATION, HEART, LEFT (Left)      Hospital Course:   6/12/20: Patient found up out of bed standing; CP resolved; Trop 0.06>0.010>0.040>0.035; LHC: L CFX w/ stent, non obstructive OM, RCA & LAD; ECHO: EF 60-65%, gr 1 diastolic dysfunction; Encouraged to cut back on smoking (currently 8-10 cigs/day), which she promised to cut back to 8/d max for the next month; patient seen, examined, walking, eating, labs & imaging reviewed, and deemed appropriate for discharge to home, where she lives with her sister.        Consults:   Consults (From admission, onward)        Status Ordering Provider     Inpatient consult to Cardiology  Once     Provider:  Fermin Simms MD    Completed AISHA SHEARER    " "      Atherosclerotic PVD with intermittent claudication  -outpatient management: Dr. Jarvis  -continue ASA, brilenta, BB, statin  -Stop Smoking: refused Nicotine patch  6/12/20: Encouraged to cut back on smoking (currently 8-10 cigs/day, which she promised to cut back to 8/d max for the next month)      Bruit of right carotid artery  Outpatient management      Tobacco abuse  -Tobacco abuse  -smoking cessation counseling  -nicotine patch: REFUSED  -"Patient was counseled on smoking cessation for 10 minutes, RX for nicotine patch offered, patient refuses and states does not want to quit".    6/12/20: Encouraged to cut back on smoking (currently 8-10 cigs/day), which she promised to cut back to 8/d max for the next month.      Final Active Diagnoses:    Diagnosis Date Noted POA    Coronary artery disease [I25.10] 06/11/2020 Yes    Atherosclerotic PVD with intermittent claudication [I70.219] 05/25/2020 Yes    Mixed hyperlipidemia [E78.2] 08/21/2018 Yes     Chronic    Bruit of right carotid artery [R09.89] 08/21/2018 Yes    Tobacco abuse [Z72.0] 08/20/2018 Yes     Chronic      Problems Resolved During this Admission:    Diagnosis Date Noted Date Resolved POA    PRINCIPAL PROBLEM:  Chest pain [R07.9] 05/25/2020 06/12/2020 Yes       Discharged Condition: stable    Disposition:     Follow Up:    Patient Instructions:   No discharge procedures on file.    Significant Diagnostic Studies: Labs:   CMP   Recent Labs   Lab 06/11/20  1149 06/12/20  0617    142   K 3.7 3.7    108   CO2 26 21*   * 79   BUN 7* 7*   CREATININE 1.0 0.8   CALCIUM 9.5 8.6*   PROT 7.5 6.5   ALBUMIN 3.5 3.1*   BILITOT 0.3 0.2   ALKPHOS 149* 128   AST 14 13   ALT 10 9*   ANIONGAP 12 13   ESTGFRAFRICA >60 >60   EGFRNONAA >60 >60   , CBC   Recent Labs   Lab 06/11/20  1149 06/11/20  1533 06/12/20  0617   WBC 8.47 6.36 10.64   HGB 11.2* 8.8* 9.7*   HCT 35.5* 28.6* 32.2*   * 336 369*   , Troponin   Recent Labs   Lab 06/12/20  0610 "   TROPONINI 0.035*     All labs within the past 24 hours have been reviewed    Imaging Results          X-Ray Chest PA And Lateral (Final result)  Result time 06/11/20 11:00:06    Final result by Calvin Vasquez MD (06/11/20 11:00:06)                 Impression:      No acute process seen.      Electronically signed by: Calvin Vasquez MD  Date:    06/11/2020  Time:    11:00             Narrative:    EXAMINATION:  XR CHEST PA AND LATERAL    CLINICAL HISTORY:  Chest Pain;    COMPARISON:  06/01/2020    FINDINGS:  Cardiac silhouette is normal. Aorta demonstrates atherosclerotic disease. The lungs demonstrate no evidence of active disease.  Emphysematous changes suspected within the upper lobes.  No evidence of pleural effusion or pneumothorax.  Bones appear intact.                              CATHETERIZATION, HEART, LEFT   Conclusion        · LVEDP (Pre): 21  · Estimated blood loss: none  · Non-obstructive CAD.  · Diastolic dysfunction.  · Patent lcx stent.     I certify that I was present for catheter insertion, catheter manipulation, angiography, and angiographic interpretation of this patient.     Procedure Log documented by Documenter: Danae Salgado RN; Kylie Green RN and verified by Shabana Jarvis MD.     Date: 6/11/2020  Time: 6:51 PM        All imaging within the past 24 hours have been reviewed      Pending Diagnostic Studies:     None         Medications:  Reconciled Home Medications:      Medication List      ASK your doctor about these medications    aspirin 81 MG EC tablet  Commonly known as:  ECOTRIN  Take 1 tablet (81 mg total) by mouth once daily.     atorvastatin 80 MG tablet  Commonly known as:  LIPITOR  Take 1 tablet (80 mg total) by mouth once daily.     BRILINTA 90 mg tablet  Generic drug:  ticagrelor  Take 1 tablet (90 mg total) by mouth 2 (two) times daily.     losartan 25 MG tablet  Commonly known as:  COZAAR  Take 1 tablet (25 mg total) by mouth once daily.     metoprolol tartrate 25  MG tablet  Commonly known as:  LOPRESSOR  Take 1 tablet (25 mg total) by mouth 2 (two) times daily.          Indwelling Lines/Drains at time of discharge:   Lines/Drains/Airways     None                 Time spent on the discharge of patient: 41 minutes  Patient was seen and examined on the date of discharge and determined to be suitable for discharge.      Erich Gleason MD  Department of Hospital Medicine  [facility]

## 2020-06-17 ENCOUNTER — PATIENT OUTREACH (OUTPATIENT)
Dept: ADMINISTRATIVE | Facility: OTHER | Age: 60
End: 2020-06-17

## 2020-06-18 ENCOUNTER — OFFICE VISIT (OUTPATIENT)
Dept: CARDIOLOGY | Facility: CLINIC | Age: 60
End: 2020-06-18
Payer: COMMERCIAL

## 2020-06-18 VITALS
HEART RATE: 61 BPM | BODY MASS INDEX: 26.76 KG/M2 | SYSTOLIC BLOOD PRESSURE: 142 MMHG | OXYGEN SATURATION: 97 % | DIASTOLIC BLOOD PRESSURE: 80 MMHG | WEIGHT: 156.75 LBS | HEIGHT: 64 IN

## 2020-06-18 DIAGNOSIS — Z72.0 TOBACCO ABUSE: Chronic | ICD-10-CM

## 2020-06-18 DIAGNOSIS — R09.89 BRUIT OF RIGHT CAROTID ARTERY: ICD-10-CM

## 2020-06-18 DIAGNOSIS — I70.219 ATHEROSCLEROTIC PVD WITH INTERMITTENT CLAUDICATION: Primary | ICD-10-CM

## 2020-06-18 DIAGNOSIS — Z01.818 PRE-OP EVALUATION: ICD-10-CM

## 2020-06-18 DIAGNOSIS — E78.2 MIXED HYPERLIPIDEMIA: Chronic | ICD-10-CM

## 2020-06-18 DIAGNOSIS — R94.31 NONSPECIFIC ABNORMAL ELECTROCARDIOGRAM (ECG) (EKG): ICD-10-CM

## 2020-06-18 DIAGNOSIS — I25.10 CORONARY ARTERY DISEASE INVOLVING NATIVE CORONARY ARTERY OF NATIVE HEART WITHOUT ANGINA PECTORIS: ICD-10-CM

## 2020-06-18 PROBLEM — I25.2 HISTORY OF ST ELEVATION MYOCARDIAL INFARCTION (STEMI): Status: ACTIVE | Noted: 2020-05-25

## 2020-06-18 PROCEDURE — 99215 OFFICE O/P EST HI 40 MIN: CPT | Mod: S$GLB,,, | Performed by: PHYSICIAN ASSISTANT

## 2020-06-18 PROCEDURE — 99215 PR OFFICE/OUTPT VISIT, EST, LEVL V, 40-54 MIN: ICD-10-PCS | Mod: S$GLB,,, | Performed by: PHYSICIAN ASSISTANT

## 2020-06-18 PROCEDURE — 99999 PR PBB SHADOW E&M-EST. PATIENT-LVL III: ICD-10-PCS | Mod: PBBFAC,,, | Performed by: PHYSICIAN ASSISTANT

## 2020-06-18 PROCEDURE — 99999 PR PBB SHADOW E&M-EST. PATIENT-LVL III: CPT | Mod: PBBFAC,,, | Performed by: PHYSICIAN ASSISTANT

## 2020-06-18 RX ORDER — LOSARTAN POTASSIUM 25 MG/1
25 TABLET ORAL DAILY
Qty: 90 TABLET | Refills: 3 | Status: SHIPPED | OUTPATIENT
Start: 2020-06-18 | End: 2020-08-25 | Stop reason: SDUPTHER

## 2020-06-18 RX ORDER — ATORVASTATIN CALCIUM 80 MG/1
80 TABLET, FILM COATED ORAL DAILY
Qty: 90 TABLET | Refills: 3 | Status: SHIPPED | OUTPATIENT
Start: 2020-06-18 | End: 2021-05-31

## 2020-06-18 RX ORDER — ISOSORBIDE MONONITRATE 30 MG/1
30 TABLET, EXTENDED RELEASE ORAL 2 TIMES DAILY
Qty: 60 TABLET | Refills: 11 | Status: SHIPPED | OUTPATIENT
Start: 2020-06-18 | End: 2020-10-01 | Stop reason: SDUPTHER

## 2020-06-18 RX ORDER — AMLODIPINE BESYLATE 2.5 MG/1
2.5 TABLET ORAL DAILY
Qty: 30 TABLET | Refills: 11 | Status: CANCELLED | OUTPATIENT
Start: 2020-06-18 | End: 2021-06-18

## 2020-06-18 RX ORDER — PANTOPRAZOLE SODIUM 40 MG/1
40 TABLET, DELAYED RELEASE ORAL DAILY
Qty: 30 TABLET | Refills: 11 | Status: SHIPPED | OUTPATIENT
Start: 2020-06-18 | End: 2021-05-31

## 2020-06-18 RX ORDER — METOPROLOL TARTRATE 25 MG/1
25 TABLET, FILM COATED ORAL 2 TIMES DAILY
Qty: 60 TABLET | Refills: 11 | Status: SHIPPED | OUTPATIENT
Start: 2020-06-18 | End: 2021-05-31

## 2020-06-18 NOTE — PROGRESS NOTES
Subjective:    Patient ID:  Iza Esquivel is a 59 y.o. female who presents for follow-up of hospital follow-up      HPI  Ms. Esquivel is a 59 year old female patient whose current medical conditions include HTN, hyperlipidemia, carotid bruit, severe PAD, and CAD s/p recent STEMI on 5/25/20 with successful PCI of LCX who presents today for hospital follow-up. Patient recently hospitalized at [facility] with chest pain. Repeat LHC was performed which showed patent stent, non-obstructive disease elsewhere. Imdur was added to her medical regimen and she was subsequently discharged. She returns today and states she is doing well overall. Denies any quyen pain but does admit to some nocturnal bouts of chest tightness, notices it more when she lies down. Less severe than before she went to the hospital. Lasts approximately 15-20 minutes then spontaneously resolved. She also admits to some mild SOB at times, not really bothersome. Feels limited due to leg pain/claudication, R>L, also notices some right leg pain at night. Occasional dizziness. No near syncope, syncope, or falls. BP slightly above goal today in office. Patient reports compliance with her medications, is taking ASA and Brilinta. Still smoking, but has cut back to 8 cigarettes daily.    Review of Systems   Constitution: Negative for chills, decreased appetite, fever and malaise/fatigue.   HENT: Negative for congestion, hoarse voice and sore throat.    Eyes: Negative for blurred vision and discharge.   Cardiovascular: Positive for chest pain (chest tightness), claudication and dyspnea on exertion (nonbothersome). Negative for cyanosis, irregular heartbeat, leg swelling, near-syncope, orthopnea, palpitations and paroxysmal nocturnal dyspnea.   Respiratory: Negative for cough, hemoptysis, shortness of breath, snoring, sputum production and wheezing.    Endocrine: Negative for cold intolerance and heat intolerance.   Hematologic/Lymphatic: Negative for bleeding  "problem. Does not bruise/bleed easily.   Skin: Negative for rash.   Musculoskeletal: Negative for arthritis, back pain, joint pain, joint swelling, muscle cramps, muscle weakness and myalgias.   Gastrointestinal: Negative for abdominal pain, constipation, diarrhea, heartburn, melena and nausea.   Genitourinary: Negative for hematuria.   Neurological: Positive for dizziness (occasional). Negative for focal weakness, headaches, light-headedness, loss of balance, numbness, paresthesias, seizures and weakness.   Psychiatric/Behavioral: Negative for memory loss. The patient does not have insomnia.    Allergic/Immunologic: Negative for hives.     BP (!) 142/80 (BP Location: Left arm, Patient Position: Sitting, BP Method: Medium (Manual))   Pulse 61   Ht 5' 4" (1.626 m)   Wt 71.1 kg (156 lb 12 oz)   LMP  (LMP Unknown)   SpO2 97%   BMI 26.91 kg/m²     Past Medical History:   Diagnosis Date    Atherosclerotic PVD with intermittent claudication 5/25/2020    Cervical cancer     Hyperlipidemia     Hypertension     Right carotid bruit     S/P PTCA (percutaneous transluminal coronary angioplasty) 05/25/2020    STEMI: stenting of LCx     Past Surgical History:   Procedure Laterality Date    ABDOMINAL AORTOGRAPHY N/A 5/25/2020    Procedure: Aortogram, Abdominal;  Surgeon: Shelly Jarvis MD;  Location: [facility] CATH LAB;  Service: Cardiology;  Laterality: N/A;    LEFT HEART CATHETERIZATION Left 5/25/2020    Procedure: CATHETERIZATION, HEART, LEFT;  Surgeon: Shelly Jarvis MD;  Location: [facility] CATH LAB;  Service: Cardiology;  Laterality: Left;    LEFT HEART CATHETERIZATION Left 6/11/2020    Procedure: CATHETERIZATION, HEART, LEFT;  Surgeon: Shelly Jarvis MD;  Location: [facility] CATH LAB;  Service: Cardiology;  Laterality: Left;    LYMPH NODE BIOPSY      PERCUTANEOUS TRANSLUMINAL BALLOON ANGIOPLASTY OF CORONARY ARTERY  5/25/2020    Procedure: Angioplasty-coronary;  Surgeon: Shelly Jarvis MD;  Location: [facility] CATH LAB;  " Service: Cardiology;;     Social History     Socioeconomic History    Marital status: Single     Spouse name: Not on file    Number of children: Not on file    Years of education: Not on file    Highest education level: Not on file   Occupational History    Not on file   Social Needs    Financial resource strain: Somewhat hard    Food insecurity     Worry: Never true     Inability: Never true    Transportation needs     Medical: No     Non-medical: No   Tobacco Use    Smoking status: Current Every Day Smoker     Packs/day: 1.50     Years: 40.00     Pack years: 60.00    Smokeless tobacco: Never Used    Tobacco comment: now down to 10 cigarettes daily   Substance and Sexual Activity    Alcohol use: No     Frequency: Never     Binge frequency: Never    Drug use: Not on file    Sexual activity: Never   Lifestyle    Physical activity     Days per week: 5 days     Minutes per session: 150+ min    Stress: To some extent   Relationships    Social connections     Talks on phone: More than three times a week     Gets together: Once a week     Attends Rastafari service: Not on file     Active member of club or organization: No     Attends meetings of clubs or organizations: Never     Relationship status: Never    Other Topics Concern    Not on file   Social History Narrative    Lives with sister.  SDM: Ninoska Hicks, sister [phone].     Family History   Problem Relation Age of Onset    Cancer Mother         ?    Cancer Father         ?    Transient ischemic attack Father         Carotid artery stenosis, CEA    Diabetes Brother     Hypertension Brother      Review of patient's allergies indicates:  No Known Allergies     Medication List with Changes/Refills   Current Medications    ASPIRIN (ECOTRIN) 81 MG EC TABLET    Take 1 tablet (81 mg total) by mouth once daily.    ATORVASTATIN (LIPITOR) 80 MG TABLET    Take 1 tablet (80 mg total) by mouth once daily.    ISOSORBIDE MONONITRATE (IMDUR) 30  MG 24 HR TABLET    Take 1 tablet (30 mg total) by mouth once daily.    LOSARTAN (COZAAR) 25 MG TABLET    Take 1 tablet (25 mg total) by mouth once daily.    METOPROLOL TARTRATE (LOPRESSOR) 25 MG TABLET    Take 1 tablet (25 mg total) by mouth 2 (two) times daily.    TICAGRELOR (BRILINTA) 90 MG TABLET    Take 1 tablet (90 mg total) by mouth 2 (two) times daily.       Objective:    Physical Exam   Constitutional: She is oriented to person, place, and time. She appears well-developed and well-nourished. No distress.   HENT:   Head: Normocephalic and atraumatic.   Eyes: Pupils are equal, round, and reactive to light. Right eye exhibits no discharge. Left eye exhibits no discharge.   Neck: Neck supple. No JVD present.   Cardiovascular: Normal rate, regular rhythm, S1 normal, S2 normal and normal heart sounds. Exam reveals decreased pulses.   No murmur heard.  Pulmonary/Chest: Effort normal and breath sounds normal. No respiratory distress. She has no wheezes. She has no rales.   Abdominal: Soft. She exhibits no distension.   Musculoskeletal:         General: No edema.   Neurological: She is alert and oriented to person, place, and time.   Skin: Skin is warm and dry. She is not diaphoretic. No erythema.   Left radial access site C/D/I; no bleeding erythema or drainage; moderate bruising   Psychiatric: She has a normal mood and affect. Her behavior is normal.   Nursing note and vitals reviewed.    Cath Results  · LVEDP (Pre): 21  · Estimated blood loss: none  · Non-obstructive CAD.  · Diastolic dysfunction.  · Patent lcx stent.     GONZÁLEZ and arterial U/S Results  FINDINGS:  Right lower extremity arterial velocities are as follows (cm/sec).  Monophasic waveforms demonstrated throughout the right lower extremity.     CFA: 132     DFA: 98     Proximal SFA: 92     Mid SFA: 240     Distal SFA: 52     Popliteal: 32     PTA: 33     BALDOMERO: 27     Left lower extremity arterial velocities are as follows (cm/sec). Monophasic waveforms  demonstrated throughout the left lower extremity.     CFA: 367     Proximal SFA: 138     Mid SFA: 119     Distal SFA: 77     Popliteal: 57     PTA: 45     BALDOMERO: 56     ABIs:     Right posterior tibialis: 0.77 compatible with peripheral vascular disease     Right dorsalis pedis: 0.65 compatible with peripheral vascular disease     Left posterior tibialis: 0.65 compatible with peripheral vascular disease     Left dorsalis pedis 0.58 compatible with intermittent claudication     Impression:     Monophasic waveforms throughout both lower extremities concerning for bilateral upstream stenosis.  More focal velocity elevations involving the right mid SFA and left CFA concerning for additional focal stenoses.     Bilateral decreased ABIs as above.  Assessment:       1. Atherosclerotic PVD with intermittent claudication    2. Bruit of right carotid artery    3. Coronary artery disease involving native coronary artery of native heart without angina pectoris    4. Nonspecific abnormal electrocardiogram (ECG) (EKG)    5. Mixed hyperlipidemia    6. Tobacco abuse      Patient presents for f/u. Doing well, still having intermittent chest tightness but improved. Increase Imdur to 30 mg BID. Start Protonix 40 mg daily. Continue other CV meds. Complains of limiting claudication, right > left. Significant disease by BLE arterial U/S and GONZÁLEZ, needs peripheral angiogram/intervention.   Plan:   -Increase Imdur to 30 mg BID  -Add Protonix 40 mg daily  -Continue other CV meds, counseled on importance of DAPT  -Smoking cessation  -Peripheral angiogram by Dr. Jarvis. All risks, benefits, and treatment alternatives explained to patient in detail. All questions answered. She has agreed to proceed.  -CMP, CMP, PTT, INR, COVID test 6/26/2020

## 2020-06-24 ENCOUNTER — NURSE TRIAGE (OUTPATIENT)
Dept: ADMINISTRATIVE | Facility: CLINIC | Age: 60
End: 2020-06-24

## 2020-06-26 ENCOUNTER — LAB VISIT (OUTPATIENT)
Dept: LAB | Facility: HOSPITAL | Age: 60
End: 2020-06-26
Attending: OPHTHALMOLOGY
Payer: COMMERCIAL

## 2020-06-26 DIAGNOSIS — R94.31 NONSPECIFIC ABNORMAL ELECTROCARDIOGRAM (ECG) (EKG): ICD-10-CM

## 2020-06-26 DIAGNOSIS — R09.89 BRUIT OF RIGHT CAROTID ARTERY: ICD-10-CM

## 2020-06-26 DIAGNOSIS — I25.10 CORONARY ARTERY DISEASE INVOLVING NATIVE CORONARY ARTERY OF NATIVE HEART WITHOUT ANGINA PECTORIS: ICD-10-CM

## 2020-06-26 DIAGNOSIS — E78.2 MIXED HYPERLIPIDEMIA: Chronic | ICD-10-CM

## 2020-06-26 DIAGNOSIS — Z01.818 PRE-OP EVALUATION: ICD-10-CM

## 2020-06-26 DIAGNOSIS — Z72.0 TOBACCO ABUSE: Chronic | ICD-10-CM

## 2020-06-26 DIAGNOSIS — I70.219 ATHEROSCLEROTIC PVD WITH INTERMITTENT CLAUDICATION: ICD-10-CM

## 2020-06-26 LAB
ALBUMIN SERPL BCP-MCNC: 3.5 G/DL (ref 3.5–5.2)
ALP SERPL-CCNC: 154 U/L (ref 55–135)
ALT SERPL W/O P-5'-P-CCNC: 14 U/L (ref 10–44)
ANION GAP SERPL CALC-SCNC: 9 MMOL/L (ref 8–16)
APTT BLDCRRT: 27.6 SEC (ref 21–32)
AST SERPL-CCNC: 22 U/L (ref 10–40)
BASOPHILS # BLD AUTO: 0.18 K/UL (ref 0–0.2)
BASOPHILS NFR BLD: 1.8 % (ref 0–1.9)
BILIRUB SERPL-MCNC: 0.4 MG/DL (ref 0.1–1)
BUN SERPL-MCNC: 9 MG/DL (ref 8–23)
CALCIUM SERPL-MCNC: 9.6 MG/DL (ref 8.7–10.5)
CHLORIDE SERPL-SCNC: 104 MMOL/L (ref 95–110)
CO2 SERPL-SCNC: 25 MMOL/L (ref 23–29)
CREAT SERPL-MCNC: 0.9 MG/DL (ref 0.5–1.4)
DIFFERENTIAL METHOD: ABNORMAL
EOSINOPHIL # BLD AUTO: 0.3 K/UL (ref 0–0.5)
EOSINOPHIL NFR BLD: 3.3 % (ref 0–8)
ERYTHROCYTE [DISTWIDTH] IN BLOOD BY AUTOMATED COUNT: 14 % (ref 11.5–14.5)
EST. GFR  (AFRICAN AMERICAN): >60 ML/MIN/1.73 M^2
EST. GFR  (NON AFRICAN AMERICAN): >60 ML/MIN/1.73 M^2
GLUCOSE SERPL-MCNC: 102 MG/DL (ref 70–110)
HCT VFR BLD AUTO: 36.1 % (ref 37–48.5)
HGB BLD-MCNC: 11.2 G/DL (ref 12–16)
IMM GRANULOCYTES # BLD AUTO: 0.05 K/UL (ref 0–0.04)
IMM GRANULOCYTES NFR BLD AUTO: 0.5 % (ref 0–0.5)
INR PPP: 1.1 (ref 0.8–1.2)
LYMPHOCYTES # BLD AUTO: 2.4 K/UL (ref 1–4.8)
LYMPHOCYTES NFR BLD: 24.4 % (ref 18–48)
MCH RBC QN AUTO: 28.6 PG (ref 27–31)
MCHC RBC AUTO-ENTMCNC: 31 G/DL (ref 32–36)
MCV RBC AUTO: 92 FL (ref 82–98)
MONOCYTES # BLD AUTO: 0.5 K/UL (ref 0.3–1)
MONOCYTES NFR BLD: 5.3 % (ref 4–15)
NEUTROPHILS # BLD AUTO: 6.5 K/UL (ref 1.8–7.7)
NEUTROPHILS NFR BLD: 64.7 % (ref 38–73)
NRBC BLD-RTO: 0 /100 WBC
PLATELET # BLD AUTO: 355 K/UL (ref 150–350)
PMV BLD AUTO: 11.1 FL (ref 9.2–12.9)
POTASSIUM SERPL-SCNC: 3.6 MMOL/L (ref 3.5–5.1)
PROT SERPL-MCNC: 7.4 G/DL (ref 6–8.4)
PROTHROMBIN TIME: 10.9 SEC (ref 9–12.5)
RBC # BLD AUTO: 3.92 M/UL (ref 4–5.4)
SODIUM SERPL-SCNC: 138 MMOL/L (ref 136–145)
WBC # BLD AUTO: 10.01 K/UL (ref 3.9–12.7)

## 2020-06-26 PROCEDURE — 80053 COMPREHEN METABOLIC PANEL: CPT

## 2020-06-26 PROCEDURE — 85610 PROTHROMBIN TIME: CPT

## 2020-06-26 PROCEDURE — 85025 COMPLETE CBC W/AUTO DIFF WBC: CPT

## 2020-06-26 PROCEDURE — 85730 THROMBOPLASTIN TIME PARTIAL: CPT

## 2020-06-26 PROCEDURE — 36415 COLL VENOUS BLD VENIPUNCTURE: CPT

## 2020-06-29 ENCOUNTER — TELEPHONE (OUTPATIENT)
Dept: CARDIOLOGY | Facility: CLINIC | Age: 60
End: 2020-06-29

## 2020-06-30 ENCOUNTER — HOSPITAL ENCOUNTER (OUTPATIENT)
Facility: HOSPITAL | Age: 60
Discharge: HOME | End: 2020-06-30
Attending: INTERNAL MEDICINE | Admitting: INTERNAL MEDICINE
Payer: COMMERCIAL

## 2020-06-30 VITALS
HEIGHT: 64 IN | BODY MASS INDEX: 26.63 KG/M2 | TEMPERATURE: 98 F | WEIGHT: 156 LBS | DIASTOLIC BLOOD PRESSURE: 52 MMHG | RESPIRATION RATE: 11 BRPM | OXYGEN SATURATION: 98 % | HEART RATE: 49 BPM | SYSTOLIC BLOOD PRESSURE: 133 MMHG

## 2020-06-30 DIAGNOSIS — I70.219 ATHEROSCLEROTIC PVD WITH INTERMITTENT CLAUDICATION: ICD-10-CM

## 2020-06-30 LAB — SARS-COV-2 RDRP RESP QL NAA+PROBE: NEGATIVE

## 2020-06-30 PROCEDURE — C1894 INTRO/SHEATH, NON-LASER: HCPCS | Performed by: INTERNAL MEDICINE

## 2020-06-30 PROCEDURE — 63600175 PHARM REV CODE 636 W HCPCS: Performed by: INTERNAL MEDICINE

## 2020-06-30 PROCEDURE — 75716 ARTERY X-RAYS ARMS/LEGS: CPT | Performed by: INTERNAL MEDICINE

## 2020-06-30 PROCEDURE — 75716 ARTERY X-RAYS ARMS/LEGS: CPT | Mod: 26,,, | Performed by: INTERNAL MEDICINE

## 2020-06-30 PROCEDURE — 99153 MOD SED SAME PHYS/QHP EA: CPT | Performed by: INTERNAL MEDICINE

## 2020-06-30 PROCEDURE — 36246 PR INS CATH ABD/L-EXT ART 2ND ORDER: ICD-10-PCS | Mod: RT,,, | Performed by: INTERNAL MEDICINE

## 2020-06-30 PROCEDURE — 75625 CONTRAST EXAM ABDOMINL AORTA: CPT | Performed by: INTERNAL MEDICINE

## 2020-06-30 PROCEDURE — 36246 INS CATH ABD/L-EXT ART 2ND: CPT | Mod: RT | Performed by: INTERNAL MEDICINE

## 2020-06-30 PROCEDURE — C1769 GUIDE WIRE: HCPCS | Performed by: INTERNAL MEDICINE

## 2020-06-30 PROCEDURE — 36246 INS CATH ABD/L-EXT ART 2ND: CPT | Mod: RT,,, | Performed by: INTERNAL MEDICINE

## 2020-06-30 PROCEDURE — 99152 MOD SED SAME PHYS/QHP 5/>YRS: CPT | Performed by: INTERNAL MEDICINE

## 2020-06-30 PROCEDURE — 75716 PR  ANGIO EXTERMITY BILAT: ICD-10-PCS | Mod: 26,,, | Performed by: INTERNAL MEDICINE

## 2020-06-30 PROCEDURE — 99152 PR MOD CONSCIOUS SEDATION, SAME PHYS, 5+ YRS, FIRST 15 MIN: ICD-10-PCS | Mod: ,,, | Performed by: INTERNAL MEDICINE

## 2020-06-30 PROCEDURE — 25500020 PHARM REV CODE 255: Performed by: INTERNAL MEDICINE

## 2020-06-30 PROCEDURE — U0002 COVID-19 LAB TEST NON-CDC: HCPCS

## 2020-06-30 PROCEDURE — 75625 PR  ANGIO AORTOGRAM ABD SERIAL: ICD-10-PCS | Mod: 26,,, | Performed by: INTERNAL MEDICINE

## 2020-06-30 PROCEDURE — 75625 CONTRAST EXAM ABDOMINL AORTA: CPT | Mod: 26,,, | Performed by: INTERNAL MEDICINE

## 2020-06-30 PROCEDURE — 99152 MOD SED SAME PHYS/QHP 5/>YRS: CPT | Mod: ,,, | Performed by: INTERNAL MEDICINE

## 2020-06-30 PROCEDURE — 27201423 OPTIME MED/SURG SUP & DEVICES STERILE SUPPLY: Performed by: INTERNAL MEDICINE

## 2020-06-30 PROCEDURE — 25000003 PHARM REV CODE 250: Performed by: INTERNAL MEDICINE

## 2020-06-30 RX ORDER — VERAPAMIL HYDROCHLORIDE 2.5 MG/ML
INJECTION, SOLUTION INTRAVENOUS
Status: DISCONTINUED | OUTPATIENT
Start: 2020-06-30 | End: 2020-06-30 | Stop reason: HOSPADM

## 2020-06-30 RX ORDER — SODIUM CHLORIDE 9 MG/ML
INJECTION, SOLUTION INTRAVENOUS
Status: DISCONTINUED | OUTPATIENT
Start: 2020-06-30 | End: 2020-06-30 | Stop reason: HOSPADM

## 2020-06-30 RX ORDER — DIAZEPAM 5 MG/1
5 TABLET ORAL
Status: DISCONTINUED | OUTPATIENT
Start: 2020-06-30 | End: 2020-06-30 | Stop reason: HOSPADM

## 2020-06-30 RX ORDER — SODIUM CHLORIDE 9 MG/ML
INJECTION, SOLUTION INTRAVENOUS CONTINUOUS
Status: DISCONTINUED | OUTPATIENT
Start: 2020-06-30 | End: 2020-06-30 | Stop reason: HOSPADM

## 2020-06-30 RX ORDER — HEPARIN SODIUM 1000 [USP'U]/ML
INJECTION INTRAVENOUS; SUBCUTANEOUS
Status: DISCONTINUED | OUTPATIENT
Start: 2020-06-30 | End: 2020-06-30 | Stop reason: HOSPADM

## 2020-06-30 RX ORDER — MIDAZOLAM HYDROCHLORIDE 1 MG/ML
INJECTION, SOLUTION INTRAMUSCULAR; INTRAVENOUS
Status: DISCONTINUED | OUTPATIENT
Start: 2020-06-30 | End: 2020-06-30 | Stop reason: HOSPADM

## 2020-06-30 RX ORDER — NAPROXEN SODIUM 220 MG/1
81 TABLET, FILM COATED ORAL ONCE
Status: DISCONTINUED | OUTPATIENT
Start: 2020-06-30 | End: 2020-06-30 | Stop reason: HOSPADM

## 2020-06-30 RX ORDER — FENTANYL CITRATE 50 UG/ML
INJECTION, SOLUTION INTRAMUSCULAR; INTRAVENOUS
Status: DISCONTINUED | OUTPATIENT
Start: 2020-06-30 | End: 2020-06-30 | Stop reason: HOSPADM

## 2020-06-30 RX ORDER — PROMETHAZINE HYDROCHLORIDE 25 MG/1
TABLET ORAL
Status: DISCONTINUED | OUTPATIENT
Start: 2020-06-30 | End: 2020-06-30 | Stop reason: HOSPADM

## 2020-06-30 RX ORDER — DIPHENHYDRAMINE HCL 50 MG
50 CAPSULE ORAL ONCE
Status: COMPLETED | OUTPATIENT
Start: 2020-06-30 | End: 2020-06-30

## 2020-06-30 RX ORDER — NITROGLYCERIN 5 MG/ML
INJECTION, SOLUTION INTRAVENOUS
Status: DISCONTINUED | OUTPATIENT
Start: 2020-06-30 | End: 2020-06-30 | Stop reason: HOSPADM

## 2020-06-30 RX ORDER — LIDOCAINE HYDROCHLORIDE 20 MG/ML
INJECTION, SOLUTION EPIDURAL; INFILTRATION; INTRACAUDAL; PERINEURAL
Status: DISCONTINUED | OUTPATIENT
Start: 2020-06-30 | End: 2020-06-30 | Stop reason: HOSPADM

## 2020-06-30 RX ADMIN — SODIUM CHLORIDE: 0.9 INJECTION, SOLUTION INTRAVENOUS at 10:06

## 2020-06-30 RX ADMIN — DIAZEPAM 5 MG: 5 TABLET ORAL at 10:06

## 2020-06-30 RX ADMIN — DIPHENHYDRAMINE HYDROCHLORIDE 50 MG: 50 CAPSULE ORAL at 10:06

## 2020-06-30 NOTE — BRIEF OP NOTE
Discharge Note  Short Stay      SUMMARY     Admit Date: 6/30/2020    Attending Physician: Shelly Jarvis MD     Discharge Physician: Shabana Jarvis MD     Discharge Date: 6/30/2020    Final Diagnosis: pvd with caludication    Outcome of Stay:patient tolerated procedure well has no complications she has severe iliac disease bilateral and sfa occlusion bilateral left pta significant stenosis with 43 vessel run off bilaterally.seh will benefit from bilateral inflow procedure with hybrid approach will discuss with vascular surgery. She was counseled about smoking cessation.will follow up in clinic for further plans.    Disposition: Home or Self Care    Patient Instructions:   Current Discharge Medication List      CONTINUE these medications which have NOT CHANGED    Details   aspirin (ECOTRIN) 81 MG EC tablet Take 1 tablet (81 mg total) by mouth once daily.  Qty: 30 tablet, Refills: 0    Associated Diagnoses: Nonspecific abnormal electrocardiogram (ECG) (EKG); Tobacco abuse      atorvastatin (LIPITOR) 80 MG tablet Take 1 tablet (80 mg total) by mouth once daily.  Qty: 90 tablet, Refills: 3    Associated Diagnoses: Atherosclerotic PVD with intermittent claudication; Coronary artery disease involving native coronary artery of native heart without angina pectoris; Nonspecific abnormal electrocardiogram (ECG) (EKG); Mixed hyperlipidemia      isosorbide mononitrate (IMDUR) 30 MG 24 hr tablet Take 1 tablet (30 mg total) by mouth 2 (two) times a day.  Qty: 60 tablet, Refills: 11    Associated Diagnoses: Coronary artery disease involving native coronary artery of native heart without angina pectoris      losartan (COZAAR) 25 MG tablet Take 1 tablet (25 mg total) by mouth once daily.  Qty: 90 tablet, Refills: 3    Comments: .  Associated Diagnoses: Atherosclerotic PVD with intermittent claudication; Coronary artery disease involving native coronary artery of native heart without angina pectoris; Nonspecific abnormal  electrocardiogram (ECG) (EKG); Mixed hyperlipidemia      metoprolol tartrate (LOPRESSOR) 25 MG tablet Take 1 tablet (25 mg total) by mouth 2 (two) times daily.  Qty: 60 tablet, Refills: 11    Comments: .  Associated Diagnoses: Atherosclerotic PVD with intermittent claudication; Coronary artery disease involving native coronary artery of native heart without angina pectoris; Nonspecific abnormal electrocardiogram (ECG) (EKG); Mixed hyperlipidemia      pantoprazole (PROTONIX) 40 MG tablet Take 1 tablet (40 mg total) by mouth once daily.  Qty: 30 tablet, Refills: 11    Associated Diagnoses: Atherosclerotic PVD with intermittent claudication; Bruit of right carotid artery; Coronary artery disease involving native coronary artery of native heart without angina pectoris; Nonspecific abnormal electrocardiogram (ECG) (EKG); Mixed hyperlipidemia; Tobacco abuse; Pre-op evaluation      ticagrelor (BRILINTA) 90 mg tablet Take 1 tablet (90 mg total) by mouth 2 (two) times daily.  Qty: 60 tablet, Refills: 11    Associated Diagnoses: Atherosclerotic PVD with intermittent claudication; Coronary artery disease involving native coronary artery of native heart without angina pectoris; Nonspecific abnormal electrocardiogram (ECG) (EKG); Mixed hyperlipidemia             Discharge Procedure Orders (must include Diet, Follow-up, Activity)   Discharge Procedure Orders (must include Diet, Follow-up, Activity)   Diet general     Call MD for:  temperature >100.4     Call MD for:  persistent nausea and vomiting     Call MD for:  severe uncontrolled pain     Call MD for:  difficulty breathing, headache or visual disturbances     Call MD for:  redness, tenderness, or signs of infection (pain, swelling, redness, odor or green/yellow discharge around incision site)     Call MD for:  hives     Call MD for:  persistent dizziness or light-headedness     Call MD for:  extreme fatigue     Follow-up Information     Shabana Jarvis MD In 2 weeks.     Specialty: Cardiology  Contact information:  [address] [place] [id number] [phone]

## 2020-06-30 NOTE — Clinical Note
The DP pulses are 2+ bilaterally. The PT pulses are 1+ bilaterally. The radial pulses are +1 bilaterally.

## 2020-06-30 NOTE — Clinical Note
Angiography of the suprarenal and abdominal aorta performed with the catheter   power injection 70 mL contrast at 6 mL/s.

## 2020-06-30 NOTE — NURSING
Pt given discharge instructions; reviewed; questions answered; understanding verbalized; pt discharged home via wheelchair accompanied by sister, [place]; pt able to ambulate without difficulty from wheelchair to vehicle; QUIRINO; CARLTON

## 2020-07-01 ENCOUNTER — CLINICAL SUPPORT (OUTPATIENT)
Dept: CARDIOLOGY | Facility: CLINIC | Age: 60
End: 2020-07-01
Payer: COMMERCIAL

## 2020-07-01 VITALS
DIASTOLIC BLOOD PRESSURE: 70 MMHG | SYSTOLIC BLOOD PRESSURE: 138 MMHG | OXYGEN SATURATION: 98 % | HEART RATE: 81 BPM | WEIGHT: 153.44 LBS | BODY MASS INDEX: 26.2 KG/M2 | HEIGHT: 64 IN

## 2020-07-01 PROCEDURE — 99999 PR PBB SHADOW E&M-EST. PATIENT-LVL III: CPT | Mod: PBBFAC,,,

## 2020-07-01 PROCEDURE — 99999 PR PBB SHADOW E&M-EST. PATIENT-LVL III: ICD-10-PCS | Mod: PBBFAC,,,

## 2020-07-17 ENCOUNTER — PATIENT OUTREACH (OUTPATIENT)
Dept: ADMINISTRATIVE | Facility: OTHER | Age: 60
End: 2020-07-17

## 2020-07-18 NOTE — PROGRESS NOTES
Chart reviewed.   Immunizations: Triggered Imm Registry     Orders placed: n/a  Upcoming appts to satisfy TOMMY topics: n/a

## 2020-07-20 ENCOUNTER — OFFICE VISIT (OUTPATIENT)
Dept: CARDIOLOGY | Facility: CLINIC | Age: 60
End: 2020-07-20
Payer: COMMERCIAL

## 2020-07-20 ENCOUNTER — LAB VISIT (OUTPATIENT)
Dept: LAB | Facility: HOSPITAL | Age: 60
End: 2020-07-20
Attending: INTERNAL MEDICINE
Payer: COMMERCIAL

## 2020-07-20 VITALS
SYSTOLIC BLOOD PRESSURE: 160 MMHG | BODY MASS INDEX: 27.04 KG/M2 | WEIGHT: 157.5 LBS | OXYGEN SATURATION: 98 % | DIASTOLIC BLOOD PRESSURE: 78 MMHG | HEART RATE: 64 BPM

## 2020-07-20 DIAGNOSIS — R94.31 NONSPECIFIC ABNORMAL ELECTROCARDIOGRAM (ECG) (EKG): ICD-10-CM

## 2020-07-20 DIAGNOSIS — I70.219 ATHEROSCLEROTIC PVD WITH INTERMITTENT CLAUDICATION: Primary | ICD-10-CM

## 2020-07-20 DIAGNOSIS — R09.89 BRUIT OF RIGHT CAROTID ARTERY: ICD-10-CM

## 2020-07-20 DIAGNOSIS — I25.10 CORONARY ARTERY DISEASE INVOLVING NATIVE CORONARY ARTERY OF NATIVE HEART WITHOUT ANGINA PECTORIS: ICD-10-CM

## 2020-07-20 DIAGNOSIS — Z01.818 PRE-OP EVALUATION: ICD-10-CM

## 2020-07-20 DIAGNOSIS — Z72.0 TOBACCO ABUSE: Chronic | ICD-10-CM

## 2020-07-20 DIAGNOSIS — I25.2 HISTORY OF ST ELEVATION MYOCARDIAL INFARCTION (STEMI): ICD-10-CM

## 2020-07-20 DIAGNOSIS — I70.219 ATHEROSCLEROTIC PVD WITH INTERMITTENT CLAUDICATION: ICD-10-CM

## 2020-07-20 DIAGNOSIS — E78.2 MIXED HYPERLIPIDEMIA: Chronic | ICD-10-CM

## 2020-07-20 LAB
ANION GAP SERPL CALC-SCNC: 9 MMOL/L (ref 8–16)
APTT BLDCRRT: 28.6 SEC (ref 21–32)
BASOPHILS # BLD AUTO: 0.1 K/UL (ref 0–0.2)
BASOPHILS NFR BLD: 1.2 % (ref 0–1.9)
BUN SERPL-MCNC: 9 MG/DL (ref 8–23)
CALCIUM SERPL-MCNC: 9.4 MG/DL (ref 8.7–10.5)
CHLORIDE SERPL-SCNC: 105 MMOL/L (ref 95–110)
CO2 SERPL-SCNC: 28 MMOL/L (ref 23–29)
CREAT SERPL-MCNC: 1 MG/DL (ref 0.5–1.4)
DIFFERENTIAL METHOD: ABNORMAL
EOSINOPHIL # BLD AUTO: 0.3 K/UL (ref 0–0.5)
EOSINOPHIL NFR BLD: 3.6 % (ref 0–8)
ERYTHROCYTE [DISTWIDTH] IN BLOOD BY AUTOMATED COUNT: 14.2 % (ref 11.5–14.5)
EST. GFR  (AFRICAN AMERICAN): >60 ML/MIN/1.73 M^2
EST. GFR  (NON AFRICAN AMERICAN): >60 ML/MIN/1.73 M^2
GLUCOSE SERPL-MCNC: 90 MG/DL (ref 70–110)
HCT VFR BLD AUTO: 35 % (ref 37–48.5)
HGB BLD-MCNC: 10.9 G/DL (ref 12–16)
IMM GRANULOCYTES # BLD AUTO: 0.03 K/UL (ref 0–0.04)
IMM GRANULOCYTES NFR BLD AUTO: 0.3 % (ref 0–0.5)
INR PPP: 1 (ref 0.8–1.2)
LYMPHOCYTES # BLD AUTO: 2.3 K/UL (ref 1–4.8)
LYMPHOCYTES NFR BLD: 26.3 % (ref 18–48)
MCH RBC QN AUTO: 28.1 PG (ref 27–31)
MCHC RBC AUTO-ENTMCNC: 31.1 G/DL (ref 32–36)
MCV RBC AUTO: 90 FL (ref 82–98)
MONOCYTES # BLD AUTO: 0.7 K/UL (ref 0.3–1)
MONOCYTES NFR BLD: 8.5 % (ref 4–15)
NEUTROPHILS # BLD AUTO: 5.2 K/UL (ref 1.8–7.7)
NEUTROPHILS NFR BLD: 60.1 % (ref 38–73)
NRBC BLD-RTO: 0 /100 WBC
PLATELET # BLD AUTO: 305 K/UL (ref 150–350)
PMV BLD AUTO: 9.8 FL (ref 9.2–12.9)
POTASSIUM SERPL-SCNC: 4.4 MMOL/L (ref 3.5–5.1)
PROTHROMBIN TIME: 10.5 SEC (ref 9–12.5)
RBC # BLD AUTO: 3.88 M/UL (ref 4–5.4)
SODIUM SERPL-SCNC: 142 MMOL/L (ref 136–145)
WBC # BLD AUTO: 8.6 K/UL (ref 3.9–12.7)

## 2020-07-20 PROCEDURE — 99999 PR PBB SHADOW E&M-EST. PATIENT-LVL III: ICD-10-PCS | Mod: PBBFAC,,, | Performed by: INTERNAL MEDICINE

## 2020-07-20 PROCEDURE — 99999 PR PBB SHADOW E&M-EST. PATIENT-LVL III: CPT | Mod: PBBFAC,,, | Performed by: INTERNAL MEDICINE

## 2020-07-20 PROCEDURE — 99214 PR OFFICE/OUTPT VISIT, EST, LEVL IV, 30-39 MIN: ICD-10-PCS | Mod: S$GLB,,, | Performed by: INTERNAL MEDICINE

## 2020-07-20 PROCEDURE — 85025 COMPLETE CBC W/AUTO DIFF WBC: CPT

## 2020-07-20 PROCEDURE — 85610 PROTHROMBIN TIME: CPT

## 2020-07-20 PROCEDURE — 99214 OFFICE O/P EST MOD 30 MIN: CPT | Mod: S$GLB,,, | Performed by: INTERNAL MEDICINE

## 2020-07-20 PROCEDURE — 80048 BASIC METABOLIC PNL TOTAL CA: CPT

## 2020-07-20 PROCEDURE — 36415 COLL VENOUS BLD VENIPUNCTURE: CPT

## 2020-07-20 PROCEDURE — 85730 THROMBOPLASTIN TIME PARTIAL: CPT

## 2020-07-20 NOTE — H&P (VIEW-ONLY)
Subjective:   Patient ID:  Iza Esquivel is a 59 y.o. female who presents for follow up of Follow-up      HPI  A 60 yo female with h/o cad s/p lcx stent p[vd with claudication htn hlp is here for f/u. Has stopped smoking 11 days ago. sjhe gest at times a feeling  Of wanting to take deep breath. Has no chest pain has no new symptom sof heart burn. She is compliant with diet meds and salt intake.   Past Medical History:   Diagnosis Date    Atherosclerotic PVD with intermittent claudication 5/25/2020    Cervical cancer     Hyperlipidemia     Hypertension     Right carotid bruit     S/P PTCA (percutaneous transluminal coronary angioplasty) 05/25/2020    STEMI: stenting of LCx       Past Surgical History:   Procedure Laterality Date    ABDOMINAL AORTOGRAPHY N/A 5/25/2020    Procedure: Aortogram, Abdominal;  Surgeon: Shelly Jarvis MD;  Location: [facility] CATH LAB;  Service: Cardiology;  Laterality: N/A;    AORTOGRAPHY WITH SERIALOGRAPHY N/A 6/30/2020    Procedure: AORTOGRAM, WITH RUNOFF;  Surgeon: Shelly Jarvis MD;  Location: [facility] CATH LAB;  Service: Cardiology;  Laterality: N/A;    LEFT HEART CATHETERIZATION Left 5/25/2020    Procedure: CATHETERIZATION, HEART, LEFT;  Surgeon: Shelly Jarvis MD;  Location: [facility] CATH LAB;  Service: Cardiology;  Laterality: Left;    LEFT HEART CATHETERIZATION Left 6/11/2020    Procedure: CATHETERIZATION, HEART, LEFT;  Surgeon: Shelly Jarvis MD;  Location: [facility] CATH LAB;  Service: Cardiology;  Laterality: Left;    LYMPH NODE BIOPSY      PERCUTANEOUS TRANSLUMINAL BALLOON ANGIOPLASTY OF CORONARY ARTERY  5/25/2020    Procedure: Angioplasty-coronary;  Surgeon: Shelly Jarvis MD;  Location: [facility] CATH LAB;  Service: Cardiology;;       Social History     Tobacco Use    Smoking status: Current Every Day Smoker     Packs/day: 1.50     Years: 40.00     Pack years: 60.00    Smokeless tobacco: Never Used    Tobacco comment: now down to 10 cigarettes daily   Substance Use Topics     Alcohol use: No     Frequency: Never     Binge frequency: Never    Drug use: Not on file       Family History   Problem Relation Age of Onset    Cancer Mother         ?    Cancer Father         ?    Transient ischemic attack Father         Carotid artery stenosis, CEA    Diabetes Brother     Hypertension Brother        Current Outpatient Medications   Medication Sig    atorvastatin (LIPITOR) 80 MG tablet Take 1 tablet (80 mg total) by mouth once daily.    isosorbide mononitrate (IMDUR) 30 MG 24 hr tablet Take 1 tablet (30 mg total) by mouth 2 (two) times a day.    losartan (COZAAR) 25 MG tablet Take 1 tablet (25 mg total) by mouth once daily.    metoprolol tartrate (LOPRESSOR) 25 MG tablet Take 1 tablet (25 mg total) by mouth 2 (two) times daily.    pantoprazole (PROTONIX) 40 MG tablet Take 1 tablet (40 mg total) by mouth once daily.    ticagrelor (BRILINTA) 90 mg tablet Take 1 tablet (90 mg total) by mouth 2 (two) times daily.    aspirin (ECOTRIN) 81 MG EC tablet Take 1 tablet (81 mg total) by mouth once daily.     No current facility-administered medications for this visit.      Current Outpatient Medications on File Prior to Visit   Medication Sig    atorvastatin (LIPITOR) 80 MG tablet Take 1 tablet (80 mg total) by mouth once daily.    isosorbide mononitrate (IMDUR) 30 MG 24 hr tablet Take 1 tablet (30 mg total) by mouth 2 (two) times a day.    losartan (COZAAR) 25 MG tablet Take 1 tablet (25 mg total) by mouth once daily.    metoprolol tartrate (LOPRESSOR) 25 MG tablet Take 1 tablet (25 mg total) by mouth 2 (two) times daily.    pantoprazole (PROTONIX) 40 MG tablet Take 1 tablet (40 mg total) by mouth once daily.    ticagrelor (BRILINTA) 90 mg tablet Take 1 tablet (90 mg total) by mouth 2 (two) times daily.    aspirin (ECOTRIN) 81 MG EC tablet Take 1 tablet (81 mg total) by mouth once daily.     No current facility-administered medications on file prior to visit.      Review of patient's  allergies indicates:  No Known Allergies  Review of Systems   Constitution: Negative for diaphoresis, malaise/fatigue and weight gain.   HENT: Negative for hoarse voice.    Eyes: Negative for double vision and visual disturbance.   Cardiovascular: Positive for claudication. Negative for chest pain, cyanosis, dyspnea on exertion, irregular heartbeat, leg swelling, near-syncope, orthopnea, palpitations, paroxysmal nocturnal dyspnea and syncope.   Respiratory: Positive for shortness of breath. Negative for cough, hemoptysis and snoring.    Hematologic/Lymphatic: Negative for bleeding problem. Does not bruise/bleed easily.   Skin: Negative for color change and poor wound healing.   Musculoskeletal: Negative for muscle cramps, muscle weakness and myalgias.   Gastrointestinal: Negative for bloating, abdominal pain, change in bowel habit, diarrhea, heartburn, hematemesis, hematochezia, melena and nausea.   Neurological: Negative for excessive daytime sleepiness, dizziness, headaches, light-headedness, loss of balance, numbness and weakness.   Psychiatric/Behavioral: Negative for memory loss. The patient does not have insomnia.    Allergic/Immunologic: Negative for hives.       Objective:   Physical Exam   Constitutional: She is oriented to person, place, and time. She appears well-developed and well-nourished. She does not appear ill. No distress.   HENT:   Head: Normocephalic and atraumatic.   Eyes: Pupils are equal, round, and reactive to light. EOM are normal. No scleral icterus.   Neck: Normal range of motion. Neck supple. Normal carotid pulses, no hepatojugular reflux and no JVD present. Carotid bruit is not present. No tracheal deviation present. No thyromegaly present.   Cardiovascular: Normal rate, regular rhythm and normal heart sounds. Exam reveals no gallop and no friction rub.   No murmur heard.  Pulses:       Carotid pulses are 2+ on the right side and 2+ on the left side.       Radial pulses are 2+ on the  right side and 2+ on the left side.        Femoral pulses are 2+ on the right side with bruit and 2+ on the left side with bruit.       Popliteal pulses are 0 on the right side and 0 on the left side.        Dorsalis pedis pulses are 0 on the right side and 0 on the left side.        Posterior tibial pulses are 0 on the right side and 0 on the left side.   Bilateral subcalvian bruits abdominal bruits no abdominal pulsatile mass,.   Pulmonary/Chest: Effort normal and breath sounds normal. No respiratory distress. She has no wheezes. She has no rhonchi. She has no rales. She exhibits no tenderness.   Abdominal: Soft. Normal appearance, normal aorta and bowel sounds are normal. She exhibits no abdominal bruit, no ascites and no pulsatile midline mass. There is no hepatomegaly. There is no abdominal tenderness.   Musculoskeletal:         General: No edema.      Right shoulder: She exhibits no deformity.   Neurological: She is alert and oriented to person, place, and time. She has normal strength. No cranial nerve deficit. Coordination normal.   Skin: Skin is warm and dry. No rash noted. She is not diaphoretic. No cyanosis or erythema. Nails show no clubbing.   Psychiatric: She has a normal mood and affect. Her speech is normal and behavior is normal.   Nursing note reviewed.    Vitals:    07/20/20 1311 07/20/20 1313   BP: (!) 152/70 (!) 160/78   BP Location: Left arm Right arm   Patient Position: Sitting Sitting   BP Method: Medium (Manual) Medium (Manual)   Pulse: 64    SpO2: 98%    Weight: 71.5 kg (157 lb 8.3 oz)      Lab Results   Component Value Date    CHOL 164 05/26/2020    CHOL 131 02/10/2019    CHOL 200 (H) 08/20/2018     Lab Results   Component Value Date    HDL 44 05/26/2020    HDL 33 (L) 02/10/2019    HDL 49 08/20/2018     Lab Results   Component Value Date    LDLCALC 87.8 05/26/2020    LDLCALC 74.0 02/10/2019    LDLCALC 110.6 08/20/2018     Lab Results   Component Value Date    TRIG 161 (H) 05/26/2020     TRIG 120 02/10/2019    TRIG 202 (H) 08/20/2018     Lab Results   Component Value Date    CHOLHDL 26.8 05/26/2020    CHOLHDL 25.2 02/10/2019    CHOLHDL 24.5 08/20/2018       Chemistry        Component Value Date/Time     06/26/2020 1017    K 3.6 06/26/2020 1017     06/26/2020 1017    CO2 25 06/26/2020 1017    BUN 9 06/26/2020 1017    CREATININE 0.9 06/26/2020 1017     06/26/2020 1017        Component Value Date/Time    CALCIUM 9.6 06/26/2020 1017    ALKPHOS 154 (H) 06/26/2020 1017    AST 22 06/26/2020 1017    ALT 14 06/26/2020 1017    BILITOT 0.4 06/26/2020 1017    ESTGFRAFRICA >60.0 06/26/2020 1017    EGFRNONAA >60.0 06/26/2020 1017          Lab Results   Component Value Date    TSH 4.399 (H) 02/09/2019     Lab Results   Component Value Date    INR 1.1 06/26/2020    INR 1.0 06/12/2020    INR 1.1 06/11/2020     Lab Results   Component Value Date    WBC 10.01 06/26/2020    HGB 11.2 (L) 06/26/2020    HCT 36.1 (L) 06/26/2020    MCV 92 06/26/2020     (H) 06/26/2020     BMP  Sodium   Date Value Ref Range Status   06/26/2020 138 136 - 145 mmol/L Final     Potassium   Date Value Ref Range Status   06/26/2020 3.6 3.5 - 5.1 mmol/L Final     Chloride   Date Value Ref Range Status   06/26/2020 104 95 - 110 mmol/L Final     CO2   Date Value Ref Range Status   06/26/2020 25 23 - 29 mmol/L Final     BUN, Bld   Date Value Ref Range Status   06/26/2020 9 8 - 23 mg/dL Final     Creatinine   Date Value Ref Range Status   06/26/2020 0.9 0.5 - 1.4 mg/dL Final     Calcium   Date Value Ref Range Status   06/26/2020 9.6 8.7 - 10.5 mg/dL Final     Anion Gap   Date Value Ref Range Status   06/26/2020 9 8 - 16 mmol/L Final     eGFR if    Date Value Ref Range Status   06/26/2020 >60.0 >60 mL/min/1.73 m^2 Final     eGFR if non    Date Value Ref Range Status   06/26/2020 >60.0 >60 mL/min/1.73 m^2 Final     Comment:     Calculation used to obtain the estimated glomerular filtration  rate  (eGFR) is the CKD-EPI equation.        CrCl cannot be calculated (Patient's most recent lab result is older than the maximum 7 days allowed.).    Assessment:     1. Atherosclerotic PVD with intermittent claudication    2. Tobacco abuse    3. Mixed hyperlipidemia    4. Bruit of right carotid artery    5. Nonspecific abnormal electrocardiogram (ECG) (EKG)    6. History of ST elevation myocardial infarction (STEMI)    7. Coronary artery disease involving native coronary artery of native heart without angina pectoris      Stable cardiac wise no angina.  Some shortness of breath may be from brilinta. Has significant claudication bilaterally with inflow and sfa occlusion bilateral. Will plan on bilateral iliac stebnt shockwave balloon   Has stopped smoking excellent job.  Plan:   Bilateral iliac stents/   I have explained the risks, benefits , and alternatives of the procedure in detail.the patient voices understanding and all questions have been answered.the patient agrees to proceed as planned.

## 2020-07-20 NOTE — PROGRESS NOTES
Subjective:   Patient ID:  Iza Esquivel is a 59 y.o. female who presents for follow up of Follow-up      HPI  A 60 yo female with h/o cad s/p lcx stent p[vd with claudication htn hlp is here for f/u. Has stopped smoking 11 days ago. sjhe gest at times a feeling  Of wanting to take deep breath. Has no chest pain has no new symptom sof heart burn. She is compliant with diet meds and salt intake.   Past Medical History:   Diagnosis Date    Atherosclerotic PVD with intermittent claudication 5/25/2020    Cervical cancer     Hyperlipidemia     Hypertension     Right carotid bruit     S/P PTCA (percutaneous transluminal coronary angioplasty) 05/25/2020    STEMI: stenting of LCx       Past Surgical History:   Procedure Laterality Date    ABDOMINAL AORTOGRAPHY N/A 5/25/2020    Procedure: Aortogram, Abdominal;  Surgeon: Shelly Jarvis MD;  Location: [facility] CATH LAB;  Service: Cardiology;  Laterality: N/A;    AORTOGRAPHY WITH SERIALOGRAPHY N/A 6/30/2020    Procedure: AORTOGRAM, WITH RUNOFF;  Surgeon: Shelly Jarvis MD;  Location: [facility] CATH LAB;  Service: Cardiology;  Laterality: N/A;    LEFT HEART CATHETERIZATION Left 5/25/2020    Procedure: CATHETERIZATION, HEART, LEFT;  Surgeon: Shelly Jarvis MD;  Location: [facility] CATH LAB;  Service: Cardiology;  Laterality: Left;    LEFT HEART CATHETERIZATION Left 6/11/2020    Procedure: CATHETERIZATION, HEART, LEFT;  Surgeon: Shelly Jarvis MD;  Location: [facility] CATH LAB;  Service: Cardiology;  Laterality: Left;    LYMPH NODE BIOPSY      PERCUTANEOUS TRANSLUMINAL BALLOON ANGIOPLASTY OF CORONARY ARTERY  5/25/2020    Procedure: Angioplasty-coronary;  Surgeon: Shelly Jarvis MD;  Location: [facility] CATH LAB;  Service: Cardiology;;       Social History     Tobacco Use    Smoking status: Current Every Day Smoker     Packs/day: 1.50     Years: 40.00     Pack years: 60.00    Smokeless tobacco: Never Used    Tobacco comment: now down to 10 cigarettes daily   Substance Use Topics     Alcohol use: No     Frequency: Never     Binge frequency: Never    Drug use: Not on file       Family History   Problem Relation Age of Onset    Cancer Mother         ?    Cancer Father         ?    Transient ischemic attack Father         Carotid artery stenosis, CEA    Diabetes Brother     Hypertension Brother        Current Outpatient Medications   Medication Sig    atorvastatin (LIPITOR) 80 MG tablet Take 1 tablet (80 mg total) by mouth once daily.    isosorbide mononitrate (IMDUR) 30 MG 24 hr tablet Take 1 tablet (30 mg total) by mouth 2 (two) times a day.    losartan (COZAAR) 25 MG tablet Take 1 tablet (25 mg total) by mouth once daily.    metoprolol tartrate (LOPRESSOR) 25 MG tablet Take 1 tablet (25 mg total) by mouth 2 (two) times daily.    pantoprazole (PROTONIX) 40 MG tablet Take 1 tablet (40 mg total) by mouth once daily.    ticagrelor (BRILINTA) 90 mg tablet Take 1 tablet (90 mg total) by mouth 2 (two) times daily.    aspirin (ECOTRIN) 81 MG EC tablet Take 1 tablet (81 mg total) by mouth once daily.     No current facility-administered medications for this visit.      Current Outpatient Medications on File Prior to Visit   Medication Sig    atorvastatin (LIPITOR) 80 MG tablet Take 1 tablet (80 mg total) by mouth once daily.    isosorbide mononitrate (IMDUR) 30 MG 24 hr tablet Take 1 tablet (30 mg total) by mouth 2 (two) times a day.    losartan (COZAAR) 25 MG tablet Take 1 tablet (25 mg total) by mouth once daily.    metoprolol tartrate (LOPRESSOR) 25 MG tablet Take 1 tablet (25 mg total) by mouth 2 (two) times daily.    pantoprazole (PROTONIX) 40 MG tablet Take 1 tablet (40 mg total) by mouth once daily.    ticagrelor (BRILINTA) 90 mg tablet Take 1 tablet (90 mg total) by mouth 2 (two) times daily.    aspirin (ECOTRIN) 81 MG EC tablet Take 1 tablet (81 mg total) by mouth once daily.     No current facility-administered medications on file prior to visit.      Review of patient's  allergies indicates:  No Known Allergies  Review of Systems   Constitution: Negative for diaphoresis, malaise/fatigue and weight gain.   HENT: Negative for hoarse voice.    Eyes: Negative for double vision and visual disturbance.   Cardiovascular: Positive for claudication. Negative for chest pain, cyanosis, dyspnea on exertion, irregular heartbeat, leg swelling, near-syncope, orthopnea, palpitations, paroxysmal nocturnal dyspnea and syncope.   Respiratory: Positive for shortness of breath. Negative for cough, hemoptysis and snoring.    Hematologic/Lymphatic: Negative for bleeding problem. Does not bruise/bleed easily.   Skin: Negative for color change and poor wound healing.   Musculoskeletal: Negative for muscle cramps, muscle weakness and myalgias.   Gastrointestinal: Negative for bloating, abdominal pain, change in bowel habit, diarrhea, heartburn, hematemesis, hematochezia, melena and nausea.   Neurological: Negative for excessive daytime sleepiness, dizziness, headaches, light-headedness, loss of balance, numbness and weakness.   Psychiatric/Behavioral: Negative for memory loss. The patient does not have insomnia.    Allergic/Immunologic: Negative for hives.       Objective:   Physical Exam   Constitutional: She is oriented to person, place, and time. She appears well-developed and well-nourished. She does not appear ill. No distress.   HENT:   Head: Normocephalic and atraumatic.   Eyes: Pupils are equal, round, and reactive to light. EOM are normal. No scleral icterus.   Neck: Normal range of motion. Neck supple. Normal carotid pulses, no hepatojugular reflux and no JVD present. Carotid bruit is not present. No tracheal deviation present. No thyromegaly present.   Cardiovascular: Normal rate, regular rhythm and normal heart sounds. Exam reveals no gallop and no friction rub.   No murmur heard.  Pulses:       Carotid pulses are 2+ on the right side and 2+ on the left side.       Radial pulses are 2+ on the  right side and 2+ on the left side.        Femoral pulses are 2+ on the right side with bruit and 2+ on the left side with bruit.       Popliteal pulses are 0 on the right side and 0 on the left side.        Dorsalis pedis pulses are 0 on the right side and 0 on the left side.        Posterior tibial pulses are 0 on the right side and 0 on the left side.   Bilateral subcalvian bruits abdominal bruits no abdominal pulsatile mass,.   Pulmonary/Chest: Effort normal and breath sounds normal. No respiratory distress. She has no wheezes. She has no rhonchi. She has no rales. She exhibits no tenderness.   Abdominal: Soft. Normal appearance, normal aorta and bowel sounds are normal. She exhibits no abdominal bruit, no ascites and no pulsatile midline mass. There is no hepatomegaly. There is no abdominal tenderness.   Musculoskeletal:         General: No edema.      Right shoulder: She exhibits no deformity.   Neurological: She is alert and oriented to person, place, and time. She has normal strength. No cranial nerve deficit. Coordination normal.   Skin: Skin is warm and dry. No rash noted. She is not diaphoretic. No cyanosis or erythema. Nails show no clubbing.   Psychiatric: She has a normal mood and affect. Her speech is normal and behavior is normal.   Nursing note reviewed.    Vitals:    07/20/20 1311 07/20/20 1313   BP: (!) 152/70 (!) 160/78   BP Location: Left arm Right arm   Patient Position: Sitting Sitting   BP Method: Medium (Manual) Medium (Manual)   Pulse: 64    SpO2: 98%    Weight: 71.5 kg (157 lb 8.3 oz)      Lab Results   Component Value Date    CHOL 164 05/26/2020    CHOL 131 02/10/2019    CHOL 200 (H) 08/20/2018     Lab Results   Component Value Date    HDL 44 05/26/2020    HDL 33 (L) 02/10/2019    HDL 49 08/20/2018     Lab Results   Component Value Date    LDLCALC 87.8 05/26/2020    LDLCALC 74.0 02/10/2019    LDLCALC 110.6 08/20/2018     Lab Results   Component Value Date    TRIG 161 (H) 05/26/2020     TRIG 120 02/10/2019    TRIG 202 (H) 08/20/2018     Lab Results   Component Value Date    CHOLHDL 26.8 05/26/2020    CHOLHDL 25.2 02/10/2019    CHOLHDL 24.5 08/20/2018       Chemistry        Component Value Date/Time     06/26/2020 1017    K 3.6 06/26/2020 1017     06/26/2020 1017    CO2 25 06/26/2020 1017    BUN 9 06/26/2020 1017    CREATININE 0.9 06/26/2020 1017     06/26/2020 1017        Component Value Date/Time    CALCIUM 9.6 06/26/2020 1017    ALKPHOS 154 (H) 06/26/2020 1017    AST 22 06/26/2020 1017    ALT 14 06/26/2020 1017    BILITOT 0.4 06/26/2020 1017    ESTGFRAFRICA >60.0 06/26/2020 1017    EGFRNONAA >60.0 06/26/2020 1017          Lab Results   Component Value Date    TSH 4.399 (H) 02/09/2019     Lab Results   Component Value Date    INR 1.1 06/26/2020    INR 1.0 06/12/2020    INR 1.1 06/11/2020     Lab Results   Component Value Date    WBC 10.01 06/26/2020    HGB 11.2 (L) 06/26/2020    HCT 36.1 (L) 06/26/2020    MCV 92 06/26/2020     (H) 06/26/2020     BMP  Sodium   Date Value Ref Range Status   06/26/2020 138 136 - 145 mmol/L Final     Potassium   Date Value Ref Range Status   06/26/2020 3.6 3.5 - 5.1 mmol/L Final     Chloride   Date Value Ref Range Status   06/26/2020 104 95 - 110 mmol/L Final     CO2   Date Value Ref Range Status   06/26/2020 25 23 - 29 mmol/L Final     BUN, Bld   Date Value Ref Range Status   06/26/2020 9 8 - 23 mg/dL Final     Creatinine   Date Value Ref Range Status   06/26/2020 0.9 0.5 - 1.4 mg/dL Final     Calcium   Date Value Ref Range Status   06/26/2020 9.6 8.7 - 10.5 mg/dL Final     Anion Gap   Date Value Ref Range Status   06/26/2020 9 8 - 16 mmol/L Final     eGFR if    Date Value Ref Range Status   06/26/2020 >60.0 >60 mL/min/1.73 m^2 Final     eGFR if non    Date Value Ref Range Status   06/26/2020 >60.0 >60 mL/min/1.73 m^2 Final     Comment:     Calculation used to obtain the estimated glomerular filtration  rate  (eGFR) is the CKD-EPI equation.        CrCl cannot be calculated (Patient's most recent lab result is older than the maximum 7 days allowed.).    Assessment:     1. Atherosclerotic PVD with intermittent claudication    2. Tobacco abuse    3. Mixed hyperlipidemia    4. Bruit of right carotid artery    5. Nonspecific abnormal electrocardiogram (ECG) (EKG)    6. History of ST elevation myocardial infarction (STEMI)    7. Coronary artery disease involving native coronary artery of native heart without angina pectoris      Stable cardiac wise no angina.  Some shortness of breath may be from brilinta. Has significant claudication bilaterally with inflow and sfa occlusion bilateral. Will plan on bilateral iliac stebnt shockwave balloon   Has stopped smoking excellent job.  Plan:   Bilateral iliac stents/   I have explained the risks, benefits , and alternatives of the procedure in detail.the patient voices understanding and all questions have been answered.the patient agrees to proceed as planned.

## 2020-07-28 ENCOUNTER — HOSPITAL ENCOUNTER (OUTPATIENT)
Facility: HOSPITAL | Age: 60
Discharge: HOME | End: 2020-07-28
Attending: INTERNAL MEDICINE | Admitting: INTERNAL MEDICINE
Payer: COMMERCIAL

## 2020-07-28 VITALS
DIASTOLIC BLOOD PRESSURE: 46 MMHG | OXYGEN SATURATION: 99 % | TEMPERATURE: 98 F | HEART RATE: 56 BPM | RESPIRATION RATE: 18 BRPM | SYSTOLIC BLOOD PRESSURE: 126 MMHG

## 2020-07-28 DIAGNOSIS — I70.219 ATHEROSCLEROTIC PVD WITH INTERMITTENT CLAUDICATION: Primary | ICD-10-CM

## 2020-07-28 DIAGNOSIS — I73.9 PVD (PERIPHERAL VASCULAR DISEASE): ICD-10-CM

## 2020-07-28 LAB
POC ACTIVATED CLOTTING TIME K: 125 SEC (ref 74–137)
POC ACTIVATED CLOTTING TIME K: 208 SEC (ref 74–137)
POC ACTIVATED CLOTTING TIME K: 213 SEC (ref 74–137)
POC ACTIVATED CLOTTING TIME K: 235 SEC (ref 74–137)
SAMPLE: ABNORMAL
SAMPLE: NORMAL
SARS-COV-2 RDRP RESP QL NAA+PROBE: NEGATIVE

## 2020-07-28 PROCEDURE — 99152 MOD SED SAME PHYS/QHP 5/>YRS: CPT | Performed by: INTERNAL MEDICINE

## 2020-07-28 PROCEDURE — 27201423 OPTIME MED/SURG SUP & DEVICES STERILE SUPPLY: Performed by: INTERNAL MEDICINE

## 2020-07-28 PROCEDURE — C1887 CATHETER, GUIDING: HCPCS | Performed by: INTERNAL MEDICINE

## 2020-07-28 PROCEDURE — 37221 PR REVASCULARIZE ILIAC ARTERY,ANGIOPLASTY/STENT, INITIAL VESSEL: CPT | Mod: RT,,, | Performed by: INTERNAL MEDICINE

## 2020-07-28 PROCEDURE — C1730 CATH, EP, 19 OR FEW ELECT: HCPCS | Performed by: INTERNAL MEDICINE

## 2020-07-28 PROCEDURE — 99152 MOD SED SAME PHYS/QHP 5/>YRS: CPT | Mod: ,,, | Performed by: INTERNAL MEDICINE

## 2020-07-28 PROCEDURE — C1894 INTRO/SHEATH, NON-LASER: HCPCS | Performed by: INTERNAL MEDICINE

## 2020-07-28 PROCEDURE — 85347 COAGULATION TIME ACTIVATED: CPT | Performed by: INTERNAL MEDICINE

## 2020-07-28 PROCEDURE — 25500020 PHARM REV CODE 255: Performed by: INTERNAL MEDICINE

## 2020-07-28 PROCEDURE — 25000003 PHARM REV CODE 250: Performed by: INTERNAL MEDICINE

## 2020-07-28 PROCEDURE — C1874 STENT, COATED/COV W/DEL SYS: HCPCS | Performed by: INTERNAL MEDICINE

## 2020-07-28 PROCEDURE — 63600175 PHARM REV CODE 636 W HCPCS: Performed by: INTERNAL MEDICINE

## 2020-07-28 PROCEDURE — C1725 CATH, TRANSLUMIN NON-LASER: HCPCS | Performed by: INTERNAL MEDICINE

## 2020-07-28 PROCEDURE — 99152 PR MOD CONSCIOUS SEDATION, SAME PHYS, 5+ YRS, FIRST 15 MIN: ICD-10-PCS | Mod: ,,, | Performed by: INTERNAL MEDICINE

## 2020-07-28 PROCEDURE — C9765 REVASC INTRA LITHOTRIP-STENT: HCPCS | Mod: LT | Performed by: INTERNAL MEDICINE

## 2020-07-28 PROCEDURE — 99153 MOD SED SAME PHYS/QHP EA: CPT | Performed by: INTERNAL MEDICINE

## 2020-07-28 PROCEDURE — C1769 GUIDE WIRE: HCPCS | Performed by: INTERNAL MEDICINE

## 2020-07-28 PROCEDURE — 37221 PR REVASCULARIZE ILIAC ARTERY,ANGIOPLASTY/STENT, INITIAL VESSEL: ICD-10-PCS | Mod: 59,51,LT, | Performed by: INTERNAL MEDICINE

## 2020-07-28 PROCEDURE — U0002 COVID-19 LAB TEST NON-CDC: HCPCS

## 2020-07-28 DEVICE — ICAST COVERED STENT, 7MMX59MMX80CM
Type: IMPLANTABLE DEVICE | Site: ARTERIAL | Status: FUNCTIONAL
Brand: ICAST

## 2020-07-28 RX ORDER — DIAZEPAM 5 MG/1
5 TABLET ORAL
Status: DISCONTINUED | OUTPATIENT
Start: 2020-07-28 | End: 2020-07-28 | Stop reason: HOSPADM

## 2020-07-28 RX ORDER — ATROPINE SULFATE 0.1 MG/ML
1 INJECTION INTRAVENOUS ONCE
Status: COMPLETED | OUTPATIENT
Start: 2020-07-28 | End: 2020-07-28

## 2020-07-28 RX ORDER — SODIUM CHLORIDE 9 MG/ML
INJECTION, SOLUTION INTRAVENOUS CONTINUOUS
Status: DISCONTINUED | OUTPATIENT
Start: 2020-07-28 | End: 2020-07-28 | Stop reason: HOSPADM

## 2020-07-28 RX ORDER — DIPHENHYDRAMINE HCL 50 MG
50 CAPSULE ORAL ONCE
Status: COMPLETED | OUTPATIENT
Start: 2020-07-28 | End: 2020-07-28

## 2020-07-28 RX ORDER — LIDOCAINE HYDROCHLORIDE 20 MG/ML
INJECTION, SOLUTION EPIDURAL; INFILTRATION; INTRACAUDAL; PERINEURAL
Status: DISCONTINUED | OUTPATIENT
Start: 2020-07-28 | End: 2020-07-28 | Stop reason: HOSPADM

## 2020-07-28 RX ORDER — PROTAMINE SULFATE 10 MG/ML
50 INJECTION, SOLUTION INTRAVENOUS ONCE
Status: COMPLETED | OUTPATIENT
Start: 2020-07-28 | End: 2020-07-28

## 2020-07-28 RX ORDER — FENTANYL CITRATE 50 UG/ML
INJECTION, SOLUTION INTRAMUSCULAR; INTRAVENOUS
Status: DISCONTINUED | OUTPATIENT
Start: 2020-07-28 | End: 2020-07-28 | Stop reason: HOSPADM

## 2020-07-28 RX ORDER — HEPARIN SODIUM 1000 [USP'U]/ML
INJECTION INTRAVENOUS; SUBCUTANEOUS
Status: DISCONTINUED | OUTPATIENT
Start: 2020-07-28 | End: 2020-07-28 | Stop reason: HOSPADM

## 2020-07-28 RX ORDER — PROMETHAZINE HYDROCHLORIDE 25 MG/ML
6.25 INJECTION, SOLUTION INTRAMUSCULAR; INTRAVENOUS ONCE
Status: COMPLETED | OUTPATIENT
Start: 2020-07-28 | End: 2020-07-28

## 2020-07-28 RX ORDER — HYDROMORPHONE HYDROCHLORIDE 2 MG/ML
INJECTION, SOLUTION INTRAMUSCULAR; INTRAVENOUS; SUBCUTANEOUS
Status: DISCONTINUED | OUTPATIENT
Start: 2020-07-28 | End: 2020-07-28 | Stop reason: HOSPADM

## 2020-07-28 RX ORDER — MIDAZOLAM HYDROCHLORIDE 1 MG/ML
INJECTION, SOLUTION INTRAMUSCULAR; INTRAVENOUS
Status: DISCONTINUED | OUTPATIENT
Start: 2020-07-28 | End: 2020-07-28 | Stop reason: HOSPADM

## 2020-07-28 RX ORDER — NAPROXEN SODIUM 220 MG/1
81 TABLET, FILM COATED ORAL ONCE
Status: COMPLETED | OUTPATIENT
Start: 2020-07-28 | End: 2020-07-28

## 2020-07-28 RX ORDER — ATROPINE SULFATE 1 MG/ML
1 INJECTION, SOLUTION INTRAMUSCULAR; INTRAVENOUS; SUBCUTANEOUS ONCE
Status: DISCONTINUED | OUTPATIENT
Start: 2020-07-28 | End: 2020-07-28

## 2020-07-28 RX ADMIN — DIAZEPAM 5 MG: 5 TABLET ORAL at 06:07

## 2020-07-28 RX ADMIN — ATROPINE SULFATE 1 MG: 0.1 INJECTION PARENTERAL at 09:07

## 2020-07-28 RX ADMIN — ASPIRIN 81 MG 81 MG: 81 TABLET ORAL at 06:07

## 2020-07-28 RX ADMIN — PROMETHAZINE HYDROCHLORIDE 6.25 MG: 25 INJECTION INTRAMUSCULAR; INTRAVENOUS at 09:07

## 2020-07-28 RX ADMIN — DIPHENHYDRAMINE HYDROCHLORIDE 50 MG: 50 CAPSULE ORAL at 06:07

## 2020-07-28 RX ADMIN — SODIUM CHLORIDE: 0.9 INJECTION, SOLUTION INTRAVENOUS at 06:07

## 2020-07-28 RX ADMIN — PROTAMINE SULFATE 50 MG: 10 INJECTION, SOLUTION INTRAVENOUS at 09:07

## 2020-07-28 NOTE — Clinical Note
Angiography performed post intervention of the right iliac artery. via power injection . Angiography of Bilateral Iliac Arteries.

## 2020-07-28 NOTE — DISCHARGE INSTRUCTIONS
"Post-op     1. DIET: It is advisable for you to follow a diet that limits the intake of salt, sugar, saturated fats and cholesterol.     2. FOR THE NEXT 24 HOURS:   · For the next 8 hours, you should be watched by a responsible adult. This person should make sure your condition is not getting worse.   · Don't drink any alcohol for the next 24 hours.  · Don't drive, operate dangerous machinery, or make important business or personal decisions during the next 24 hours.  · Your healthcare provider may tell you not to take any medicine by mouth for pain or sleep in the next 4 hours. These medicines may react with the medicines you were given in the hospital. This could cause a much stronger response than usual.       3. ACTIVITY:                                Day of discharge:             NO vigorous activity, lifting or straining                                                   Day after discharge:         Avoid heavy lifting (up to 10 lbs)                                                                        The day after discharge you may shower, but avoid tub baths for 5 days                                                                         Wash site gently with soap and water        NO powder or lotion to your procedure site.                 Before you shower, remove dressing, apply bandaid if desired                                                                                                                                                                            2nd day after discharge:  Resume normal activities                                                                         Exercise program as instructed      4. WOUND CARE: It is not unusual to have a small amount of bruising appear in the groin area. It is also common to have a tender "knot" develop beneath the skin at the puncture site in the groin. This is scar tissue only and is not a cause for concern or alarm. This tender knot may " "take several weeks to fully resolve. The bruise will usually spread over several days. If the lump gets bigger, call you doctor immediately.    5. DISCOMFORT: For general discomfort at the puncture site, you may take 1 or 2 Acetaminophen (Tylenol) tablets every 4 hours as needed. (Do not take more than 4000 mg a day)                 6. CALL YOUR HEALTHCARE PROVIDER IF YOU START TO HAVE THE FOLLOWING SYMPTOMS:        1. Problems with the affected leg: Pain, discomfort, loss of warmth, numbness or tingling                                                                                                                            2. Problems at the groin site: Bleeding, pain that is sudden/sharp/persistent,                   swelling at site or a change in "lump" size, increased redness or drainage at                     puncture site                                                               3. High fever (101 degrees or higher)       4.  Drowsiness that doesn't get better       5. Weakness or dizziness that doesn't get better            6. Repeated vomiting        7. GO TO  THE EMERGENCY ROOM OR CALL 911 IF YOU HAVE: Chest pains or discomforts not relieved with 3 nitroglycerin doses (sublingual tablets or spray), numbness or severe pain or if your foot or leg becomes cold or discolored or uncontrolled bleeding from site (apply direct pressure above site).  "

## 2020-07-28 NOTE — Clinical Note
120 ml injected throughout the case. 180 mL total wasted during the case. 300 mL total used in the case.

## 2020-07-28 NOTE — PLAN OF CARE
VSS. Bilateral groin areas with dressings dry and intact. Bruise noted to left groin, but area remains soft. Ambulating to bathroom to urinate without problems. DC instructions reviewed and able to teach back.

## 2020-07-28 NOTE — INTERVAL H&P NOTE
The patient has been examined and the H&P has been reviewed:    I concur with the findings and no changes have occurred since H&P was written.    Anesthesia/Surgery risks, benefits and alternative options discussed and understood by patient/family.  I have explained the risks, benefits , and alternatives of the procedure in detail.the patient voices understanding and all questions have been answered.the patient agrees to proceed as planned.          Active Hospital Problems    Diagnosis  POA    PVD (peripheral vascular disease) [I73.9]  Yes     Priority: High      Resolved Hospital Problems   No resolved problems to display.

## 2020-07-28 NOTE — Clinical Note
Angiography performed post intervention of the infrarenal . via power injection . Post Angiography performed of Bilateral Iliac Arteries.

## 2020-07-28 NOTE — DISCHARGE SUMMARY
Discharge Note  Short Stay      SUMMARY     Admit Date: 7/28/2020    Attending Physician: Shelly Miramontes MD     Discharge Physician: Shabana Miramontes MD     Discharge Date: 7/28/2020    Final Diagnosis: PVD WITH CLAUDICATION  Outcome of Stay:PATIENT HAD BILATERAL ILIAC COVERED STENTS PALCED. NO COMPLICATION. HAD SMALL HEMATOMA IN LEFT GROIN IN RECOVERY HEMOSTASIS ACHIEVED AFTER SHEATH REMOVED AN PROTAMINE. SHE WAS DEEMED STABLE FOR DISCAHRGE. SHE WILL FOLLOW IN CLINIC WITH DR MIRAMONTES    Disposition: Home or Self Care    Patient Instructions:   Current Discharge Medication List      CONTINUE these medications which have NOT CHANGED    Details   atorvastatin (LIPITOR) 80 MG tablet Take 1 tablet (80 mg total) by mouth once daily.  Qty: 90 tablet, Refills: 3    Associated Diagnoses: Atherosclerotic PVD with intermittent claudication; Coronary artery disease involving native coronary artery of native heart without angina pectoris; Nonspecific abnormal electrocardiogram (ECG) (EKG); Mixed hyperlipidemia      isosorbide mononitrate (IMDUR) 30 MG 24 hr tablet Take 1 tablet (30 mg total) by mouth 2 (two) times a day.  Qty: 60 tablet, Refills: 11    Associated Diagnoses: Coronary artery disease involving native coronary artery of native heart without angina pectoris      losartan (COZAAR) 25 MG tablet Take 1 tablet (25 mg total) by mouth once daily.  Qty: 90 tablet, Refills: 3    Comments: .  Associated Diagnoses: Atherosclerotic PVD with intermittent claudication; Coronary artery disease involving native coronary artery of native heart without angina pectoris; Nonspecific abnormal electrocardiogram (ECG) (EKG); Mixed hyperlipidemia      metoprolol tartrate (LOPRESSOR) 25 MG tablet Take 1 tablet (25 mg total) by mouth 2 (two) times daily.  Qty: 60 tablet, Refills: 11    Comments: .  Associated Diagnoses: Atherosclerotic PVD with intermittent claudication; Coronary artery disease involving native coronary artery of native heart  without angina pectoris; Nonspecific abnormal electrocardiogram (ECG) (EKG); Mixed hyperlipidemia      pantoprazole (PROTONIX) 40 MG tablet Take 1 tablet (40 mg total) by mouth once daily.  Qty: 30 tablet, Refills: 11    Associated Diagnoses: Atherosclerotic PVD with intermittent claudication; Bruit of right carotid artery; Coronary artery disease involving native coronary artery of native heart without angina pectoris; Nonspecific abnormal electrocardiogram (ECG) (EKG); Mixed hyperlipidemia; Tobacco abuse; Pre-op evaluation      ticagrelor (BRILINTA) 90 mg tablet Take 1 tablet (90 mg total) by mouth 2 (two) times daily.  Qty: 60 tablet, Refills: 11    Associated Diagnoses: Atherosclerotic PVD with intermittent claudication; Coronary artery disease involving native coronary artery of native heart without angina pectoris; Nonspecific abnormal electrocardiogram (ECG) (EKG); Mixed hyperlipidemia      aspirin (ECOTRIN) 81 MG EC tablet Take 1 tablet (81 mg total) by mouth once daily.  Qty: 30 tablet, Refills: 0    Associated Diagnoses: Nonspecific abnormal electrocardiogram (ECG) (EKG); Tobacco abuse             Discharge Procedure Orders (must include Diet, Follow-up, Activity)   Discharge Procedure Orders (must include Diet, Follow-up, Activity)   Diet general     Call MD for:  temperature >100.4     Call MD for:  persistent nausea and vomiting     Call MD for:  severe uncontrolled pain     Call MD for:  difficulty breathing, headache or visual disturbances     Call MD for:  redness, tenderness, or signs of infection (pain, swelling, redness, odor or green/yellow discharge around incision site)     Call MD for:  hives     Call MD for:  persistent dizziness or light-headedness     Call MD for:  extreme fatigue     Follow-up Information     Shabana Jarvis MD In 2 weeks.    Specialty: Cardiology  Contact information:  [address]  [place] [address] [phone]

## 2020-08-24 ENCOUNTER — PATIENT OUTREACH (OUTPATIENT)
Dept: ADMINISTRATIVE | Facility: OTHER | Age: 60
End: 2020-08-24

## 2020-08-25 ENCOUNTER — OFFICE VISIT (OUTPATIENT)
Dept: CARDIOLOGY | Facility: CLINIC | Age: 60
End: 2020-08-25
Payer: COMMERCIAL

## 2020-08-25 VITALS
OXYGEN SATURATION: 98 % | WEIGHT: 158.31 LBS | HEART RATE: 71 BPM | SYSTOLIC BLOOD PRESSURE: 140 MMHG | BODY MASS INDEX: 27.03 KG/M2 | DIASTOLIC BLOOD PRESSURE: 70 MMHG | HEIGHT: 64 IN

## 2020-08-25 DIAGNOSIS — I73.9 PVD (PERIPHERAL VASCULAR DISEASE): ICD-10-CM

## 2020-08-25 DIAGNOSIS — I70.219 ATHEROSCLEROTIC PVD WITH INTERMITTENT CLAUDICATION: ICD-10-CM

## 2020-08-25 DIAGNOSIS — R09.89 BRUIT OF RIGHT CAROTID ARTERY: ICD-10-CM

## 2020-08-25 DIAGNOSIS — R94.31 NONSPECIFIC ABNORMAL ELECTROCARDIOGRAM (ECG) (EKG): ICD-10-CM

## 2020-08-25 DIAGNOSIS — I25.10 CORONARY ARTERY DISEASE INVOLVING NATIVE CORONARY ARTERY OF NATIVE HEART WITHOUT ANGINA PECTORIS: Primary | ICD-10-CM

## 2020-08-25 DIAGNOSIS — I25.2 HISTORY OF ST ELEVATION MYOCARDIAL INFARCTION (STEMI): ICD-10-CM

## 2020-08-25 DIAGNOSIS — E78.2 MIXED HYPERLIPIDEMIA: Chronic | ICD-10-CM

## 2020-08-25 PROCEDURE — 99999 PR PBB SHADOW E&M-EST. PATIENT-LVL III: ICD-10-PCS | Mod: PBBFAC,,, | Performed by: INTERNAL MEDICINE

## 2020-08-25 PROCEDURE — 99214 OFFICE O/P EST MOD 30 MIN: CPT | Mod: S$GLB,,, | Performed by: INTERNAL MEDICINE

## 2020-08-25 PROCEDURE — 99999 PR PBB SHADOW E&M-EST. PATIENT-LVL III: CPT | Mod: PBBFAC,,, | Performed by: INTERNAL MEDICINE

## 2020-08-25 PROCEDURE — 99214 PR OFFICE/OUTPT VISIT, EST, LEVL IV, 30-39 MIN: ICD-10-PCS | Mod: S$GLB,,, | Performed by: INTERNAL MEDICINE

## 2020-08-25 RX ORDER — LOSARTAN POTASSIUM 50 MG/1
50 TABLET ORAL DAILY
Qty: 90 TABLET | Refills: 3 | Status: SHIPPED | OUTPATIENT
Start: 2020-08-25 | End: 2021-06-01

## 2020-08-25 NOTE — PROGRESS NOTES
Subjective:   Patient ID:  Iza Esquivel is a 60 y.o. female who presents for follow up of Atherosclerotic PVD with intermittent claudication      HPI  A 59 yo female with old mi nstemi tobacco use has stopped smoking in July had lcx stent she also had severe limiting claudication with multilevel disease limiting her rehabilitation from her mi. She has iliac stenting performed subsequently she still has residual claudication on the rt calfb she can  Walk 1 mile w/o stopping has gained weight still not compliant with diet . Has been taking meds regularily. seh wants to go back to work no angina has some residual shortness of breath.   Past Medical History:   Diagnosis Date    Atherosclerotic PVD with intermittent claudication 5/25/2020    Cervical cancer     Hyperlipidemia     Hypertension     Right carotid bruit     S/P PTCA (percutaneous transluminal coronary angioplasty) 05/25/2020    STEMI: stenting of LCx       Past Surgical History:   Procedure Laterality Date    ABDOMINAL AORTOGRAPHY N/A 5/25/2020    Procedure: Aortogram, Abdominal;  Surgeon: Shelly Jarvis MD;  Location: [facility] CATH LAB;  Service: Cardiology;  Laterality: N/A;    AORTOGRAPHY WITH SERIALOGRAPHY N/A 6/30/2020    Procedure: AORTOGRAM, WITH RUNOFF;  Surgeon: Shelly Jarvis MD;  Location: [facility] CATH LAB;  Service: Cardiology;  Laterality: N/A;    LEFT HEART CATHETERIZATION Left 5/25/2020    Procedure: CATHETERIZATION, HEART, LEFT;  Surgeon: Shelly Jarvis MD;  Location: [facility] CATH LAB;  Service: Cardiology;  Laterality: Left;    LEFT HEART CATHETERIZATION Left 6/11/2020    Procedure: CATHETERIZATION, HEART, LEFT;  Surgeon: Shelly Jarvis MD;  Location: [facility] CATH LAB;  Service: Cardiology;  Laterality: Left;    LYMPH NODE BIOPSY      PERCUTANEOUS TRANSLUMINAL BALLOON ANGIOPLASTY OF CORONARY ARTERY  5/25/2020    Procedure: Angioplasty-coronary;  Surgeon: Shelly Jarvis MD;  Location: [facility] CATH LAB;  Service: Cardiology;;       Social  History     Tobacco Use    Smoking status: Former Smoker     Packs/day: 1.50     Years: 40.00     Pack years: 60.00     Quit date: 2020     Years since quittin.1    Smokeless tobacco: Never Used    Tobacco comment: now down to 10 cigarettes daily   Substance Use Topics    Alcohol use: No     Frequency: Never     Binge frequency: Never    Drug use: Not on file       Family History   Problem Relation Age of Onset    Cancer Mother         ?    Cancer Father         ?    Transient ischemic attack Father         Carotid artery stenosis, CEA    Diabetes Brother     Hypertension Brother        Current Outpatient Medications   Medication Sig    aspirin (ECOTRIN) 81 MG EC tablet Take 1 tablet (81 mg total) by mouth once daily.    atorvastatin (LIPITOR) 80 MG tablet Take 1 tablet (80 mg total) by mouth once daily.    isosorbide mononitrate (IMDUR) 30 MG 24 hr tablet Take 1 tablet (30 mg total) by mouth 2 (two) times a day.    losartan (COZAAR) 25 MG tablet Take 1 tablet (25 mg total) by mouth once daily.    metoprolol tartrate (LOPRESSOR) 25 MG tablet Take 1 tablet (25 mg total) by mouth 2 (two) times daily.    pantoprazole (PROTONIX) 40 MG tablet Take 1 tablet (40 mg total) by mouth once daily.    ticagrelor (BRILINTA) 90 mg tablet Take 1 tablet (90 mg total) by mouth 2 (two) times daily.     No current facility-administered medications for this visit.      Current Outpatient Medications on File Prior to Visit   Medication Sig    aspirin (ECOTRIN) 81 MG EC tablet Take 1 tablet (81 mg total) by mouth once daily.    atorvastatin (LIPITOR) 80 MG tablet Take 1 tablet (80 mg total) by mouth once daily.    isosorbide mononitrate (IMDUR) 30 MG 24 hr tablet Take 1 tablet (30 mg total) by mouth 2 (two) times a day.    losartan (COZAAR) 25 MG tablet Take 1 tablet (25 mg total) by mouth once daily.    metoprolol tartrate (LOPRESSOR) 25 MG tablet Take 1 tablet (25 mg total) by mouth 2 (two) times daily.     pantoprazole (PROTONIX) 40 MG tablet Take 1 tablet (40 mg total) by mouth once daily.    ticagrelor (BRILINTA) 90 mg tablet Take 1 tablet (90 mg total) by mouth 2 (two) times daily.     No current facility-administered medications on file prior to visit.      Review of patient's allergies indicates:  No Known Allergies  Review of Systems   Constitution: Positive for weight gain. Negative for diaphoresis and malaise/fatigue.   HENT: Negative for hoarse voice.    Eyes: Negative for double vision and visual disturbance.   Cardiovascular: Positive for claudication. Negative for chest pain, cyanosis, dyspnea on exertion, irregular heartbeat, leg swelling, near-syncope, orthopnea, palpitations, paroxysmal nocturnal dyspnea and syncope.   Respiratory: Negative for cough, hemoptysis, shortness of breath and snoring.    Hematologic/Lymphatic: Negative for bleeding problem. Does not bruise/bleed easily.   Skin: Negative for color change and poor wound healing.   Musculoskeletal: Negative for muscle cramps, muscle weakness and myalgias.   Gastrointestinal: Negative for bloating, abdominal pain, change in bowel habit, diarrhea, heartburn, hematemesis, hematochezia, melena and nausea.   Neurological: Negative for excessive daytime sleepiness, dizziness, headaches, light-headedness, loss of balance, numbness and weakness.   Psychiatric/Behavioral: Negative for memory loss. The patient does not have insomnia.    Allergic/Immunologic: Negative for hives.       Objective:   Physical Exam   Constitutional: She is oriented to person, place, and time. She appears well-developed and well-nourished. She does not appear ill. No distress.   HENT:   Head: Normocephalic and atraumatic.   Eyes: Pupils are equal, round, and reactive to light. EOM are normal. No scleral icterus.   Neck: Normal range of motion. Neck supple. Normal carotid pulses, no hepatojugular reflux and no JVD present. Carotid bruit is not present. No tracheal deviation  "present. No thyromegaly present.   Cardiovascular: Normal rate, regular rhythm and normal heart sounds. Exam reveals no gallop and no friction rub.   No murmur heard.  Pulses:       Carotid pulses are 2+ on the right side and 2+ on the left side.       Radial pulses are 2+ on the right side and 2+ on the left side.        Femoral pulses are 2+ on the right side with bruit and 2+ on the left side with bruit.       Popliteal pulses are 1+ on the right side and 1+ on the left side.        Dorsalis pedis pulses are 0 on the right side and 0 on the left side.        Posterior tibial pulses are 1+ on the right side and 1+ on the left side.   Abdominal subclavian bruits noted.  No pulsatile abdominal mass.    Pulmonary/Chest: Effort normal and breath sounds normal. No respiratory distress. She has no wheezes. She has no rhonchi. She has no rales. She exhibits no tenderness.   Abdominal: Soft. Normal appearance, normal aorta and bowel sounds are normal. She exhibits no distension, no abdominal bruit, no ascites and no pulsatile midline mass. There is no hepatomegaly. There is no abdominal tenderness.   Musculoskeletal:         General: No edema.      Right shoulder: She exhibits no deformity.   Neurological: She is alert and oriented to person, place, and time. She has normal strength. No cranial nerve deficit. Coordination normal.   Skin: Skin is warm and dry. No rash noted. No cyanosis or erythema. Nails show no clubbing.   Psychiatric: She has a normal mood and affect. Her speech is normal and behavior is normal.   Nursing note and vitals reviewed.    Vitals:    08/25/20 1538 08/25/20 1545   BP: (!) 146/72 (!) 140/70   BP Location: Right arm Left arm   Patient Position: Sitting Sitting   BP Method: Medium (Manual) Medium (Manual)   Pulse: 71    SpO2: 98%    Weight: 71.8 kg (158 lb 4.6 oz)    Height: 5' 4" (1.626 m)      Lab Results   Component Value Date    CHOL 164 05/26/2020    CHOL 131 02/10/2019    CHOL 200 (H) " 08/20/2018     Lab Results   Component Value Date    HDL 44 05/26/2020    HDL 33 (L) 02/10/2019    HDL 49 08/20/2018     Lab Results   Component Value Date    LDLCALC 87.8 05/26/2020    LDLCALC 74.0 02/10/2019    LDLCALC 110.6 08/20/2018     Lab Results   Component Value Date    TRIG 161 (H) 05/26/2020    TRIG 120 02/10/2019    TRIG 202 (H) 08/20/2018     Lab Results   Component Value Date    CHOLHDL 26.8 05/26/2020    CHOLHDL 25.2 02/10/2019    CHOLHDL 24.5 08/20/2018       Chemistry        Component Value Date/Time     07/20/2020 1517    K 4.4 07/20/2020 1517     07/20/2020 1517    CO2 28 07/20/2020 1517    BUN 9 07/20/2020 1517    CREATININE 1.0 07/20/2020 1517    GLU 90 07/20/2020 1517        Component Value Date/Time    CALCIUM 9.4 07/20/2020 1517    ALKPHOS 154 (H) 06/26/2020 1017    AST 22 06/26/2020 1017    ALT 14 06/26/2020 1017    BILITOT 0.4 06/26/2020 1017    ESTGFRAFRICA >60 07/20/2020 1517    EGFRNONAA >60 07/20/2020 1517          Lab Results   Component Value Date    TSH 4.399 (H) 02/09/2019     Lab Results   Component Value Date    INR 1.0 07/20/2020    INR 1.1 06/26/2020    INR 1.0 06/12/2020     Lab Results   Component Value Date    WBC 8.60 07/20/2020    HGB 10.9 (L) 07/20/2020    HCT 35.0 (L) 07/20/2020    MCV 90 07/20/2020     07/20/2020     BMP  Sodium   Date Value Ref Range Status   07/20/2020 142 136 - 145 mmol/L Final     Potassium   Date Value Ref Range Status   07/20/2020 4.4 3.5 - 5.1 mmol/L Final     Chloride   Date Value Ref Range Status   07/20/2020 105 95 - 110 mmol/L Final     CO2   Date Value Ref Range Status   07/20/2020 28 23 - 29 mmol/L Final     BUN, Bld   Date Value Ref Range Status   07/20/2020 9 8 - 23 mg/dL Final     Creatinine   Date Value Ref Range Status   07/20/2020 1.0 0.5 - 1.4 mg/dL Final     Calcium   Date Value Ref Range Status   07/20/2020 9.4 8.7 - 10.5 mg/dL Final     Anion Gap   Date Value Ref Range Status   07/20/2020 9 8 - 16 mmol/L Final      eGFR if    Date Value Ref Range Status   07/20/2020 >60 >60 mL/min/1.73 m^2 Final     eGFR if non    Date Value Ref Range Status   07/20/2020 >60 >60 mL/min/1.73 m^2 Final     Comment:     Calculation used to obtain the estimated glomerular filtration  rate (eGFR) is the CKD-EPI equation.        CrCl cannot be calculated (Patient's most recent lab result is older than the maximum 7 days allowed.).    Assessment:     1. Coronary artery disease involving native coronary artery of native heart without angina pectoris    2. Mixed hyperlipidemia    3. PVD (peripheral vascular disease)    4. Atherosclerotic PVD with intermittent claudication    5. Bruit of right carotid artery    6. Nonspecific abnormal electrocardiogram (ECG) (EKG)    7. History of ST elevation myocardial infarction (STEMI)      Cad wise no angina  htn better control still not optimal counsled about compliance with diet salt intake will increase losartan to 50 mg po daily  hlp on statins counseled about compliance  Weight loss exercise.  Multilevel pvd s/p bilateral iliac stenting still has residual rt sided non limiting claudication with bilateral sfa occlusion.s she was counsled about continued daily exercise program and aggressive risk factor modification. Iliacs tenting had her walking capacity improved remarkably so she could rehab her cad.   Plan:   Continue current therapy  Cardiac low salt diet.  Risk factor modification and excercise program./weight loss  Smoking cessation counseling  F/u in 3  months with lipid cmp .ishan   Increase losartan to 50 mg po daily.

## 2020-09-12 ENCOUNTER — HOSPITAL ENCOUNTER (EMERGENCY)
Facility: HOSPITAL | Age: 60
Discharge: HOME | End: 2020-09-12
Attending: EMERGENCY MEDICINE
Payer: COMMERCIAL

## 2020-09-12 VITALS
TEMPERATURE: 99 F | RESPIRATION RATE: 16 BRPM | WEIGHT: 157.19 LBS | HEIGHT: 64 IN | DIASTOLIC BLOOD PRESSURE: 70 MMHG | BODY MASS INDEX: 26.84 KG/M2 | HEART RATE: 49 BPM | OXYGEN SATURATION: 100 % | SYSTOLIC BLOOD PRESSURE: 155 MMHG

## 2020-09-12 DIAGNOSIS — I73.9 CLAUDICATION: Primary | ICD-10-CM

## 2020-09-12 DIAGNOSIS — S80.12XA CONTUSION OF LEFT LEG: ICD-10-CM

## 2020-09-12 DIAGNOSIS — I73.9 PVD (PERIPHERAL VASCULAR DISEASE): ICD-10-CM

## 2020-09-12 DIAGNOSIS — M79.606 LEG PAIN: ICD-10-CM

## 2020-09-12 LAB
ALBUMIN SERPL BCP-MCNC: 3.6 G/DL (ref 3.5–5.2)
ALP SERPL-CCNC: 118 U/L (ref 55–135)
ALT SERPL W/O P-5'-P-CCNC: 15 U/L (ref 10–44)
ANION GAP SERPL CALC-SCNC: 11 MMOL/L (ref 8–16)
APTT BLDCRRT: 25.6 SEC (ref 21–32)
APTT BLDCRRT: 27.1 SEC (ref 21–32)
AST SERPL-CCNC: 25 U/L (ref 10–40)
BASOPHILS # BLD AUTO: 0.09 K/UL (ref 0–0.2)
BASOPHILS NFR BLD: 1.1 % (ref 0–1.9)
BILIRUB SERPL-MCNC: 0.3 MG/DL (ref 0.1–1)
BUN SERPL-MCNC: 7 MG/DL (ref 8–23)
CALCIUM SERPL-MCNC: 9.1 MG/DL (ref 8.7–10.5)
CHLORIDE SERPL-SCNC: 107 MMOL/L (ref 95–110)
CK SERPL-CCNC: 269 U/L (ref 20–180)
CO2 SERPL-SCNC: 24 MMOL/L (ref 23–29)
CREAT SERPL-MCNC: 1 MG/DL (ref 0.5–1.4)
DIFFERENTIAL METHOD: ABNORMAL
EOSINOPHIL # BLD AUTO: 0.1 K/UL (ref 0–0.5)
EOSINOPHIL NFR BLD: 1.1 % (ref 0–8)
ERYTHROCYTE [DISTWIDTH] IN BLOOD BY AUTOMATED COUNT: 14.8 % (ref 11.5–14.5)
EST. GFR  (AFRICAN AMERICAN): >60 ML/MIN/1.73 M^2
EST. GFR  (NON AFRICAN AMERICAN): >60 ML/MIN/1.73 M^2
GLUCOSE SERPL-MCNC: 91 MG/DL (ref 70–110)
HCT VFR BLD AUTO: 32.9 % (ref 37–48.5)
HGB BLD-MCNC: 10.1 G/DL (ref 12–16)
IMM GRANULOCYTES # BLD AUTO: 0.03 K/UL (ref 0–0.04)
IMM GRANULOCYTES NFR BLD AUTO: 0.4 % (ref 0–0.5)
INR PPP: 1 (ref 0.8–1.2)
INR PPP: 1.3 (ref 0.8–1.2)
LYMPHOCYTES # BLD AUTO: 2.1 K/UL (ref 1–4.8)
LYMPHOCYTES NFR BLD: 25.3 % (ref 18–48)
MCH RBC QN AUTO: 28.3 PG (ref 27–31)
MCHC RBC AUTO-ENTMCNC: 30.7 G/DL (ref 32–36)
MCV RBC AUTO: 92 FL (ref 82–98)
MONOCYTES # BLD AUTO: 0.6 K/UL (ref 0.3–1)
MONOCYTES NFR BLD: 7 % (ref 4–15)
NEUTROPHILS # BLD AUTO: 5.5 K/UL (ref 1.8–7.7)
NEUTROPHILS NFR BLD: 65.1 % (ref 38–73)
NRBC BLD-RTO: 0 /100 WBC
PLATELET # BLD AUTO: 381 K/UL (ref 150–350)
PMV BLD AUTO: 10.3 FL (ref 9.2–12.9)
POTASSIUM SERPL-SCNC: 3.7 MMOL/L (ref 3.5–5.1)
PROT SERPL-MCNC: 7.2 G/DL (ref 6–8.4)
PROTHROMBIN TIME: 10.6 SEC (ref 9–12.5)
PROTHROMBIN TIME: 13.4 SEC (ref 9–12.5)
RBC # BLD AUTO: 3.57 M/UL (ref 4–5.4)
SODIUM SERPL-SCNC: 142 MMOL/L (ref 136–145)
WBC # BLD AUTO: 8.39 K/UL (ref 3.9–12.7)

## 2020-09-12 PROCEDURE — 82550 ASSAY OF CK (CPK): CPT

## 2020-09-12 PROCEDURE — 85610 PROTHROMBIN TIME: CPT

## 2020-09-12 PROCEDURE — 80053 COMPREHEN METABOLIC PANEL: CPT

## 2020-09-12 PROCEDURE — 99284 EMERGENCY DEPT VISIT MOD MDM: CPT | Mod: 25

## 2020-09-12 PROCEDURE — 85025 COMPLETE CBC W/AUTO DIFF WBC: CPT

## 2020-09-12 PROCEDURE — 85730 THROMBOPLASTIN TIME PARTIAL: CPT | Mod: 91

## 2020-09-12 NOTE — ED NOTES
GONZÁLEZ's obtained and Dr. Pérez notified:     Left Ankle: 130/162 = 0.802  Right Ankle: 118/162 = 0.728

## 2020-09-12 NOTE — ED NOTES
Patient complains of knot to left thigh and pain to BLE. Symptoms have been present since angiogram of abdominal aorta with extremity run off procedure on 7/28/2020. Able to ambulate independently and without difficulty. Patient reports chronic SOB but no new SOB and no chest pain.    Level of Consciousness: The patient is awake, alert, and oriented with appropriate affect and speech; oriented to person, place and time.  Appearance: Sitting up in bed with no acute distress noted. Clothing and hygiene are clean and worn appropriately.  Skin: Skin is warm and dry with good skin turgor; intact; color consistent with ethnicity.  Mucous membranes are moist.   Musculoskeletal: Moves all extremities well in full range of motion. No obvious deformities or swelling noted.  Respiratory: Airway open and patent, respirations spontaneous, even and unlabored. No accessory muscles in use. Breath sounds clear and equal bilaterally. Pt reports chronic SOB but nothing new.  Cardiac: Regular rate and rhythm, no peripheral edema noted, good pulses palpated peripherally, capillary refill < 3 seconds.  Abdomen: Soft, non-tender to palpation. No distention noted.  Neurologic: PERRLA, face exhibits symmetrical expression, hand grasps equal and even bilaterally, reports normal sensation to all extremities and face.  Psychosocial: WNL    Patient verbalized understanding of status and plan of care. Patient changed into hospital gown with assistance. Side rails up x 2, call light in reach, bed low and locked. Cardiac monitor applied to patient; alarms on, audible, and set. Visitor at bedside. Will continue to monitor.

## 2020-09-12 NOTE — ED PROVIDER NOTES
SCRIBE #1 NOTE: I, Hanna Grant, am scribing for, and in the presence of, Demetrio Pérez MD. I have scribed the entire note.       History     Chief Complaint   Patient presents with    Leg Pain     had procedure done last month and woke up with knot to left thigh and pain to both legs.      Review of patient's allergies indicates:  No Known Allergies      History of Present Illness     HPI    9/12/2020, 11:48 AM  History obtained from the patient      History of Present Illness: Iza Esquivel is a 60 y.o. female patient with a PMHx of atherosclerotic PVD with intermittent claudication, cervical cancer, HLD, HTN and right carotid bruit and PSHx aortography with serialograph, L heart cath, percutaneous transluminal balloon angioplasty of coronary artery, and abdominal aortography who presents to the Emergency Department for evaluation of BLE pain. Pt reports she had a L heart cath on 6/11/2020 and an aortography with serialography on 6/39/2020 and woke up this morning with a knot to L thigh with surrounding bruise. Symptoms are constant and moderate in severity. Sxs exacerbated by ambulation. No mitigating factors reported. Patient denies any fever, chills, numbness, weakness, n/v/d, abd pain, leg swelling, CP, SOB, and all other sxs at this time. No further complaints or concerns at this time.       Arrival mode: Personal vehicle      PCP: Park Gomez MD        Past Medical History:  Past Medical History:   Diagnosis Date    Atherosclerotic PVD with intermittent claudication 5/25/2020    Cervical cancer     Hyperlipidemia     Hypertension     Right carotid bruit     S/P PTCA (percutaneous transluminal coronary angioplasty) 05/25/2020    STEMI: stenting of LCx       Past Surgical History:  Past Surgical History:   Procedure Laterality Date    ABDOMINAL AORTOGRAPHY N/A 5/25/2020    Procedure: Aortogram, Abdominal;  Surgeon: Shelly Jarvis MD;  Location: [facility] CATH LAB;  Service: Cardiology;   Laterality: N/A;    AORTOGRAPHY WITH SERIALOGRAPHY N/A 2020    Procedure: AORTOGRAM, WITH RUNOFF;  Surgeon: Shelly Jarvis MD;  Location: [facility] CATH LAB;  Service: Cardiology;  Laterality: N/A;    LEFT HEART CATHETERIZATION Left 2020    Procedure: CATHETERIZATION, HEART, LEFT;  Surgeon: Shelly Jarvis MD;  Location: [facility] CATH LAB;  Service: Cardiology;  Laterality: Left;    LEFT HEART CATHETERIZATION Left 2020    Procedure: CATHETERIZATION, HEART, LEFT;  Surgeon: Shelly Jarvis MD;  Location: [facility] CATH LAB;  Service: Cardiology;  Laterality: Left;    LYMPH NODE BIOPSY      PERCUTANEOUS TRANSLUMINAL BALLOON ANGIOPLASTY OF CORONARY ARTERY  2020    Procedure: Angioplasty-coronary;  Surgeon: Shelly Jarvis MD;  Location: [facility] CATH LAB;  Service: Cardiology;;         Family History:  Family History   Problem Relation Age of Onset    Cancer Mother         ?    Cancer Father         ?    Transient ischemic attack Father         Carotid artery stenosis, CEA    Diabetes Brother     Hypertension Brother        Social History:  Social History     Tobacco Use    Smoking status: Former Smoker     Packs/day: 1.50     Years: 40.00     Pack years: 60.00     Quit date: 2020     Years since quittin.1    Smokeless tobacco: Never Used    Tobacco comment: now down to 10 cigarettes daily   Substance and Sexual Activity    Alcohol use: No     Frequency: Never     Binge frequency: Never    Drug use: Unknown    Sexual activity: Never        Review of Systems     Review of Systems   Constitutional: Negative for chills and fever.   HENT: Negative for sore throat.    Respiratory: Negative for shortness of breath.    Cardiovascular: Negative for chest pain and leg swelling.   Gastrointestinal: Negative for abdominal pain, diarrhea, nausea and vomiting.   Genitourinary: Negative for dysuria.   Musculoskeletal: Positive for arthralgias (BLE pain). Negative for back pain.   Skin: Negative for rash.     "    (+) knot to L leg with surrounding bruise     Neurological: Negative for weakness and numbness.   Hematological: Does not bruise/bleed easily.   All other systems reviewed and are negative.     Physical Exam     Initial Vitals [09/12/20 1115]   BP Pulse Resp Temp SpO2   (!) 167/74 61 16 98.9 °F (37.2 °C) 97 %      MAP       --          Physical Exam  Nursing Notes and Vital Signs Reviewed.  Constitutional: Patient is in no acute distress. Well-developed and well-nourished.  Head: Atraumatic. Normocephalic.  Eyes: PERRL. EOM intact. Conjunctivae are not pale. No scleral icterus.  ENT: Mucous membranes are moist. Oropharynx is clear and symmetric.    Neck: Supple. Full ROM. No lymphadenopathy.  Cardiovascular: Regular rate. Regular rhythm. No murmurs, rubs, or gallops. Distal pulses are 2+ and symmetric.  Pulmonary/Chest: No respiratory distress. Clear to auscultation bilaterally. No wheezing or rales.  Abdominal: Soft and non-distended.  There is no tenderness.  No rebound, guarding, or rigidity. Good bowel sounds.  Genitourinary: No CVA tenderness  Musculoskeletal: Moves all extremities. No obvious deformities. No edema. No calf tenderness.  Skin: Warm and dry.  Neurological:  Alert, awake, and appropriate.  Normal speech.  No acute focal neurological deficits are appreciated.  Psychiatric: Normal affect. Good eye contact. Appropriate in content.     ED Course   Procedures  ED Vital Signs:  Vitals:    09/12/20 1115 09/12/20 1204 09/12/20 1333 09/12/20 1402   BP: (!) 167/74 (!) 213/88 (!) 173/82 (!) 180/73   Pulse: 61 (!) 56 (!) 53 (!) 51   Resp: 16      Temp: 98.9 °F (37.2 °C)      TempSrc: Oral      SpO2: 97% 100% 100% 100%   Weight: 71.3 kg (157 lb 3 oz)      Height: 5' 4" (1.626 m)       09/12/20 1444 09/12/20 1502   BP: (!) 162/74 (!) 155/70   Pulse: (!) 53 (!) 49   Resp:     Temp:     TempSrc:     SpO2: 100% 100%   Weight:     Height:         Abnormal Lab Results:  Labs Reviewed   COMPREHENSIVE METABOLIC " PANEL - Abnormal; Notable for the following components:       Result Value    BUN, Bld 7 (*)     All other components within normal limits   PROTIME-INR - Abnormal; Notable for the following components:    Prothrombin Time 13.4 (*)     INR 1.3 (*)     All other components within normal limits   CK - Abnormal; Notable for the following components:     (*)     All other components within normal limits   CBC W/ AUTO DIFFERENTIAL - Abnormal; Notable for the following components:    RBC 3.57 (*)     Hemoglobin 10.1 (*)     Hematocrit 32.9 (*)     Mean Corpuscular Hemoglobin Conc 30.7 (*)     RDW 14.8 (*)     Platelets 381 (*)     All other components within normal limits   APTT   APTT   PROTIME-INR        All Lab Results:  Results for orders placed or performed during the hospital encounter of 09/12/20   Comprehensive metabolic panel   Result Value Ref Range    Sodium 142 136 - 145 mmol/L    Potassium 3.7 3.5 - 5.1 mmol/L    Chloride 107 95 - 110 mmol/L    CO2 24 23 - 29 mmol/L    Glucose 91 70 - 110 mg/dL    BUN, Bld 7 (L) 8 - 23 mg/dL    Creatinine 1.0 0.5 - 1.4 mg/dL    Calcium 9.1 8.7 - 10.5 mg/dL    Total Protein 7.2 6.0 - 8.4 g/dL    Albumin 3.6 3.5 - 5.2 g/dL    Total Bilirubin 0.3 0.1 - 1.0 mg/dL    Alkaline Phosphatase 118 55 - 135 U/L    AST 25 10 - 40 U/L    ALT 15 10 - 44 U/L    Anion Gap 11 8 - 16 mmol/L    eGFR if African American >60 >60 mL/min/1.73 m^2    eGFR if non African American >60 >60 mL/min/1.73 m^2   Protime-INR   Result Value Ref Range    Prothrombin Time 13.4 (H) 9.0 - 12.5 sec    INR 1.3 (H) 0.8 - 1.2   APTT   Result Value Ref Range    aPTT 25.6 21.0 - 32.0 sec   CPK   Result Value Ref Range     (H) 20 - 180 U/L   CBC auto differential   Result Value Ref Range    WBC 8.39 3.90 - 12.70 K/uL    RBC 3.57 (L) 4.00 - 5.40 M/uL    Hemoglobin 10.1 (L) 12.0 - 16.0 g/dL    Hematocrit 32.9 (L) 37.0 - 48.5 %    Mean Corpuscular Volume 92 82 - 98 fL    Mean Corpuscular Hemoglobin 28.3 27.0 -  31.0 pg    Mean Corpuscular Hemoglobin Conc 30.7 (L) 32.0 - 36.0 g/dL    RDW 14.8 (H) 11.5 - 14.5 %    Platelets 381 (H) 150 - 350 K/uL    MPV 10.3 9.2 - 12.9 fL    Immature Granulocytes 0.4 0.0 - 0.5 %    Gran # (ANC) 5.5 1.8 - 7.7 K/uL    Immature Grans (Abs) 0.03 0.00 - 0.04 K/uL    Lymph # 2.1 1.0 - 4.8 K/uL    Mono # 0.6 0.3 - 1.0 K/uL    Eos # 0.1 0.0 - 0.5 K/uL    Baso # 0.09 0.00 - 0.20 K/uL    nRBC 0 0 /100 WBC    Gran% 65.1 38.0 - 73.0 %    Lymph% 25.3 18.0 - 48.0 %    Mono% 7.0 4.0 - 15.0 %    Eosinophil% 1.1 0.0 - 8.0 %    Basophil% 1.1 0.0 - 1.9 %    Differential Method Automated    APTT   Result Value Ref Range    aPTT 27.1 21.0 - 32.0 sec   Protime-INR   Result Value Ref Range    Prothrombin Time 10.6 9.0 - 12.5 sec    INR 1.0 0.8 - 1.2       Imaging Results:  Imaging Results          US Lower Extremity Veins Bilateral (Final result)  Result time 09/12/20 12:37:43    Final result by Humza Can MD (09/12/20 12:37:43)                 Impression:      No evidence of deep venous thrombosis in either lower extremity.      Electronically signed by: Humza Can  Date:    09/12/2020  Time:    12:37             Narrative:    EXAMINATION:  US LOWER EXTREMITY VEINS BILATERAL    CLINICAL HISTORY:  Pain in leg, unspecified    TECHNIQUE:  Duplex and color flow Doppler and dynamic compression was performed of the bilateral lower extremity veins was performed.    COMPARISON:  None    FINDINGS:  Right thigh veins: The common femoral, femoral, popliteal, upper greater saphenous, and deep femoral veins are patent and free of thrombus. The veins are normally compressible and have normal phasic flow and augmentation response.    Right calf veins: The visualized calf veins are patent.    Left thigh veins: The common femoral, femoral, popliteal, upper greater saphenous, and deep femoral veins are patent and free of thrombus. The veins are normally compressible and have normal phasic flow and augmentation  response.    Left calf veins: The visualized calf veins are patent.    Miscellaneous: None                               X-Ray Femur Ap/Lat Left (Final result)  Result time 09/12/20 12:14:53    Final result by Emerson Colvin MD (09/12/20 12:14:53)                 Impression:      Negative for fracture.      Electronically signed by: Emerson Colvin MD  Date:    09/12/2020  Time:    12:14             Narrative:    EXAMINATION:  XR FEMUR 2 VIEW LEFT    CLINICAL HISTORY:  Contusion of left lower leg, initial encounter    TECHNIQUE:  Routine radiographs obtained.    COMPARISON:  None    FINDINGS:  Negative for acute fracture or dislocation.    No significant arthritic changes.    Arterial vascular calcifications.                                         The Emergency Provider reviewed the vital signs and test results, which are outlined above.     ED Discussion     3:06 PM: Discussed pt's case with Wiley Goldstein MD (Cardiology) who states that since pt is not having any severe symptoms, she can see Khuri and discuss leg angio. He recommends she should see him this week.     3:12 PM: Reassessed pt at this time.  Pt states her condition has improved at this time. Discussed with pt all pertinent ED information and results. Discussed pt dx and plan of tx. Gave pt all f/u and return to the ED instructions. All questions and concerns were addressed at this time. Pt expresses understanding of information and instructions, and is comfortable with plan to discharge. Pt is stable for discharge.    I discussed with patient and/or family/caretaker that evaluation in the ED does not suggest any emergent or life threatening medical conditions requiring immediate intervention beyond what was provided in the ED, and I believe patient is safe for discharge.  Regardless, an unremarkable evaluation in the ED does not preclude the development or presence of a serious of life threatening condition. As such, patient was instructed to return  immediately for any worsening or change in current symptoms.      ED Course as of Sep 12 1723   Sat Sep 12, 2020   1419 GONZÁLEZ's being performed    [BA]      ED Course User Index  [BA] Demetrio Pérez MD     Medical Decision Making:   Clinical Tests:   Lab Tests: Reviewed and Ordered  Radiological Study: Reviewed and Ordered           ED Medication(s):  Medications - No data to display    Discharge Medication List as of 9/12/2020  3:05 PM          Follow-up Information     Shabana Jarvis MD. Schedule an appointment as soon as possible for a visit in 2 days.    Specialty: Cardiology  Why: For re-evaluation and further treatment  Contact information:  [id number]  [place] [address]  [phone]             [facility]. Go today.    Specialty: Emergency Medicine  Why: If symptoms worsen, For re-evaluation and further treatment, As needed  Contact information:  [address] [facility] [address] [phone]                     Scribe Attestation:   Scribe #1: I performed the above scribed service and the documentation accurately describes the services I performed. I attest to the accuracy of the note.     Attending:   Physician Attestation Statement for Scribe #1: I, Demetrio Pérez MD, personally performed the services described in this documentation, as scribed by Hanna Grant, in my presence, and it is both accurate and complete.           Clinical Impression       ICD-10-CM ICD-9-CM   1. Claudication  I73.9 443.9   2. Leg pain  M79.606 729.5   3. Contusion of left leg  S80.12XA 924.5   4. PVD (peripheral vascular disease)  I73.9 443.9       Disposition:   Disposition: Discharged  Condition: Stable         Demetrio Pérez MD  09/12/20 1723

## 2020-09-16 ENCOUNTER — PATIENT MESSAGE (OUTPATIENT)
Dept: CARDIOLOGY | Facility: CLINIC | Age: 60
End: 2020-09-16

## 2020-09-17 ENCOUNTER — OFFICE VISIT (OUTPATIENT)
Dept: CARDIOLOGY | Facility: CLINIC | Age: 60
End: 2020-09-17
Payer: COMMERCIAL

## 2020-09-17 VITALS
SYSTOLIC BLOOD PRESSURE: 158 MMHG | HEART RATE: 73 BPM | WEIGHT: 158.31 LBS | DIASTOLIC BLOOD PRESSURE: 78 MMHG | BODY MASS INDEX: 27.03 KG/M2 | HEIGHT: 64 IN

## 2020-09-17 DIAGNOSIS — I73.9 PVD (PERIPHERAL VASCULAR DISEASE): ICD-10-CM

## 2020-09-17 DIAGNOSIS — E78.2 MIXED HYPERLIPIDEMIA: Chronic | ICD-10-CM

## 2020-09-17 DIAGNOSIS — Z72.0 TOBACCO ABUSE: Chronic | ICD-10-CM

## 2020-09-17 DIAGNOSIS — I10 ESSENTIAL HYPERTENSION: ICD-10-CM

## 2020-09-17 DIAGNOSIS — I25.118 CORONARY ARTERY DISEASE OF NATIVE ARTERY OF NATIVE HEART WITH STABLE ANGINA PECTORIS: Primary | ICD-10-CM

## 2020-09-17 DIAGNOSIS — I25.2 HISTORY OF ST ELEVATION MYOCARDIAL INFARCTION (STEMI): ICD-10-CM

## 2020-09-17 DIAGNOSIS — I70.219 ATHEROSCLEROTIC PVD WITH INTERMITTENT CLAUDICATION: ICD-10-CM

## 2020-09-17 DIAGNOSIS — Z01.89 NEED FOR ASSESSMENT FOR SLEEP APNEA: ICD-10-CM

## 2020-09-17 PROCEDURE — 99214 PR OFFICE/OUTPT VISIT, EST, LEVL IV, 30-39 MIN: ICD-10-PCS | Mod: S$GLB,,, | Performed by: NURSE PRACTITIONER

## 2020-09-17 PROCEDURE — 99999 PR PBB SHADOW E&M-EST. PATIENT-LVL IV: ICD-10-PCS | Mod: PBBFAC,,, | Performed by: NURSE PRACTITIONER

## 2020-09-17 PROCEDURE — 99999 PR PBB SHADOW E&M-EST. PATIENT-LVL IV: CPT | Mod: PBBFAC,,, | Performed by: NURSE PRACTITIONER

## 2020-09-17 PROCEDURE — 99214 OFFICE O/P EST MOD 30 MIN: CPT | Mod: S$GLB,,, | Performed by: NURSE PRACTITIONER

## 2020-09-17 RX ORDER — AMLODIPINE BESYLATE 2.5 MG/1
2.5 TABLET ORAL DAILY
Qty: 30 TABLET | Refills: 11 | Status: SHIPPED | OUTPATIENT
Start: 2020-09-17 | End: 2020-10-01 | Stop reason: SDUPTHER

## 2020-09-17 NOTE — PROGRESS NOTES
Subjective:   Patient ID:  Iza Esquivel is a 60 y.o. female who presents for evaluation of Coronary Artery Disease, Peripheral Arterial Disease, and Risk Factor Management For Atherosclerosis      HPI     Iza Esquivel is a 60 year old female who presents to clinic due to bruise on her leg. Her current medical conditions include CAD s/p PCI of LCX, PVD, HTN, HLP. She returns today and states she is doing ok.     She has returned to work and has been having leg pain at the end of the day. She was seen in ED due to claudication and underwent BLE U/S which was negative for DVT.     She has bruise to her left outer thigh, dressing applied. Bruise healing.     Denies chest pain or anginal equivalents. Today in office, she does endorse MORRELL and nightly snoring issues. Reports nerve pain to bilateral feet when walking. Denies orthopnea, PND or abdominal bloating. Reports regular walking without any issues lately. NO leg swelling or claudications. No recent falls, syncope or near syncopal events. Reports compliance with medications and dietary restrictions. NO CNS complaints to suggest TIA or CVA today. No signs of abnormal bleeding on ASA and Brilinta.          Past Medical History:   Diagnosis Date    Atherosclerotic PVD with intermittent claudication 5/25/2020    Cervical cancer     Hyperlipidemia     Hypertension     Right carotid bruit     S/P PTCA (percutaneous transluminal coronary angioplasty) 05/25/2020    STEMI: stenting of LCx       Past Surgical History:   Procedure Laterality Date    ABDOMINAL AORTOGRAPHY N/A 5/25/2020    Procedure: Aortogram, Abdominal;  Surgeon: Shelly Jarvis MD;  Location: [facility] CATH LAB;  Service: Cardiology;  Laterality: N/A;    AORTOGRAPHY WITH SERIALOGRAPHY N/A 6/30/2020    Procedure: AORTOGRAM, WITH RUNOFF;  Surgeon: Shelly Jarvis MD;  Location: [facility] CATH LAB;  Service: Cardiology;  Laterality: N/A;    LEFT HEART CATHETERIZATION Left 5/25/2020    Procedure:  CATHETERIZATION, HEART, LEFT;  Surgeon: Shelly Jarvis MD;  Location: [facility] CATH LAB;  Service: Cardiology;  Laterality: Left;    LEFT HEART CATHETERIZATION Left 2020    Procedure: CATHETERIZATION, HEART, LEFT;  Surgeon: Shelly Jarvis MD;  Location: [facility] CATH LAB;  Service: Cardiology;  Laterality: Left;    LYMPH NODE BIOPSY      PERCUTANEOUS TRANSLUMINAL BALLOON ANGIOPLASTY OF CORONARY ARTERY  2020    Procedure: Angioplasty-coronary;  Surgeon: Shelly Jarvis MD;  Location: [facility] CATH LAB;  Service: Cardiology;;       Social History     Tobacco Use    Smoking status: Former Smoker     Packs/day: 1.50     Years: 40.00     Pack years: 60.00     Quit date: 2020     Years since quittin.1    Smokeless tobacco: Never Used    Tobacco comment: now down to 10 cigarettes daily   Substance Use Topics    Alcohol use: No     Frequency: Never     Binge frequency: Never    Drug use: Not on file       Family History   Problem Relation Age of Onset    Cancer Mother         ?    Cancer Father         ?    Transient ischemic attack Father         Carotid artery stenosis, CEA    Diabetes Brother     Hypertension Brother      Wt Readings from Last 3 Encounters:   20 71.8 kg (158 lb 4.6 oz)   20 71.3 kg (157 lb 3 oz)   20 71.8 kg (158 lb 4.6 oz)     Temp Readings from Last 3 Encounters:   20 98.9 °F (37.2 °C) (Oral)   20 97.9 °F (36.6 °C) (Temporal)   20 98.2 °F (36.8 °C) (Temporal)     BP Readings from Last 3 Encounters:   20 (!) 158/78   20 (!) 155/70   20 (!) 140/70     Pulse Readings from Last 3 Encounters:   20 73   20 (!) 49   20 71     Current Outpatient Medications on File Prior to Visit   Medication Sig Dispense Refill    aspirin (ECOTRIN) 81 MG EC tablet Take 1 tablet (81 mg total) by mouth once daily. 30 tablet 0    atorvastatin (LIPITOR) 80 MG tablet Take 1 tablet (80 mg total) by mouth once daily. 90 tablet 3     "isosorbide mononitrate (IMDUR) 30 MG 24 hr tablet Take 1 tablet (30 mg total) by mouth 2 (two) times a day. 60 tablet 11    losartan (COZAAR) 50 MG tablet Take 1 tablet (50 mg total) by mouth once daily. 90 tablet 3    metoprolol tartrate (LOPRESSOR) 25 MG tablet Take 1 tablet (25 mg total) by mouth 2 (two) times daily. 60 tablet 11    pantoprazole (PROTONIX) 40 MG tablet Take 1 tablet (40 mg total) by mouth once daily. 30 tablet 11    ticagrelor (BRILINTA) 90 mg tablet Take 1 tablet (90 mg total) by mouth 2 (two) times daily. 60 tablet 11     No current facility-administered medications on file prior to visit.          Review of Systems   Constitution: Positive for malaise/fatigue.   HENT: Negative for hearing loss and hoarse voice.    Eyes: Negative for blurred vision and visual disturbance.   Cardiovascular: Positive for dyspnea on exertion. Negative for chest pain, claudication, irregular heartbeat, leg swelling, near-syncope, orthopnea, palpitations, paroxysmal nocturnal dyspnea and syncope.   Respiratory: Positive for cough and snoring. Negative for hemoptysis, shortness of breath, sleep disturbances due to breathing and wheezing.    Endocrine: Negative for cold intolerance and heat intolerance.   Hematologic/Lymphatic: Bruises/bleeds easily.   Skin: Negative for color change, dry skin and nail changes.   Musculoskeletal: Positive for arthritis and back pain. Negative for joint pain and myalgias.   Gastrointestinal: Negative for bloating, abdominal pain, constipation, nausea and vomiting.   Genitourinary: Negative for dysuria, flank pain, hematuria and hesitancy.   Neurological: Negative for headaches, light-headedness, loss of balance, numbness, paresthesias and weakness.   Psychiatric/Behavioral: Negative for altered mental status.   Allergic/Immunologic: Negative for environmental allergies.     BP (!) 158/78   Pulse 73   Ht 5' 4" (1.626 m)   Wt 71.8 kg (158 lb 4.6 oz)   LMP  (LMP Unknown)   BMI " 27.17 kg/m²     Objective:   Physical Exam   Constitutional: She is oriented to person, place, and time. She appears well-developed and well-nourished. No distress.   HENT:   Head: Normocephalic and atraumatic.   Eyes: Pupils are equal, round, and reactive to light.   Neck: Normal range of motion and full passive range of motion without pain. Neck supple. No JVD present.   Cardiovascular: Normal rate, regular rhythm, S1 normal, S2 normal and intact distal pulses. PMI is not displaced. Exam reveals no distant heart sounds.   No murmur heard.  Pulses:       Radial pulses are 2+ on the right side and 2+ on the left side.        Dorsalis pedis pulses are 1+ on the right side and 1+ on the left side.   CHEST PAIN FREE   Pulmonary/Chest: Effort normal. No accessory muscle usage. No respiratory distress. She has decreased breath sounds in the right lower field and the left lower field. She has no wheezes. She has no rales.   Musculoskeletal: Normal range of motion.         General: No edema.      Right ankle: She exhibits no swelling.      Left ankle: She exhibits no swelling.   Neurological: She is alert and oriented to person, place, and time.   Skin: Skin is warm and dry. She is not diaphoretic. No cyanosis. Nails show no clubbing.   Psychiatric: She has a normal mood and affect. Her speech is normal and behavior is normal. Judgment and thought content normal. Cognition and memory are normal.   Nursing note and vitals reviewed.      Lab Results   Component Value Date    CHOL 164 05/26/2020    CHOL 131 02/10/2019    CHOL 200 (H) 08/20/2018     Lab Results   Component Value Date    HDL 44 05/26/2020    HDL 33 (L) 02/10/2019    HDL 49 08/20/2018     Lab Results   Component Value Date    LDLCALC 87.8 05/26/2020    LDLCALC 74.0 02/10/2019    LDLCALC 110.6 08/20/2018     Lab Results   Component Value Date    TRIG 161 (H) 05/26/2020    TRIG 120 02/10/2019    TRIG 202 (H) 08/20/2018     Lab Results   Component Value Date     CHOLHDL 26.8 05/26/2020    CHOLHDL 25.2 02/10/2019    CHOLHDL 24.5 08/20/2018       Chemistry        Component Value Date/Time     09/12/2020 1254    K 3.7 09/12/2020 1254     09/12/2020 1254    CO2 24 09/12/2020 1254    BUN 7 (L) 09/12/2020 1254    CREATININE 1.0 09/12/2020 1254    GLU 91 09/12/2020 1254        Component Value Date/Time    CALCIUM 9.1 09/12/2020 1254    ALKPHOS 118 09/12/2020 1254    AST 25 09/12/2020 1254    ALT 15 09/12/2020 1254    BILITOT 0.3 09/12/2020 1254    ESTGFRAFRICA >60 09/12/2020 1254    EGFRNONAA >60 09/12/2020 1254          Lab Results   Component Value Date    TSH 4.399 (H) 02/09/2019     Lab Results   Component Value Date    INR 1.0 09/12/2020    INR 1.3 (H) 09/12/2020    INR 1.0 07/20/2020     Lab Results   Component Value Date    WBC 8.39 09/12/2020    HGB 10.1 (L) 09/12/2020    HCT 32.9 (L) 09/12/2020    MCV 92 09/12/2020     (H) 09/12/2020        Assessment:      1. Coronary artery disease of native artery of native heart with stable angina pectoris    2. Tobacco abuse    3. Need for assessment for sleep apnea    4. Mixed hyperlipidemia    5. History of ST elevation myocardial infarction (STEMI)    6. Atherosclerotic PVD with intermittent claudication    7. PVD (peripheral vascular disease)      Patient presents to clinic for evaluation of bruise on her left thigh which is healing. Recent negative DVT u/s. Her blood pressure is elevated today in office despite increase to Losartan at last visit. Has smoked a few cigarettes a few days ago. Discussed importance of complete smoking cessation. Needs f/u with Pulm arranged given smoking history and MORRELL issues. Will adjust meds for better BP control as well.   Plan:      Will check A1C on next scheduled labs  Needs evaluation by Pulm for sleep apnea and probable COPD  Has recently quit smoking AND NEEDS TO CONTINUE TO ABSTAIN, SHE SMOKED A FEW DAYS AGO.   Add Norvasc 2.5 mg daily  Continue all other CV meds for  now  Dash diet, 2 gm sodium restriction  Encourage daily walking  Monitor BP at home  Keep F/U with Dr Jarvis as scheduled    BLAYNE SinghPNAKIA  [facility]

## 2020-09-25 ENCOUNTER — PATIENT MESSAGE (OUTPATIENT)
Dept: OTHER | Facility: OTHER | Age: 60
End: 2020-09-25

## 2020-10-01 ENCOUNTER — CLINICAL SUPPORT (OUTPATIENT)
Dept: CARDIOLOGY | Facility: CLINIC | Age: 60
End: 2020-10-01
Payer: COMMERCIAL

## 2020-10-01 VITALS
SYSTOLIC BLOOD PRESSURE: 156 MMHG | WEIGHT: 156.75 LBS | OXYGEN SATURATION: 99 % | DIASTOLIC BLOOD PRESSURE: 76 MMHG | BODY MASS INDEX: 26.76 KG/M2 | HEIGHT: 64 IN | HEART RATE: 56 BPM

## 2020-10-01 DIAGNOSIS — R94.31 NONSPECIFIC ABNORMAL ELECTROCARDIOGRAM (ECG) (EKG): ICD-10-CM

## 2020-10-01 DIAGNOSIS — I70.219 ATHEROSCLEROTIC PVD WITH INTERMITTENT CLAUDICATION: ICD-10-CM

## 2020-10-01 DIAGNOSIS — I25.10 CORONARY ARTERY DISEASE INVOLVING NATIVE CORONARY ARTERY OF NATIVE HEART WITHOUT ANGINA PECTORIS: ICD-10-CM

## 2020-10-01 DIAGNOSIS — R07.9 CHEST PAIN, UNSPECIFIED TYPE: ICD-10-CM

## 2020-10-01 DIAGNOSIS — I10 ESSENTIAL HYPERTENSION: ICD-10-CM

## 2020-10-01 DIAGNOSIS — I25.118 CORONARY ARTERY DISEASE OF NATIVE ARTERY OF NATIVE HEART WITH STABLE ANGINA PECTORIS: ICD-10-CM

## 2020-10-01 DIAGNOSIS — E78.2 MIXED HYPERLIPIDEMIA: Chronic | ICD-10-CM

## 2020-10-01 DIAGNOSIS — R07.9 CHEST PAIN, UNSPECIFIED TYPE: Primary | ICD-10-CM

## 2020-10-01 PROCEDURE — 93010 EKG 12-LEAD: ICD-10-PCS | Mod: S$GLB,,, | Performed by: INTERNAL MEDICINE

## 2020-10-01 PROCEDURE — 99999 PR PBB SHADOW E&M-EST. PATIENT-LVL III: CPT | Mod: PBBFAC,,,

## 2020-10-01 PROCEDURE — 93010 ELECTROCARDIOGRAM REPORT: CPT | Mod: S$GLB,,, | Performed by: INTERNAL MEDICINE

## 2020-10-01 PROCEDURE — 93005 ELECTROCARDIOGRAM TRACING: CPT

## 2020-10-01 PROCEDURE — 99999 PR PBB SHADOW E&M-EST. PATIENT-LVL III: ICD-10-PCS | Mod: PBBFAC,,,

## 2020-10-01 RX ORDER — ISOSORBIDE MONONITRATE 30 MG/1
60 TABLET, EXTENDED RELEASE ORAL 2 TIMES DAILY
Qty: 60 TABLET | Refills: 11 | Status: SHIPPED | OUTPATIENT
Start: 2020-10-01 | End: 2021-06-03 | Stop reason: SDUPTHER

## 2020-10-01 RX ORDER — AMLODIPINE BESYLATE 2.5 MG/1
5 TABLET ORAL DAILY
Qty: 60 TABLET | Refills: 11 | Status: SHIPPED | OUTPATIENT
Start: 2020-10-01 | End: 2020-12-22

## 2020-10-01 NOTE — PROGRESS NOTES
Pt is here today nurse visit for blood pressure check. Pt is doing ok but c/o of some chest pain and SOB. EKG done in clinic. Pt blood pressure today is 160/ 80- right and 156/76- Left arm. Per Mala Wu medication change today is Norvasc 5mg once a day and Imdur 60 mg BID. Pt verbalizes understanding. Pt going to follow up in clinic in two weeks with Mala Wu.        I agree with above findings  XIMENA Singh  [facility]

## 2020-10-05 ENCOUNTER — PATIENT MESSAGE (OUTPATIENT)
Dept: ADMINISTRATIVE | Facility: HOSPITAL | Age: 60
End: 2020-10-05

## 2020-10-12 ENCOUNTER — OFFICE VISIT (OUTPATIENT)
Dept: FAMILY MEDICINE | Facility: CLINIC | Age: 60
End: 2020-10-12
Attending: FAMILY MEDICINE
Payer: COMMERCIAL

## 2020-10-12 ENCOUNTER — HOSPITAL ENCOUNTER (EMERGENCY)
Facility: HOSPITAL | Age: 60
Discharge: HOME | End: 2020-10-12
Attending: EMERGENCY MEDICINE
Payer: COMMERCIAL

## 2020-10-12 VITALS
TEMPERATURE: 98 F | BODY MASS INDEX: 26.8 KG/M2 | SYSTOLIC BLOOD PRESSURE: 140 MMHG | HEART RATE: 52 BPM | WEIGHT: 157 LBS | HEIGHT: 64 IN | DIASTOLIC BLOOD PRESSURE: 65 MMHG | OXYGEN SATURATION: 97 % | RESPIRATION RATE: 18 BRPM

## 2020-10-12 VITALS
HEART RATE: 78 BPM | OXYGEN SATURATION: 99 % | SYSTOLIC BLOOD PRESSURE: 130 MMHG | WEIGHT: 157.5 LBS | BODY MASS INDEX: 26.89 KG/M2 | DIASTOLIC BLOOD PRESSURE: 70 MMHG | HEIGHT: 64 IN | TEMPERATURE: 98 F

## 2020-10-12 DIAGNOSIS — M79.602 PAIN OF LEFT UPPER EXTREMITY: ICD-10-CM

## 2020-10-12 DIAGNOSIS — R07.9 CHEST PAIN, UNSPECIFIED TYPE: Primary | ICD-10-CM

## 2020-10-12 DIAGNOSIS — I73.9 PAD (PERIPHERAL ARTERY DISEASE): ICD-10-CM

## 2020-10-12 DIAGNOSIS — R07.9 CHEST PAIN: ICD-10-CM

## 2020-10-12 DIAGNOSIS — I25.10 CORONARY ARTERY DISEASE, ANGINA PRESENCE UNSPECIFIED, UNSPECIFIED VESSEL OR LESION TYPE, UNSPECIFIED WHETHER NATIVE OR TRANSPLANTED HEART: ICD-10-CM

## 2020-10-12 DIAGNOSIS — I95.9 HYPOTENSION, UNSPECIFIED HYPOTENSION TYPE: ICD-10-CM

## 2020-10-12 DIAGNOSIS — Z72.0 TOBACCO ABUSE: ICD-10-CM

## 2020-10-12 DIAGNOSIS — I95.9 HYPOTENSION, UNSPECIFIED HYPOTENSION TYPE: Primary | ICD-10-CM

## 2020-10-12 PROBLEM — N28.9 ACUTE RENAL INSUFFICIENCY: Status: RESOLVED | Noted: 2019-02-09 | Resolved: 2020-10-12

## 2020-10-12 PROBLEM — E87.1 HYPONATREMIA: Status: RESOLVED | Noted: 2019-02-09 | Resolved: 2020-10-12

## 2020-10-12 LAB
ALBUMIN SERPL BCP-MCNC: 4.1 G/DL (ref 3.5–5.2)
ALP SERPL-CCNC: 129 U/L (ref 55–135)
ALT SERPL W/O P-5'-P-CCNC: 23 U/L (ref 10–44)
ANION GAP SERPL CALC-SCNC: 10 MMOL/L (ref 8–16)
AST SERPL-CCNC: 24 U/L (ref 10–40)
BASOPHILS # BLD AUTO: 0.16 K/UL (ref 0–0.2)
BASOPHILS NFR BLD: 1.8 % (ref 0–1.9)
BILIRUB SERPL-MCNC: 0.2 MG/DL (ref 0.1–1)
BNP SERPL-MCNC: 26 PG/ML (ref 0–99)
BUN SERPL-MCNC: 9 MG/DL (ref 8–23)
CALCIUM SERPL-MCNC: 9.3 MG/DL (ref 8.7–10.5)
CHLORIDE SERPL-SCNC: 106 MMOL/L (ref 95–110)
CO2 SERPL-SCNC: 26 MMOL/L (ref 23–29)
CREAT SERPL-MCNC: 1 MG/DL (ref 0.5–1.4)
DIFFERENTIAL METHOD: ABNORMAL
EOSINOPHIL # BLD AUTO: 0.2 K/UL (ref 0–0.5)
EOSINOPHIL NFR BLD: 1.7 % (ref 0–8)
ERYTHROCYTE [DISTWIDTH] IN BLOOD BY AUTOMATED COUNT: 14.4 % (ref 11.5–14.5)
EST. GFR  (AFRICAN AMERICAN): >60 ML/MIN/1.73 M^2
EST. GFR  (NON AFRICAN AMERICAN): >60 ML/MIN/1.73 M^2
GLUCOSE SERPL-MCNC: 96 MG/DL (ref 70–110)
HCT VFR BLD AUTO: 36.4 % (ref 37–48.5)
HGB BLD-MCNC: 11.4 G/DL (ref 12–16)
IMM GRANULOCYTES # BLD AUTO: 0.05 K/UL (ref 0–0.04)
IMM GRANULOCYTES NFR BLD AUTO: 0.6 % (ref 0–0.5)
LYMPHOCYTES # BLD AUTO: 3 K/UL (ref 1–4.8)
LYMPHOCYTES NFR BLD: 33.1 % (ref 18–48)
MCH RBC QN AUTO: 28.3 PG (ref 27–31)
MCHC RBC AUTO-ENTMCNC: 31.3 G/DL (ref 32–36)
MCV RBC AUTO: 90 FL (ref 82–98)
MONOCYTES # BLD AUTO: 0.7 K/UL (ref 0.3–1)
MONOCYTES NFR BLD: 7.9 % (ref 4–15)
NEUTROPHILS # BLD AUTO: 4.9 K/UL (ref 1.8–7.7)
NEUTROPHILS NFR BLD: 54.9 % (ref 38–73)
NRBC BLD-RTO: 0 /100 WBC
PLATELET # BLD AUTO: 368 K/UL (ref 150–350)
PMV BLD AUTO: 10.6 FL (ref 9.2–12.9)
POTASSIUM SERPL-SCNC: 4.7 MMOL/L (ref 3.5–5.1)
PROT SERPL-MCNC: 8 G/DL (ref 6–8.4)
RBC # BLD AUTO: 4.03 M/UL (ref 4–5.4)
SODIUM SERPL-SCNC: 142 MMOL/L (ref 136–145)
TROPONIN I SERPL DL<=0.01 NG/ML-MCNC: <0.006 NG/ML (ref 0–0.03)
WBC # BLD AUTO: 8.94 K/UL (ref 3.9–12.7)

## 2020-10-12 PROCEDURE — 99215 PR OFFICE/OUTPT VISIT, EST, LEVL V, 40-54 MIN: ICD-10-PCS | Mod: S$GLB,,, | Performed by: FAMILY MEDICINE

## 2020-10-12 PROCEDURE — 85025 COMPLETE CBC W/AUTO DIFF WBC: CPT

## 2020-10-12 PROCEDURE — 93005 ELECTROCARDIOGRAM TRACING: CPT

## 2020-10-12 PROCEDURE — 80053 COMPREHEN METABOLIC PANEL: CPT

## 2020-10-12 PROCEDURE — 93005 ELECTROCARDIOGRAM TRACING: CPT | Mod: S$GLB,,, | Performed by: FAMILY MEDICINE

## 2020-10-12 PROCEDURE — 93010 EKG 12-LEAD: ICD-10-PCS | Mod: ,,, | Performed by: INTERNAL MEDICINE

## 2020-10-12 PROCEDURE — 99999 PR PBB SHADOW E&M-EST. PATIENT-LVL III: ICD-10-PCS | Mod: PBBFAC,,, | Performed by: FAMILY MEDICINE

## 2020-10-12 PROCEDURE — 93005 EKG 12-LEAD: ICD-10-PCS | Mod: S$GLB,,, | Performed by: FAMILY MEDICINE

## 2020-10-12 PROCEDURE — 99215 OFFICE O/P EST HI 40 MIN: CPT | Mod: S$GLB,,, | Performed by: FAMILY MEDICINE

## 2020-10-12 PROCEDURE — 99999 PR PBB SHADOW E&M-EST. PATIENT-LVL III: CPT | Mod: PBBFAC,,, | Performed by: FAMILY MEDICINE

## 2020-10-12 PROCEDURE — 93010 ELECTROCARDIOGRAM REPORT: CPT | Mod: ,,, | Performed by: INTERNAL MEDICINE

## 2020-10-12 PROCEDURE — 93010 EKG 12-LEAD: ICD-10-PCS | Mod: 77,S$GLB,, | Performed by: INTERNAL MEDICINE

## 2020-10-12 PROCEDURE — 99285 EMERGENCY DEPT VISIT HI MDM: CPT | Mod: 25

## 2020-10-12 PROCEDURE — 36415 COLL VENOUS BLD VENIPUNCTURE: CPT

## 2020-10-12 PROCEDURE — 93010 ELECTROCARDIOGRAM REPORT: CPT | Mod: 77,S$GLB,, | Performed by: INTERNAL MEDICINE

## 2020-10-12 PROCEDURE — 84484 ASSAY OF TROPONIN QUANT: CPT

## 2020-10-12 PROCEDURE — 83880 ASSAY OF NATRIURETIC PEPTIDE: CPT

## 2020-10-12 NOTE — PROGRESS NOTES
Subjective:       Patient ID: Iza Esquivel is a 60 y.o. female.    Chief Complaint: Hypotension (lowest reading 80s/50s)    60 y old female with CAD , PAD, tobacco abuse who has been noticing low BP readings at home ( 80/50) since yesterday associated with dizziness and L arm pain since . No CP , sob . Continues to smoke about 1 cig qd     Hypertension      Review of Systems   Constitutional: Negative.    HENT: Negative.    Eyes: Negative.    Respiratory: Negative.    Cardiovascular: Negative.    Gastrointestinal: Negative.    Genitourinary: Negative.    Musculoskeletal: Negative.    Skin: Negative.    Hematological: Negative.        Objective:      Physical Exam  Vitals signs and nursing note reviewed.   Constitutional:       General: She is not in acute distress.     Appearance: She is well-developed. She is not diaphoretic.   HENT:      Head: Normocephalic and atraumatic.      Right Ear: External ear normal.      Left Ear: External ear normal.      Nose: Nose normal.   Eyes:      General: No scleral icterus.        Left eye: No discharge.      Conjunctiva/sclera: Conjunctivae normal.      Pupils: Pupils are equal, round, and reactive to light.   Neck:      Musculoskeletal: Normal range of motion and neck supple.      Thyroid: No thyromegaly.      Vascular: No JVD.      Trachea: No tracheal deviation.   Cardiovascular:      Rate and Rhythm: Normal rate and regular rhythm.      Heart sounds: Normal heart sounds. No murmur. No friction rub. No gallop.    Pulmonary:      Effort: Pulmonary effort is normal. No respiratory distress.      Breath sounds: Normal breath sounds. No wheezing or rales.   Chest:      Chest wall: No tenderness.   Abdominal:      General: Bowel sounds are normal. There is no distension.      Palpations: Abdomen is soft. There is no mass.      Tenderness: There is no abdominal tenderness. There is no guarding.   Lymphadenopathy:      Cervical: No cervical adenopathy.         Assessment:        1. Chest pain, unspecified type    2. PAD (peripheral artery disease)    3. Coronary artery disease, angina presence unspecified, unspecified vessel or lesion type, unspecified whether native or transplanted heart    4. Hypotension, unspecified hypotension type    5. Pain of left upper extremity    6. Tobacco abuse        Plan:     Iza was seen today for hypotension.    Diagnoses and all orders for this visit:    Chest pain, unspecified type  -     IN OFFICE EKG 12-LEAD (to Muse)    PAD (peripheral artery disease)    Coronary artery disease, angina presence unspecified, unspecified vessel or lesion type, unspecified whether native or transplanted heart    Hypotension, unspecified hypotension type    Pain of left upper extremity    Tobacco abuse       Normal sinus rhythm noticed on EKG    ER eval ASAP   Will be transported by EMT

## 2020-10-13 NOTE — ED PROVIDER NOTES
SCRIBE #1 NOTE: I, Rola Garcia, am scribing for, and in the presence of, Mary Christine MD. I have scribed the entire note.       History     Chief Complaint   Patient presents with    BP fluctuations     low reading yesterday and high today     Review of patient's allergies indicates:  No Known Allergies      History of Present Illness     HPI    10/12/2020, 8:19 PM  History obtained from the patient      History of Present Illness: Iza Esquivel is a 60 y.o. female patient with a PMHx of PVD, HTN, HLD, and S/P PTCA who presents to the Emergency Department for evaluation of BP fluctuations which onset gradually 1 day ago. Symptoms are episodic and moderate in severity. No mitigating or exacerbating factors reported. Pt reports her BP being abnormally low 1 day ago. Pt saw her PCP earlier today for her low BP, and her PCP called EMS due to pt PMHx. Pt reports her BP was high at her PCP office. No associates sxs reported. Patient denies any CP, leg swelling, palpitations, SOB, n/v, and all other sxs at this time. Pt reports compliance with BP medications and that her dosage was increased. No further complaints or concerns at this time.       Arrival mode: EMS  [facility]    PCP: Park Gomez MD        Past Medical History:  Past Medical History:   Diagnosis Date    Atherosclerotic PVD with intermittent claudication 5/25/2020    Cervical cancer     Hyperlipidemia     Hypertension     Right carotid bruit     S/P PTCA (percutaneous transluminal coronary angioplasty) 05/25/2020    STEMI: stenting of LCx       Past Surgical History:  Past Surgical History:   Procedure Laterality Date    ABDOMINAL AORTOGRAPHY N/A 5/25/2020    Procedure: Aortogram, Abdominal;  Surgeon: Shelly Jarvis MD;  Location: [facility] CATH LAB;  Service: Cardiology;  Laterality: N/A;    AORTOGRAPHY WITH SERIALOGRAPHY N/A 6/30/2020    Procedure: AORTOGRAM, WITH RUNOFF;  Surgeon: Shelly Jarvis MD;  Location: [facility] CATH LAB;   Service: Cardiology;  Laterality: N/A;    LEFT HEART CATHETERIZATION Left 2020    Procedure: CATHETERIZATION, HEART, LEFT;  Surgeon: Shelly Jarvis MD;  Location: [facility] CATH LAB;  Service: Cardiology;  Laterality: Left;    LEFT HEART CATHETERIZATION Left 2020    Procedure: CATHETERIZATION, HEART, LEFT;  Surgeon: Shelly Jarvis MD;  Location: [facility] CATH LAB;  Service: Cardiology;  Laterality: Left;    LYMPH NODE BIOPSY      PERCUTANEOUS TRANSLUMINAL BALLOON ANGIOPLASTY OF CORONARY ARTERY  2020    Procedure: Angioplasty-coronary;  Surgeon: Shelly Jarvis MD;  Location: [facility] CATH LAB;  Service: Cardiology;;         Family History:  Family History   Problem Relation Age of Onset    Cancer Mother         ?    Cancer Father         ?    Transient ischemic attack Father         Carotid artery stenosis, CEA    Diabetes Brother     Hypertension Brother        Social History:  Social History     Tobacco Use    Smoking status: Former Smoker     Packs/day: 1.50     Years: 40.00     Pack years: 60.00     Quit date: 2020     Years since quittin.2    Smokeless tobacco: Never Used    Tobacco comment: now down to 10 cigarettes daily   Substance and Sexual Activity    Alcohol use: No     Frequency: Never     Binge frequency: Never    Drug use: Unknown    Sexual activity: Never        Review of Systems     Review of Systems   Constitutional: Negative for fever.        + BP fluctuations   HENT: Negative for sore throat.    Respiratory: Negative for shortness of breath.    Cardiovascular: Negative for chest pain, palpitations and leg swelling.   Gastrointestinal: Negative for nausea and vomiting.   Genitourinary: Negative for dysuria.   Musculoskeletal: Negative for back pain.   Skin: Negative for rash.   Neurological: Negative for weakness.   Hematological: Does not bruise/bleed easily.   All other systems reviewed and are negative.     Physical Exam     Initial Vitals [10/12/20 1816]   BP Pulse  "Resp Temp SpO2   (!) 174/78 62 18 98 °F (36.7 °C) 100 %      MAP       --          Physical Exam  Nursing Notes and Vital Signs Reviewed.  Constitutional: Patient is in no acute distress. Well-developed and well-nourished.  Head: Atraumatic. Normocephalic.  Eyes: PERRL. EOM intact. Conjunctivae are not pale. No scleral icterus.  ENT: Mucous membranes are moist. Oropharynx is clear and symmetric.    Neck: Supple. Full ROM. No lymphadenopathy.  Cardiovascular: Regular rate. Regular rhythm. No murmurs, rubs, or gallops. Distal pulses are 2+ and symmetric.  Pulmonary/Chest: No respiratory distress. Clear to auscultation bilaterally. No wheezing or rales.  Abdominal: Soft and non-distended.  There is no tenderness.  No rebound, guarding, or rigidity. Good bowel sounds.  Genitourinary: No CVA tenderness  Musculoskeletal: Moves all extremities. No obvious deformities. No edema. No calf tenderness.  Skin: Warm and dry.  Neurological:  Alert, awake, and appropriate.  Normal speech.  No acute focal neurological deficits are appreciated.  Psychiatric: Normal affect. Good eye contact. Appropriate in content.     ED Course   Procedures  ED Vital Signs:  Vitals:    10/12/20 1816 10/12/20 1826 10/12/20 1828 10/12/20 2042   BP: (!) 174/78 (!) 167/73  (!) 140/65   Pulse: 62 (!) 59 (!) 59 (!) 52   Resp: 18 20  18   Temp: 98 °F (36.7 °C)      TempSrc: Oral      SpO2: 100% 100%  97%   Weight: 71.2 kg (157 lb)      Height: 5' 4" (1.626 m)          Abnormal Lab Results:  Labs Reviewed   CBC W/ AUTO DIFFERENTIAL - Abnormal; Notable for the following components:       Result Value    Hemoglobin 11.4 (*)     Hematocrit 36.4 (*)     Mean Corpuscular Hemoglobin Conc 31.3 (*)     Platelets 368 (*)     Immature Granulocytes 0.6 (*)     Immature Grans (Abs) 0.05 (*)     All other components within normal limits   COMPREHENSIVE METABOLIC PANEL   TROPONIN I   B-TYPE NATRIURETIC PEPTIDE        All Lab Results:  Results for orders placed or " performed during the hospital encounter of 10/12/20   CBC auto differential   Result Value Ref Range    WBC 8.94 3.90 - 12.70 K/uL    RBC 4.03 4.00 - 5.40 M/uL    Hemoglobin 11.4 (L) 12.0 - 16.0 g/dL    Hematocrit 36.4 (L) 37.0 - 48.5 %    Mean Corpuscular Volume 90 82 - 98 fL    Mean Corpuscular Hemoglobin 28.3 27.0 - 31.0 pg    Mean Corpuscular Hemoglobin Conc 31.3 (L) 32.0 - 36.0 g/dL    RDW 14.4 11.5 - 14.5 %    Platelets 368 (H) 150 - 350 K/uL    MPV 10.6 9.2 - 12.9 fL    Immature Granulocytes 0.6 (H) 0.0 - 0.5 %    Gran # (ANC) 4.9 1.8 - 7.7 K/uL    Immature Grans (Abs) 0.05 (H) 0.00 - 0.04 K/uL    Lymph # 3.0 1.0 - 4.8 K/uL    Mono # 0.7 0.3 - 1.0 K/uL    Eos # 0.2 0.0 - 0.5 K/uL    Baso # 0.16 0.00 - 0.20 K/uL    nRBC 0 0 /100 WBC    Gran% 54.9 38.0 - 73.0 %    Lymph% 33.1 18.0 - 48.0 %    Mono% 7.9 4.0 - 15.0 %    Eosinophil% 1.7 0.0 - 8.0 %    Basophil% 1.8 0.0 - 1.9 %    Differential Method Automated    Comprehensive metabolic panel   Result Value Ref Range    Sodium 142 136 - 145 mmol/L    Potassium 4.7 3.5 - 5.1 mmol/L    Chloride 106 95 - 110 mmol/L    CO2 26 23 - 29 mmol/L    Glucose 96 70 - 110 mg/dL    BUN, Bld 9 8 - 23 mg/dL    Creatinine 1.0 0.5 - 1.4 mg/dL    Calcium 9.3 8.7 - 10.5 mg/dL    Total Protein 8.0 6.0 - 8.4 g/dL    Albumin 4.1 3.5 - 5.2 g/dL    Total Bilirubin 0.2 0.1 - 1.0 mg/dL    Alkaline Phosphatase 129 55 - 135 U/L    AST 24 10 - 40 U/L    ALT 23 10 - 44 U/L    Anion Gap 10 8 - 16 mmol/L    eGFR if African American >60 >60 mL/min/1.73 m^2    eGFR if non African American >60 >60 mL/min/1.73 m^2   Troponin I #1   Result Value Ref Range    Troponin I <0.006 0.000 - 0.026 ng/mL   BNP   Result Value Ref Range    BNP 26 0 - 99 pg/mL         Imaging Results:  Imaging Results          X-Ray Chest AP Portable (Final result)  Result time 10/12/20 18:57:38    Final result by Vasiliy Sanchez MD (10/12/20 18:57:38)                 Impression:      No acute abnormality.      Electronically  signed by: Daniel Vasiliy  Date:    10/12/2020  Time:    18:57             Narrative:    EXAMINATION:  XR CHEST AP PORTABLE    CLINICAL HISTORY:  Chest Pain;    TECHNIQUE:  Single frontal view of the chest was performed.    COMPARISON:  Prior    FINDINGS:  The lungs are clear, with normal appearance of pulmonary vasculature and no pleural effusion or pneumothorax.    The cardiac silhouette is normal in size. The hilar and mediastinal contours are unremarkable.    Bones are intact.                                 The EKG was ordered, reviewed, and independently interpreted by the ED provider.  Interpretation time: 1854  Rate: 51 BPM  Rhythm: sinus bradycardia  Interpretation: No acute ST changes. No STEMI.           The Emergency Provider reviewed the vital signs and test results, which are outlined above.     ED Discussion     8:39 PM: Reassessed pt at this time. Discussed with pt all pertinent ED information and results. Discussed pt dx and plan of tx. Gave pt all f/u and return to the ED instructions. All questions and concerns were addressed at this time. Pt expresses understanding of information and instructions, and is comfortable with plan to discharge. Pt is stable for discharge.    I discussed with patient and/or family/caretaker that evaluation in the ED does not suggest any emergent or life threatening medical conditions requiring immediate intervention beyond what was provided in the ED, and I believe patient is safe for discharge.  Regardless, an unremarkable evaluation in the ED does not preclude the development or presence of a serious of life threatening condition. As such, patient was instructed to return immediately for any worsening or change in current symptoms.         Medical Decision Making:   Clinical Tests:   Lab Tests: Ordered and Reviewed  Radiological Study: Ordered and Reviewed  Medical Tests: Ordered and Reviewed           ED Medication(s):  Medications - No data to display    Discharge  Medication List as of 10/12/2020  8:20 PM          Follow-up Information     Schedule an appointment as soon as possible for a visit  with Park Gomez MD.    Specialty: Family Medicine  Why: As needed  Contact information:  [address] [place] [id number]  [phone]                       Scribe Attestation:   Scribe #1: I performed the above scribed service and the documentation accurately describes the services I performed. I attest to the accuracy of the note.     Attending:   Physician Attestation Statement for Scribe #1: I, Mary Christine MD, personally performed the services described in this documentation, as scribed by Rola Garcia, in my presence, and it is both accurate and complete.           Clinical Impression       ICD-10-CM ICD-9-CM   1. Hypotension, unspecified hypotension type  I95.9 458.9   2. Chest pain  R07.9 786.50       Disposition:   Disposition: Discharged  Condition: Stable         Mary Christine MD  10/14/20 [id number]

## 2020-10-26 ENCOUNTER — OFFICE VISIT (OUTPATIENT)
Dept: CARDIOLOGY | Facility: CLINIC | Age: 60
End: 2020-10-26
Payer: COMMERCIAL

## 2020-10-26 VITALS
BODY MASS INDEX: 27.21 KG/M2 | DIASTOLIC BLOOD PRESSURE: 70 MMHG | SYSTOLIC BLOOD PRESSURE: 128 MMHG | HEART RATE: 65 BPM | OXYGEN SATURATION: 99 % | WEIGHT: 158.5 LBS

## 2020-10-26 DIAGNOSIS — E78.2 MIXED HYPERLIPIDEMIA: Chronic | ICD-10-CM

## 2020-10-26 DIAGNOSIS — I73.9 PVD (PERIPHERAL VASCULAR DISEASE): ICD-10-CM

## 2020-10-26 DIAGNOSIS — I25.118 CORONARY ARTERY DISEASE OF NATIVE ARTERY OF NATIVE HEART WITH STABLE ANGINA PECTORIS: Primary | ICD-10-CM

## 2020-10-26 DIAGNOSIS — I25.2 HISTORY OF ST ELEVATION MYOCARDIAL INFARCTION (STEMI): ICD-10-CM

## 2020-10-26 DIAGNOSIS — Z72.0 TOBACCO ABUSE: Chronic | ICD-10-CM

## 2020-10-26 DIAGNOSIS — R06.09 DOE (DYSPNEA ON EXERTION): ICD-10-CM

## 2020-10-26 PROCEDURE — 99999 PR PBB SHADOW E&M-EST. PATIENT-LVL IV: CPT | Mod: PBBFAC,,, | Performed by: NURSE PRACTITIONER

## 2020-10-26 PROCEDURE — 99214 OFFICE O/P EST MOD 30 MIN: CPT | Mod: S$GLB,,, | Performed by: NURSE PRACTITIONER

## 2020-10-26 PROCEDURE — 99999 PR PBB SHADOW E&M-EST. PATIENT-LVL IV: ICD-10-PCS | Mod: PBBFAC,,, | Performed by: NURSE PRACTITIONER

## 2020-10-26 PROCEDURE — 99214 PR OFFICE/OUTPT VISIT, EST, LEVL IV, 30-39 MIN: ICD-10-PCS | Mod: S$GLB,,, | Performed by: NURSE PRACTITIONER

## 2020-10-26 NOTE — PROGRESS NOTES
"  Subjective:   Patient ID:  Iza Esquivel is a 60 y.o. female who presents for evaluation of Shortness of Breath (on exertion)      HPI     Iza Esquivel is a 60 year old female who presents to clinic due to bruise on her leg. Her current medical conditions include CAD s/p PCI of LCX, PVD, HTN, HLP. She returns today and states she is doing ok.     She has returned to work and has been having leg pain at the end of the day. She was seen in ED due to claudication and underwent BLE U/S which was negative for DVT.     She has bruise to her left outer thigh, dressing applied. Bruise healing.     Denies chest pain or anginal equivalents. Today in office, she does endorse MORRELL and nightly snoring issues. Reports nerve pain to bilateral feet when walking. Denies orthopnea, PND or abdominal bloating. Reports regular walking without any issues lately. NO leg swelling or claudications. No recent falls, syncope or near syncopal events. Reports compliance with medications and dietary restrictions. NO CNS complaints to suggest TIA or CVA today. No signs of abnormal bleeding on ASA and Brilinta.     10/26/2020 Update:  She had episode of low BP earlier this month and went to ED and her Norvasc was stopped. Since that time her blood pressure has stabilized without any further issues with dizziness, lightheadedness or "out of sorts". NO chest pain or anginal equivalents. She continues to have some degree of shortness of breath with exertion. She is still smoking about 1/2 pack per day. She is very concerned with other family members medical problems, very anxious today.     No signs of abnormal bleeding on ASA and Brilinta. Will discuss with Dr aJrvis regarding possibility of side effect of brilinta causing her Shortness of breath. She does not recall having any shortness of breath or MORRELL prior to STEMI in May 2020.     Past Medical History:   Diagnosis Date    Atherosclerotic PVD with intermittent claudication 5/25/2020    " Cervical cancer     Hyperlipidemia     Hypertension     Right carotid bruit     S/P PTCA (percutaneous transluminal coronary angioplasty) 2020    STEMI: stenting of LCx       Past Surgical History:   Procedure Laterality Date    ABDOMINAL AORTOGRAPHY N/A 2020    Procedure: Aortogram, Abdominal;  Surgeon: Shelly Jarvis MD;  Location: [facility] CATH LAB;  Service: Cardiology;  Laterality: N/A;    AORTOGRAPHY WITH SERIALOGRAPHY N/A 2020    Procedure: AORTOGRAM, WITH RUNOFF;  Surgeon: Shelly Jarvis MD;  Location: [facility] CATH LAB;  Service: Cardiology;  Laterality: N/A;    LEFT HEART CATHETERIZATION Left 2020    Procedure: CATHETERIZATION, HEART, LEFT;  Surgeon: Shelly Jarvis MD;  Location: [facility] CATH LAB;  Service: Cardiology;  Laterality: Left;    LEFT HEART CATHETERIZATION Left 2020    Procedure: CATHETERIZATION, HEART, LEFT;  Surgeon: Shelly Jarvis MD;  Location: [facility] CATH LAB;  Service: Cardiology;  Laterality: Left;    LYMPH NODE BIOPSY      PERCUTANEOUS TRANSLUMINAL BALLOON ANGIOPLASTY OF CORONARY ARTERY  2020    Procedure: Angioplasty-coronary;  Surgeon: Shelly Jarvis MD;  Location: [facility] CATH LAB;  Service: Cardiology;;       Social History     Tobacco Use    Smoking status: Former Smoker     Packs/day: 1.50     Years: 40.00     Pack years: 60.00     Quit date: 2020     Years since quittin.2    Smokeless tobacco: Never Used    Tobacco comment: now down to 10 cigarettes daily   Substance Use Topics    Alcohol use: No     Frequency: Never     Binge frequency: Never    Drug use: Not on file       Family History   Problem Relation Age of Onset    Cancer Mother         ?    Cancer Father         ?    Transient ischemic attack Father         Carotid artery stenosis, CEA    Diabetes Brother     Hypertension Brother      Wt Readings from Last 3 Encounters:   10/26/20 71.9 kg (158 lb 8.2 oz)   10/12/20 71.2 kg (157 lb)   10/12/20 71.5 kg (157 lb 8.3 oz)     Temp  Readings from Last 3 Encounters:   10/12/20 98 °F (36.7 °C) (Oral)   10/12/20 97.8 °F (36.6 °C) (Tympanic)   09/12/20 98.9 °F (37.2 °C) (Oral)     BP Readings from Last 3 Encounters:   10/26/20 128/70   10/12/20 (!) 140/65   10/12/20 130/70     Pulse Readings from Last 3 Encounters:   10/26/20 65   10/12/20 (!) 52   10/12/20 78     Current Outpatient Medications on File Prior to Visit   Medication Sig Dispense Refill    aspirin (ECOTRIN) 81 MG EC tablet Take 1 tablet (81 mg total) by mouth once daily. 30 tablet 0    atorvastatin (LIPITOR) 80 MG tablet Take 1 tablet (80 mg total) by mouth once daily. 90 tablet 3    isosorbide mononitrate (IMDUR) 30 MG 24 hr tablet Take 2 tablets (60 mg total) by mouth 2 (two) times a day. 60 tablet 11    losartan (COZAAR) 50 MG tablet Take 1 tablet (50 mg total) by mouth once daily. 90 tablet 3    metoprolol tartrate (LOPRESSOR) 25 MG tablet Take 1 tablet (25 mg total) by mouth 2 (two) times daily. 60 tablet 11    ticagrelor (BRILINTA) 90 mg tablet Take 1 tablet (90 mg total) by mouth 2 (two) times daily. 60 tablet 11    amLODIPine (NORVASC) 2.5 MG tablet Take 2 tablets (5 mg total) by mouth once daily. (Patient not taking: Reported on 10/26/2020) 60 tablet 11    pantoprazole (PROTONIX) 40 MG tablet Take 1 tablet (40 mg total) by mouth once daily. (Patient not taking: Reported on 10/26/2020) 30 tablet 11     No current facility-administered medications on file prior to visit.          Review of Systems   Constitution: Positive for malaise/fatigue.   HENT: Negative for hearing loss and hoarse voice.    Eyes: Negative for blurred vision and visual disturbance.   Cardiovascular: Positive for dyspnea on exertion. Negative for chest pain, claudication, irregular heartbeat, leg swelling, near-syncope, orthopnea, palpitations, paroxysmal nocturnal dyspnea and syncope.   Respiratory: Positive for cough and snoring. Negative for hemoptysis, shortness of breath, sleep disturbances due  to breathing and wheezing.    Endocrine: Negative for cold intolerance and heat intolerance.   Hematologic/Lymphatic: Bruises/bleeds easily.   Skin: Negative for color change, dry skin and nail changes.   Musculoskeletal: Positive for arthritis and back pain. Negative for joint pain and myalgias.   Gastrointestinal: Negative for bloating, abdominal pain, constipation, nausea and vomiting.   Genitourinary: Negative for dysuria, flank pain, hematuria and hesitancy.   Neurological: Negative for headaches, light-headedness, loss of balance, numbness, paresthesias and weakness.   Psychiatric/Behavioral: Negative for altered mental status.   Allergic/Immunologic: Negative for environmental allergies.     /70 (BP Location: Left arm, Patient Position: Sitting)   Pulse 65   Wt 71.9 kg (158 lb 8.2 oz)   LMP  (LMP Unknown)   SpO2 99%   BMI 27.21 kg/m²    /70 (BP Location: Left arm, Patient Position: Sitting)   Pulse 65   Wt 71.9 kg (158 lb 8.2 oz)   LMP  (LMP Unknown)   SpO2 99%   BMI 27.21 kg/m²       Objective:   Physical Exam   Constitutional: She is oriented to person, place, and time. She appears well-developed and well-nourished. No distress.   HENT:   Head: Normocephalic and atraumatic.   Eyes: Pupils are equal, round, and reactive to light.   Neck: Normal range of motion and full passive range of motion without pain. Neck supple. No JVD present.   Cardiovascular: Normal rate, regular rhythm, S1 normal, S2 normal and intact distal pulses. PMI is not displaced. Exam reveals no distant heart sounds.   No murmur heard.  Pulses:       Radial pulses are 2+ on the right side and 2+ on the left side.        Dorsalis pedis pulses are 1+ on the right side and 1+ on the left side.   CHEST PAIN FREE   Pulmonary/Chest: Effort normal. No accessory muscle usage. No respiratory distress. She has decreased breath sounds in the right lower field and the left lower field. She has no wheezes. She has no rales.    Musculoskeletal: Normal range of motion.         General: No edema.      Right ankle: She exhibits no swelling.      Left ankle: She exhibits no swelling.   Neurological: She is alert and oriented to person, place, and time.   Skin: Skin is warm and dry. She is not diaphoretic. No cyanosis. Nails show no clubbing.   Psychiatric: She has a normal mood and affect. Her speech is normal and behavior is normal. Judgment and thought content normal. Cognition and memory are normal.   Nursing note and vitals reviewed.      Lab Results   Component Value Date    CHOL 164 05/26/2020    CHOL 131 02/10/2019    CHOL 200 (H) 08/20/2018     Lab Results   Component Value Date    HDL 44 05/26/2020    HDL 33 (L) 02/10/2019    HDL 49 08/20/2018     Lab Results   Component Value Date    LDLCALC 87.8 05/26/2020    LDLCALC 74.0 02/10/2019    LDLCALC 110.6 08/20/2018     Lab Results   Component Value Date    TRIG 161 (H) 05/26/2020    TRIG 120 02/10/2019    TRIG 202 (H) 08/20/2018     Lab Results   Component Value Date    CHOLHDL 26.8 05/26/2020    CHOLHDL 25.2 02/10/2019    CHOLHDL 24.5 08/20/2018       Chemistry        Component Value Date/Time     10/12/2020 1845    K 4.7 10/12/2020 1845     10/12/2020 1845    CO2 26 10/12/2020 1845    BUN 9 10/12/2020 1845    CREATININE 1.0 10/12/2020 1845    GLU 96 10/12/2020 1845        Component Value Date/Time    CALCIUM 9.3 10/12/2020 1845    ALKPHOS 129 10/12/2020 1845    AST 24 10/12/2020 1845    ALT 23 10/12/2020 1845    BILITOT 0.2 10/12/2020 1845    ESTGFRAFRICA >60 10/12/2020 1845    EGFRNONAA >60 10/12/2020 1845          Lab Results   Component Value Date    TSH 4.399 (H) 02/09/2019     Lab Results   Component Value Date    INR 1.0 09/12/2020    INR 1.3 (H) 09/12/2020    INR 1.0 07/20/2020     Lab Results   Component Value Date    WBC 8.94 10/12/2020    HGB 11.4 (L) 10/12/2020    HCT 36.4 (L) 10/12/2020    MCV 90 10/12/2020     (H) 10/12/2020        Assessment:      1.  Coronary artery disease of native artery of native heart with stable angina pectoris    2. History of ST elevation myocardial infarction (STEMI)    3. Mixed hyperlipidemia    4. PVD (peripheral vascular disease)    5. Tobacco abuse    6. MORRELL (dyspnea on exertion)      Patient presents to clinic for 2 week BP follow up. She continues to smoke 1/2 ppd even though she previously told me she had quit. BP much improved today in office. Has held the norvasc due to hypotension episodes noted.   Plan:    Continue to hold norvasc  Continue all other CV meds  Dash diet, 2 gm sodium restriction  1500 ml fluid restriction  Encourage daily walking  NO SMOKING!  Keep appt with Dr Jarvis as scheduled.     XIMENA Singh  [facility]

## 2020-10-27 ENCOUNTER — TELEPHONE (OUTPATIENT)
Dept: CARDIOLOGY | Facility: CLINIC | Age: 60
End: 2020-10-27

## 2020-10-27 DIAGNOSIS — R06.09 DOE (DYSPNEA ON EXERTION): ICD-10-CM

## 2020-10-27 DIAGNOSIS — I25.118 CORONARY ARTERY DISEASE OF NATIVE ARTERY OF NATIVE HEART WITH STABLE ANGINA PECTORIS: ICD-10-CM

## 2020-10-27 DIAGNOSIS — Z72.0 TOBACCO ABUSE: Chronic | ICD-10-CM

## 2020-10-27 DIAGNOSIS — I25.2 HISTORY OF ST ELEVATION MYOCARDIAL INFARCTION (STEMI): Primary | ICD-10-CM

## 2020-10-27 RX ORDER — PRASUGREL 10 MG/1
10 TABLET, FILM COATED ORAL DAILY
Qty: 30 TABLET | Refills: 11 | Status: SHIPPED | OUTPATIENT
Start: 2020-10-27 | End: 2021-10-04 | Stop reason: SDUPTHER

## 2020-10-27 NOTE — TELEPHONE ENCOUNTER
Please call patient.     I have discussed with Dr Jarvis regarding her shortness of breath. He is ok with switching her Brilinta to Effient, I will send to pharmacy for her. She should start taking once she receives it from the pharmacy.    He would like her to see Pulmonary as well for further evaluation.      We can evaluate further at her follow up appt with Dr Jarvis in December.     Thanks  Mala

## 2020-10-27 NOTE — TELEPHONE ENCOUNTER
Pt was contacted about new Prescription. Informed pt that Dr. aJrvis wants to discontinue Brilinta and to start Effient. Prescription was sent to pt preferred pharmacy and that she should start taking it once she receives it from the pharmacy. Informed pt that Dr. Jarvis would like her to see Pulmonary as well for further evaluation ,so he can evaluate further at her follow up appt with Dr Jarvis in December. PT verbalized understanding with no questions or concerns.

## 2020-11-04 ENCOUNTER — TELEPHONE (OUTPATIENT)
Dept: ADMINISTRATIVE | Facility: HOSPITAL | Age: 60
End: 2020-11-04

## 2020-11-23 ENCOUNTER — LAB VISIT (OUTPATIENT)
Dept: LAB | Facility: HOSPITAL | Age: 60
End: 2020-11-23
Attending: INTERNAL MEDICINE
Payer: MEDICAID

## 2020-11-23 ENCOUNTER — TELEPHONE (OUTPATIENT)
Dept: ADMINISTRATIVE | Facility: HOSPITAL | Age: 60
End: 2020-11-23

## 2020-11-23 DIAGNOSIS — I25.10 CORONARY ARTERY DISEASE INVOLVING NATIVE CORONARY ARTERY OF NATIVE HEART WITHOUT ANGINA PECTORIS: ICD-10-CM

## 2020-11-23 DIAGNOSIS — I25.118 CORONARY ARTERY DISEASE OF NATIVE ARTERY OF NATIVE HEART WITH STABLE ANGINA PECTORIS: ICD-10-CM

## 2020-11-23 LAB
ALBUMIN SERPL BCP-MCNC: 3.7 G/DL (ref 3.5–5.2)
ALP SERPL-CCNC: 144 U/L (ref 55–135)
ALT SERPL W/O P-5'-P-CCNC: 23 U/L (ref 10–44)
ANION GAP SERPL CALC-SCNC: 9 MMOL/L (ref 8–16)
AST SERPL-CCNC: 24 U/L (ref 10–40)
BILIRUB SERPL-MCNC: 0.2 MG/DL (ref 0.1–1)
BUN SERPL-MCNC: 11 MG/DL (ref 8–23)
CALCIUM SERPL-MCNC: 9.2 MG/DL (ref 8.7–10.5)
CHLORIDE SERPL-SCNC: 107 MMOL/L (ref 95–110)
CHOLEST SERPL-MCNC: 144 MG/DL (ref 120–199)
CHOLEST/HDLC SERPL: 2.8 {RATIO} (ref 2–5)
CO2 SERPL-SCNC: 29 MMOL/L (ref 23–29)
CREAT SERPL-MCNC: 1 MG/DL (ref 0.5–1.4)
EST. GFR  (AFRICAN AMERICAN): >60 ML/MIN/1.73 M^2
EST. GFR  (NON AFRICAN AMERICAN): >60 ML/MIN/1.73 M^2
ESTIMATED AVG GLUCOSE: 117 MG/DL (ref 68–131)
GLUCOSE SERPL-MCNC: 82 MG/DL (ref 70–110)
HBA1C MFR BLD HPLC: 5.7 % (ref 4–5.6)
HDLC SERPL-MCNC: 52 MG/DL (ref 40–75)
HDLC SERPL: 36.1 % (ref 20–50)
LDLC SERPL CALC-MCNC: 65.8 MG/DL (ref 63–159)
NONHDLC SERPL-MCNC: 92 MG/DL
POTASSIUM SERPL-SCNC: 4.5 MMOL/L (ref 3.5–5.1)
PROT SERPL-MCNC: 7.4 G/DL (ref 6–8.4)
SODIUM SERPL-SCNC: 145 MMOL/L (ref 136–145)
TRIGL SERPL-MCNC: 131 MG/DL (ref 30–150)

## 2020-11-23 PROCEDURE — 80061 LIPID PANEL: CPT

## 2020-11-23 PROCEDURE — 36415 COLL VENOUS BLD VENIPUNCTURE: CPT

## 2020-11-23 PROCEDURE — 83036 HEMOGLOBIN GLYCOSYLATED A1C: CPT

## 2020-11-23 PROCEDURE — 80053 COMPREHEN METABOLIC PANEL: CPT

## 2020-12-03 ENCOUNTER — OFFICE VISIT (OUTPATIENT)
Dept: CARDIOLOGY | Facility: CLINIC | Age: 60
End: 2020-12-03
Payer: COMMERCIAL

## 2020-12-03 VITALS
SYSTOLIC BLOOD PRESSURE: 140 MMHG | WEIGHT: 164.44 LBS | DIASTOLIC BLOOD PRESSURE: 68 MMHG | OXYGEN SATURATION: 99 % | HEART RATE: 64 BPM | HEIGHT: 64 IN | BODY MASS INDEX: 28.07 KG/M2

## 2020-12-03 DIAGNOSIS — R94.31 NONSPECIFIC ABNORMAL ELECTROCARDIOGRAM (ECG) (EKG): ICD-10-CM

## 2020-12-03 DIAGNOSIS — I70.219 ATHEROSCLEROTIC PVD WITH INTERMITTENT CLAUDICATION: ICD-10-CM

## 2020-12-03 DIAGNOSIS — R09.89 BRUIT OF RIGHT CAROTID ARTERY: ICD-10-CM

## 2020-12-03 DIAGNOSIS — I25.118 CORONARY ARTERY DISEASE OF NATIVE ARTERY OF NATIVE HEART WITH STABLE ANGINA PECTORIS: Primary | ICD-10-CM

## 2020-12-03 DIAGNOSIS — E78.2 MIXED HYPERLIPIDEMIA: Chronic | ICD-10-CM

## 2020-12-03 DIAGNOSIS — I25.2 HISTORY OF ST ELEVATION MYOCARDIAL INFARCTION (STEMI): ICD-10-CM

## 2020-12-03 DIAGNOSIS — I73.9 PVD (PERIPHERAL VASCULAR DISEASE): ICD-10-CM

## 2020-12-03 DIAGNOSIS — R06.09 DOE (DYSPNEA ON EXERTION): ICD-10-CM

## 2020-12-03 PROCEDURE — 99999 PR PBB SHADOW E&M-EST. PATIENT-LVL III: CPT | Mod: PBBFAC,,, | Performed by: INTERNAL MEDICINE

## 2020-12-03 PROCEDURE — 99214 OFFICE O/P EST MOD 30 MIN: CPT | Mod: S$GLB,,, | Performed by: INTERNAL MEDICINE

## 2020-12-03 PROCEDURE — 99999 PR PBB SHADOW E&M-EST. PATIENT-LVL III: ICD-10-PCS | Mod: PBBFAC,,, | Performed by: INTERNAL MEDICINE

## 2020-12-03 PROCEDURE — 99214 PR OFFICE/OUTPT VISIT, EST, LEVL IV, 30-39 MIN: ICD-10-PCS | Mod: S$GLB,,, | Performed by: INTERNAL MEDICINE

## 2020-12-03 PROCEDURE — 99213 OFFICE O/P EST LOW 20 MIN: CPT | Mod: PBBFAC | Performed by: INTERNAL MEDICINE

## 2020-12-03 NOTE — PROGRESS NOTES
Subjective:   Patient ID:  Iza Esquivel is a 60 y.o. female who presents for follow up of Coronary Artery Disease      HPI   8/25/2020  A 59 yo female with old mi nstemi tobacco use has stopped smoking in July had lcx stent she also had severe limiting claudication with multilevel disease limiting her rehabilitation from her mi. She has iliac stenting performed subsequently she still has residual claudication on the rt calfb she can  Walk 1 mile w/o stopping has gained weight still not compliant with diet . Has been taking meds regularily. seh wants to go back to work no angina has some residual shortness of breath.     12/3/2020  Still not smoking can walk still has some residual calf claudication that is non limiting. Has bilatearl sfa disease s/p bilateral iliac stenting. Has noc hest pain or shortness of breath has swollen rt knee. Has been  compliant with meds and diet.   Her bp is controlled at home. she gained weight.has been snacking a lot walks 4 times weekly. Lipids on target  Past Medical History:   Diagnosis Date    Atherosclerotic PVD with intermittent claudication 5/25/2020    Cervical cancer     Hyperlipidemia     Hypertension     Right carotid bruit     S/P PTCA (percutaneous transluminal coronary angioplasty) 05/25/2020    STEMI: stenting of LCx       Past Surgical History:   Procedure Laterality Date    ABDOMINAL AORTOGRAPHY N/A 5/25/2020    Procedure: Aortogram, Abdominal;  Surgeon: Shelly Jarvis MD;  Location: [facility] CATH LAB;  Service: Cardiology;  Laterality: N/A;    AORTOGRAPHY WITH SERIALOGRAPHY N/A 6/30/2020    Procedure: AORTOGRAM, WITH RUNOFF;  Surgeon: Shelly Jarvis MD;  Location: [facility] CATH LAB;  Service: Cardiology;  Laterality: N/A;    LEFT HEART CATHETERIZATION Left 5/25/2020    Procedure: CATHETERIZATION, HEART, LEFT;  Surgeon: Shelly Jarvis MD;  Location: [facility] CATH LAB;  Service: Cardiology;  Laterality: Left;    LEFT HEART CATHETERIZATION Left 6/11/2020     Procedure: CATHETERIZATION, HEART, LEFT;  Surgeon: Shelly Jarvis MD;  Location: [facility] CATH LAB;  Service: Cardiology;  Laterality: Left;    LYMPH NODE BIOPSY      PERCUTANEOUS TRANSLUMINAL BALLOON ANGIOPLASTY OF CORONARY ARTERY  2020    Procedure: Angioplasty-coronary;  Surgeon: Shelly Jarvis MD;  Location: [facility] CATH LAB;  Service: Cardiology;;       Social History     Tobacco Use    Smoking status: Former Smoker     Packs/day: 1.50     Years: 40.00     Pack years: 60.00     Quit date: 2020     Years since quittin.4    Smokeless tobacco: Never Used    Tobacco comment: now down to 10 cigarettes daily   Substance Use Topics    Alcohol use: No     Frequency: Never     Binge frequency: Never    Drug use: Not on file       Family History   Problem Relation Age of Onset    Cancer Mother         ?    Cancer Father         ?    Transient ischemic attack Father         Carotid artery stenosis, CEA    Diabetes Brother     Hypertension Brother        Current Outpatient Medications   Medication Sig    aspirin (ECOTRIN) 81 MG EC tablet Take 1 tablet (81 mg total) by mouth once daily.    atorvastatin (LIPITOR) 80 MG tablet Take 1 tablet (80 mg total) by mouth once daily.    isosorbide mononitrate (IMDUR) 30 MG 24 hr tablet Take 2 tablets (60 mg total) by mouth 2 (two) times a day.    losartan (COZAAR) 50 MG tablet Take 1 tablet (50 mg total) by mouth once daily.    metoprolol tartrate (LOPRESSOR) 25 MG tablet Take 1 tablet (25 mg total) by mouth 2 (two) times daily.    pantoprazole (PROTONIX) 40 MG tablet Take 1 tablet (40 mg total) by mouth once daily.    prasugreL (EFFIENT) 10 mg Tab Take 1 tablet (10 mg total) by mouth once daily.    amLODIPine (NORVASC) 2.5 MG tablet Take 2 tablets (5 mg total) by mouth once daily. (Patient not taking: Reported on 10/26/2020)     No current facility-administered medications for this visit.      Current Outpatient Medications on File Prior to Visit    Medication Sig    aspirin (ECOTRIN) 81 MG EC tablet Take 1 tablet (81 mg total) by mouth once daily.    atorvastatin (LIPITOR) 80 MG tablet Take 1 tablet (80 mg total) by mouth once daily.    isosorbide mononitrate (IMDUR) 30 MG 24 hr tablet Take 2 tablets (60 mg total) by mouth 2 (two) times a day.    losartan (COZAAR) 50 MG tablet Take 1 tablet (50 mg total) by mouth once daily.    metoprolol tartrate (LOPRESSOR) 25 MG tablet Take 1 tablet (25 mg total) by mouth 2 (two) times daily.    pantoprazole (PROTONIX) 40 MG tablet Take 1 tablet (40 mg total) by mouth once daily.    prasugreL (EFFIENT) 10 mg Tab Take 1 tablet (10 mg total) by mouth once daily.    amLODIPine (NORVASC) 2.5 MG tablet Take 2 tablets (5 mg total) by mouth once daily. (Patient not taking: Reported on 10/26/2020)     No current facility-administered medications on file prior to visit.        Review of Systems   Constitution: Positive for weight gain. Negative for diaphoresis and malaise/fatigue.   HENT: Negative for hoarse voice.    Eyes: Negative for double vision and visual disturbance.   Cardiovascular: Positive for claudication. Negative for chest pain, cyanosis, dyspnea on exertion, irregular heartbeat, leg swelling, near-syncope, orthopnea, palpitations, paroxysmal nocturnal dyspnea and syncope.   Respiratory: Negative for cough, hemoptysis, shortness of breath and snoring.    Hematologic/Lymphatic: Negative for bleeding problem. Does not bruise/bleed easily.   Skin: Negative for color change and poor wound healing.   Musculoskeletal: Negative for muscle cramps, muscle weakness and myalgias.   Gastrointestinal: Negative for bloating, abdominal pain, change in bowel habit, diarrhea, heartburn, hematemesis, hematochezia, melena and nausea.   Neurological: Negative for excessive daytime sleepiness, dizziness, headaches, light-headedness, loss of balance, numbness and weakness.   Psychiatric/Behavioral: Negative for memory loss. The  patient does not have insomnia.    Allergic/Immunologic: Negative for hives.       Objective:   Physical Exam   Constitutional: She is oriented to person, place, and time. She appears well-developed and well-nourished. She does not appear ill. No distress.   HENT:   Head: Normocephalic and atraumatic.   Eyes: Pupils are equal, round, and reactive to light. EOM are normal. No scleral icterus.   Neck: Normal range of motion. Neck supple. Normal carotid pulses, no hepatojugular reflux and no JVD present. Carotid bruit is not present. No tracheal deviation present. No thyromegaly present.   Cardiovascular: Normal rate, regular rhythm and normal heart sounds. Exam reveals no gallop and no friction rub.   No murmur heard.  Pulses:       Carotid pulses are 2+ on the right side and 2+ on the left side.       Radial pulses are 2+ on the right side and 2+ on the left side.        Femoral pulses are 2+ on the right side with bruit and 2+ on the left side with bruit.       Popliteal pulses are 1+ on the right side and 1+ on the left side.        Dorsalis pedis pulses are 0 on the right side and 0 on the left side.        Posterior tibial pulses are 1+ on the right side and 1+ on the left side.   Has abdominal and subclavian bruits no pulsatile [id number] mass.   Pulmonary/Chest: Effort normal and breath sounds normal. No respiratory distress. She has no wheezes. She has no rhonchi. She has no rales. She exhibits no tenderness.   Abdominal: Soft. Normal appearance, normal aorta and bowel sounds are normal. She exhibits no distension, no abdominal bruit, no ascites and no pulsatile midline mass. There is no hepatomegaly. There is no abdominal tenderness.   Obese    Musculoskeletal:         General: No edema.      Right shoulder: She exhibits no deformity.   Neurological: She is alert and oriented to person, place, and time. She has normal strength. No cranial nerve deficit. Coordination normal.   Skin: Skin is warm and dry. No  "rash noted. No cyanosis or erythema. Nails show no clubbing.   Psychiatric: She has a normal mood and affect. Her speech is normal and behavior is normal.     Vitals:    12/03/20 1219 12/03/20 1222   BP: (!) 150/70 (!) 140/68   BP Location: Right arm Left arm   Patient Position: Sitting Sitting   BP Method: Medium (Manual) Medium (Manual)   Pulse: 64    SpO2: 99%    Weight: 74.6 kg (164 lb 7.4 oz)    Height: 5' 4" (1.626 m)      Lab Results   Component Value Date    CHOL 144 11/23/2020    CHOL 164 05/26/2020    CHOL 131 02/10/2019     Lab Results   Component Value Date    HDL 52 11/23/2020    HDL 44 05/26/2020    HDL 33 (L) 02/10/2019     Lab Results   Component Value Date    LDLCALC 65.8 11/23/2020    LDLCALC 87.8 05/26/2020    LDLCALC 74.0 02/10/2019     Lab Results   Component Value Date    TRIG 131 11/23/2020    TRIG 161 (H) 05/26/2020    TRIG 120 02/10/2019     Lab Results   Component Value Date    CHOLHDL 36.1 11/23/2020    CHOLHDL 26.8 05/26/2020    CHOLHDL 25.2 02/10/2019       Chemistry        Component Value Date/Time     11/23/2020 0859    K 4.5 11/23/2020 0859     11/23/2020 0859    CO2 29 11/23/2020 0859    BUN 11 11/23/2020 0859    CREATININE 1.0 11/23/2020 0859    GLU 82 11/23/2020 0859        Component Value Date/Time    CALCIUM 9.2 11/23/2020 0859    ALKPHOS 144 (H) 11/23/2020 0859    AST 24 11/23/2020 0859    ALT 23 11/23/2020 0859    BILITOT 0.2 11/23/2020 0859    ESTGFRAFRICA >60.0 11/23/2020 0859    EGFRNONAA >60.0 11/23/2020 0859          Lab Results   Component Value Date    TSH 4.399 (H) 02/09/2019     Lab Results   Component Value Date    INR 1.0 09/12/2020    INR 1.3 (H) 09/12/2020    INR 1.0 07/20/2020     Lab Results   Component Value Date    WBC 8.94 10/12/2020    HGB 11.4 (L) 10/12/2020    HCT 36.4 (L) 10/12/2020    MCV 90 10/12/2020     (H) 10/12/2020     BMP  Sodium   Date Value Ref Range Status   11/23/2020 145 136 - 145 mmol/L Final     Potassium   Date Value Ref " Range Status   11/23/2020 4.5 3.5 - 5.1 mmol/L Final     Chloride   Date Value Ref Range Status   11/23/2020 107 95 - 110 mmol/L Final     CO2   Date Value Ref Range Status   11/23/2020 29 23 - 29 mmol/L Final     BUN   Date Value Ref Range Status   11/23/2020 11 8 - 23 mg/dL Final     Creatinine   Date Value Ref Range Status   11/23/2020 1.0 0.5 - 1.4 mg/dL Final     Calcium   Date Value Ref Range Status   11/23/2020 9.2 8.7 - 10.5 mg/dL Final     Anion Gap   Date Value Ref Range Status   11/23/2020 9 8 - 16 mmol/L Final     eGFR if    Date Value Ref Range Status   11/23/2020 >60.0 >60 mL/min/1.73 m^2 Final     eGFR if non    Date Value Ref Range Status   11/23/2020 >60.0 >60 mL/min/1.73 m^2 Final     Comment:     Calculation used to obtain the estimated glomerular filtration  rate (eGFR) is the CKD-EPI equation.        CrCl cannot be calculated (Patient's most recent lab result is older than the maximum 7 days allowed.).    Assessment:     1. Coronary artery disease of native artery of native heart with stable angina pectoris    2. Mixed hyperlipidemia    3. PVD (peripheral vascular disease)    4. Atherosclerotic PVD with intermittent claudication    5. Bruit of right carotid artery    6. Nonspecific abnormal electrocardiogram (ECG) (EKG)    7. History of ST elevation myocardial infarction (STEMI)    8. MORRELL (dyspnea on exertion)      Cad asymptomatic   htn fair control needs to be compliant with slat  Lipids on target  pvd with non limiting claudication.  Weight gain she will increase exercise and watch her snacks and calories.   Plan:   Continue current therapy  Cardiac low salt diet.  Risk factor modification and excercise program.  F/u in 6 months with lipid cmp

## 2020-12-18 ENCOUNTER — LAB VISIT (OUTPATIENT)
Dept: LAB | Facility: HOSPITAL | Age: 60
End: 2020-12-18
Attending: INTERNAL MEDICINE
Payer: COMMERCIAL

## 2020-12-18 ENCOUNTER — OFFICE VISIT (OUTPATIENT)
Dept: PULMONOLOGY | Facility: CLINIC | Age: 60
End: 2020-12-18
Payer: COMMERCIAL

## 2020-12-18 VITALS
RESPIRATION RATE: 16 BRPM | HEART RATE: 73 BPM | WEIGHT: 165.38 LBS | BODY MASS INDEX: 28.24 KG/M2 | SYSTOLIC BLOOD PRESSURE: 110 MMHG | OXYGEN SATURATION: 98 % | HEIGHT: 64 IN | DIASTOLIC BLOOD PRESSURE: 70 MMHG

## 2020-12-18 DIAGNOSIS — R06.02 SHORTNESS OF BREATH: ICD-10-CM

## 2020-12-18 DIAGNOSIS — R06.09 DOE (DYSPNEA ON EXERTION): Chronic | ICD-10-CM

## 2020-12-18 DIAGNOSIS — F17.210 NICOTINE DEPENDENCE, CIGARETTES, UNCOMPLICATED: Chronic | ICD-10-CM

## 2020-12-18 DIAGNOSIS — R06.09 DOE (DYSPNEA ON EXERTION): Primary | Chronic | ICD-10-CM

## 2020-12-18 LAB — ERYTHROCYTE [SEDIMENTATION RATE] IN BLOOD BY WESTERGREN METHOD: 32 MM/HR (ref 0–36)

## 2020-12-18 PROCEDURE — 85652 RBC SED RATE AUTOMATED: CPT

## 2020-12-18 PROCEDURE — 99999 PR PBB SHADOW E&M-EST. PATIENT-LVL IV: ICD-10-PCS | Mod: PBBFAC,,, | Performed by: INTERNAL MEDICINE

## 2020-12-18 PROCEDURE — 99204 OFFICE O/P NEW MOD 45 MIN: CPT | Mod: S$GLB,,, | Performed by: INTERNAL MEDICINE

## 2020-12-18 PROCEDURE — 86255 FLUORESCENT ANTIBODY SCREEN: CPT

## 2020-12-18 PROCEDURE — 86038 ANTINUCLEAR ANTIBODIES: CPT

## 2020-12-18 PROCEDURE — 99204 PR OFFICE/OUTPT VISIT, NEW, LEVL IV, 45-59 MIN: ICD-10-PCS | Mod: S$GLB,,, | Performed by: INTERNAL MEDICINE

## 2020-12-18 PROCEDURE — 36415 COLL VENOUS BLD VENIPUNCTURE: CPT

## 2020-12-18 PROCEDURE — 99999 PR PBB SHADOW E&M-EST. PATIENT-LVL IV: CPT | Mod: PBBFAC,,, | Performed by: INTERNAL MEDICINE

## 2020-12-18 PROCEDURE — 86140 C-REACTIVE PROTEIN: CPT

## 2020-12-18 NOTE — ASSESSMENT & PLAN NOTE
Etiology unclear.  Multifactorial etiology suspected.  Likely contributors to etiology (checked)    [x] Pulmonary airway disease    [x]  Pulmonary parenchymal disease  [x] Pulmonary vascular disease   [x] Pleural disease  [x] Pulmonary vasculitis  [] Hypoventilation ( chest wall deformity, neuromuscular disease, obesity etc)  [] Anemia  [] Thyroid disease.  [x] Cardiac illess  []         Sleep disorder    EVALUATION:  [x]        Complete PFT with bronchodilator.  []        Bronchial challenge with methacholine.   [x]        Stress test, pulmonary.  [x]        PULM - Arterial Blood Gases  []        Chest X Ray  [x]        CT scan of chest.   [x]        EMILY  [x]        Sedimentation rate  [x]        C-reactive protein  [x]        Anti-neutrophilic cytoplasmic antibody          PLAN:  Discussed diagnosis, its evaluation, treatment and usual course.  All questions answered.        Call if shortness of breath worsens, blood in sputum, change in character of cough, development of fever or chills, inability to maintain nutrition and hydration.     Re evaluate in six weeks with results.

## 2020-12-18 NOTE — ASSESSMENT & PLAN NOTE
Assistance with smoking cessation was offered, including:  []  Medications  [x]  Counseling  []  Printed Information on Smoking Cessation  []  Referral to a Smoking Cessation Program    Patient was counseled regarding smoking for 3-10 minutes.     1.65

## 2020-12-18 NOTE — PROGRESS NOTES
New patient    Subjective:      Patient ID: Iza Esquivel is a 60 y.o. female.    Patient Active Problem List   Diagnosis    Nicotine dependence, cigarettes, uncomplicated    Hx of cervical cancer    Mixed hyperlipidemia    Bruit of right carotid artery    Nonspecific abnormal electrocardiogram (ECG) (EKG)    Near syncope    History of ST elevation myocardial infarction (STEMI)    Atherosclerotic PVD with intermittent claudication    Coronary artery disease of native artery of native heart with stable angina pectoris    PVD (peripheral vascular disease)    MORRELL (dyspnea on exertion)       Problem list has been reviewed.    she has been referred by Mala Wu FNP-C for evaluation and management for   Chief Complaint   Patient presents with    Shortness of Breath       Chief Complaint: Shortness of Breath      HPI:      Patient reports cough, difficulty breathing and wheezing and denies hemoptysis.   ALLEVIATING rest  EXACERBATION exercise and walking  Patient denies recent sick contacts.     Patient does not have new pets.   Patient does not have a history of asthma.   Patient does not have a history of environmental allergens.   Patient has traveled recently.   Patient does have a history of smoking.   She has smoked since age 11. She has attempted to quit 3 times . Last quit attempt was 1 month ago. She has not relapsed. She smoked approximately 1 .5 PPD.   Patient has had a previous chest x-ray.   Patient has not had a PPD done.      Dyspnea Characteristics:   Disproportionate Exertional Dyspnea   Dyspnea Duration:  Chronic  Dyspnea Severity:  ANAYELI 7  Dyspnea Timing:   Nocturnal  Dyspnea Contributing Factors:  Tobacco Abuse   Dyspnea Associated Symptoms:   Cough and  Wheezing       Modified Anayeli Dyspnea Scale      0  Nothing at all    0.5  Very, very slight (just noticeable)    1  Very slight    2  Slight    3  Moderate    4 Somewhat severe    5 Severe      6    7  Very severe    8    9   Very,  very severe (almost maximal)  10  Maximal      Previous Report Reviewed: historical medical records, office notes and radiology reports     Past Medical History: The following portions of the patient's history were reviewed and updated as appropriate:   She  has a past surgical history that includes Lymph node biopsy; Left heart catheterization (Left, 5/25/2020); Percutaneous transluminal balloon angioplasty of coronary artery (5/25/2020); Abdominal aortography (N/A, 5/25/2020); Left heart catheterization (Left, 6/11/2020); and Aortography with serialography (N/A, 6/30/2020).  Her family history includes Cancer in her father and mother; Diabetes in her brother; Hypertension in her brother; Transient ischemic attack in her father.  She  reports that she quit smoking about 5 weeks ago. She has a 60.00 pack-year smoking history. She has never used smokeless tobacco. She reports that she does not drink alcohol. No history on file for drug.  She has a current medication list which includes the following prescription(s): amlodipine, atorvastatin, isosorbide mononitrate, losartan, pantoprazole, prasugrel, aspirin, and metoprolol tartrate.  She has No Known Allergies..    Review of Systems   Constitutional: Negative for chills, fatigue and night sweats.   HENT: Positive for sore throat and congestion. Negative for nosebleeds, rhinorrhea and trouble swallowing.    Eyes: Negative for itching.   Respiratory: Positive for cough, wheezing and dyspnea on extertion. Negative for sputum production.    Cardiovascular: Positive for chest pain, palpitations and leg swelling.   Genitourinary: Negative for difficulty urinating.   Endocrine: Negative for cold intolerance and heat intolerance.    Musculoskeletal: Positive for back pain. Negative for arthralgias.   Skin: Negative for rash.   Gastrointestinal: Negative for nausea, vomiting, abdominal pain, abdominal distention and acid reflux.   Neurological: Negative for dizziness,  "light-headedness and headaches.   Hematological: Excessive bruising. Does not bruise/bleed easily.   All other systems reviewed and are negative.         Occupational History:  INVENTORY for [employer]  Denies occupational exposure to asbestos, silica and petrochemicals.    Avocational Exposures:  none    Pet Exposures:  none      Objective:     Vitals:    12/18/20 1606   BP: 110/70   Pulse: 73   Resp: 16   SpO2: 98%   Weight: 75 kg (165 lb 5.5 oz)   Height: 5' 4" (1.626 m)     Body mass index is 28.38 kg/m².    Physical Exam  Vitals signs and nursing note reviewed.   Constitutional:       General: She is not in acute distress.     Appearance: She is well-developed. She is not diaphoretic.   HENT:      Head: Normocephalic and atraumatic.      Right Ear: External ear normal.      Left Ear: External ear normal.      Nose: Nose normal.      Mouth/Throat:      Mouth: Mucous membranes are moist.      Comments: Mallampati 2  Eyes:      General: No scleral icterus.        Left eye: No discharge.      Conjunctiva/sclera: Conjunctivae normal.      Pupils: Pupils are equal, round, and reactive to light.   Neck:      Musculoskeletal: Normal range of motion and neck supple.      Thyroid: No thyromegaly.      Vascular: No JVD.      Trachea: No tracheal deviation.      Comments: 13"  Cardiovascular:      Rate and Rhythm: Normal rate and regular rhythm.      Heart sounds: Normal heart sounds. No murmur. No friction rub. No gallop.    Pulmonary:      Effort: Pulmonary effort is normal. No respiratory distress.      Breath sounds: Normal breath sounds. No wheezing or rales.   Chest:      Chest wall: No tenderness.   Abdominal:      General: Bowel sounds are normal. There is no distension.      Palpations: Abdomen is soft. There is no mass.      Tenderness: There is no abdominal tenderness. There is no guarding.   Lymphadenopathy:      Cervical: No cervical adenopathy.   Skin:     General: Skin is warm and dry.      Capillary Refill: " Capillary refill takes less than 2 seconds.   Neurological:      General: No focal deficit present.      Mental Status: She is oriented to person, place, and time.   Psychiatric:         Mood and Affect: Mood normal.         Behavior: Behavior normal.         Personal Diagnostic Review  Chest x-ray: 10/12/20:   The lungs are clear, with normal appearance of pulmonary vasculature and no pleural effusion or pneumothorax.   The cardiac silhouette is normal in size. The hilar and mediastinal contours are unremarkable.   Bones are intact.      Assessment /Plan:     Discussed diagnosis, its evaluation, treatment and usual course. All questions answered.    Problem List Items Addressed This Visit        Other    Nicotine dependence, cigarettes, uncomplicated (Chronic)    Current Assessment & Plan     Assistance with smoking cessation was offered, including:  []  Medications  [x]  Counseling  []  Printed Information on Smoking Cessation  []  Referral to a Smoking Cessation Program    Patient was counseled regarding smoking for 3-10 minutes.           MORRELL (dyspnea on exertion) - Primary    Current Assessment & Plan     Etiology unclear.  Multifactorial etiology suspected.  Likely contributors to etiology (checked)    [x] Pulmonary airway disease    [x]  Pulmonary parenchymal disease  [x] Pulmonary vascular disease   [x] Pleural disease  [x] Pulmonary vasculitis  [] Hypoventilation ( chest wall deformity, neuromuscular disease, obesity etc)  [] Anemia  [] Thyroid disease.  [x] Cardiac illess  []         Sleep disorder    EVALUATION:  [x]        Complete PFT with bronchodilator.  []        Bronchial challenge with methacholine.   [x]        Stress test, pulmonary.  [x]        PULM - Arterial Blood Gases  []        Chest X Ray  [x]        CT scan of chest.   [x]        EMILY  [x]        Sedimentation rate  [x]        C-reactive protein  [x]        Anti-neutrophilic cytoplasmic antibody          PLAN:  Discussed diagnosis, its  evaluation, treatment and usual course.  All questions answered.        Call if shortness of breath worsens, blood in sputum, change in character of cough, development of fever or chills, inability to maintain nutrition and hydration.     Re evaluate in six weeks with results.           Relevant Orders    EMILY Screen w/Reflex    C-Reactive Protein    Sedimentation rate    Anti-Neutrophilic Cytoplasmic Antibody    Complete PFT with bronchodilator    PULM - Arterial Blood Gases--in addition to PFT only    Pulmonary stress test    CT Chest Without Contrast              TIME SPENT WITH PATIENT: Time spent: 40 minutes in face to face  discussion concerning diagnosis, prognosis, review of lab and test results, benefits of treatment as well as management of disease, counseling of patient and coordination of care between various health  care providers . Greater than half the time spent was used for coordination of care and counseling of patient.     Follow up in about 6 weeks (around 1/29/2021) for Tobacco use disorder, Shortness of breath.

## 2020-12-18 NOTE — PATIENT INSTRUCTIONS
Lung Anatomy  Your lungs take air in to give your body oxygen, which the body needs to work. Your lungs, like all the tissues in your body, are made up of billions of tiny specialized cells. Old lung cells die and are replaced by new, identical lung cells. This natural process helps ensure healthy lungs.    Date Last Reviewed: 11/1/2016  © 0225-1430 [employer]. [address] [place] [address], [place] [address]. All rights reserved. This information is not intended as a substitute for professional medical care. Always follow your healthcare professional's instructions.

## 2020-12-18 NOTE — LETTER
December 18, 2020      Mala Wu, P-C  [address]  [facility] [address]           [facility]  [address] [place] [address]  Phone: [phone]  Fax: [phone]          Patient: Iza Esquivel   MR Number: [id number]   YOB: 1960   Date of Visit: 12/18/2020       Dear Mala Wu:    Thank you for referring Iza Esquivel to me for evaluation. Attached you will find relevant portions of my assessment and plan of care.    If you have questions, please do not hesitate to call me. I look forward to following Iza Esquivel along with you.    Sincerely,    Alex Nunez MD    Enclosure  CC:  No Recipients    If you would like to receive this communication electronically, please contact [email or web address] or [phone] to request more information on [employer] access.    For providers and/or their staff who would like to refer a patient to [facility], please contact us through our one-stop-shop provider referral line, [facility] , at [phone].    If you feel you have received this communication in error or would no longer like to receive these types of communications, please e-mail [email or web address]

## 2020-12-19 DIAGNOSIS — Z12.11 SPECIAL SCREENING FOR MALIGNANT NEOPLASMS, COLON: Primary | ICD-10-CM

## 2020-12-19 LAB
ANA SER QL IF: NORMAL
CRP SERPL-MCNC: 1.8 MG/L (ref 0–8.2)

## 2020-12-22 ENCOUNTER — HOSPITAL ENCOUNTER (OUTPATIENT)
Dept: RADIOLOGY | Facility: HOSPITAL | Age: 60
Discharge: HOME | End: 2020-12-22
Attending: FAMILY MEDICINE
Payer: COMMERCIAL

## 2020-12-22 ENCOUNTER — OFFICE VISIT (OUTPATIENT)
Dept: FAMILY MEDICINE | Facility: CLINIC | Age: 60
End: 2020-12-22
Attending: FAMILY MEDICINE
Payer: COMMERCIAL

## 2020-12-22 ENCOUNTER — DOCUMENTATION ONLY (OUTPATIENT)
Dept: FAMILY MEDICINE | Facility: CLINIC | Age: 60
End: 2020-12-22

## 2020-12-22 ENCOUNTER — PATIENT MESSAGE (OUTPATIENT)
Dept: FAMILY MEDICINE | Facility: CLINIC | Age: 60
End: 2020-12-22

## 2020-12-22 VITALS
SYSTOLIC BLOOD PRESSURE: 126 MMHG | OXYGEN SATURATION: 99 % | HEART RATE: 65 BPM | WEIGHT: 165.38 LBS | TEMPERATURE: 98 F | DIASTOLIC BLOOD PRESSURE: 68 MMHG | HEIGHT: 64 IN | BODY MASS INDEX: 28.24 KG/M2

## 2020-12-22 DIAGNOSIS — Z13.820 OSTEOPOROSIS SCREENING: ICD-10-CM

## 2020-12-22 DIAGNOSIS — M79.89 LEG SWELLING: ICD-10-CM

## 2020-12-22 DIAGNOSIS — Z72.0 TOBACCO ABUSE: ICD-10-CM

## 2020-12-22 DIAGNOSIS — Z12.11 COLON CANCER SCREENING: ICD-10-CM

## 2020-12-22 DIAGNOSIS — Z85.41 HISTORY OF CERVICAL CANCER: Primary | ICD-10-CM

## 2020-12-22 DIAGNOSIS — R22.1 LOCALIZED SWELLING, MASS AND LUMP, NECK: ICD-10-CM

## 2020-12-22 DIAGNOSIS — R22.1 NECK MASS: ICD-10-CM

## 2020-12-22 PROBLEM — R94.31 NONSPECIFIC ABNORMAL ELECTROCARDIOGRAM (ECG) (EKG): Status: RESOLVED | Noted: 2018-08-21 | Resolved: 2020-12-22

## 2020-12-22 PROBLEM — R06.09 DOE (DYSPNEA ON EXERTION): Status: RESOLVED | Noted: 2020-10-26 | Resolved: 2020-12-22

## 2020-12-22 LAB
ANCA AB TITR SER IF: NORMAL TITER
P-ANCA TITR SER IF: NORMAL TITER

## 2020-12-22 PROCEDURE — 99999 PR PBB SHADOW E&M-EST. PATIENT-LVL V: ICD-10-PCS | Mod: PBBFAC,,, | Performed by: FAMILY MEDICINE

## 2020-12-22 PROCEDURE — 73562 X-RAY EXAM OF KNEE 3: CPT | Mod: TC,50,PO

## 2020-12-22 PROCEDURE — 99214 PR OFFICE/OUTPT VISIT, EST, LEVL IV, 30-39 MIN: ICD-10-PCS | Mod: S$GLB,,, | Performed by: FAMILY MEDICINE

## 2020-12-22 PROCEDURE — 99214 OFFICE O/P EST MOD 30 MIN: CPT | Mod: S$GLB,,, | Performed by: FAMILY MEDICINE

## 2020-12-22 PROCEDURE — 73562 X-RAY EXAM OF KNEE 3: CPT | Mod: 26,50,, | Performed by: RADIOLOGY

## 2020-12-22 PROCEDURE — 99999 PR PBB SHADOW E&M-EST. PATIENT-LVL V: CPT | Mod: PBBFAC,,, | Performed by: FAMILY MEDICINE

## 2020-12-22 PROCEDURE — 73562 XR KNEE ORTHO BILAT: ICD-10-PCS | Mod: 26,50,, | Performed by: RADIOLOGY

## 2020-12-22 NOTE — PROGRESS NOTES
Subjective:       Patient ID: Iza Esquivel is a 60 y.o. female.    Chief Complaint: Joint Swelling (pt c/o right knee swelling and knot behind left knee)    60 y old female with PAD , CAD , Tobacco abuse, hx of Cervical ca here to discuss L knee  Edema and knot behind R knee for 1 m . Some pain when she gets off chair . Mainly on r knee. S/p Bilat  Iliac stents . Also had procedure in cervix over 40 y ago  Due to cervical ca . She has not f.u since . No sob , cp , she does have claudication . Lastly . She has a lump on throat  . Present for about 1 m . Hx of node biopsied in neck over 20 y ago     Review of Systems   Constitutional: Negative for activity change and unexpected weight change.   HENT: Negative for hearing loss, rhinorrhea and trouble swallowing.    Eyes: Negative for discharge and visual disturbance.   Respiratory: Negative for chest tightness and wheezing.    Cardiovascular: Negative for chest pain and palpitations.   Gastrointestinal: Negative for blood in stool, constipation, diarrhea and vomiting.   Endocrine: Negative for polydipsia and polyuria.   Genitourinary: Negative for difficulty urinating, dysuria, hematuria and menstrual problem.   Musculoskeletal: Positive for arthralgias and joint swelling. Negative for neck pain.   Neurological: Negative for weakness and headaches.   Psychiatric/Behavioral: Negative for confusion and dysphoric mood.       Objective:      Physical Exam  Vitals signs and nursing note reviewed.   Constitutional:       General: She is not in acute distress.     Appearance: She is well-developed. She is not diaphoretic.   HENT:      Head: Normocephalic and atraumatic.      Right Ear: External ear normal.      Left Ear: External ear normal.      Nose: Nose normal.   Eyes:      General: No scleral icterus.        Left eye: No discharge.      Conjunctiva/sclera: Conjunctivae normal.      Pupils: Pupils are equal, round, and reactive to light.   Neck:      Musculoskeletal:  Normal range of motion and neck supple.      Thyroid: No thyromegaly.      Vascular: No JVD.      Trachea: No tracheal deviation.   Cardiovascular:      Rate and Rhythm: Normal rate and regular rhythm.      Heart sounds: Normal heart sounds. No murmur. No friction rub. No gallop.    Pulmonary:      Effort: Pulmonary effort is normal. No respiratory distress.      Breath sounds: Normal breath sounds. No wheezing or rales.   Chest:      Chest wall: No tenderness.   Abdominal:      General: Bowel sounds are normal. There is no distension.      Palpations: Abdomen is soft. There is no mass.      Tenderness: There is no abdominal tenderness. There is no guarding.   Lymphadenopathy:      Cervical: No cervical adenopathy.         Assessment:       1. History of cervical cancer    2. Colon cancer screening    3. Osteoporosis screening    4. Leg swelling    5. Localized swelling, mass and lump, neck    6. Neck mass        Plan:     Iza was seen today for joint swelling.    Diagnoses and all orders for this visit:    History of cervical cancer  -     Ambulatory referral/consult to Gynecology; Future    Colon cancer screening  -     Fecal Immunochemical Test (iFOBT); Future    Osteoporosis screening  -     DXA Bone Density Spine And Hip; Future    Leg swelling  -     X-Ray Knee Complete 4 Or More Views Bilat; Future  -     US Lower Extremity Veins Bilateral; Future    Localized swelling, mass and lump, neck  -     CT Soft Tissue Neck With Contrast; Future    Neck mass  -     Creatinine, serum; Future     GYN   FIT   DEXA  DVT rule out   CT

## 2020-12-23 ENCOUNTER — TELEPHONE (OUTPATIENT)
Dept: ENDOSCOPY | Facility: HOSPITAL | Age: 60
End: 2020-12-23

## 2020-12-23 ENCOUNTER — HOSPITAL ENCOUNTER (OUTPATIENT)
Dept: RADIOLOGY | Facility: HOSPITAL | Age: 60
Discharge: HOME | End: 2020-12-23
Attending: FAMILY MEDICINE
Payer: MEDICAID

## 2020-12-23 ENCOUNTER — HOSPITAL ENCOUNTER (OUTPATIENT)
Dept: RADIOLOGY | Facility: HOSPITAL | Age: 60
Discharge: HOME | End: 2020-12-23
Attending: FAMILY MEDICINE
Payer: COMMERCIAL

## 2020-12-23 DIAGNOSIS — R22.1 LOCALIZED SWELLING, MASS AND LUMP, NECK: ICD-10-CM

## 2020-12-23 DIAGNOSIS — M79.89 LEG SWELLING: ICD-10-CM

## 2020-12-23 PROCEDURE — 93970 US LOWER EXTREMITY VEINS BILATERAL: ICD-10-PCS | Mod: 26,,, | Performed by: RADIOLOGY

## 2020-12-23 PROCEDURE — 93970 EXTREMITY STUDY: CPT | Mod: 26,,, | Performed by: RADIOLOGY

## 2020-12-23 PROCEDURE — 25500020 PHARM REV CODE 255: Performed by: FAMILY MEDICINE

## 2020-12-23 PROCEDURE — 70491 CT SOFT TISSUE NECK W/DYE: CPT | Mod: TC

## 2020-12-23 PROCEDURE — 93970 EXTREMITY STUDY: CPT | Mod: TC

## 2020-12-23 RX ADMIN — IOHEXOL 100 ML: 350 INJECTION, SOLUTION INTRAVENOUS at 05:12

## 2020-12-23 NOTE — TELEPHONE ENCOUNTER
Attempted to schedule procedure with patient, patient declined at this time. Patient stated she received a fit kit to perform at home.

## 2020-12-24 ENCOUNTER — TELEPHONE (OUTPATIENT)
Dept: FAMILY MEDICINE | Facility: CLINIC | Age: 60
End: 2020-12-24

## 2020-12-24 ENCOUNTER — PATIENT MESSAGE (OUTPATIENT)
Dept: FAMILY MEDICINE | Facility: CLINIC | Age: 60
End: 2020-12-24

## 2020-12-24 DIAGNOSIS — E01.0 THYROMEGALY: Primary | ICD-10-CM

## 2020-12-29 ENCOUNTER — HOSPITAL ENCOUNTER (OUTPATIENT)
Dept: RADIOLOGY | Facility: HOSPITAL | Age: 60
Discharge: HOME | End: 2020-12-29
Attending: FAMILY MEDICINE
Payer: COMMERCIAL

## 2020-12-29 DIAGNOSIS — E01.0 THYROMEGALY: ICD-10-CM

## 2020-12-29 PROCEDURE — 76536 US EXAM OF HEAD AND NECK: CPT | Mod: 26,,, | Performed by: RADIOLOGY

## 2020-12-29 PROCEDURE — 76536 US EXAM OF HEAD AND NECK: CPT | Mod: TC

## 2020-12-29 PROCEDURE — 76536 US SOFT TISSUE HEAD NECK THYROID: ICD-10-PCS | Mod: 26,,, | Performed by: RADIOLOGY

## 2020-12-30 ENCOUNTER — PATIENT MESSAGE (OUTPATIENT)
Dept: FAMILY MEDICINE | Facility: CLINIC | Age: 60
End: 2020-12-30

## 2021-01-05 ENCOUNTER — TELEPHONE (OUTPATIENT)
Dept: FAMILY MEDICINE | Facility: CLINIC | Age: 61
End: 2021-01-05

## 2021-01-05 DIAGNOSIS — E04.9 ENLARGED THYROID: Primary | ICD-10-CM

## 2021-01-06 ENCOUNTER — OFFICE VISIT (OUTPATIENT)
Dept: FAMILY MEDICINE | Facility: CLINIC | Age: 61
End: 2021-01-06
Attending: FAMILY MEDICINE
Payer: MEDICAID

## 2021-01-06 ENCOUNTER — PATIENT MESSAGE (OUTPATIENT)
Dept: FAMILY MEDICINE | Facility: CLINIC | Age: 61
End: 2021-01-06

## 2021-01-06 ENCOUNTER — TELEPHONE (OUTPATIENT)
Dept: FAMILY MEDICINE | Facility: CLINIC | Age: 61
End: 2021-01-06

## 2021-01-06 VITALS
HEIGHT: 64 IN | OXYGEN SATURATION: 99 % | WEIGHT: 168 LBS | DIASTOLIC BLOOD PRESSURE: 72 MMHG | TEMPERATURE: 98 F | HEART RATE: 63 BPM | BODY MASS INDEX: 28.68 KG/M2 | SYSTOLIC BLOOD PRESSURE: 134 MMHG

## 2021-01-06 DIAGNOSIS — R22.9 LOCALIZED SWELLING, MASS AND LUMP, UNSPECIFIED: ICD-10-CM

## 2021-01-06 DIAGNOSIS — M17.4 OTHER SECONDARY OSTEOARTHRITIS OF BOTH KNEES: Primary | ICD-10-CM

## 2021-01-06 PROCEDURE — 99214 OFFICE O/P EST MOD 30 MIN: CPT | Mod: PBBFAC,PO | Performed by: FAMILY MEDICINE

## 2021-01-06 PROCEDURE — 99214 OFFICE O/P EST MOD 30 MIN: CPT | Mod: S$PBB,,, | Performed by: FAMILY MEDICINE

## 2021-01-06 PROCEDURE — 99214 PR OFFICE/OUTPT VISIT, EST, LEVL IV, 30-39 MIN: ICD-10-PCS | Mod: S$PBB,,, | Performed by: FAMILY MEDICINE

## 2021-01-06 PROCEDURE — 99999 PR PBB SHADOW E&M-EST. PATIENT-LVL IV: CPT | Mod: PBBFAC,,, | Performed by: FAMILY MEDICINE

## 2021-01-06 PROCEDURE — 99999 PR PBB SHADOW E&M-EST. PATIENT-LVL IV: ICD-10-PCS | Mod: PBBFAC,,, | Performed by: FAMILY MEDICINE

## 2021-01-06 RX ORDER — TICAGRELOR 90 MG/1
TABLET ORAL
COMMUNITY
Start: 2021-01-05 | End: 2021-01-06 | Stop reason: ALTCHOICE

## 2021-01-11 ENCOUNTER — TELEPHONE (OUTPATIENT)
Dept: ORTHOPEDICS | Facility: CLINIC | Age: 61
End: 2021-01-11

## 2021-01-12 ENCOUNTER — OFFICE VISIT (OUTPATIENT)
Dept: OBSTETRICS AND GYNECOLOGY | Facility: CLINIC | Age: 61
End: 2021-01-12
Attending: FAMILY MEDICINE
Payer: MEDICAID

## 2021-01-12 ENCOUNTER — HOSPITAL ENCOUNTER (OUTPATIENT)
Dept: RADIOLOGY | Facility: HOSPITAL | Age: 61
Discharge: HOME | End: 2021-01-12
Attending: FAMILY MEDICINE
Payer: MEDICAID

## 2021-01-12 ENCOUNTER — TELEPHONE (OUTPATIENT)
Dept: FAMILY MEDICINE | Facility: CLINIC | Age: 61
End: 2021-01-12

## 2021-01-12 VITALS
SYSTOLIC BLOOD PRESSURE: 158 MMHG | BODY MASS INDEX: 28.85 KG/M2 | DIASTOLIC BLOOD PRESSURE: 88 MMHG | HEIGHT: 64 IN | WEIGHT: 169 LBS

## 2021-01-12 DIAGNOSIS — M85.80 OSTEOPENIA, UNSPECIFIED LOCATION: ICD-10-CM

## 2021-01-12 DIAGNOSIS — Z12.11 SPECIAL SCREENING FOR MALIGNANT NEOPLASM OF COLON: Primary | ICD-10-CM

## 2021-01-12 DIAGNOSIS — Z01.419 ROUTINE GYNECOLOGICAL EXAMINATION: Primary | ICD-10-CM

## 2021-01-12 DIAGNOSIS — Z12.31 BREAST CANCER SCREENING BY MAMMOGRAM: ICD-10-CM

## 2021-01-12 DIAGNOSIS — Z12.4 PAPANICOLAOU SMEAR FOR CERVICAL CANCER SCREENING: ICD-10-CM

## 2021-01-12 DIAGNOSIS — Z85.41 HISTORY OF CERVICAL CANCER: ICD-10-CM

## 2021-01-12 PROCEDURE — 99213 OFFICE O/P EST LOW 20 MIN: CPT | Mod: PBBFAC,25 | Performed by: NURSE PRACTITIONER

## 2021-01-12 PROCEDURE — 77067 MAMMO DIGITAL SCREENING BILAT WITH TOMOSYNTHESIS_CAD: ICD-10-PCS | Mod: 26,,, | Performed by: RADIOLOGY

## 2021-01-12 PROCEDURE — 99999 PR PBB SHADOW E&M-EST. PATIENT-LVL III: CPT | Mod: PBBFAC,,, | Performed by: NURSE PRACTITIONER

## 2021-01-12 PROCEDURE — 77063 MAMMO DIGITAL SCREENING BILAT WITH TOMOSYNTHESIS_CAD: ICD-10-PCS | Mod: 26,,, | Performed by: RADIOLOGY

## 2021-01-12 PROCEDURE — 77063 BREAST TOMOSYNTHESIS BI: CPT | Mod: 26,,, | Performed by: RADIOLOGY

## 2021-01-12 PROCEDURE — 87624 HPV HI-RISK TYP POOLED RSLT: CPT

## 2021-01-12 PROCEDURE — 99386 PR PREVENTIVE VISIT,NEW,40-64: ICD-10-PCS | Mod: S$PBB,,, | Performed by: NURSE PRACTITIONER

## 2021-01-12 PROCEDURE — 99999 PR PBB SHADOW E&M-EST. PATIENT-LVL III: ICD-10-PCS | Mod: PBBFAC,,, | Performed by: NURSE PRACTITIONER

## 2021-01-12 PROCEDURE — 99386 PREV VISIT NEW AGE 40-64: CPT | Mod: S$PBB,,, | Performed by: NURSE PRACTITIONER

## 2021-01-12 PROCEDURE — 77067 SCR MAMMO BI INCL CAD: CPT | Mod: 26,,, | Performed by: RADIOLOGY

## 2021-01-12 PROCEDURE — 77067 SCR MAMMO BI INCL CAD: CPT | Mod: TC

## 2021-01-12 PROCEDURE — 88175 CYTOPATH C/V AUTO FLUID REDO: CPT

## 2021-01-13 ENCOUNTER — OFFICE VISIT (OUTPATIENT)
Dept: ORTHOPEDICS | Facility: CLINIC | Age: 61
End: 2021-01-13
Attending: FAMILY MEDICINE
Payer: MEDICAID

## 2021-01-13 ENCOUNTER — TELEPHONE (OUTPATIENT)
Dept: FAMILY MEDICINE | Facility: CLINIC | Age: 61
End: 2021-01-13

## 2021-01-13 ENCOUNTER — HOSPITAL ENCOUNTER (OUTPATIENT)
Dept: RADIOLOGY | Facility: HOSPITAL | Age: 61
Discharge: HOME | End: 2021-01-13
Attending: PHYSICIAN ASSISTANT
Payer: MEDICAID

## 2021-01-13 VITALS
DIASTOLIC BLOOD PRESSURE: 70 MMHG | BODY MASS INDEX: 28.85 KG/M2 | SYSTOLIC BLOOD PRESSURE: 126 MMHG | HEART RATE: 65 BPM | HEIGHT: 64 IN | WEIGHT: 169 LBS

## 2021-01-13 DIAGNOSIS — Z12.31 OTHER SCREENING MAMMOGRAM: ICD-10-CM

## 2021-01-13 DIAGNOSIS — G89.29 CHRONIC PAIN OF RIGHT KNEE: ICD-10-CM

## 2021-01-13 DIAGNOSIS — M25.561 PAIN IN BOTH KNEES, UNSPECIFIED CHRONICITY: Primary | ICD-10-CM

## 2021-01-13 DIAGNOSIS — M25.562 PAIN IN BOTH KNEES, UNSPECIFIED CHRONICITY: Primary | ICD-10-CM

## 2021-01-13 DIAGNOSIS — M25.562 PAIN IN BOTH KNEES, UNSPECIFIED CHRONICITY: ICD-10-CM

## 2021-01-13 DIAGNOSIS — M25.561 PAIN IN BOTH KNEES, UNSPECIFIED CHRONICITY: ICD-10-CM

## 2021-01-13 DIAGNOSIS — M94.261 CHONDROMALACIA, RIGHT KNEE: Primary | ICD-10-CM

## 2021-01-13 DIAGNOSIS — M25.561 CHRONIC PAIN OF RIGHT KNEE: ICD-10-CM

## 2021-01-13 PROCEDURE — 73565 X-RAY EXAM OF KNEES: CPT | Mod: TC

## 2021-01-13 PROCEDURE — 99202 PR OFFICE/OUTPT VISIT, NEW, LEVL II, 15-29 MIN: ICD-10-PCS | Mod: S$PBB,,, | Performed by: PHYSICIAN ASSISTANT

## 2021-01-13 PROCEDURE — 99202 OFFICE O/P NEW SF 15 MIN: CPT | Mod: S$PBB,,, | Performed by: PHYSICIAN ASSISTANT

## 2021-01-13 PROCEDURE — 99999 PR PBB SHADOW E&M-EST. PATIENT-LVL IV: CPT | Mod: PBBFAC,,, | Performed by: PHYSICIAN ASSISTANT

## 2021-01-13 PROCEDURE — 99214 OFFICE O/P EST MOD 30 MIN: CPT | Mod: PBBFAC,25 | Performed by: PHYSICIAN ASSISTANT

## 2021-01-13 PROCEDURE — 73565 XR KNEE AP STANDING BILATERAL: ICD-10-PCS | Mod: 26,,, | Performed by: RADIOLOGY

## 2021-01-13 PROCEDURE — 73565 X-RAY EXAM OF KNEES: CPT | Mod: 26,,, | Performed by: RADIOLOGY

## 2021-01-13 PROCEDURE — 99999 PR PBB SHADOW E&M-EST. PATIENT-LVL IV: ICD-10-PCS | Mod: PBBFAC,,, | Performed by: PHYSICIAN ASSISTANT

## 2021-01-14 ENCOUNTER — PATIENT MESSAGE (OUTPATIENT)
Dept: ORTHOPEDICS | Facility: CLINIC | Age: 61
End: 2021-01-14

## 2021-01-14 ENCOUNTER — LAB VISIT (OUTPATIENT)
Dept: LAB | Facility: HOSPITAL | Age: 61
End: 2021-01-14
Attending: FAMILY MEDICINE
Payer: MEDICAID

## 2021-01-14 ENCOUNTER — OFFICE VISIT (OUTPATIENT)
Dept: ENDOCRINOLOGY | Facility: CLINIC | Age: 61
End: 2021-01-14
Payer: MEDICAID

## 2021-01-14 VITALS
DIASTOLIC BLOOD PRESSURE: 88 MMHG | HEART RATE: 64 BPM | BODY MASS INDEX: 28.56 KG/M2 | WEIGHT: 167.31 LBS | SYSTOLIC BLOOD PRESSURE: 132 MMHG | OXYGEN SATURATION: 97 % | HEIGHT: 64 IN | TEMPERATURE: 98 F

## 2021-01-14 DIAGNOSIS — M85.80 OSTEOPENIA, UNSPECIFIED LOCATION: ICD-10-CM

## 2021-01-14 DIAGNOSIS — E04.9 ENLARGED THYROID: Primary | ICD-10-CM

## 2021-01-14 DIAGNOSIS — E55.9 HYPOVITAMINOSIS D: ICD-10-CM

## 2021-01-14 DIAGNOSIS — Z80.8 FAMILY HISTORY OF THYROID CANCER: ICD-10-CM

## 2021-01-14 DIAGNOSIS — Z78.0 POSTMENOPAUSAL: ICD-10-CM

## 2021-01-14 DIAGNOSIS — E04.9 ENLARGED THYROID: ICD-10-CM

## 2021-01-14 LAB
CA-I BLDV-SCNC: 1.12 MMOL/L (ref 1.06–1.42)
PTH-INTACT SERPL-MCNC: 121.3 PG/ML (ref 9–77)
T4 FREE SERPL-MCNC: 0.76 NG/DL (ref 0.71–1.51)
TSH SERPL DL<=0.005 MIU/L-ACNC: 10.28 UIU/ML (ref 0.4–4)

## 2021-01-14 PROCEDURE — 36415 COLL VENOUS BLD VENIPUNCTURE: CPT

## 2021-01-14 PROCEDURE — 99204 OFFICE O/P NEW MOD 45 MIN: CPT | Mod: S$PBB,,, | Performed by: PHYSICIAN ASSISTANT

## 2021-01-14 PROCEDURE — 84165 PROTEIN E-PHORESIS SERUM: CPT

## 2021-01-14 PROCEDURE — 84432 ASSAY OF THYROGLOBULIN: CPT

## 2021-01-14 PROCEDURE — 84439 ASSAY OF FREE THYROXINE: CPT

## 2021-01-14 PROCEDURE — 86334 IMMUNOFIX E-PHORESIS SERUM: CPT

## 2021-01-14 PROCEDURE — 84165 PATHOLOGIST INTERPRETATION SPE: ICD-10-PCS | Mod: 26,,, | Performed by: PATHOLOGY

## 2021-01-14 PROCEDURE — 84165 PROTEIN E-PHORESIS SERUM: CPT | Mod: 26,,, | Performed by: PATHOLOGY

## 2021-01-14 PROCEDURE — 99214 OFFICE O/P EST MOD 30 MIN: CPT | Mod: PBBFAC,PO | Performed by: PHYSICIAN ASSISTANT

## 2021-01-14 PROCEDURE — 82306 VITAMIN D 25 HYDROXY: CPT

## 2021-01-14 PROCEDURE — 84443 ASSAY THYROID STIM HORMONE: CPT

## 2021-01-14 PROCEDURE — 86376 MICROSOMAL ANTIBODY EACH: CPT

## 2021-01-14 PROCEDURE — 83970 ASSAY OF PARATHORMONE: CPT

## 2021-01-14 PROCEDURE — 99999 PR PBB SHADOW E&M-EST. PATIENT-LVL IV: ICD-10-PCS | Mod: PBBFAC,,, | Performed by: PHYSICIAN ASSISTANT

## 2021-01-14 PROCEDURE — 82330 ASSAY OF CALCIUM: CPT

## 2021-01-14 PROCEDURE — 99999 PR PBB SHADOW E&M-EST. PATIENT-LVL IV: CPT | Mod: PBBFAC,,, | Performed by: PHYSICIAN ASSISTANT

## 2021-01-14 PROCEDURE — 86334 IMMUNOFIX E-PHORESIS SERUM: CPT | Mod: 26,,, | Performed by: PATHOLOGY

## 2021-01-14 PROCEDURE — 86334 PATHOLOGIST INTERPRETATION IFE: ICD-10-PCS | Mod: 26,,, | Performed by: PATHOLOGY

## 2021-01-14 PROCEDURE — 99204 PR OFFICE/OUTPT VISIT, NEW, LEVL IV, 45-59 MIN: ICD-10-PCS | Mod: S$PBB,,, | Performed by: PHYSICIAN ASSISTANT

## 2021-01-15 ENCOUNTER — TELEPHONE (OUTPATIENT)
Dept: FAMILY MEDICINE | Facility: CLINIC | Age: 61
End: 2021-01-15

## 2021-01-15 LAB
25(OH)D3+25(OH)D2 SERPL-MCNC: 10 NG/ML (ref 30–96)
THRYOGLOBULIN INTERPRETATION: ABNORMAL
THYROGLOB AB SERPL-ACNC: 3 IU/ML
THYROGLOB SERPL-MCNC: 29 NG/ML
THYROPEROXIDASE IGG SERPL-ACNC: ABNORMAL IU/ML

## 2021-01-15 RX ORDER — ERGOCALCIFEROL 1.25 MG/1
50000 CAPSULE ORAL WEEKLY
Qty: 4 CAPSULE | Refills: 2 | Status: SHIPPED | OUTPATIENT
Start: 2021-01-15 | End: 2021-03-31

## 2021-01-19 ENCOUNTER — PATIENT MESSAGE (OUTPATIENT)
Dept: CARDIOLOGY | Facility: CLINIC | Age: 61
End: 2021-01-19

## 2021-01-19 ENCOUNTER — PATIENT MESSAGE (OUTPATIENT)
Dept: FAMILY MEDICINE | Facility: CLINIC | Age: 61
End: 2021-01-19

## 2021-01-19 ENCOUNTER — TELEPHONE (OUTPATIENT)
Dept: CARDIOLOGY | Facility: CLINIC | Age: 61
End: 2021-01-19

## 2021-01-19 DIAGNOSIS — R09.89 BRUIT OF RIGHT CAROTID ARTERY: Primary | ICD-10-CM

## 2021-01-19 DIAGNOSIS — E06.3 HYPOTHYROIDISM DUE TO HASHIMOTO'S THYROIDITIS: Primary | ICD-10-CM

## 2021-01-19 DIAGNOSIS — E03.8 HYPOTHYROIDISM DUE TO HASHIMOTO'S THYROIDITIS: Primary | ICD-10-CM

## 2021-01-19 LAB
ALBUMIN SERPL ELPH-MCNC: 3.59 G/DL (ref 3.35–5.55)
ALPHA1 GLOB SERPL ELPH-MCNC: 0.39 G/DL (ref 0.17–0.41)
ALPHA2 GLOB SERPL ELPH-MCNC: 1.12 G/DL (ref 0.43–0.99)
B-GLOBULIN SERPL ELPH-MCNC: 1.13 G/DL (ref 0.5–1.1)
GAMMA GLOB SERPL ELPH-MCNC: 0.98 G/DL (ref 0.67–1.58)
PATHOLOGIST INTERPRETATION SPE: NORMAL
PROT SERPL-MCNC: 7.2 G/DL (ref 6–8.4)

## 2021-01-19 RX ORDER — LEVOTHYROXINE SODIUM 50 UG/1
50 TABLET ORAL
Qty: 30 TABLET | Refills: 11 | Status: SHIPPED | OUTPATIENT
Start: 2021-01-19 | End: 2021-12-23

## 2021-01-20 LAB
INTERPRETATION SERPL IFE-IMP: NORMAL
PATHOLOGIST INTERPRETATION IFE: NORMAL

## 2021-01-22 LAB
HPV HR 12 DNA SPEC QL NAA+PROBE: NEGATIVE
HPV16 AG SPEC QL: NEGATIVE
HPV18 DNA SPEC QL NAA+PROBE: NEGATIVE

## 2021-01-25 ENCOUNTER — HOSPITAL ENCOUNTER (OUTPATIENT)
Dept: CARDIOLOGY | Facility: HOSPITAL | Age: 61
Discharge: HOME | End: 2021-01-25
Attending: INTERNAL MEDICINE
Payer: MEDICAID

## 2021-01-25 DIAGNOSIS — R09.89 BRUIT OF RIGHT CAROTID ARTERY: ICD-10-CM

## 2021-01-25 LAB
LEFT ARM DIASTOLIC BLOOD PRESSURE: 88 MMHG
LEFT ARM SYSTOLIC BLOOD PRESSURE: 132 MMHG
LEFT CBA DIAS: 32 CM/S
LEFT CBA SYS: 108 CM/S
LEFT CCA DIST DIAS: 17 CM/S
LEFT CCA DIST SYS: 77 CM/S
LEFT CCA MID DIAS: 20 CM/S
LEFT CCA MID SYS: 93 CM/S
LEFT CCA PROX DIAS: 21 CM/S
LEFT CCA PROX SYS: 96 CM/S
LEFT ECA DIAS: 26 CM/S
LEFT ECA SYS: 126 CM/S
LEFT ICA DIST DIAS: 27 CM/S
LEFT ICA DIST SYS: 89 CM/S
LEFT ICA MID DIAS: 39 CM/S
LEFT ICA MID SYS: 152 CM/S
LEFT ICA PROX DIAS: 40 CM/S
LEFT ICA PROX SYS: 130 CM/S
LEFT VERTEBRAL DIAS: 25 CM/S
LEFT VERTEBRAL SYS: 81 CM/S
OHS CV CAROTID RIGHT ICA EDV HIGHEST: 39
OHS CV CAROTID ULTRASOUND LEFT ICA/CCA RATIO: 1.97
OHS CV CAROTID ULTRASOUND RIGHT ICA/CCA RATIO: 2.26
OHS CV PV CAROTID LEFT HIGHEST CCA: 96
OHS CV PV CAROTID LEFT HIGHEST ICA: 152
OHS CV PV CAROTID RIGHT HIGHEST CCA: 115
OHS CV PV CAROTID RIGHT HIGHEST ICA: 176
OHS CV US CAROTID LEFT HIGHEST EDV: 40
RIGHT ARM DIASTOLIC BLOOD PRESSURE: 80 MMHG
RIGHT ARM SYSTOLIC BLOOD PRESSURE: 130 MMHG
RIGHT CBA DIAS: 20 CM/S
RIGHT CBA SYS: 72 CM/S
RIGHT CCA DIST DIAS: 18 CM/S
RIGHT CCA DIST SYS: 78 CM/S
RIGHT CCA MID DIAS: 19 CM/S
RIGHT CCA MID SYS: 70 CM/S
RIGHT CCA PROX DIAS: 24 CM/S
RIGHT CCA PROX SYS: 115 CM/S
RIGHT ECA DIAS: 14 CM/S
RIGHT ECA SYS: 147 CM/S
RIGHT ICA DIST DIAS: 39 CM/S
RIGHT ICA DIST SYS: 176 CM/S
RIGHT ICA MID DIAS: 28 CM/S
RIGHT ICA MID SYS: 100 CM/S
RIGHT ICA PROX DIAS: 25 CM/S
RIGHT ICA PROX SYS: 79 CM/S
RIGHT VERTEBRAL DIAS: 23 CM/S
RIGHT VERTEBRAL SYS: 79 CM/S

## 2021-01-25 PROCEDURE — 93880 CV US DOPPLER CAROTID (CUPID ONLY): ICD-10-PCS | Mod: 26,,, | Performed by: INTERNAL MEDICINE

## 2021-01-25 PROCEDURE — 93880 EXTRACRANIAL BILAT STUDY: CPT | Mod: 26,,, | Performed by: INTERNAL MEDICINE

## 2021-01-25 PROCEDURE — 93880 EXTRACRANIAL BILAT STUDY: CPT

## 2021-01-26 ENCOUNTER — TELEPHONE (OUTPATIENT)
Dept: CARDIOLOGY | Facility: CLINIC | Age: 61
End: 2021-01-26

## 2021-02-07 LAB
FINAL PATHOLOGIC DIAGNOSIS: NORMAL
Lab: NORMAL

## 2021-02-24 ENCOUNTER — LAB VISIT (OUTPATIENT)
Dept: LAB | Facility: HOSPITAL | Age: 61
End: 2021-02-24
Attending: PHYSICIAN ASSISTANT
Payer: MEDICAID

## 2021-02-24 DIAGNOSIS — E06.3 HYPOTHYROIDISM DUE TO HASHIMOTO'S THYROIDITIS: ICD-10-CM

## 2021-02-24 DIAGNOSIS — E03.8 HYPOTHYROIDISM DUE TO HASHIMOTO'S THYROIDITIS: ICD-10-CM

## 2021-02-24 LAB
T4 FREE SERPL-MCNC: 1.15 NG/DL (ref 0.71–1.51)
TSH SERPL DL<=0.005 MIU/L-ACNC: 1.76 UIU/ML (ref 0.4–4)

## 2021-02-24 PROCEDURE — 84439 ASSAY OF FREE THYROXINE: CPT

## 2021-02-24 PROCEDURE — 36415 COLL VENOUS BLD VENIPUNCTURE: CPT

## 2021-02-24 PROCEDURE — 84443 ASSAY THYROID STIM HORMONE: CPT

## 2021-02-25 ENCOUNTER — TELEPHONE (OUTPATIENT)
Dept: ENDOSCOPY | Facility: HOSPITAL | Age: 61
End: 2021-02-25

## 2021-02-26 ENCOUNTER — LAB VISIT (OUTPATIENT)
Dept: OTOLARYNGOLOGY | Facility: CLINIC | Age: 61
End: 2021-02-26
Payer: MEDICAID

## 2021-02-26 ENCOUNTER — PATIENT MESSAGE (OUTPATIENT)
Dept: PULMONOLOGY | Facility: CLINIC | Age: 61
End: 2021-02-26

## 2021-02-26 DIAGNOSIS — R06.02 SHORTNESS OF BREATH: ICD-10-CM

## 2021-02-26 LAB — SARS-COV-2 RNA RESP QL NAA+PROBE: NOT DETECTED

## 2021-02-26 PROCEDURE — U0005 INFEC AGEN DETEC AMPLI PROBE: HCPCS

## 2021-02-26 PROCEDURE — U0003 INFECTIOUS AGENT DETECTION BY NUCLEIC ACID (DNA OR RNA); SEVERE ACUTE RESPIRATORY SYNDROME CORONAVIRUS 2 (SARS-COV-2) (CORONAVIRUS DISEASE [COVID-19]), AMPLIFIED PROBE TECHNIQUE, MAKING USE OF HIGH THROUGHPUT TECHNOLOGIES AS DESCRIBED BY CMS-2020-01-R: HCPCS

## 2021-03-01 ENCOUNTER — CLINICAL SUPPORT (OUTPATIENT)
Dept: PULMONOLOGY | Facility: CLINIC | Age: 61
End: 2021-03-01
Payer: MEDICAID

## 2021-03-01 ENCOUNTER — OFFICE VISIT (OUTPATIENT)
Dept: PULMONOLOGY | Facility: CLINIC | Age: 61
End: 2021-03-01
Payer: MEDICAID

## 2021-03-01 VITALS — BODY MASS INDEX: 27.99 KG/M2 | WEIGHT: 168 LBS | HEIGHT: 65 IN

## 2021-03-01 VITALS
SYSTOLIC BLOOD PRESSURE: 128 MMHG | DIASTOLIC BLOOD PRESSURE: 74 MMHG | WEIGHT: 166.75 LBS | HEIGHT: 65 IN | HEART RATE: 64 BPM | BODY MASS INDEX: 27.78 KG/M2 | OXYGEN SATURATION: 97 % | RESPIRATION RATE: 16 BRPM

## 2021-03-01 DIAGNOSIS — R06.09 DOE (DYSPNEA ON EXERTION): Chronic | ICD-10-CM

## 2021-03-01 DIAGNOSIS — J44.9 COPD, MODERATE: Primary | ICD-10-CM

## 2021-03-01 DIAGNOSIS — Z12.2 ENCOUNTER FOR SCREENING FOR MALIGNANT NEOPLASM OF RESPIRATORY ORGANS: ICD-10-CM

## 2021-03-01 DIAGNOSIS — I70.219 ATHEROSCLEROTIC PVD WITH INTERMITTENT CLAUDICATION: ICD-10-CM

## 2021-03-01 DIAGNOSIS — I25.2 HISTORY OF ST ELEVATION MYOCARDIAL INFARCTION (STEMI): ICD-10-CM

## 2021-03-01 DIAGNOSIS — F17.210 NICOTINE DEPENDENCE, CIGARETTES, UNCOMPLICATED: ICD-10-CM

## 2021-03-01 LAB
ALLENS TEST: ABNORMAL
DELSYS: ABNORMAL
FIO2: 0.21
HCO3 UR-SCNC: 25 MMOL/L (ref 24–28)
MODE: ABNORMAL
PCO2 BLDA: 37.8 MMHG (ref 35–45)
PH SMN: 7.43 [PH] (ref 7.35–7.45)
PO2 BLDA: 106 MMHG (ref 80–100)
POC BE: 1 MMOL/L
POC SATURATED O2: 98 % (ref 95–100)
SAMPLE: ABNORMAL
SITE: ABNORMAL

## 2021-03-01 PROCEDURE — 94618 PULMONARY STRESS TESTING: CPT | Mod: 26,S$PBB,, | Performed by: INTERNAL MEDICINE

## 2021-03-01 PROCEDURE — 94010 BREATHING CAPACITY TEST: CPT | Mod: 26,59,S$PBB, | Performed by: INTERNAL MEDICINE

## 2021-03-01 PROCEDURE — 94726 PULM FUNCT TST PLETHYSMOGRAP: ICD-10-PCS | Mod: 26,S$PBB,, | Performed by: INTERNAL MEDICINE

## 2021-03-01 PROCEDURE — 94729 PR C02/MEMBANE DIFFUSE CAPACITY: ICD-10-PCS | Mod: 26,S$PBB,, | Performed by: INTERNAL MEDICINE

## 2021-03-01 PROCEDURE — 99999 PR PBB SHADOW E&M-EST. PATIENT-LVL I: ICD-10-PCS | Mod: PBBFAC,,,

## 2021-03-01 PROCEDURE — 94726 PLETHYSMOGRAPHY LUNG VOLUMES: CPT | Mod: PBBFAC

## 2021-03-01 PROCEDURE — 99999 PR PBB SHADOW E&M-EST. PATIENT-LVL IV: ICD-10-PCS | Mod: PBBFAC,,, | Performed by: NURSE PRACTITIONER

## 2021-03-01 PROCEDURE — 94729 DIFFUSING CAPACITY: CPT | Mod: 26,S$PBB,, | Performed by: INTERNAL MEDICINE

## 2021-03-01 PROCEDURE — 94618 PULMONARY STRESS TESTING: CPT | Mod: PBBFAC

## 2021-03-01 PROCEDURE — 94729 DIFFUSING CAPACITY: CPT | Mod: PBBFAC

## 2021-03-01 PROCEDURE — 99214 OFFICE O/P EST MOD 30 MIN: CPT | Mod: PBBFAC,27,25 | Performed by: NURSE PRACTITIONER

## 2021-03-01 PROCEDURE — 99211 OFF/OP EST MAY X REQ PHY/QHP: CPT | Mod: PBBFAC,25

## 2021-03-01 PROCEDURE — 36600 WITHDRAWAL OF ARTERIAL BLOOD: CPT | Mod: 59,S$PBB,, | Performed by: INTERNAL MEDICINE

## 2021-03-01 PROCEDURE — 36600 WITHDRAWAL OF ARTERIAL BLOOD: CPT | Mod: PBBFAC

## 2021-03-01 PROCEDURE — 99999 PR PBB SHADOW E&M-EST. PATIENT-LVL IV: CPT | Mod: PBBFAC,,, | Performed by: NURSE PRACTITIONER

## 2021-03-01 PROCEDURE — 94618 PULMONARY STRESS TESTING: ICD-10-PCS | Mod: 26,S$PBB,, | Performed by: INTERNAL MEDICINE

## 2021-03-01 PROCEDURE — 94726 PLETHYSMOGRAPHY LUNG VOLUMES: CPT | Mod: 26,S$PBB,, | Performed by: INTERNAL MEDICINE

## 2021-03-01 PROCEDURE — 36600 PR WITHDRAWAL OF ARTERIAL BLOOD: ICD-10-PCS | Mod: 59,S$PBB,, | Performed by: INTERNAL MEDICINE

## 2021-03-01 PROCEDURE — 99214 PR OFFICE/OUTPT VISIT, EST, LEVL IV, 30-39 MIN: ICD-10-PCS | Mod: 25,S$PBB,, | Performed by: NURSE PRACTITIONER

## 2021-03-01 PROCEDURE — 99999 PR PBB SHADOW E&M-EST. PATIENT-LVL I: CPT | Mod: PBBFAC,,,

## 2021-03-01 PROCEDURE — 99214 OFFICE O/P EST MOD 30 MIN: CPT | Mod: 25,S$PBB,, | Performed by: NURSE PRACTITIONER

## 2021-03-01 PROCEDURE — 94010 BREATHING CAPACITY TEST: CPT | Mod: PBBFAC

## 2021-03-01 PROCEDURE — 82803 BLOOD GASES ANY COMBINATION: CPT | Mod: PBBFAC

## 2021-03-01 PROCEDURE — 94010 BREATHING CAPACITY TEST: ICD-10-PCS | Mod: 26,59,S$PBB, | Performed by: INTERNAL MEDICINE

## 2021-03-01 RX ORDER — ALBUTEROL SULFATE 90 UG/1
2 AEROSOL, METERED RESPIRATORY (INHALATION) EVERY 4 HOURS PRN
Qty: 18 G | Refills: 11 | Status: SHIPPED | OUTPATIENT
Start: 2021-03-01 | End: 2022-02-02

## 2021-03-01 RX ORDER — UMECLIDINIUM BROMIDE AND VILANTEROL TRIFENATATE 62.5; 25 UG/1; UG/1
1 POWDER RESPIRATORY (INHALATION) DAILY
Qty: 1 EACH | Refills: 11 | Status: SHIPPED | OUTPATIENT
Start: 2021-03-01 | End: 2023-01-18

## 2021-03-02 LAB
BRPFT: ABNORMAL
DLCO ADJ PRE: 10.43 ML/(MIN*MMHG) (ref 17.56–29.03)
DLCO SINGLE BREATH LLN: 17.56
DLCO SINGLE BREATH PRE REF: 44.8 %
DLCO SINGLE BREATH REF: 23.29
DLCOC SBVA LLN: 3.1
DLCOC SBVA PRE REF: 64.7 %
DLCOC SBVA REF: 4.51
DLCOC SINGLE BREATH LLN: 17.56
DLCOC SINGLE BREATH PRE REF: 44.8 %
DLCOC SINGLE BREATH REF: 23.29
DLCOVA LLN: 3.1
DLCOVA PRE REF: 64.7 %
DLCOVA PRE: 2.92 ML/(MIN*MMHG*L) (ref 3.1–5.92)
DLCOVA REF: 4.51
DLVAADJ PRE: 2.92 ML/(MIN*MMHG*L) (ref 3.1–5.92)
ERV LLN: -16449.22
ERV PRE REF: 100.2 %
ERV REF: 0.78
FEF 25 75 LLN: 1.11
FEF 25 75 PRE REF: 29 %
FEF 25 75 REF: 2.23
FEV1 FVC LLN: 67
FEV1 FVC PRE REF: 77.3 %
FEV1 FVC REF: 79
FEV1 LLN: 1.9
FEV1 PRE REF: 69 %
FEV1 REF: 2.53
FRCPLETH LLN: 1.96
FRCPLETH PREREF: 95.4 %
FRCPLETH REF: 2.78
FVC LLN: 2.43
FVC PRE REF: 88.7 %
FVC REF: 3.24
IVC PRE: 2 L (ref 2.43–4.04)
IVC SINGLE BREATH LLN: 2.43
IVC SINGLE BREATH PRE REF: 61.7 %
IVC SINGLE BREATH REF: 3.24
MVV LLN: 82
MVV PRE REF: 68.6 %
MVV REF: 96
PEF LLN: 4.61
PEF PRE REF: 64.7 %
PEF REF: 6.39
PRE DLCO: 10.43 ML/(MIN*MMHG) (ref 17.56–29.03)
PRE ERV: 0.79 L (ref -16449.22–16450.78)
PRE FEF 25 75: 0.65 L/S (ref 1.11–3.34)
PRE FET 100: 13.22 SEC
PRE FEV1 FVC: 60.95 % (ref 66.58–91.15)
PRE FEV1: 1.75 L (ref 1.9–3.17)
PRE FRC PL: 2.65 L
PRE FVC: 2.87 L (ref 2.43–4.04)
PRE MVV: 66 L/MIN (ref 81.84–110.72)
PRE PEF: 4.13 L/S (ref 4.61–8.18)
PRE RV: 1.25 L (ref 1.42–2.57)
PRE TLC: 4.12 L (ref 4.18–6.15)
RAW LLN: 3.06
RAW PRE REF: 177.7 %
RAW PRE: 5.44 CMH2O*S/L (ref 3.06–3.06)
RAW REF: 3.06
RV LLN: 1.42
RV PRE REF: 62.7 %
RV REF: 2
RVTLC LLN: 30
RVTLC PRE REF: 75.8 %
RVTLC PRE: 30.37 % (ref 30.45–49.63)
RVTLC REF: 40
TLC LLN: 4.18
TLC PRE REF: 79.8 %
TLC REF: 5.17
VA PRE: 3.58 L (ref 5.02–5.02)
VA SINGLE BREATH LLN: 5.02
VA SINGLE BREATH PRE REF: 71.5 %
VA SINGLE BREATH REF: 5.02
VC LLN: 2.43
VC PRE REF: 88.7 %
VC PRE: 2.87 L (ref 2.43–4.04)
VC REF: 3.24
VTGRAWPRE: 2.72 L

## 2021-03-10 ENCOUNTER — CLINICAL SUPPORT (OUTPATIENT)
Dept: SMOKING CESSATION | Facility: CLINIC | Age: 61
End: 2021-03-10
Payer: COMMERCIAL

## 2021-03-10 DIAGNOSIS — F17.200 CURRENT EVERY DAY SMOKER: Primary | ICD-10-CM

## 2021-03-10 PROCEDURE — 99404 PREV MED CNSL INDIV APPRX 60: CPT | Mod: S$PBB,,, | Performed by: GENERAL PRACTICE

## 2021-03-10 PROCEDURE — 99404 PR PREVENT COUNSEL,INDIV,60 MIN: ICD-10-PCS | Mod: S$PBB,,, | Performed by: GENERAL PRACTICE

## 2021-03-10 RX ORDER — IBUPROFEN 200 MG
1 TABLET ORAL DAILY
Qty: 28 PATCH | Refills: 0 | Status: SHIPPED | OUTPATIENT
Start: 2021-03-10 | End: 2022-02-02

## 2021-03-10 RX ORDER — MICONAZOLE NITRATE 2 %
2 CREAM (GRAM) TOPICAL
Qty: 200 EACH | Refills: 0 | Status: SHIPPED | OUTPATIENT
Start: 2021-03-10 | End: 2022-02-02

## 2021-03-10 RX ORDER — DM/P-EPHED/ACETAMINOPH/DOXYLAM 30-7.5/3
2 LIQUID (ML) ORAL
Qty: 216 LOZENGE | Refills: 0 | Status: SHIPPED | OUTPATIENT
Start: 2021-03-10 | End: 2022-06-20

## 2021-03-17 ENCOUNTER — OFFICE VISIT (OUTPATIENT)
Dept: FAMILY MEDICINE | Facility: CLINIC | Age: 61
End: 2021-03-17
Payer: MEDICAID

## 2021-03-17 ENCOUNTER — CLINICAL SUPPORT (OUTPATIENT)
Dept: SMOKING CESSATION | Facility: CLINIC | Age: 61
End: 2021-03-17
Payer: MEDICAID

## 2021-03-17 VITALS
TEMPERATURE: 98 F | HEART RATE: 93 BPM | SYSTOLIC BLOOD PRESSURE: 122 MMHG | DIASTOLIC BLOOD PRESSURE: 70 MMHG | BODY MASS INDEX: 28.51 KG/M2 | WEIGHT: 171.31 LBS | OXYGEN SATURATION: 96 %

## 2021-03-17 DIAGNOSIS — M53.3 SACROILIAC JOINT PAIN: Primary | ICD-10-CM

## 2021-03-17 DIAGNOSIS — Z12.11 COLON CANCER SCREENING: ICD-10-CM

## 2021-03-17 DIAGNOSIS — F17.210 MODERATE SMOKER (20 OR LESS PER DAY): Primary | ICD-10-CM

## 2021-03-17 PROCEDURE — 99214 OFFICE O/P EST MOD 30 MIN: CPT | Mod: PBBFAC,PO,25 | Performed by: NURSE PRACTITIONER

## 2021-03-17 PROCEDURE — 99214 PR OFFICE/OUTPT VISIT, EST, LEVL IV, 30-39 MIN: ICD-10-PCS | Mod: S$PBB,,, | Performed by: NURSE PRACTITIONER

## 2021-03-17 PROCEDURE — 99404 PREV MED CNSL INDIV APPRX 60: CPT | Mod: S$PBB,,, | Performed by: GENERAL PRACTICE

## 2021-03-17 PROCEDURE — 99214 OFFICE O/P EST MOD 30 MIN: CPT | Mod: S$PBB,,, | Performed by: NURSE PRACTITIONER

## 2021-03-17 PROCEDURE — 99404 PR PREVENT COUNSEL,INDIV,60 MIN: ICD-10-PCS | Mod: S$PBB,,, | Performed by: GENERAL PRACTICE

## 2021-03-17 PROCEDURE — 96372 THER/PROPH/DIAG INJ SC/IM: CPT | Mod: PBBFAC,PO

## 2021-03-17 PROCEDURE — 99999 PR PBB SHADOW E&M-EST. PATIENT-LVL IV: ICD-10-PCS | Mod: PBBFAC,,, | Performed by: NURSE PRACTITIONER

## 2021-03-17 PROCEDURE — 99999 PR PBB SHADOW E&M-EST. PATIENT-LVL IV: CPT | Mod: PBBFAC,,, | Performed by: NURSE PRACTITIONER

## 2021-03-17 RX ORDER — METHYLPREDNISOLONE ACETATE 80 MG/ML
80 INJECTION, SUSPENSION INTRA-ARTICULAR; INTRALESIONAL; INTRAMUSCULAR; SOFT TISSUE
Status: COMPLETED | OUTPATIENT
Start: 2021-03-17 | End: 2021-03-17

## 2021-03-17 RX ORDER — CYCLOBENZAPRINE HCL 10 MG
10 TABLET ORAL 3 TIMES DAILY PRN
Qty: 30 TABLET | Refills: 0 | Status: SHIPPED | OUTPATIENT
Start: 2021-03-17 | End: 2021-03-27

## 2021-03-17 RX ADMIN — METHYLPREDNISOLONE ACETATE 80 MG: 80 INJECTION, SUSPENSION INTRALESIONAL; INTRAMUSCULAR; INTRASYNOVIAL; SOFT TISSUE at 03:03

## 2021-03-25 ENCOUNTER — CLINICAL SUPPORT (OUTPATIENT)
Dept: SMOKING CESSATION | Facility: CLINIC | Age: 61
End: 2021-03-25
Payer: COMMERCIAL

## 2021-03-25 DIAGNOSIS — F17.200 LIGHT TOBACCO SMOKER: Primary | ICD-10-CM

## 2021-03-25 PROCEDURE — 99403 PR PREVENT COUNSEL,INDIV,45 MIN: ICD-10-PCS | Mod: S$PBB,,, | Performed by: GENERAL PRACTICE

## 2021-03-25 PROCEDURE — 99403 PREV MED CNSL INDIV APPRX 45: CPT | Mod: S$PBB,,, | Performed by: GENERAL PRACTICE

## 2021-03-30 ENCOUNTER — TELEPHONE (OUTPATIENT)
Dept: ENDOSCOPY | Facility: HOSPITAL | Age: 61
End: 2021-03-30

## 2021-04-01 ENCOUNTER — TELEPHONE (OUTPATIENT)
Dept: SMOKING CESSATION | Facility: CLINIC | Age: 61
End: 2021-04-01

## 2021-04-08 ENCOUNTER — TELEPHONE (OUTPATIENT)
Dept: SMOKING CESSATION | Facility: CLINIC | Age: 61
End: 2021-04-08

## 2021-04-14 ENCOUNTER — TELEPHONE (OUTPATIENT)
Dept: SMOKING CESSATION | Facility: CLINIC | Age: 61
End: 2021-04-14

## 2021-04-18 ENCOUNTER — HOSPITAL ENCOUNTER (EMERGENCY)
Facility: HOSPITAL | Age: 61
Discharge: HOME | End: 2021-04-18
Attending: EMERGENCY MEDICINE
Payer: MEDICAID

## 2021-04-18 VITALS
RESPIRATION RATE: 16 BRPM | TEMPERATURE: 99 F | WEIGHT: 167 LBS | DIASTOLIC BLOOD PRESSURE: 66 MMHG | HEIGHT: 65 IN | SYSTOLIC BLOOD PRESSURE: 146 MMHG | HEART RATE: 70 BPM | OXYGEN SATURATION: 98 % | BODY MASS INDEX: 27.82 KG/M2

## 2021-04-18 DIAGNOSIS — M54.50 ACUTE MIDLINE LOW BACK PAIN WITHOUT SCIATICA: Primary | ICD-10-CM

## 2021-04-18 PROCEDURE — 25000003 PHARM REV CODE 250: Performed by: PHYSICIAN ASSISTANT

## 2021-04-18 PROCEDURE — 99284 EMERGENCY DEPT VISIT MOD MDM: CPT | Mod: 25

## 2021-04-18 RX ORDER — TRAMADOL HYDROCHLORIDE 50 MG/1
50 TABLET ORAL EVERY 6 HOURS PRN
Qty: 12 TABLET | Refills: 0 | Status: SHIPPED | OUTPATIENT
Start: 2021-04-18 | End: 2021-05-06

## 2021-04-18 RX ORDER — HYDROCODONE BITARTRATE AND ACETAMINOPHEN 7.5; 325 MG/1; MG/1
1 TABLET ORAL
Status: COMPLETED | OUTPATIENT
Start: 2021-04-18 | End: 2021-04-18

## 2021-04-18 RX ORDER — MELOXICAM 15 MG/1
15 TABLET ORAL DAILY
Qty: 5 TABLET | Refills: 0 | Status: SHIPPED | OUTPATIENT
Start: 2021-04-18 | End: 2021-05-06

## 2021-04-18 RX ORDER — NAPROXEN 500 MG/1
500 TABLET ORAL
Status: COMPLETED | OUTPATIENT
Start: 2021-04-18 | End: 2021-04-18

## 2021-04-18 RX ADMIN — NAPROXEN 500 MG: 500 TABLET ORAL at 01:04

## 2021-04-18 RX ADMIN — HYDROCODONE BITARTRATE AND ACETAMINOPHEN 1 TABLET: 7.5; 325 TABLET ORAL at 01:04

## 2021-04-19 ENCOUNTER — TELEPHONE (OUTPATIENT)
Dept: FAMILY MEDICINE | Facility: CLINIC | Age: 61
End: 2021-04-19

## 2021-04-28 ENCOUNTER — OFFICE VISIT (OUTPATIENT)
Dept: FAMILY MEDICINE | Facility: CLINIC | Age: 61
End: 2021-04-28
Attending: FAMILY MEDICINE
Payer: MEDICAID

## 2021-04-28 ENCOUNTER — LAB VISIT (OUTPATIENT)
Dept: LAB | Facility: HOSPITAL | Age: 61
End: 2021-04-28
Attending: FAMILY MEDICINE
Payer: MEDICAID

## 2021-04-28 VITALS
SYSTOLIC BLOOD PRESSURE: 134 MMHG | OXYGEN SATURATION: 96 % | TEMPERATURE: 99 F | WEIGHT: 171.63 LBS | HEART RATE: 82 BPM | BODY MASS INDEX: 28.6 KG/M2 | HEIGHT: 65 IN | DIASTOLIC BLOOD PRESSURE: 72 MMHG

## 2021-04-28 DIAGNOSIS — M85.80 OSTEOPENIA, UNSPECIFIED LOCATION: ICD-10-CM

## 2021-04-28 DIAGNOSIS — Z72.0 TOBACCO ABUSE: Primary | ICD-10-CM

## 2021-04-28 DIAGNOSIS — M51.36 DDD (DEGENERATIVE DISC DISEASE), LUMBAR: ICD-10-CM

## 2021-04-28 DIAGNOSIS — I73.9 PAD (PERIPHERAL ARTERY DISEASE): ICD-10-CM

## 2021-04-28 LAB — 25(OH)D3+25(OH)D2 SERPL-MCNC: 37 NG/ML (ref 30–96)

## 2021-04-28 PROCEDURE — 99214 OFFICE O/P EST MOD 30 MIN: CPT | Mod: S$PBB,,, | Performed by: FAMILY MEDICINE

## 2021-04-28 PROCEDURE — 99999 PR PBB SHADOW E&M-EST. PATIENT-LVL V: ICD-10-PCS | Mod: PBBFAC,,, | Performed by: FAMILY MEDICINE

## 2021-04-28 PROCEDURE — 36415 COLL VENOUS BLD VENIPUNCTURE: CPT | Mod: PO | Performed by: FAMILY MEDICINE

## 2021-04-28 PROCEDURE — 99999 PR PBB SHADOW E&M-EST. PATIENT-LVL V: CPT | Mod: PBBFAC,,, | Performed by: FAMILY MEDICINE

## 2021-04-28 PROCEDURE — 99215 OFFICE O/P EST HI 40 MIN: CPT | Mod: PBBFAC,PO | Performed by: FAMILY MEDICINE

## 2021-04-28 PROCEDURE — 82306 VITAMIN D 25 HYDROXY: CPT | Performed by: FAMILY MEDICINE

## 2021-04-28 PROCEDURE — 99214 PR OFFICE/OUTPT VISIT, EST, LEVL IV, 30-39 MIN: ICD-10-PCS | Mod: S$PBB,,, | Performed by: FAMILY MEDICINE

## 2021-05-01 ENCOUNTER — HOSPITAL ENCOUNTER (EMERGENCY)
Facility: HOSPITAL | Age: 61
Discharge: HOME | End: 2021-05-01
Attending: EMERGENCY MEDICINE
Payer: MEDICAID

## 2021-05-01 VITALS
RESPIRATION RATE: 18 BRPM | TEMPERATURE: 98 F | DIASTOLIC BLOOD PRESSURE: 86 MMHG | BODY MASS INDEX: 28.47 KG/M2 | WEIGHT: 170.88 LBS | HEIGHT: 65 IN | HEART RATE: 69 BPM | OXYGEN SATURATION: 100 % | SYSTOLIC BLOOD PRESSURE: 205 MMHG

## 2021-05-01 DIAGNOSIS — B02.9 HERPES ZOSTER WITHOUT COMPLICATION: Primary | ICD-10-CM

## 2021-05-01 DIAGNOSIS — R07.9 CHEST PAIN: ICD-10-CM

## 2021-05-01 PROCEDURE — 93010 ELECTROCARDIOGRAM REPORT: CPT | Mod: ,,, | Performed by: INTERNAL MEDICINE

## 2021-05-01 PROCEDURE — 93010 EKG 12-LEAD: ICD-10-PCS | Mod: ,,, | Performed by: INTERNAL MEDICINE

## 2021-05-01 PROCEDURE — 25000003 PHARM REV CODE 250: Performed by: EMERGENCY MEDICINE

## 2021-05-01 PROCEDURE — 99284 EMERGENCY DEPT VISIT MOD MDM: CPT | Mod: 25

## 2021-05-01 PROCEDURE — 93005 ELECTROCARDIOGRAM TRACING: CPT

## 2021-05-01 RX ORDER — HYDROCODONE BITARTRATE AND ACETAMINOPHEN 10; 325 MG/1; MG/1
1 TABLET ORAL EVERY 6 HOURS PRN
Qty: 9 TABLET | Refills: 0 | Status: SHIPPED | OUTPATIENT
Start: 2021-05-01 | End: 2021-05-06

## 2021-05-01 RX ORDER — FAMCICLOVIR 500 MG/1
500 TABLET ORAL 3 TIMES DAILY
Qty: 21 TABLET | Refills: 0 | Status: SHIPPED | OUTPATIENT
Start: 2021-05-01 | End: 2021-05-08

## 2021-05-01 RX ORDER — PREDNISONE 20 MG/1
60 TABLET ORAL DAILY
Qty: 15 TABLET | Refills: 0 | Status: SHIPPED | OUTPATIENT
Start: 2021-05-01 | End: 2021-05-06

## 2021-05-01 RX ORDER — HYDROCODONE BITARTRATE AND ACETAMINOPHEN 5; 325 MG/1; MG/1
1 TABLET ORAL
Status: COMPLETED | OUTPATIENT
Start: 2021-05-01 | End: 2021-05-01

## 2021-05-01 RX ADMIN — HYDROCODONE BITARTRATE AND ACETAMINOPHEN 1 TABLET: 5; 325 TABLET ORAL at 09:05

## 2021-05-05 ENCOUNTER — PATIENT MESSAGE (OUTPATIENT)
Dept: FAMILY MEDICINE | Facility: CLINIC | Age: 61
End: 2021-05-05

## 2021-05-05 ENCOUNTER — TELEPHONE (OUTPATIENT)
Dept: FAMILY MEDICINE | Facility: CLINIC | Age: 61
End: 2021-05-05

## 2021-05-06 ENCOUNTER — PATIENT MESSAGE (OUTPATIENT)
Dept: RESEARCH | Facility: HOSPITAL | Age: 61
End: 2021-05-06

## 2021-05-06 ENCOUNTER — OFFICE VISIT (OUTPATIENT)
Dept: FAMILY MEDICINE | Facility: CLINIC | Age: 61
End: 2021-05-06
Attending: FAMILY MEDICINE
Payer: MEDICAID

## 2021-05-06 VITALS
BODY MASS INDEX: 28.6 KG/M2 | OXYGEN SATURATION: 97 % | HEIGHT: 65 IN | SYSTOLIC BLOOD PRESSURE: 144 MMHG | WEIGHT: 171.63 LBS | DIASTOLIC BLOOD PRESSURE: 82 MMHG | TEMPERATURE: 99 F | HEART RATE: 82 BPM

## 2021-05-06 DIAGNOSIS — B02.7 DISSEMINATED HERPES ZOSTER: Primary | ICD-10-CM

## 2021-05-06 DIAGNOSIS — I10 HYPERTENSION, UNSPECIFIED TYPE: ICD-10-CM

## 2021-05-06 DIAGNOSIS — Z72.0 TOBACCO ABUSE: ICD-10-CM

## 2021-05-06 PROCEDURE — 99214 PR OFFICE/OUTPT VISIT, EST, LEVL IV, 30-39 MIN: ICD-10-PCS | Mod: S$PBB,,, | Performed by: FAMILY MEDICINE

## 2021-05-06 PROCEDURE — 99999 PR PBB SHADOW E&M-EST. PATIENT-LVL V: CPT | Mod: PBBFAC,,, | Performed by: FAMILY MEDICINE

## 2021-05-06 PROCEDURE — 99214 OFFICE O/P EST MOD 30 MIN: CPT | Mod: S$PBB,,, | Performed by: FAMILY MEDICINE

## 2021-05-06 PROCEDURE — 99999 PR PBB SHADOW E&M-EST. PATIENT-LVL V: ICD-10-PCS | Mod: PBBFAC,,, | Performed by: FAMILY MEDICINE

## 2021-05-06 PROCEDURE — 99215 OFFICE O/P EST HI 40 MIN: CPT | Mod: PBBFAC,PO | Performed by: FAMILY MEDICINE

## 2021-05-06 RX ORDER — OXYCODONE AND ACETAMINOPHEN 7.5; 325 MG/1; MG/1
1 TABLET ORAL EVERY 4 HOURS PRN
Qty: 20 TABLET | Refills: 0 | Status: SHIPPED | OUTPATIENT
Start: 2021-05-06 | End: 2021-06-29

## 2021-05-09 ENCOUNTER — NURSE TRIAGE (OUTPATIENT)
Dept: ADMINISTRATIVE | Facility: CLINIC | Age: 61
End: 2021-05-09

## 2021-05-09 ENCOUNTER — HOSPITAL ENCOUNTER (EMERGENCY)
Facility: HOSPITAL | Age: 61
Discharge: HOME | End: 2021-05-09
Attending: EMERGENCY MEDICINE
Payer: MEDICAID

## 2021-05-09 ENCOUNTER — PATIENT MESSAGE (OUTPATIENT)
Dept: FAMILY MEDICINE | Facility: CLINIC | Age: 61
End: 2021-05-09

## 2021-05-09 VITALS
HEIGHT: 65 IN | SYSTOLIC BLOOD PRESSURE: 190 MMHG | WEIGHT: 169.06 LBS | DIASTOLIC BLOOD PRESSURE: 84 MMHG | TEMPERATURE: 98 F | BODY MASS INDEX: 28.17 KG/M2 | OXYGEN SATURATION: 100 % | RESPIRATION RATE: 18 BRPM | HEART RATE: 66 BPM

## 2021-05-09 DIAGNOSIS — B02.9 HERPES ZOSTER WITHOUT COMPLICATION: Primary | ICD-10-CM

## 2021-05-09 PROCEDURE — 25000003 PHARM REV CODE 250: Performed by: NURSE PRACTITIONER

## 2021-05-09 PROCEDURE — 99284 EMERGENCY DEPT VISIT MOD MDM: CPT | Mod: 25

## 2021-05-09 PROCEDURE — 63600175 PHARM REV CODE 636 W HCPCS: Performed by: NURSE PRACTITIONER

## 2021-05-09 PROCEDURE — 96372 THER/PROPH/DIAG INJ SC/IM: CPT

## 2021-05-09 RX ORDER — GABAPENTIN 300 MG/1
CAPSULE ORAL
Qty: 90 CAPSULE | Refills: 0 | Status: SHIPPED | OUTPATIENT
Start: 2021-05-09 | End: 2021-06-02

## 2021-05-09 RX ORDER — MORPHINE SULFATE 4 MG/ML
6 INJECTION, SOLUTION INTRAMUSCULAR; INTRAVENOUS
Status: COMPLETED | OUTPATIENT
Start: 2021-05-09 | End: 2021-05-09

## 2021-05-09 RX ORDER — ONDANSETRON 8 MG/1
8 TABLET, ORALLY DISINTEGRATING ORAL
Status: COMPLETED | OUTPATIENT
Start: 2021-05-09 | End: 2021-05-09

## 2021-05-09 RX ORDER — LIDOCAINE 50 MG/G
1 PATCH TOPICAL DAILY
Qty: 30 PATCH | Refills: 0 | Status: SHIPPED | OUTPATIENT
Start: 2021-05-09 | End: 2021-06-29

## 2021-05-09 RX ADMIN — ONDANSETRON 8 MG: 8 TABLET, ORALLY DISINTEGRATING ORAL at 09:05

## 2021-05-09 RX ADMIN — MORPHINE SULFATE 6 MG: 4 INJECTION, SOLUTION INTRAMUSCULAR; INTRAVENOUS at 09:05

## 2021-05-13 ENCOUNTER — TELEPHONE (OUTPATIENT)
Dept: FAMILY MEDICINE | Facility: CLINIC | Age: 61
End: 2021-05-13

## 2021-05-13 ENCOUNTER — PATIENT MESSAGE (OUTPATIENT)
Dept: FAMILY MEDICINE | Facility: CLINIC | Age: 61
End: 2021-05-13

## 2021-05-13 ENCOUNTER — CLINICAL SUPPORT (OUTPATIENT)
Dept: FAMILY MEDICINE | Facility: CLINIC | Age: 61
End: 2021-05-13
Payer: MEDICAID

## 2021-05-13 VITALS — DIASTOLIC BLOOD PRESSURE: 72 MMHG | SYSTOLIC BLOOD PRESSURE: 136 MMHG

## 2021-05-13 DIAGNOSIS — Z01.30 BP CHECK: Primary | ICD-10-CM

## 2021-05-13 PROCEDURE — 99213 OFFICE O/P EST LOW 20 MIN: CPT | Mod: PBBFAC,PO

## 2021-05-13 PROCEDURE — 99999 PR PBB SHADOW E&M-EST. PATIENT-LVL III: ICD-10-PCS | Mod: PBBFAC,,,

## 2021-05-13 PROCEDURE — 99999 PR PBB SHADOW E&M-EST. PATIENT-LVL III: CPT | Mod: PBBFAC,,,

## 2021-05-27 ENCOUNTER — LAB VISIT (OUTPATIENT)
Dept: LAB | Facility: HOSPITAL | Age: 61
End: 2021-05-27
Attending: INTERNAL MEDICINE
Payer: MEDICAID

## 2021-05-27 DIAGNOSIS — I73.9 PVD (PERIPHERAL VASCULAR DISEASE): ICD-10-CM

## 2021-05-27 DIAGNOSIS — I25.118 CORONARY ARTERY DISEASE OF NATIVE ARTERY OF NATIVE HEART WITH STABLE ANGINA PECTORIS: ICD-10-CM

## 2021-05-27 DIAGNOSIS — E55.9 HYPOVITAMINOSIS D: ICD-10-CM

## 2021-05-27 LAB
25(OH)D3+25(OH)D2 SERPL-MCNC: 40 NG/ML (ref 30–96)
ALBUMIN SERPL BCP-MCNC: 3.5 G/DL (ref 3.5–5.2)
ALP SERPL-CCNC: 125 U/L (ref 55–135)
ALT SERPL W/O P-5'-P-CCNC: 22 U/L (ref 10–44)
ANION GAP SERPL CALC-SCNC: 13 MMOL/L (ref 8–16)
AST SERPL-CCNC: 21 U/L (ref 10–40)
BILIRUB SERPL-MCNC: 0.3 MG/DL (ref 0.1–1)
BUN SERPL-MCNC: 11 MG/DL (ref 8–23)
CALCIUM SERPL-MCNC: 9.5 MG/DL (ref 8.7–10.5)
CHLORIDE SERPL-SCNC: 104 MMOL/L (ref 95–110)
CHOLEST SERPL-MCNC: 143 MG/DL (ref 120–199)
CHOLEST/HDLC SERPL: 2.6 {RATIO} (ref 2–5)
CO2 SERPL-SCNC: 25 MMOL/L (ref 23–29)
CREAT SERPL-MCNC: 1 MG/DL (ref 0.5–1.4)
EST. GFR  (AFRICAN AMERICAN): >60 ML/MIN/1.73 M^2
EST. GFR  (NON AFRICAN AMERICAN): >60 ML/MIN/1.73 M^2
GLUCOSE SERPL-MCNC: 86 MG/DL (ref 70–110)
HDLC SERPL-MCNC: 55 MG/DL (ref 40–75)
HDLC SERPL: 38.5 % (ref 20–50)
LDLC SERPL CALC-MCNC: 61.4 MG/DL (ref 63–159)
NONHDLC SERPL-MCNC: 88 MG/DL
POTASSIUM SERPL-SCNC: 4.2 MMOL/L (ref 3.5–5.1)
PROT SERPL-MCNC: 7.3 G/DL (ref 6–8.4)
SODIUM SERPL-SCNC: 142 MMOL/L (ref 136–145)
TRIGL SERPL-MCNC: 133 MG/DL (ref 30–150)

## 2021-05-27 PROCEDURE — 82306 VITAMIN D 25 HYDROXY: CPT | Performed by: PHYSICIAN ASSISTANT

## 2021-05-27 PROCEDURE — 80061 LIPID PANEL: CPT | Performed by: INTERNAL MEDICINE

## 2021-05-27 PROCEDURE — 36415 COLL VENOUS BLD VENIPUNCTURE: CPT | Mod: PO | Performed by: PHYSICIAN ASSISTANT

## 2021-05-27 PROCEDURE — 80053 COMPREHEN METABOLIC PANEL: CPT | Performed by: INTERNAL MEDICINE

## 2021-06-01 ENCOUNTER — HOSPITAL ENCOUNTER (OUTPATIENT)
Dept: RADIOLOGY | Facility: HOSPITAL | Age: 61
Discharge: HOME | End: 2021-06-01
Attending: NURSE PRACTITIONER
Payer: MEDICAID

## 2021-06-01 DIAGNOSIS — Z12.2 ENCOUNTER FOR SCREENING FOR MALIGNANT NEOPLASM OF RESPIRATORY ORGANS: ICD-10-CM

## 2021-06-01 DIAGNOSIS — F17.210 NICOTINE DEPENDENCE, CIGARETTES, UNCOMPLICATED: ICD-10-CM

## 2021-06-01 PROCEDURE — 71271 CT THORAX LUNG CANCER SCR C-: CPT | Mod: TC

## 2021-06-01 PROCEDURE — 71271 CT THORAX LUNG CANCER SCR C-: CPT | Mod: 26,,, | Performed by: RADIOLOGY

## 2021-06-01 PROCEDURE — 71271 CT CHEST LUNG SCREENING LOW DOSE: ICD-10-PCS | Mod: 26,,, | Performed by: RADIOLOGY

## 2021-06-02 ENCOUNTER — OFFICE VISIT (OUTPATIENT)
Dept: PULMONOLOGY | Facility: CLINIC | Age: 61
End: 2021-06-02
Payer: MEDICAID

## 2021-06-02 VITALS
BODY MASS INDEX: 28.28 KG/M2 | WEIGHT: 169.75 LBS | SYSTOLIC BLOOD PRESSURE: 110 MMHG | DIASTOLIC BLOOD PRESSURE: 60 MMHG | HEART RATE: 72 BPM | RESPIRATION RATE: 18 BRPM | HEIGHT: 65 IN | OXYGEN SATURATION: 98 %

## 2021-06-02 DIAGNOSIS — R06.89 GASPING FOR BREATH: ICD-10-CM

## 2021-06-02 DIAGNOSIS — J44.9 COPD, MODERATE: Primary | ICD-10-CM

## 2021-06-02 DIAGNOSIS — F17.210 NICOTINE DEPENDENCE, CIGARETTES, UNCOMPLICATED: Chronic | ICD-10-CM

## 2021-06-02 DIAGNOSIS — G47.30 SLEEP-DISORDERED BREATHING: ICD-10-CM

## 2021-06-02 DIAGNOSIS — R06.83 SNORES: ICD-10-CM

## 2021-06-02 DIAGNOSIS — R53.83 FEELING TIRED: ICD-10-CM

## 2021-06-02 DIAGNOSIS — R40.0 DAYTIME SLEEPINESS: ICD-10-CM

## 2021-06-02 PROCEDURE — 99214 OFFICE O/P EST MOD 30 MIN: CPT | Mod: PBBFAC | Performed by: NURSE PRACTITIONER

## 2021-06-02 PROCEDURE — 99214 PR OFFICE/OUTPT VISIT, EST, LEVL IV, 30-39 MIN: ICD-10-PCS | Mod: S$PBB,,, | Performed by: NURSE PRACTITIONER

## 2021-06-02 PROCEDURE — 99999 PR PBB SHADOW E&M-EST. PATIENT-LVL IV: CPT | Mod: PBBFAC,,, | Performed by: NURSE PRACTITIONER

## 2021-06-02 PROCEDURE — 99214 OFFICE O/P EST MOD 30 MIN: CPT | Mod: S$PBB,,, | Performed by: NURSE PRACTITIONER

## 2021-06-02 PROCEDURE — 99999 PR PBB SHADOW E&M-EST. PATIENT-LVL IV: ICD-10-PCS | Mod: PBBFAC,,, | Performed by: NURSE PRACTITIONER

## 2021-06-03 ENCOUNTER — TELEPHONE (OUTPATIENT)
Dept: PULMONOLOGY | Facility: HOSPITAL | Age: 61
End: 2021-06-03

## 2021-06-03 ENCOUNTER — OFFICE VISIT (OUTPATIENT)
Dept: CARDIOLOGY | Facility: CLINIC | Age: 61
End: 2021-06-03
Payer: MEDICAID

## 2021-06-03 VITALS
DIASTOLIC BLOOD PRESSURE: 64 MMHG | SYSTOLIC BLOOD PRESSURE: 128 MMHG | OXYGEN SATURATION: 100 % | WEIGHT: 170.75 LBS | HEIGHT: 65 IN | BODY MASS INDEX: 28.45 KG/M2 | HEART RATE: 72 BPM

## 2021-06-03 DIAGNOSIS — Z85.41 HX OF CERVICAL CANCER: ICD-10-CM

## 2021-06-03 DIAGNOSIS — F17.210 NICOTINE DEPENDENCE, CIGARETTES, UNCOMPLICATED: Chronic | ICD-10-CM

## 2021-06-03 DIAGNOSIS — R09.89 BRUIT OF RIGHT CAROTID ARTERY: ICD-10-CM

## 2021-06-03 DIAGNOSIS — I70.219 ATHEROSCLEROTIC PVD WITH INTERMITTENT CLAUDICATION: ICD-10-CM

## 2021-06-03 DIAGNOSIS — I73.9 PVD (PERIPHERAL VASCULAR DISEASE): ICD-10-CM

## 2021-06-03 DIAGNOSIS — E78.2 MIXED HYPERLIPIDEMIA: Chronic | ICD-10-CM

## 2021-06-03 DIAGNOSIS — I25.2 HISTORY OF ST ELEVATION MYOCARDIAL INFARCTION (STEMI): ICD-10-CM

## 2021-06-03 DIAGNOSIS — J44.9 COPD, MODERATE: ICD-10-CM

## 2021-06-03 DIAGNOSIS — I25.10 CORONARY ARTERY DISEASE INVOLVING NATIVE CORONARY ARTERY OF NATIVE HEART WITHOUT ANGINA PECTORIS: ICD-10-CM

## 2021-06-03 DIAGNOSIS — I25.118 CORONARY ARTERY DISEASE OF NATIVE ARTERY OF NATIVE HEART WITH STABLE ANGINA PECTORIS: Primary | ICD-10-CM

## 2021-06-03 PROCEDURE — 99214 OFFICE O/P EST MOD 30 MIN: CPT | Mod: S$PBB,,, | Performed by: INTERNAL MEDICINE

## 2021-06-03 PROCEDURE — 99214 PR OFFICE/OUTPT VISIT, EST, LEVL IV, 30-39 MIN: ICD-10-PCS | Mod: S$PBB,,, | Performed by: INTERNAL MEDICINE

## 2021-06-03 PROCEDURE — 99999 PR PBB SHADOW E&M-EST. PATIENT-LVL IV: ICD-10-PCS | Mod: PBBFAC,,, | Performed by: INTERNAL MEDICINE

## 2021-06-03 PROCEDURE — 99214 OFFICE O/P EST MOD 30 MIN: CPT | Mod: PBBFAC | Performed by: INTERNAL MEDICINE

## 2021-06-03 PROCEDURE — 99999 PR PBB SHADOW E&M-EST. PATIENT-LVL IV: CPT | Mod: PBBFAC,,, | Performed by: INTERNAL MEDICINE

## 2021-06-03 RX ORDER — ISOSORBIDE MONONITRATE 60 MG/1
60 TABLET, EXTENDED RELEASE ORAL 2 TIMES DAILY
Qty: 60 TABLET | Refills: 11 | Status: SHIPPED | OUTPATIENT
Start: 2021-06-03 | End: 2021-08-17 | Stop reason: SDUPTHER

## 2021-06-04 ENCOUNTER — PATIENT MESSAGE (OUTPATIENT)
Dept: SLEEP MEDICINE | Facility: HOSPITAL | Age: 61
End: 2021-06-04

## 2021-06-04 ENCOUNTER — OFFICE VISIT (OUTPATIENT)
Dept: PAIN MEDICINE | Facility: CLINIC | Age: 61
End: 2021-06-04
Attending: FAMILY MEDICINE
Payer: MEDICAID

## 2021-06-04 ENCOUNTER — PATIENT OUTREACH (OUTPATIENT)
Dept: ADMINISTRATIVE | Facility: OTHER | Age: 61
End: 2021-06-04

## 2021-06-04 ENCOUNTER — IMMUNIZATION (OUTPATIENT)
Dept: INTERNAL MEDICINE | Facility: CLINIC | Age: 61
End: 2021-06-04
Payer: MEDICAID

## 2021-06-04 VITALS
BODY MASS INDEX: 28.43 KG/M2 | WEIGHT: 170.63 LBS | SYSTOLIC BLOOD PRESSURE: 138 MMHG | HEART RATE: 62 BPM | DIASTOLIC BLOOD PRESSURE: 77 MMHG | HEIGHT: 65 IN

## 2021-06-04 DIAGNOSIS — Z23 NEED FOR VACCINATION: Primary | ICD-10-CM

## 2021-06-04 DIAGNOSIS — M51.36 DDD (DEGENERATIVE DISC DISEASE), LUMBAR: ICD-10-CM

## 2021-06-04 DIAGNOSIS — M47.816 LUMBAR SPONDYLOSIS: ICD-10-CM

## 2021-06-04 DIAGNOSIS — M53.3 SACROILIAC JOINT PAIN: Primary | ICD-10-CM

## 2021-06-04 PROCEDURE — 99214 OFFICE O/P EST MOD 30 MIN: CPT | Mod: PBBFAC | Performed by: ANESTHESIOLOGY

## 2021-06-04 PROCEDURE — 91300 COVID-19, MRNA, LNP-S, PF, 30 MCG/0.3 ML DOSE VACCINE: CPT | Mod: PBBFAC

## 2021-06-04 PROCEDURE — 99999 PR PBB SHADOW E&M-EST. PATIENT-LVL IV: ICD-10-PCS | Mod: PBBFAC,,, | Performed by: ANESTHESIOLOGY

## 2021-06-04 PROCEDURE — 99204 OFFICE O/P NEW MOD 45 MIN: CPT | Mod: S$PBB,,, | Performed by: ANESTHESIOLOGY

## 2021-06-04 PROCEDURE — 99204 PR OFFICE/OUTPT VISIT, NEW, LEVL IV, 45-59 MIN: ICD-10-PCS | Mod: S$PBB,,, | Performed by: ANESTHESIOLOGY

## 2021-06-04 PROCEDURE — 99999 PR PBB SHADOW E&M-EST. PATIENT-LVL IV: CPT | Mod: PBBFAC,,, | Performed by: ANESTHESIOLOGY

## 2021-06-04 RX ORDER — TIZANIDINE 4 MG/1
4 TABLET ORAL 2 TIMES DAILY PRN
Qty: 60 TABLET | Refills: 0 | Status: SHIPPED | OUTPATIENT
Start: 2021-06-04 | End: 2021-07-01

## 2021-06-07 ENCOUNTER — PATIENT MESSAGE (OUTPATIENT)
Dept: SLEEP MEDICINE | Facility: HOSPITAL | Age: 61
End: 2021-06-07

## 2021-06-17 ENCOUNTER — PATIENT MESSAGE (OUTPATIENT)
Dept: FAMILY MEDICINE | Facility: CLINIC | Age: 61
End: 2021-06-17

## 2021-06-17 ENCOUNTER — TELEPHONE (OUTPATIENT)
Dept: FAMILY MEDICINE | Facility: CLINIC | Age: 61
End: 2021-06-17

## 2021-06-25 ENCOUNTER — TELEPHONE (OUTPATIENT)
Dept: PULMONOLOGY | Facility: CLINIC | Age: 61
End: 2021-06-25

## 2021-06-25 ENCOUNTER — IMMUNIZATION (OUTPATIENT)
Dept: INTERNAL MEDICINE | Facility: CLINIC | Age: 61
End: 2021-06-25
Payer: MEDICAID

## 2021-06-25 DIAGNOSIS — Z23 NEED FOR VACCINATION: Primary | ICD-10-CM

## 2021-06-25 PROCEDURE — 0002A COVID-19, MRNA, LNP-S, PF, 30 MCG/0.3 ML DOSE VACCINE: CPT | Mod: PBBFAC

## 2021-06-25 PROCEDURE — 91300 COVID-19, MRNA, LNP-S, PF, 30 MCG/0.3 ML DOSE VACCINE: CPT | Mod: PBBFAC

## 2021-06-29 ENCOUNTER — OFFICE VISIT (OUTPATIENT)
Dept: FAMILY MEDICINE | Facility: CLINIC | Age: 61
End: 2021-06-29
Attending: FAMILY MEDICINE
Payer: MEDICAID

## 2021-06-29 VITALS
DIASTOLIC BLOOD PRESSURE: 74 MMHG | HEIGHT: 65 IN | SYSTOLIC BLOOD PRESSURE: 110 MMHG | OXYGEN SATURATION: 99 % | HEART RATE: 72 BPM | WEIGHT: 171.94 LBS | BODY MASS INDEX: 28.65 KG/M2 | TEMPERATURE: 98 F

## 2021-06-29 DIAGNOSIS — Z12.11 COLON CANCER SCREENING: Primary | ICD-10-CM

## 2021-06-29 DIAGNOSIS — B02.29 POST HERPETIC NEURALGIA: ICD-10-CM

## 2021-06-29 PROCEDURE — 99214 OFFICE O/P EST MOD 30 MIN: CPT | Mod: PBBFAC,PO | Performed by: FAMILY MEDICINE

## 2021-06-29 PROCEDURE — 99213 PR OFFICE/OUTPT VISIT, EST, LEVL III, 20-29 MIN: ICD-10-PCS | Mod: S$PBB,,, | Performed by: FAMILY MEDICINE

## 2021-06-29 PROCEDURE — 99999 PR PBB SHADOW E&M-EST. PATIENT-LVL IV: ICD-10-PCS | Mod: PBBFAC,,, | Performed by: FAMILY MEDICINE

## 2021-06-29 PROCEDURE — 99213 OFFICE O/P EST LOW 20 MIN: CPT | Mod: S$PBB,,, | Performed by: FAMILY MEDICINE

## 2021-06-29 PROCEDURE — 99999 PR PBB SHADOW E&M-EST. PATIENT-LVL IV: CPT | Mod: PBBFAC,,, | Performed by: FAMILY MEDICINE

## 2021-06-29 RX ORDER — GABAPENTIN 100 MG/1
100 CAPSULE ORAL 3 TIMES DAILY
Qty: 90 CAPSULE | Refills: 0 | Status: SHIPPED | OUTPATIENT
Start: 2021-06-29 | End: 2021-07-30

## 2021-07-02 ENCOUNTER — TELEPHONE (OUTPATIENT)
Dept: FAMILY MEDICINE | Facility: CLINIC | Age: 61
End: 2021-07-02

## 2021-07-07 ENCOUNTER — PATIENT MESSAGE (OUTPATIENT)
Dept: ADMINISTRATIVE | Facility: HOSPITAL | Age: 61
End: 2021-07-07

## 2021-07-08 ENCOUNTER — PATIENT OUTREACH (OUTPATIENT)
Dept: ADMINISTRATIVE | Facility: HOSPITAL | Age: 61
End: 2021-07-08

## 2021-07-19 ENCOUNTER — PATIENT OUTREACH (OUTPATIENT)
Dept: ADMINISTRATIVE | Facility: OTHER | Age: 61
End: 2021-07-19

## 2021-07-19 LAB — NONINV COLON CA DNA+OCC BLD SCRN STL QL: NEGATIVE

## 2021-07-20 ENCOUNTER — OFFICE VISIT (OUTPATIENT)
Dept: PAIN MEDICINE | Facility: CLINIC | Age: 61
End: 2021-07-20
Payer: MEDICAID

## 2021-07-20 ENCOUNTER — PATIENT MESSAGE (OUTPATIENT)
Dept: PAIN MEDICINE | Facility: CLINIC | Age: 61
End: 2021-07-20

## 2021-07-20 VITALS
HEART RATE: 64 BPM | WEIGHT: 170.88 LBS | HEIGHT: 65 IN | BODY MASS INDEX: 28.47 KG/M2 | DIASTOLIC BLOOD PRESSURE: 65 MMHG | SYSTOLIC BLOOD PRESSURE: 111 MMHG

## 2021-07-20 DIAGNOSIS — M51.36 DDD (DEGENERATIVE DISC DISEASE), LUMBAR: Primary | ICD-10-CM

## 2021-07-20 DIAGNOSIS — M47.816 LUMBAR SPONDYLOSIS: ICD-10-CM

## 2021-07-20 DIAGNOSIS — I70.219 ATHEROSCLEROTIC PVD WITH INTERMITTENT CLAUDICATION: ICD-10-CM

## 2021-07-20 DIAGNOSIS — M53.3 SACROILIAC JOINT PAIN: ICD-10-CM

## 2021-07-20 PROCEDURE — 99214 OFFICE O/P EST MOD 30 MIN: CPT | Mod: S$PBB,,, | Performed by: PHYSICIAN ASSISTANT

## 2021-07-20 PROCEDURE — 99214 OFFICE O/P EST MOD 30 MIN: CPT | Mod: PBBFAC | Performed by: PHYSICIAN ASSISTANT

## 2021-07-20 PROCEDURE — 99999 PR PBB SHADOW E&M-EST. PATIENT-LVL IV: ICD-10-PCS | Mod: PBBFAC,,, | Performed by: PHYSICIAN ASSISTANT

## 2021-07-20 PROCEDURE — 99214 PR OFFICE/OUTPT VISIT, EST, LEVL IV, 30-39 MIN: ICD-10-PCS | Mod: S$PBB,,, | Performed by: PHYSICIAN ASSISTANT

## 2021-07-20 PROCEDURE — 99999 PR PBB SHADOW E&M-EST. PATIENT-LVL IV: CPT | Mod: PBBFAC,,, | Performed by: PHYSICIAN ASSISTANT

## 2021-07-20 RX ORDER — TIZANIDINE 4 MG/1
2-4 TABLET ORAL 2 TIMES DAILY PRN
Qty: 60 TABLET | Refills: 0 | Status: SHIPPED | OUTPATIENT
Start: 2021-07-20 | End: 2021-08-19

## 2021-07-21 ENCOUNTER — PATIENT MESSAGE (OUTPATIENT)
Dept: PAIN MEDICINE | Facility: CLINIC | Age: 61
End: 2021-07-21

## 2021-07-22 ENCOUNTER — CLINICAL SUPPORT (OUTPATIENT)
Dept: REHABILITATION | Facility: HOSPITAL | Age: 61
End: 2021-07-22
Payer: MEDICAID

## 2021-07-22 DIAGNOSIS — M53.3 SACROILIAC JOINT PAIN: ICD-10-CM

## 2021-07-22 DIAGNOSIS — M47.816 LUMBAR SPONDYLOSIS: ICD-10-CM

## 2021-07-22 DIAGNOSIS — M54.50 ACUTE BILATERAL LOW BACK PAIN WITHOUT SCIATICA: ICD-10-CM

## 2021-07-22 DIAGNOSIS — M51.36 DDD (DEGENERATIVE DISC DISEASE), LUMBAR: ICD-10-CM

## 2021-07-22 DIAGNOSIS — Z74.09 IMPAIRED FUNCTIONAL MOBILITY AND ACTIVITY TOLERANCE: ICD-10-CM

## 2021-07-22 PROCEDURE — 97161 PT EVAL LOW COMPLEX 20 MIN: CPT

## 2021-07-22 PROCEDURE — 97110 THERAPEUTIC EXERCISES: CPT

## 2021-07-25 ENCOUNTER — HOSPITAL ENCOUNTER (EMERGENCY)
Facility: HOSPITAL | Age: 61
Discharge: HOME | End: 2021-07-25
Attending: EMERGENCY MEDICINE
Payer: MEDICAID

## 2021-07-25 VITALS
TEMPERATURE: 98 F | RESPIRATION RATE: 20 BRPM | HEART RATE: 63 BPM | WEIGHT: 171.19 LBS | DIASTOLIC BLOOD PRESSURE: 79 MMHG | OXYGEN SATURATION: 97 % | BODY MASS INDEX: 28.49 KG/M2 | SYSTOLIC BLOOD PRESSURE: 161 MMHG

## 2021-07-25 DIAGNOSIS — R42 LIGHTHEADED: Primary | ICD-10-CM

## 2021-07-25 DIAGNOSIS — I95.9 HYPOTENSION: ICD-10-CM

## 2021-07-25 LAB
ALBUMIN SERPL BCP-MCNC: 3.5 G/DL (ref 3.5–5.2)
ALP SERPL-CCNC: 109 U/L (ref 55–135)
ALT SERPL W/O P-5'-P-CCNC: 22 U/L (ref 10–44)
ANION GAP SERPL CALC-SCNC: 13 MMOL/L (ref 8–16)
AST SERPL-CCNC: 37 U/L (ref 10–40)
BASOPHILS # BLD AUTO: 0.12 K/UL (ref 0–0.2)
BASOPHILS NFR BLD: 1.2 % (ref 0–1.9)
BILIRUB SERPL-MCNC: 0.2 MG/DL (ref 0.1–1)
BNP SERPL-MCNC: 70 PG/ML (ref 0–99)
BUN SERPL-MCNC: 9 MG/DL (ref 8–23)
CALCIUM SERPL-MCNC: 9.1 MG/DL (ref 8.7–10.5)
CHLORIDE SERPL-SCNC: 107 MMOL/L (ref 95–110)
CO2 SERPL-SCNC: 21 MMOL/L (ref 23–29)
CREAT SERPL-MCNC: 0.9 MG/DL (ref 0.5–1.4)
DIFFERENTIAL METHOD: ABNORMAL
EOSINOPHIL # BLD AUTO: 0.1 K/UL (ref 0–0.5)
EOSINOPHIL NFR BLD: 1.3 % (ref 0–8)
ERYTHROCYTE [DISTWIDTH] IN BLOOD BY AUTOMATED COUNT: 14.5 % (ref 11.5–14.5)
EST. GFR  (AFRICAN AMERICAN): >60 ML/MIN/1.73 M^2
EST. GFR  (NON AFRICAN AMERICAN): >60 ML/MIN/1.73 M^2
GLUCOSE SERPL-MCNC: 78 MG/DL (ref 70–110)
HCT VFR BLD AUTO: 34.8 % (ref 37–48.5)
HGB BLD-MCNC: 10.5 G/DL (ref 12–16)
IMM GRANULOCYTES # BLD AUTO: 0.07 K/UL (ref 0–0.04)
IMM GRANULOCYTES NFR BLD AUTO: 0.7 % (ref 0–0.5)
LYMPHOCYTES # BLD AUTO: 3.2 K/UL (ref 1–4.8)
LYMPHOCYTES NFR BLD: 31.6 % (ref 18–48)
MCH RBC QN AUTO: 27.7 PG (ref 27–31)
MCHC RBC AUTO-ENTMCNC: 30.2 G/DL (ref 32–36)
MCV RBC AUTO: 92 FL (ref 82–98)
MONOCYTES # BLD AUTO: 0.7 K/UL (ref 0.3–1)
MONOCYTES NFR BLD: 6.5 % (ref 4–15)
NEUTROPHILS # BLD AUTO: 5.9 K/UL (ref 1.8–7.7)
NEUTROPHILS NFR BLD: 58.7 % (ref 38–73)
NRBC BLD-RTO: 0 /100 WBC
PLATELET # BLD AUTO: 327 K/UL (ref 150–450)
PLATELET BLD QL SMEAR: ABNORMAL
PMV BLD AUTO: 10.5 FL (ref 9.2–12.9)
POTASSIUM SERPL-SCNC: 4.3 MMOL/L (ref 3.5–5.1)
PROT SERPL-MCNC: 6.9 G/DL (ref 6–8.4)
RBC # BLD AUTO: 3.79 M/UL (ref 4–5.4)
SODIUM SERPL-SCNC: 141 MMOL/L (ref 136–145)
TROPONIN I SERPL DL<=0.01 NG/ML-MCNC: <0.006 NG/ML (ref 0–0.03)
WBC # BLD AUTO: 10.1 K/UL (ref 3.9–12.7)

## 2021-07-25 PROCEDURE — 93010 ELECTROCARDIOGRAM REPORT: CPT | Mod: ,,, | Performed by: INTERNAL MEDICINE

## 2021-07-25 PROCEDURE — 96361 HYDRATE IV INFUSION ADD-ON: CPT

## 2021-07-25 PROCEDURE — 96360 HYDRATION IV INFUSION INIT: CPT

## 2021-07-25 PROCEDURE — 83880 ASSAY OF NATRIURETIC PEPTIDE: CPT | Performed by: EMERGENCY MEDICINE

## 2021-07-25 PROCEDURE — 99285 EMERGENCY DEPT VISIT HI MDM: CPT | Mod: 25

## 2021-07-25 PROCEDURE — 85025 COMPLETE CBC W/AUTO DIFF WBC: CPT | Performed by: NURSE PRACTITIONER

## 2021-07-25 PROCEDURE — 80053 COMPREHEN METABOLIC PANEL: CPT | Performed by: NURSE PRACTITIONER

## 2021-07-25 PROCEDURE — 84484 ASSAY OF TROPONIN QUANT: CPT | Performed by: NURSE PRACTITIONER

## 2021-07-25 PROCEDURE — 63600175 PHARM REV CODE 636 W HCPCS: Performed by: EMERGENCY MEDICINE

## 2021-07-25 PROCEDURE — 93010 EKG 12-LEAD: ICD-10-PCS | Mod: ,,, | Performed by: INTERNAL MEDICINE

## 2021-07-25 PROCEDURE — 93005 ELECTROCARDIOGRAM TRACING: CPT

## 2021-07-25 RX ADMIN — SODIUM CHLORIDE, SODIUM LACTATE, POTASSIUM CHLORIDE, AND CALCIUM CHLORIDE 1000 ML: .6; .31; .03; .02 INJECTION, SOLUTION INTRAVENOUS at 03:07

## 2021-07-26 ENCOUNTER — PATIENT MESSAGE (OUTPATIENT)
Dept: CARDIOLOGY | Facility: CLINIC | Age: 61
End: 2021-07-26

## 2021-07-26 ENCOUNTER — TELEPHONE (OUTPATIENT)
Dept: CARDIOLOGY | Facility: HOSPITAL | Age: 61
End: 2021-07-26

## 2021-07-26 ENCOUNTER — PATIENT MESSAGE (OUTPATIENT)
Dept: FAMILY MEDICINE | Facility: CLINIC | Age: 61
End: 2021-07-26

## 2021-07-30 ENCOUNTER — CLINICAL SUPPORT (OUTPATIENT)
Dept: REHABILITATION | Facility: HOSPITAL | Age: 61
End: 2021-07-30
Payer: MEDICAID

## 2021-07-30 DIAGNOSIS — M54.50 ACUTE BILATERAL LOW BACK PAIN WITHOUT SCIATICA: ICD-10-CM

## 2021-07-30 DIAGNOSIS — Z74.09 IMPAIRED FUNCTIONAL MOBILITY AND ACTIVITY TOLERANCE: ICD-10-CM

## 2021-07-30 PROCEDURE — 97110 THERAPEUTIC EXERCISES: CPT

## 2021-07-30 PROCEDURE — 97014 ELECTRIC STIMULATION THERAPY: CPT

## 2021-08-02 ENCOUNTER — DOCUMENTATION ONLY (OUTPATIENT)
Dept: REHABILITATION | Facility: HOSPITAL | Age: 61
End: 2021-08-02

## 2021-08-02 ENCOUNTER — CLINICAL SUPPORT (OUTPATIENT)
Dept: REHABILITATION | Facility: HOSPITAL | Age: 61
End: 2021-08-02
Payer: MEDICAID

## 2021-08-02 DIAGNOSIS — M54.50 ACUTE BILATERAL LOW BACK PAIN WITHOUT SCIATICA: ICD-10-CM

## 2021-08-02 DIAGNOSIS — Z74.09 IMPAIRED FUNCTIONAL MOBILITY AND ACTIVITY TOLERANCE: ICD-10-CM

## 2021-08-02 PROCEDURE — 97110 THERAPEUTIC EXERCISES: CPT | Mod: CQ

## 2021-08-08 ENCOUNTER — PATIENT MESSAGE (OUTPATIENT)
Dept: CARDIOLOGY | Facility: CLINIC | Age: 61
End: 2021-08-08

## 2021-08-10 ENCOUNTER — TELEPHONE (OUTPATIENT)
Dept: CARDIOLOGY | Facility: CLINIC | Age: 61
End: 2021-08-10

## 2021-08-17 ENCOUNTER — OFFICE VISIT (OUTPATIENT)
Dept: CARDIOLOGY | Facility: CLINIC | Age: 61
End: 2021-08-17
Payer: MEDICAID

## 2021-08-17 VITALS
SYSTOLIC BLOOD PRESSURE: 116 MMHG | OXYGEN SATURATION: 94 % | BODY MASS INDEX: 28.65 KG/M2 | HEART RATE: 77 BPM | DIASTOLIC BLOOD PRESSURE: 66 MMHG | WEIGHT: 172.19 LBS

## 2021-08-17 DIAGNOSIS — R09.89 BRUIT OF RIGHT CAROTID ARTERY: ICD-10-CM

## 2021-08-17 DIAGNOSIS — I70.219 ATHEROSCLEROTIC PVD WITH INTERMITTENT CLAUDICATION: ICD-10-CM

## 2021-08-17 DIAGNOSIS — E78.2 MIXED HYPERLIPIDEMIA: Chronic | ICD-10-CM

## 2021-08-17 DIAGNOSIS — I25.2 HISTORY OF ST ELEVATION MYOCARDIAL INFARCTION (STEMI): ICD-10-CM

## 2021-08-17 DIAGNOSIS — J44.9 COPD, MODERATE: ICD-10-CM

## 2021-08-17 DIAGNOSIS — I10 ESSENTIAL HYPERTENSION: Primary | ICD-10-CM

## 2021-08-17 DIAGNOSIS — Z74.09 IMPAIRED FUNCTIONAL MOBILITY AND ACTIVITY TOLERANCE: ICD-10-CM

## 2021-08-17 DIAGNOSIS — R94.31 NONSPECIFIC ABNORMAL ELECTROCARDIOGRAM (ECG) (EKG): ICD-10-CM

## 2021-08-17 DIAGNOSIS — I73.9 PVD (PERIPHERAL VASCULAR DISEASE): ICD-10-CM

## 2021-08-17 DIAGNOSIS — I25.10 CORONARY ARTERY DISEASE INVOLVING NATIVE CORONARY ARTERY OF NATIVE HEART WITHOUT ANGINA PECTORIS: ICD-10-CM

## 2021-08-17 DIAGNOSIS — F17.210 NICOTINE DEPENDENCE, CIGARETTES, UNCOMPLICATED: Chronic | ICD-10-CM

## 2021-08-17 DIAGNOSIS — I25.118 CORONARY ARTERY DISEASE OF NATIVE ARTERY OF NATIVE HEART WITH STABLE ANGINA PECTORIS: ICD-10-CM

## 2021-08-17 PROCEDURE — 99214 OFFICE O/P EST MOD 30 MIN: CPT | Mod: S$PBB,,, | Performed by: INTERNAL MEDICINE

## 2021-08-17 PROCEDURE — 99214 PR OFFICE/OUTPT VISIT, EST, LEVL IV, 30-39 MIN: ICD-10-PCS | Mod: S$PBB,,, | Performed by: INTERNAL MEDICINE

## 2021-08-17 PROCEDURE — 99213 OFFICE O/P EST LOW 20 MIN: CPT | Mod: PBBFAC | Performed by: INTERNAL MEDICINE

## 2021-08-17 PROCEDURE — 99999 PR PBB SHADOW E&M-EST. PATIENT-LVL III: CPT | Mod: PBBFAC,,, | Performed by: INTERNAL MEDICINE

## 2021-08-17 PROCEDURE — 99999 PR PBB SHADOW E&M-EST. PATIENT-LVL III: ICD-10-PCS | Mod: PBBFAC,,, | Performed by: INTERNAL MEDICINE

## 2021-08-17 RX ORDER — ISOSORBIDE MONONITRATE 60 MG/1
60 TABLET, EXTENDED RELEASE ORAL DAILY
Qty: 30 TABLET | Refills: 6 | Status: SHIPPED | OUTPATIENT
Start: 2021-08-17 | End: 2022-09-15 | Stop reason: SDUPTHER

## 2021-08-17 RX ORDER — LOSARTAN POTASSIUM 25 MG/1
25 TABLET ORAL 2 TIMES DAILY
Qty: 60 TABLET | Refills: 6 | Status: SHIPPED | OUTPATIENT
Start: 2021-08-17 | End: 2022-05-23

## 2021-09-17 ENCOUNTER — HOSPITAL ENCOUNTER (OUTPATIENT)
Dept: CARDIOLOGY | Facility: HOSPITAL | Age: 61
Discharge: HOME | End: 2021-09-17
Attending: INTERNAL MEDICINE
Payer: MEDICAID

## 2021-09-17 VITALS
BODY MASS INDEX: 28.66 KG/M2 | DIASTOLIC BLOOD PRESSURE: 90 MMHG | SYSTOLIC BLOOD PRESSURE: 182 MMHG | WEIGHT: 172 LBS | HEIGHT: 65 IN | HEIGHT: 65 IN | DIASTOLIC BLOOD PRESSURE: 90 MMHG | WEIGHT: 172 LBS | SYSTOLIC BLOOD PRESSURE: 182 MMHG | BODY MASS INDEX: 28.66 KG/M2

## 2021-09-17 DIAGNOSIS — I70.219 ATHEROSCLEROTIC PVD WITH INTERMITTENT CLAUDICATION: ICD-10-CM

## 2021-09-17 DIAGNOSIS — I73.9 PVD (PERIPHERAL VASCULAR DISEASE): ICD-10-CM

## 2021-09-17 PROCEDURE — 93925 CV US DOPPLER ARTERIAL LEGS BILATERAL (CUPID ONLY): ICD-10-PCS | Mod: 26,,, | Performed by: INTERNAL MEDICINE

## 2021-09-17 PROCEDURE — 93925 LOWER EXTREMITY STUDY: CPT | Mod: 26,,, | Performed by: INTERNAL MEDICINE

## 2021-09-17 PROCEDURE — 93925 LOWER EXTREMITY STUDY: CPT

## 2021-09-17 PROCEDURE — 93924 LWR XTR VASC STDY BILAT: CPT

## 2021-09-17 PROCEDURE — 93924 LWR XTR VASC STDY BILAT: CPT | Mod: 26,,, | Performed by: INTERNAL MEDICINE

## 2021-09-17 PROCEDURE — 93924 ANKLE BRACHIAL INDICES (ABI): ICD-10-PCS | Mod: 26,,, | Performed by: INTERNAL MEDICINE

## 2021-09-18 LAB
IMMEDIATE ARM BP: 199 MMHG
IMMEDIATE LEFT ABI: 0.26
IMMEDIATE LEFT TIBIAL: 51 MMHG
IMMEDIATE RIGHT ABI: 0.39
IMMEDIATE RIGHT TIBIAL: 78 MMHG
LEFT ABI: 0.61
LEFT ANT TIBIAL SYS PSV: 37 CM/S
LEFT ARM BP: 167 MMHG
LEFT CFA PSV: 327 CM/S
LEFT DORSALIS PEDIS: 99 MMHG
LEFT PERONEAL SYS PSV: 27 CM/S
LEFT POPLITEAL PSV: 53 CM/S
LEFT POST TIBIAL SYS PSV: 48 CM/S
LEFT POSTERIOR TIBIAL: 111 MMHG
LEFT PROFUNDA SYS PSV: 194 CM/S
LEFT SUPER FEMORAL DIST SYS PSV: 64 CM/S
LEFT SUPER FEMORAL MID SYS PSV: 93 CM/S
LEFT SUPER FEMORAL OSTIAL SYS PSV: 336 CM/S
LEFT SUPER FEMORAL PROX SYS PSV: 0 CM/S
LEFT TBI: 0.38
LEFT TIB/PER TRUNK SYS PSV: 38 CM/S
LEFT TOE PRESSURE: 70 MMHG
OHS CV LEFT LOWER EXTREMITY ABI (NO CALC): 0.61
OHS CV RIGHT ABI LOWER EXTREMITY (NO CALC): 0.6
POST1 ARM BP: 182 MMHG
POST1 LEFT ABI: 0.32
POST1 LEFT TIBIAL: 58 MMHG
POST1 RIGHT ABI: 0.43
POST1 RIGHT TIBIAL: 79 MMHG
POST2 ARM BP: 152 MMHG
POST2 LEFT ABI: 0.41
POST2 LEFT TIBIAL: 62 MMHG
POST2 RIGHT ABI: 0.56
POST2 RIGHT TIBIAL: 85 MMHG
RIGHT ABI: 0.6
RIGHT ANT TIBIAL SYS PSV: 23 CM/S
RIGHT ARM BP: 182 MMHG
RIGHT CFA PSV: 199 CM/S
RIGHT DORSALIS PEDIS: 106 MMHG
RIGHT PERONEAL SYS PSV: 20 CM/S
RIGHT POPLITEAL PSV: 26 CM/S
RIGHT POST TIBIAL SYS PSV: 33 CM/S
RIGHT POSTERIOR TIBIAL: 110 MMHG
RIGHT PROFUNDA SYS PSV: 172 CM/S
RIGHT SUPER FEMORAL DIST SYS PSV: 57 CM/S
RIGHT SUPER FEMORAL MID SYS PSV: 41 CM/S
RIGHT SUPER FEMORAL OSTIAL SYS PSV: 128 CM/S
RIGHT SUPER FEMORAL PROX SYS PSV: 0 CM/S
RIGHT TBI: 0.46
RIGHT TIB/PER TRUNK SYS PSV: 36 CM/S
RIGHT TOE PRESSURE: 83 MMHG
TREADMILL GRADE: 2 %
TREADMILL SPEED: 1.7 MPH
TREADMILL TIME: 4 MIN

## 2021-09-21 ENCOUNTER — PATIENT MESSAGE (OUTPATIENT)
Dept: CARDIOLOGY | Facility: CLINIC | Age: 61
End: 2021-09-21

## 2021-10-04 ENCOUNTER — PATIENT MESSAGE (OUTPATIENT)
Dept: CARDIOLOGY | Facility: CLINIC | Age: 61
End: 2021-10-04

## 2021-10-18 ENCOUNTER — CLINICAL SUPPORT (OUTPATIENT)
Dept: SMOKING CESSATION | Facility: CLINIC | Age: 61
End: 2021-10-18
Payer: COMMERCIAL

## 2021-10-18 DIAGNOSIS — F17.200 NICOTINE DEPENDENCE: Primary | ICD-10-CM

## 2021-10-18 PROCEDURE — 99407 PR TOBACCO USE CESSATION INTENSIVE >10 MINUTES: ICD-10-PCS | Mod: S$GLB,,,

## 2021-10-18 PROCEDURE — 99407 BEHAV CHNG SMOKING > 10 MIN: CPT | Mod: S$GLB,,,

## 2021-12-02 ENCOUNTER — LAB VISIT (OUTPATIENT)
Dept: LAB | Facility: HOSPITAL | Age: 61
End: 2021-12-02
Attending: INTERNAL MEDICINE
Payer: MEDICAID

## 2021-12-02 DIAGNOSIS — E78.2 MIXED HYPERLIPIDEMIA: Chronic | ICD-10-CM

## 2021-12-02 LAB
ALBUMIN SERPL BCP-MCNC: 3.6 G/DL (ref 3.5–5.2)
ALP SERPL-CCNC: 117 U/L (ref 55–135)
ALT SERPL W/O P-5'-P-CCNC: 13 U/L (ref 10–44)
ANION GAP SERPL CALC-SCNC: 12 MMOL/L (ref 8–16)
AST SERPL-CCNC: 17 U/L (ref 10–40)
BILIRUB SERPL-MCNC: 0.3 MG/DL (ref 0.1–1)
BUN SERPL-MCNC: 10 MG/DL (ref 8–23)
CALCIUM SERPL-MCNC: 9.3 MG/DL (ref 8.7–10.5)
CHLORIDE SERPL-SCNC: 105 MMOL/L (ref 95–110)
CHOLEST SERPL-MCNC: 131 MG/DL (ref 120–199)
CHOLEST/HDLC SERPL: 2.6 {RATIO} (ref 2–5)
CO2 SERPL-SCNC: 26 MMOL/L (ref 23–29)
CREAT SERPL-MCNC: 0.9 MG/DL (ref 0.5–1.4)
EST. GFR  (AFRICAN AMERICAN): >60 ML/MIN/1.73 M^2
EST. GFR  (NON AFRICAN AMERICAN): >60 ML/MIN/1.73 M^2
GLUCOSE SERPL-MCNC: 91 MG/DL (ref 70–110)
HDLC SERPL-MCNC: 50 MG/DL (ref 40–75)
HDLC SERPL: 38.2 % (ref 20–50)
LDLC SERPL CALC-MCNC: 57.4 MG/DL (ref 63–159)
NONHDLC SERPL-MCNC: 81 MG/DL
POTASSIUM SERPL-SCNC: 4.2 MMOL/L (ref 3.5–5.1)
PROT SERPL-MCNC: 6.9 G/DL (ref 6–8.4)
SODIUM SERPL-SCNC: 143 MMOL/L (ref 136–145)
TRIGL SERPL-MCNC: 118 MG/DL (ref 30–150)

## 2021-12-02 PROCEDURE — 80053 COMPREHEN METABOLIC PANEL: CPT | Performed by: INTERNAL MEDICINE

## 2021-12-02 PROCEDURE — 80061 LIPID PANEL: CPT | Performed by: INTERNAL MEDICINE

## 2021-12-02 PROCEDURE — 36415 COLL VENOUS BLD VENIPUNCTURE: CPT | Mod: PO | Performed by: INTERNAL MEDICINE

## 2021-12-09 ENCOUNTER — OFFICE VISIT (OUTPATIENT)
Dept: CARDIOLOGY | Facility: CLINIC | Age: 61
End: 2021-12-09
Payer: MEDICAID

## 2021-12-09 VITALS
HEIGHT: 65 IN | SYSTOLIC BLOOD PRESSURE: 130 MMHG | BODY MASS INDEX: 28.95 KG/M2 | OXYGEN SATURATION: 99 % | DIASTOLIC BLOOD PRESSURE: 72 MMHG | WEIGHT: 173.75 LBS | HEART RATE: 77 BPM

## 2021-12-09 DIAGNOSIS — E78.2 MIXED HYPERLIPIDEMIA: Chronic | ICD-10-CM

## 2021-12-09 DIAGNOSIS — R09.89 BRUIT OF RIGHT CAROTID ARTERY: ICD-10-CM

## 2021-12-09 DIAGNOSIS — F17.210 NICOTINE DEPENDENCE, CIGARETTES, UNCOMPLICATED: Chronic | ICD-10-CM

## 2021-12-09 DIAGNOSIS — I25.118 CORONARY ARTERY DISEASE OF NATIVE ARTERY OF NATIVE HEART WITH STABLE ANGINA PECTORIS: Primary | ICD-10-CM

## 2021-12-09 DIAGNOSIS — I70.219 ATHEROSCLEROTIC PVD WITH INTERMITTENT CLAUDICATION: ICD-10-CM

## 2021-12-09 DIAGNOSIS — Z74.09 IMPAIRED FUNCTIONAL MOBILITY AND ACTIVITY TOLERANCE: ICD-10-CM

## 2021-12-09 DIAGNOSIS — M54.50 ACUTE BILATERAL LOW BACK PAIN WITHOUT SCIATICA: ICD-10-CM

## 2021-12-09 DIAGNOSIS — I10 ESSENTIAL HYPERTENSION: ICD-10-CM

## 2021-12-09 DIAGNOSIS — I25.2 HISTORY OF ST ELEVATION MYOCARDIAL INFARCTION (STEMI): ICD-10-CM

## 2021-12-09 DIAGNOSIS — Z85.41 HX OF CERVICAL CANCER: ICD-10-CM

## 2021-12-09 DIAGNOSIS — I73.9 PVD (PERIPHERAL VASCULAR DISEASE): ICD-10-CM

## 2021-12-09 DIAGNOSIS — J44.9 COPD, MODERATE: ICD-10-CM

## 2021-12-09 PROCEDURE — 99999 PR PBB SHADOW E&M-EST. PATIENT-LVL IV: ICD-10-PCS | Mod: PBBFAC,,, | Performed by: INTERNAL MEDICINE

## 2021-12-09 PROCEDURE — 99214 OFFICE O/P EST MOD 30 MIN: CPT | Mod: S$PBB,,, | Performed by: INTERNAL MEDICINE

## 2021-12-09 PROCEDURE — 4010F ACE/ARB THERAPY RXD/TAKEN: CPT | Mod: CPTII,,, | Performed by: INTERNAL MEDICINE

## 2021-12-09 PROCEDURE — 4010F PR ACE/ARB THEARPY RXD/TAKEN: ICD-10-PCS | Mod: CPTII,,, | Performed by: INTERNAL MEDICINE

## 2021-12-09 PROCEDURE — 99214 PR OFFICE/OUTPT VISIT, EST, LEVL IV, 30-39 MIN: ICD-10-PCS | Mod: S$PBB,,, | Performed by: INTERNAL MEDICINE

## 2021-12-09 PROCEDURE — 99999 PR PBB SHADOW E&M-EST. PATIENT-LVL IV: CPT | Mod: PBBFAC,,, | Performed by: INTERNAL MEDICINE

## 2021-12-09 PROCEDURE — 99214 OFFICE O/P EST MOD 30 MIN: CPT | Mod: PBBFAC | Performed by: INTERNAL MEDICINE

## 2021-12-29 ENCOUNTER — PATIENT MESSAGE (OUTPATIENT)
Dept: ENDOCRINOLOGY | Facility: CLINIC | Age: 61
End: 2021-12-29
Payer: MEDICAID

## 2022-01-14 ENCOUNTER — PATIENT MESSAGE (OUTPATIENT)
Dept: FAMILY MEDICINE | Facility: CLINIC | Age: 62
End: 2022-01-14
Payer: MEDICAID

## 2022-01-24 ENCOUNTER — IMMUNIZATION (OUTPATIENT)
Dept: PHARMACY | Facility: CLINIC | Age: 62
End: 2022-01-24
Payer: MEDICAID

## 2022-01-24 ENCOUNTER — OFFICE VISIT (OUTPATIENT)
Dept: FAMILY MEDICINE | Facility: CLINIC | Age: 62
End: 2022-01-24
Attending: FAMILY MEDICINE
Payer: MEDICAID

## 2022-01-24 VITALS
BODY MASS INDEX: 29.2 KG/M2 | HEART RATE: 75 BPM | SYSTOLIC BLOOD PRESSURE: 136 MMHG | TEMPERATURE: 98 F | OXYGEN SATURATION: 98 % | HEIGHT: 65 IN | DIASTOLIC BLOOD PRESSURE: 82 MMHG | WEIGHT: 175.25 LBS

## 2022-01-24 DIAGNOSIS — B02.29 POST HERPETIC NEURALGIA: ICD-10-CM

## 2022-01-24 DIAGNOSIS — Z23 NEED FOR VACCINATION: Primary | ICD-10-CM

## 2022-01-24 DIAGNOSIS — Z72.0 TOBACCO ABUSE: ICD-10-CM

## 2022-01-24 DIAGNOSIS — Z12.39 ENCOUNTER FOR SCREENING FOR MALIGNANT NEOPLASM OF BREAST, UNSPECIFIED SCREENING MODALITY: Primary | ICD-10-CM

## 2022-01-24 DIAGNOSIS — I25.10 CORONARY ARTERY DISEASE, UNSPECIFIED VESSEL OR LESION TYPE, UNSPECIFIED WHETHER ANGINA PRESENT, UNSPECIFIED WHETHER NATIVE OR TRANSPLANTED HEART: ICD-10-CM

## 2022-01-24 PROCEDURE — 3079F DIAST BP 80-89 MM HG: CPT | Mod: CPTII,,, | Performed by: FAMILY MEDICINE

## 2022-01-24 PROCEDURE — 1160F RVW MEDS BY RX/DR IN RCRD: CPT | Mod: CPTII,,, | Performed by: FAMILY MEDICINE

## 2022-01-24 PROCEDURE — 4010F ACE/ARB THERAPY RXD/TAKEN: CPT | Mod: CPTII,,, | Performed by: FAMILY MEDICINE

## 2022-01-24 PROCEDURE — 3075F PR MOST RECENT SYSTOLIC BLOOD PRESS GE 130-139MM HG: ICD-10-PCS | Mod: CPTII,,, | Performed by: FAMILY MEDICINE

## 2022-01-24 PROCEDURE — 3008F BODY MASS INDEX DOCD: CPT | Mod: CPTII,,, | Performed by: FAMILY MEDICINE

## 2022-01-24 PROCEDURE — 99999 PR PBB SHADOW E&M-EST. PATIENT-LVL V: CPT | Mod: PBBFAC,,, | Performed by: FAMILY MEDICINE

## 2022-01-24 PROCEDURE — 3075F SYST BP GE 130 - 139MM HG: CPT | Mod: CPTII,,, | Performed by: FAMILY MEDICINE

## 2022-01-24 PROCEDURE — 4010F PR ACE/ARB THEARPY RXD/TAKEN: ICD-10-PCS | Mod: CPTII,,, | Performed by: FAMILY MEDICINE

## 2022-01-24 PROCEDURE — 99999 PR PBB SHADOW E&M-EST. PATIENT-LVL V: ICD-10-PCS | Mod: PBBFAC,,, | Performed by: FAMILY MEDICINE

## 2022-01-24 PROCEDURE — 99215 OFFICE O/P EST HI 40 MIN: CPT | Mod: PBBFAC,PO | Performed by: FAMILY MEDICINE

## 2022-01-24 PROCEDURE — 99214 PR OFFICE/OUTPT VISIT, EST, LEVL IV, 30-39 MIN: ICD-10-PCS | Mod: S$PBB,,, | Performed by: FAMILY MEDICINE

## 2022-01-24 PROCEDURE — 1159F MED LIST DOCD IN RCRD: CPT | Mod: CPTII,,, | Performed by: FAMILY MEDICINE

## 2022-01-24 PROCEDURE — 3008F PR BODY MASS INDEX (BMI) DOCUMENTED: ICD-10-PCS | Mod: CPTII,,, | Performed by: FAMILY MEDICINE

## 2022-01-24 PROCEDURE — 1160F PR REVIEW ALL MEDS BY PRESCRIBER/CLIN PHARMACIST DOCUMENTED: ICD-10-PCS | Mod: CPTII,,, | Performed by: FAMILY MEDICINE

## 2022-01-24 PROCEDURE — 3079F PR MOST RECENT DIASTOLIC BLOOD PRESSURE 80-89 MM HG: ICD-10-PCS | Mod: CPTII,,, | Performed by: FAMILY MEDICINE

## 2022-01-24 PROCEDURE — 99214 OFFICE O/P EST MOD 30 MIN: CPT | Mod: S$PBB,,, | Performed by: FAMILY MEDICINE

## 2022-01-24 PROCEDURE — 1159F PR MEDICATION LIST DOCUMENTED IN MEDICAL RECORD: ICD-10-PCS | Mod: CPTII,,, | Performed by: FAMILY MEDICINE

## 2022-01-24 RX ORDER — GABAPENTIN 300 MG/1
300 CAPSULE ORAL NIGHTLY
Qty: 30 CAPSULE | Refills: 0 | Status: SHIPPED | OUTPATIENT
Start: 2022-01-24 | End: 2022-02-21

## 2022-01-24 NOTE — PROGRESS NOTES
Subjective:       Patient ID: Iza Esquivel is a 61 y.o. female.    Chief Complaint: Back Pain (Middle right side of back)    61 y ol female with CAD , tobacco abuse, hx of Herpes zoster on R mid back  With R  Mid back pain since recovering from Zoster. Irradiated to R mid chest  . Achy , last hrs , 1/10 . No  Triggers, digestive symptoms. Card has ruled out cardiac etiologies  . She stopped taking gabapentin shortly after herpetic lesions disappeared     Review of Systems   Constitutional: Negative.    HENT: Negative.    Eyes: Negative.    Respiratory: Negative.    Cardiovascular: Positive for chest pain.   Gastrointestinal: Negative.    Genitourinary: Negative.    Musculoskeletal: Negative.    Skin: Negative.    Hematological: Negative.        Objective:      Physical Exam  Vitals and nursing note reviewed.   Constitutional:       General: She is not in acute distress.     Appearance: She is well-developed and well-nourished. She is not diaphoretic.   HENT:      Head: Normocephalic and atraumatic.      Right Ear: External ear normal.      Left Ear: External ear normal.      Nose: Nose normal.      Mouth/Throat:      Mouth: Oropharynx is clear and moist.   Eyes:      General: No scleral icterus.        Left eye: No discharge.      Extraocular Movements: EOM normal.      Conjunctiva/sclera: Conjunctivae normal.      Pupils: Pupils are equal, round, and reactive to light.   Neck:      Thyroid: No thyromegaly.      Vascular: No JVD.      Trachea: No tracheal deviation.   Cardiovascular:      Rate and Rhythm: Normal rate and regular rhythm.      Pulses: Intact distal pulses.      Heart sounds: Normal heart sounds. No murmur heard.  No friction rub. No gallop.    Pulmonary:      Effort: Pulmonary effort is normal. No respiratory distress.      Breath sounds: Normal breath sounds. No wheezing or rales.   Chest:      Chest wall: No tenderness.   Abdominal:      General: Bowel sounds are normal. There is no distension.       Palpations: Abdomen is soft. There is no mass.      Tenderness: There is no abdominal tenderness. There is no guarding.   Musculoskeletal:      Cervical back: Normal range of motion and neck supple.   Lymphadenopathy:      Cervical: No cervical adenopathy.         Assessment:     Iza was seen today for back pain.    Diagnoses and all orders for this visit:    Encounter for screening for malignant neoplasm of breast, unspecified screening modality  -     Mammo Digital Screening Bilat; Future    Post herpetic neuralgia    Coronary artery disease, unspecified vessel or lesion type, unspecified whether angina present, unspecified whether native or transplanted heart    Tobacco abuse    Other orders  -     gabapentin (NEURONTIN) 300 MG capsule; Take 1 capsule (300 mg total) by mouth every evening.      Plan:    Resume Gabapentin . Email progress in  2 w   F.u with card   Work on smoking cessation

## 2022-01-27 ENCOUNTER — PATIENT OUTREACH (OUTPATIENT)
Dept: ADMINISTRATIVE | Facility: OTHER | Age: 62
End: 2022-01-27
Payer: MEDICAID

## 2022-01-27 NOTE — PROGRESS NOTES
Chart was reviewed for overdue Proactive [facility] Encounters (TOMMY)  topics  Updates were requested from care everywhere  Health Maintenance has been updated  LINKS immunization registry triggered  Fit kit previously ordered

## 2022-02-02 ENCOUNTER — OFFICE VISIT (OUTPATIENT)
Dept: ENDOCRINOLOGY | Facility: CLINIC | Age: 62
End: 2022-02-02
Payer: MEDICAID

## 2022-02-02 ENCOUNTER — LAB VISIT (OUTPATIENT)
Dept: LAB | Facility: HOSPITAL | Age: 62
End: 2022-02-02
Attending: PHYSICIAN ASSISTANT
Payer: MEDICAID

## 2022-02-02 VITALS
HEART RATE: 65 BPM | SYSTOLIC BLOOD PRESSURE: 122 MMHG | WEIGHT: 175.81 LBS | OXYGEN SATURATION: 98 % | TEMPERATURE: 98 F | HEIGHT: 65 IN | BODY MASS INDEX: 29.29 KG/M2 | DIASTOLIC BLOOD PRESSURE: 79 MMHG

## 2022-02-02 DIAGNOSIS — E55.9 HYPOVITAMINOSIS D: ICD-10-CM

## 2022-02-02 DIAGNOSIS — E03.8 HYPOTHYROIDISM DUE TO HASHIMOTO'S THYROIDITIS: ICD-10-CM

## 2022-02-02 DIAGNOSIS — E03.9 HYPOTHYROIDISM, UNSPECIFIED TYPE: ICD-10-CM

## 2022-02-02 DIAGNOSIS — E04.9 ENLARGED THYROID: Primary | ICD-10-CM

## 2022-02-02 DIAGNOSIS — E06.3 HYPOTHYROIDISM DUE TO HASHIMOTO'S THYROIDITIS: ICD-10-CM

## 2022-02-02 DIAGNOSIS — E04.9 ENLARGED THYROID: ICD-10-CM

## 2022-02-02 DIAGNOSIS — Z78.0 POSTMENOPAUSAL: ICD-10-CM

## 2022-02-02 DIAGNOSIS — Z80.8 FAMILY HISTORY OF THYROID CANCER: ICD-10-CM

## 2022-02-02 PROCEDURE — 1159F MED LIST DOCD IN RCRD: CPT | Mod: CPTII,,, | Performed by: PHYSICIAN ASSISTANT

## 2022-02-02 PROCEDURE — 1160F PR REVIEW ALL MEDS BY PRESCRIBER/CLIN PHARMACIST DOCUMENTED: ICD-10-PCS | Mod: CPTII,,, | Performed by: PHYSICIAN ASSISTANT

## 2022-02-02 PROCEDURE — 36415 COLL VENOUS BLD VENIPUNCTURE: CPT | Mod: PO | Performed by: PHYSICIAN ASSISTANT

## 2022-02-02 PROCEDURE — 3008F PR BODY MASS INDEX (BMI) DOCUMENTED: ICD-10-PCS | Mod: CPTII,,, | Performed by: PHYSICIAN ASSISTANT

## 2022-02-02 PROCEDURE — 99213 OFFICE O/P EST LOW 20 MIN: CPT | Mod: S$PBB,,, | Performed by: PHYSICIAN ASSISTANT

## 2022-02-02 PROCEDURE — 4010F ACE/ARB THERAPY RXD/TAKEN: CPT | Mod: CPTII,,, | Performed by: PHYSICIAN ASSISTANT

## 2022-02-02 PROCEDURE — 1159F PR MEDICATION LIST DOCUMENTED IN MEDICAL RECORD: ICD-10-PCS | Mod: CPTII,,, | Performed by: PHYSICIAN ASSISTANT

## 2022-02-02 PROCEDURE — 3078F PR MOST RECENT DIASTOLIC BLOOD PRESSURE < 80 MM HG: ICD-10-PCS | Mod: CPTII,,, | Performed by: PHYSICIAN ASSISTANT

## 2022-02-02 PROCEDURE — 99215 OFFICE O/P EST HI 40 MIN: CPT | Mod: PBBFAC,PO | Performed by: PHYSICIAN ASSISTANT

## 2022-02-02 PROCEDURE — 3008F BODY MASS INDEX DOCD: CPT | Mod: CPTII,,, | Performed by: PHYSICIAN ASSISTANT

## 2022-02-02 PROCEDURE — 99999 PR PBB SHADOW E&M-EST. PATIENT-LVL V: ICD-10-PCS | Mod: PBBFAC,,, | Performed by: PHYSICIAN ASSISTANT

## 2022-02-02 PROCEDURE — 86376 MICROSOMAL ANTIBODY EACH: CPT | Performed by: PHYSICIAN ASSISTANT

## 2022-02-02 PROCEDURE — 84443 ASSAY THYROID STIM HORMONE: CPT | Performed by: PHYSICIAN ASSISTANT

## 2022-02-02 PROCEDURE — 99213 PR OFFICE/OUTPT VISIT, EST, LEVL III, 20-29 MIN: ICD-10-PCS | Mod: S$PBB,,, | Performed by: PHYSICIAN ASSISTANT

## 2022-02-02 PROCEDURE — 3074F PR MOST RECENT SYSTOLIC BLOOD PRESSURE < 130 MM HG: ICD-10-PCS | Mod: CPTII,,, | Performed by: PHYSICIAN ASSISTANT

## 2022-02-02 PROCEDURE — 3078F DIAST BP <80 MM HG: CPT | Mod: CPTII,,, | Performed by: PHYSICIAN ASSISTANT

## 2022-02-02 PROCEDURE — 84439 ASSAY OF FREE THYROXINE: CPT | Performed by: PHYSICIAN ASSISTANT

## 2022-02-02 PROCEDURE — 1160F RVW MEDS BY RX/DR IN RCRD: CPT | Mod: CPTII,,, | Performed by: PHYSICIAN ASSISTANT

## 2022-02-02 PROCEDURE — 4010F PR ACE/ARB THEARPY RXD/TAKEN: ICD-10-PCS | Mod: CPTII,,, | Performed by: PHYSICIAN ASSISTANT

## 2022-02-02 PROCEDURE — 3074F SYST BP LT 130 MM HG: CPT | Mod: CPTII,,, | Performed by: PHYSICIAN ASSISTANT

## 2022-02-02 PROCEDURE — 99999 PR PBB SHADOW E&M-EST. PATIENT-LVL V: CPT | Mod: PBBFAC,,, | Performed by: PHYSICIAN ASSISTANT

## 2022-02-02 RX ORDER — LEVOTHYROXINE SODIUM 50 UG/1
50 TABLET ORAL
Qty: 30 TABLET | Refills: 11 | Status: SHIPPED | OUTPATIENT
Start: 2022-02-02 | End: 2022-02-27

## 2022-02-02 RX ORDER — LEVOTHYROXINE SODIUM 50 UG/1
50 TABLET ORAL
Qty: 30 TABLET | Refills: 0 | Status: CANCELLED | OUTPATIENT
Start: 2022-02-02

## 2022-02-02 NOTE — PROGRESS NOTES
"CC: Thyromegaly    HPI: Iza Esquivel is a 61 y.o. female here for thyromegaly along with pending conditions listed in the Visit Diagnosis. Diagnosed by her PCP recently after an exam . +FHx of thyroid disease in her mother (possible cancer).  [place]. No hx of neck radiation. Her brother has pancreatic cancer.    Hypothyroidism  LT4 50 mcg daily w/ only water  + fatigue. No palpitations, tremors, hair loss, constipation, diarrhea.  Thyroid u/s 12/20 did not show any nodules but did show an enlarged thyroid.  + sob. No dysphagia or voice changes.    DEXA: 1/21 osteopenia in the left hip. No falls, fractures or steriod injections. She walks but her legs start hurting and she has to stop. Not taking ca or vd.     PMHx, PSHx: reviewed in epic.    Social Hx: no ETOH/tobacco use. She is trying to quit smoking. Smokes 0.5 ppw.     Wt Readings from Last 6 Encounters:   02/02/22 79.8 kg (175 lb 13.1 oz)   01/24/22 79.5 kg (175 lb 4.3 oz)   12/09/21 78.8 kg (173 lb 11.6 oz)   09/17/21 78 kg (172 lb)   09/17/21 78 kg (172 lb)   08/17/21 78.1 kg (172 lb 2.9 oz)      ROS:   Constitutional: No recent significant weight change  Eyes: No recent visual changes  Cardiovascular: Denies current anginal symptoms  Respiratory: Denies current respiratory difficulty  Gastrointestinal: Denies recent bowel disturbances  GenitoUrinary - No dysuria  Skin: No new skin rash  Neurologic: No focal neurologic complaints  Musculoskeletal: + knee pain  Endocrine: no polyphagia, polydipsia or polyuria  Remainder ROS negative     /79 (BP Location: Left arm, Patient Position: Sitting)   Pulse 65   Temp 98 °F (36.7 °C) (Oral)   Ht 5' 5" (1.651 m)   Wt 79.8 kg (175 lb 13.1 oz)   LMP  (LMP Unknown)   SpO2 98%   BMI 29.26 kg/m²      Personally reviewed labs below:    Lab Results   Component Value Date    TSH 1.758 02/24/2021    FREET4 1.15 02/24/2021          Chemistry        Component Value Date/Time     12/02/2021 0758    K " 4.2 12/02/2021 0758     12/02/2021 0758    CO2 26 12/02/2021 0758    BUN 10 12/02/2021 0758    CREATININE 0.9 12/02/2021 0758    GLU 91 12/02/2021 0758        Component Value Date/Time    CALCIUM 9.3 12/02/2021 0758    ALKPHOS 117 12/02/2021 0758    AST 17 12/02/2021 0758    ALT 13 12/02/2021 0758    BILITOT 0.3 12/02/2021 0758    ESTGFRAFRICA >60.0 12/02/2021 0758    EGFRNONAA >60.0 12/02/2021 0758         Lab Results   Component Value Date    HGBA1C 5.7 (H) 11/23/2020    HGBA1C 5.3 08/20/2018        PE:  GENERAL: elderly female, well developed, well nourished  NECK: Supple neck, normal thyroid. No bruit  LYMPHATIC: No cervical or supraclavicular lymphadenopathy  CARDIOVASCULAR: Normal heart sounds, no pedal edema  RESPIRATORY: Normal effort, clear to auscultation bl  ABDOMEN: soft, non-tender, non-distended.  MUSC: 2+ DTR UE/LE  NEURO: steady gait, CN ll-Xll grossly intact  PSYCH: normal mood and affect      Assessment/Plan:   1. Enlarged thyroid  Thyroid Peroxidase Antibody   2. Family history of thyroid cancer     3. Postmenopausal     4. Hypovitaminosis D     5. Hypothyroidism, unspecified type  TSH    Thyroid Peroxidase Antibody   6. Hypothyroidism due to Hashimoto's thyroiditis  TSH    levothyroxine (SYNTHROID) 50 MCG tablet      Hypothyroidism due to Hashimoto's Disease-TSH today. Repeating TPO because it was very high.   Enlarged thyroid-pt is not symptomatic. No nodules seen. Will not need to repeat unless she has dysphagia, sob or voice changes.   Postmenopausal/Osteopenia-DEXA scan 1/23. Continue taking 1500 mg of ca daily. Also, participate in weight bearing and resistance exercises for 40 min 4x weekly.   Hypovitaminosis [id number] vd.    Additional labs approved by .     TSH, TPO today  F/u in 6 mths-TSH

## 2022-02-03 LAB
T4 FREE SERPL-MCNC: 0.76 NG/DL (ref 0.71–1.51)
THYROPEROXIDASE IGG SERPL-ACNC: 8271.9 IU/ML
TSH SERPL DL<=0.005 MIU/L-ACNC: 10 UIU/ML (ref 0.4–4)

## 2022-02-21 RX ORDER — GABAPENTIN 300 MG/1
CAPSULE ORAL
Qty: 30 CAPSULE | Refills: 0 | Status: SHIPPED | OUTPATIENT
Start: 2022-02-21 | End: 2022-06-20

## 2022-02-21 NOTE — TELEPHONE ENCOUNTER
No new care gaps identified.  Powered by [employer]. Reference number: [id number].   2/21/2022 8:33:23 AM CST

## 2022-02-27 DIAGNOSIS — E03.8 HYPOTHYROIDISM DUE TO HASHIMOTO'S THYROIDITIS: Primary | ICD-10-CM

## 2022-02-27 DIAGNOSIS — E06.3 HYPOTHYROIDISM DUE TO HASHIMOTO'S THYROIDITIS: Primary | ICD-10-CM

## 2022-02-27 RX ORDER — LEVOTHYROXINE SODIUM 75 UG/1
75 TABLET ORAL
Qty: 30 TABLET | Refills: 11 | Status: SHIPPED | OUTPATIENT
Start: 2022-02-27 | End: 2022-12-08

## 2022-02-28 ENCOUNTER — TELEPHONE (OUTPATIENT)
Dept: ENDOCRINOLOGY | Facility: CLINIC | Age: 62
End: 2022-02-28
Payer: MEDICAID

## 2022-02-28 NOTE — TELEPHONE ENCOUNTER
----- Message from JINNY Jackson PA-C sent at 2/27/2022  2:18 PM CST -----  Your thyroid hormone is elevated. Increase Levothyroxine to 75 mcg daily. Recheck TSH in six weeks.

## 2022-02-28 NOTE — TELEPHONE ENCOUNTER
Pt notified her thyroid level breana elevated and to increase her Levothyroxine to 75mcg daily per Brennan. schedule lab 6 weeks out. Pt aware to check pt portal for the appt.

## 2022-03-09 NOTE — H&P (VIEW-ONLY)
Subjective:    Patient ID:  Iza Esquivel is a 59 y.o. female who presents for follow-up of hospital follow-up      HPI  Ms. Esquivel is a 59 year old female patient whose current medical conditions include HTN, hyperlipidemia, carotid bruit, severe PAD, and CAD s/p recent STEMI on 5/25/20 with successful PCI of LCX who presents today for hospital follow-up. Patient recently hospitalized at [facility] with chest pain. Repeat LHC was performed which showed patent stent, non-obstructive disease elsewhere. Imdur was added to her medical regimen and she was subsequently discharged. She returns today and states she is doing well overall. Denies any quyen pain but does admit to some nocturnal bouts of chest tightness, notices it more when she lies down. Less severe than before she went to the hospital. Lasts approximately 15-20 minutes then spontaneously resolved. She also admits to some mild SOB at times, not really bothersome. Feels limited due to leg pain/claudication, R>L, also notices some right leg pain at night. Occasional dizziness. No near syncope, syncope, or falls. BP slightly above goal today in office. Patient reports compliance with her medications, is taking ASA and Brilinta. Still smoking, but has cut back to 8 cigarettes daily.    Review of Systems   Constitution: Negative for chills, decreased appetite, fever and malaise/fatigue.   HENT: Negative for congestion, hoarse voice and sore throat.    Eyes: Negative for blurred vision and discharge.   Cardiovascular: Positive for chest pain (chest tightness), claudication and dyspnea on exertion (nonbothersome). Negative for cyanosis, irregular heartbeat, leg swelling, near-syncope, orthopnea, palpitations and paroxysmal nocturnal dyspnea.   Respiratory: Negative for cough, hemoptysis, shortness of breath, snoring, sputum production and wheezing.    Endocrine: Negative for cold intolerance and heat intolerance.   Hematologic/Lymphatic: Negative for bleeding  "problem. Does not bruise/bleed easily.   Skin: Negative for rash.   Musculoskeletal: Negative for arthritis, back pain, joint pain, joint swelling, muscle cramps, muscle weakness and myalgias.   Gastrointestinal: Negative for abdominal pain, constipation, diarrhea, heartburn, melena and nausea.   Genitourinary: Negative for hematuria.   Neurological: Positive for dizziness (occasional). Negative for focal weakness, headaches, light-headedness, loss of balance, numbness, paresthesias, seizures and weakness.   Psychiatric/Behavioral: Negative for memory loss. The patient does not have insomnia.    Allergic/Immunologic: Negative for hives.     BP (!) 142/80 (BP Location: Left arm, Patient Position: Sitting, BP Method: Medium (Manual))   Pulse 61   Ht 5' 4" (1.626 m)   Wt 71.1 kg (156 lb 12 oz)   LMP  (LMP Unknown)   SpO2 97%   BMI 26.91 kg/m²     Past Medical History:   Diagnosis Date    Atherosclerotic PVD with intermittent claudication 5/25/2020    Cervical cancer     Hyperlipidemia     Hypertension     Right carotid bruit     S/P PTCA (percutaneous transluminal coronary angioplasty) 05/25/2020    STEMI: stenting of LCx     Past Surgical History:   Procedure Laterality Date    ABDOMINAL AORTOGRAPHY N/A 5/25/2020    Procedure: Aortogram, Abdominal;  Surgeon: Shelly Jarvis MD;  Location: [facility] CATH LAB;  Service: Cardiology;  Laterality: N/A;    LEFT HEART CATHETERIZATION Left 5/25/2020    Procedure: CATHETERIZATION, HEART, LEFT;  Surgeon: Shelly Jarvis MD;  Location: [facility] CATH LAB;  Service: Cardiology;  Laterality: Left;    LEFT HEART CATHETERIZATION Left 6/11/2020    Procedure: CATHETERIZATION, HEART, LEFT;  Surgeon: Shelly Jarvis MD;  Location: [facility] CATH LAB;  Service: Cardiology;  Laterality: Left;    LYMPH NODE BIOPSY      PERCUTANEOUS TRANSLUMINAL BALLOON ANGIOPLASTY OF CORONARY ARTERY  5/25/2020    Procedure: Angioplasty-coronary;  Surgeon: Shelly Jarvis MD;  Location: [facility] CATH LAB;  " Service: Cardiology;;     Social History     Socioeconomic History    Marital status: Single     Spouse name: Not on file    Number of children: Not on file    Years of education: Not on file    Highest education level: Not on file   Occupational History    Not on file   Social Needs    Financial resource strain: Somewhat hard    Food insecurity     Worry: Never true     Inability: Never true    Transportation needs     Medical: No     Non-medical: No   Tobacco Use    Smoking status: Current Every Day Smoker     Packs/day: 1.50     Years: 40.00     Pack years: 60.00    Smokeless tobacco: Never Used    Tobacco comment: now down to 10 cigarettes daily   Substance and Sexual Activity    Alcohol use: No     Frequency: Never     Binge frequency: Never    Drug use: Not on file    Sexual activity: Never   Lifestyle    Physical activity     Days per week: 5 days     Minutes per session: 150+ min    Stress: To some extent   Relationships    Social connections     Talks on phone: More than three times a week     Gets together: Once a week     Attends Sabianist service: Not on file     Active member of club or organization: No     Attends meetings of clubs or organizations: Never     Relationship status: Never    Other Topics Concern    Not on file   Social History Narrative    Lives with sister.  SDM: Ninoska Hicks, sister [phone].     Family History   Problem Relation Age of Onset    Cancer Mother         ?    Cancer Father         ?    Transient ischemic attack Father         Carotid artery stenosis, CEA    Diabetes Brother     Hypertension Brother      Review of patient's allergies indicates:  No Known Allergies     Medication List with Changes/Refills   Current Medications    ASPIRIN (ECOTRIN) 81 MG EC TABLET    Take 1 tablet (81 mg total) by mouth once daily.    ATORVASTATIN (LIPITOR) 80 MG TABLET    Take 1 tablet (80 mg total) by mouth once daily.    ISOSORBIDE MONONITRATE (IMDUR) 30  MG 24 HR TABLET    Take 1 tablet (30 mg total) by mouth once daily.    LOSARTAN (COZAAR) 25 MG TABLET    Take 1 tablet (25 mg total) by mouth once daily.    METOPROLOL TARTRATE (LOPRESSOR) 25 MG TABLET    Take 1 tablet (25 mg total) by mouth 2 (two) times daily.    TICAGRELOR (BRILINTA) 90 MG TABLET    Take 1 tablet (90 mg total) by mouth 2 (two) times daily.       Objective:    Physical Exam   Constitutional: She is oriented to person, place, and time. She appears well-developed and well-nourished. No distress.   HENT:   Head: Normocephalic and atraumatic.   Eyes: Pupils are equal, round, and reactive to light. Right eye exhibits no discharge. Left eye exhibits no discharge.   Neck: Neck supple. No JVD present.   Cardiovascular: Normal rate, regular rhythm, S1 normal, S2 normal and normal heart sounds. Exam reveals decreased pulses.   No murmur heard.  Pulmonary/Chest: Effort normal and breath sounds normal. No respiratory distress. She has no wheezes. She has no rales.   Abdominal: Soft. She exhibits no distension.   Musculoskeletal:         General: No edema.   Neurological: She is alert and oriented to person, place, and time.   Skin: Skin is warm and dry. She is not diaphoretic. No erythema.   Left radial access site C/D/I; no bleeding erythema or drainage; moderate bruising   Psychiatric: She has a normal mood and affect. Her behavior is normal.   Nursing note and vitals reviewed.    Cath Results  · LVEDP (Pre): 21  · Estimated blood loss: none  · Non-obstructive CAD.  · Diastolic dysfunction.  · Patent lcx stent.     GONZÁLEZ and arterial U/S Results  FINDINGS:  Right lower extremity arterial velocities are as follows (cm/sec).  Monophasic waveforms demonstrated throughout the right lower extremity.     CFA: 132     DFA: 98     Proximal SFA: 92     Mid SFA: 240     Distal SFA: 52     Popliteal: 32     PTA: 33     BALDOMERO: 27     Left lower extremity arterial velocities are as follows (cm/sec). Monophasic waveforms  demonstrated throughout the left lower extremity.     CFA: 367     Proximal SFA: 138     Mid SFA: 119     Distal SFA: 77     Popliteal: 57     PTA: 45     BALDOMERO: 56     ABIs:     Right posterior tibialis: 0.77 compatible with peripheral vascular disease     Right dorsalis pedis: 0.65 compatible with peripheral vascular disease     Left posterior tibialis: 0.65 compatible with peripheral vascular disease     Left dorsalis pedis 0.58 compatible with intermittent claudication     Impression:     Monophasic waveforms throughout both lower extremities concerning for bilateral upstream stenosis.  More focal velocity elevations involving the right mid SFA and left CFA concerning for additional focal stenoses.     Bilateral decreased ABIs as above.  Assessment:       1. Atherosclerotic PVD with intermittent claudication    2. Bruit of right carotid artery    3. Coronary artery disease involving native coronary artery of native heart without angina pectoris    4. Nonspecific abnormal electrocardiogram (ECG) (EKG)    5. Mixed hyperlipidemia    6. Tobacco abuse      Patient presents for f/u. Doing well, still having intermittent chest tightness but improved. Increase Imdur to 30 mg BID. Start Protonix 40 mg daily. Continue other CV meds. Complains of limiting claudication, right > left. Significant disease by BLE arterial U/S and GONZÁLEZ, needs peripheral angiogram/intervention.   Plan:   -Increase Imdur to 30 mg BID  -Add Protonix 40 mg daily  -Continue other CV meds, counseled on importance of DAPT  -Smoking cessation  -Peripheral angiogram by Dr. Jarvis. All risks, benefits, and treatment alternatives explained to patient in detail. All questions answered. She has agreed to proceed.  -CMP, CMP, PTT, INR, COVID test 6/26/2020           no

## 2022-03-21 ENCOUNTER — CLINICAL SUPPORT (OUTPATIENT)
Dept: SMOKING CESSATION | Facility: CLINIC | Age: 62
End: 2022-03-21
Payer: COMMERCIAL

## 2022-03-21 DIAGNOSIS — F17.200 NICOTINE DEPENDENCE: Primary | ICD-10-CM

## 2022-03-21 PROCEDURE — 99407 BEHAV CHNG SMOKING > 10 MIN: CPT | Mod: S$GLB,,,

## 2022-03-21 PROCEDURE — 99407 PR TOBACCO USE CESSATION INTENSIVE >10 MINUTES: ICD-10-PCS | Mod: S$GLB,,,

## 2022-03-21 NOTE — PROGRESS NOTES
Spoke with patient today in regard to smoking cessation progress for 12-month telephone follow up. Patient states that she is tobacco free.  Patient stated she quit about a month ago, but was unsure of the date.  Patient states she is doing well and not having strong urges or cravings.  Commended patient on their quit. Informed patient of benefit period, future follow up, and contact information if any further help or support is needed. Will complete smart form for 12 month follow up and resolve Quit attempt #1.

## 2022-04-07 NOTE — TELEPHONE ENCOUNTER
[facility]  Progress Note - Tawana Cox 1953, 68 y o  male MRN: [id number]  Unit/Bed#: [facility] 365-62 Encounter: [id number]  Primary Care Provider: Cynthia Portillo MD   Date and time admitted to hospital: 3/31/2022 10:58 AM    Cervical myelopathy ([address] )  Assessment & Plan  POD 6 posterior cervical evacuation of postoperative collection and debridement with placement of drains C3-T1  · S/p Anterior cervical discectomy and fixation fusion C5/6 and C6/7; Posterior cervical decompression and instrumented fusion C3-T1 with Dr Pablito Ballard on 3/11/22  · Presented as a direct admission from the office on 03/31 with concerns for wound infection    Imaging:   · CT Cervical w/ contrast 3/31/2022: Large posterior soft tissue fluid collection, morphology suspicious for infection as clinically suspected  Study is nondiagnostic for evaluation of the central canal and epidural abscess  MR is better suited intracanalicular evaluation although will be limited by artifact from metal hardware  Plan:   Continue to monitor neurological exam and symptoms   Blood Cx no growth after 5 days    Wound cx 3/31 + MRSA   Intraop cx tissue culture 4/1 + MRSA   [place] collar to remain in place at all times, okay for [place] collar for showering   MARCIN drains:  o Left to gravity with 63 mL/24 hours  o Right MARCIN removed yesterday   · SLIM and ID consulted - appreciate recommendations  · Patient will require 4 weeks of IV vancomycin followed by 4 weeks of p o  antibiotics  · PICC placed  · Maintain vancomycin 1,5000 mg q12h per ID  · Continue to monitor incision  · Pain well controlled on current pain regimen:  · Gabapentin 100mg daily and 300mg at HS  · Tylenol 975 mg TID prn  · Robaxin 750 mg q 6 hours p r n   · Oxycodone 2 5-5mg q 4 hours p r n  For moderate or severe pain  · Bowel regimen: Senokot, and MiraLax  · Mobilize with PT/OT  Home with outpatient rehab  · Mobilize as tolerated    Out of bed I left a message letting Ms Couch know that her hospital follow up with Erica Dunne is scheduled for 6/18/20 at 3 pm at [place]   to chair with meals  · DVT ppx: SCDs, on heparin gtt    Plan of care discussed with RN Colette Ordonez  Spoke with Rolando Momin, wife, updated on plan of care and all questions answered  Neurosurgery will follow as primary, call with any further questions or concerns  Factor V Leiden mutation [facility])  Assessment & Plan  On heparin gtt, was supratherapeutic this morning, PTT >210  No active signs of bleeding, continue to monitor incision and drain output  Plan to bridge to Coumadin once drains are discontinued      Subjective/Objective     Chief Complaint: "I feel okay"    Subjective:  Patient denies any neck pain but does continue to endorse sharp left paraspinal neck pain with certain movements  He reports his current pain regimen helps with his pain  He denies any radiation of his neck pain into his arms  He continues to endorse mild burning to the top of his head when touched which has been ongoing since surgery which has greatly improved  He does endorse having some diarrhea last night  He denies any headaches, dizziness, blurry vision, chest pain, shortness of breath, abdominal pain, nausea, vomiting, no new problems with bowel or bladder, no new weakness or numbness/tingling  Patient denies any difficulty with swallowing or breathing  Objective:  Patient comfortably sitting out in recliner with vista collar in place as well as left MARCIN drain, NAD  I/O       04/05 0701  04/06 0700 04/06 0701  04/07 0700 04/07 0701  04/08 0700    P  O  1080 1560     I V  (mL/kg) 250 (2 3)      IV Piggyback 500      Total Intake(mL/kg) 1830 (17 1) 1560 (14 6)     Urine (mL/kg/hr) 1350 (0 5) 1525 (0 6) 600 (1)    Drains 34 68 10    Stool 0      Total Output 1384 1593 610    Net +446 -33 -610           Unmeasured Urine Occurrence 1 x 2 x     Unmeasured Stool Occurrence 1 x            Invasive Devices  Report    Peripherally Inserted Central Catheter Line            PICC Line 60/55/44 Right Basilic 5 days          Drain Closed/Suction Drain Posterior; Left Neck Bulb 7 Fr  5 days                Physical Exam:  Vitals: Blood pressure 137/69, pulse 62, temperature 98 2 °F (36 8 °C), resp  rate 16, height 5' 11" (1 803 m), weight 107 kg (235 lb), SpO2 95 %  ,Body mass index is 32 78 kg/m²  General appearance: alert, appears stated age, cooperative and no distress  Head: Normocephalic, without obvious abnormality, atraumatic  Eyes: EOMI, PERRL, conjugate gaze  Neck: [place] collar, area around incision continues to have some erythema, otherwise incision intact clean and dry with retention sutures and staples in place, there is some serosanguineous drainage on dressing  Left MARCIN drain to gravity in place, continue to monitor incision  Anterior cervical incision Steri-Strips in place clean, dry, intact  Lungs: non labored breathing  Heart: regular heart rate  Neurologic:   Mental status: Alert, oriented x3, thought content appropriate, speech is clear, following commands  Cranial nerves: grossly intact (Cranial nerves II-XII)  Sensory: normal to light touch in all extremities x4  Motor: moving all extremities, strength 5/5 throughout except right lower extremity 4+/5 throughout            Lab Results:  Results from last 7 days   Lab Units 04/04/22  0826 04/03/22  0051 04/02/22  1041 03/31/22  1618 03/31/22  1618   WBC Thousand/uL 7 48 6 73 8 12   < > 6 86   HEMOGLOBIN g/dL 8 4* 7 9* 9 8*   < > 10 4*   HEMATOCRIT % 25 3* 24 8* 29 9*   < > 32 0*   PLATELETS Thousands/uL 318 308 347   < > 365   NEUTROS PCT %  --  65  --   --  70   MONOS PCT %  --  7  --   --  6    < > = values in this interval not displayed       Results from last 7 days   Lab Units 04/07/22  0515 04/05/22  1359 04/03/22  0051   POTASSIUM mmol/L 3 6 3 8 4 0   CHLORIDE mmol/L 106 106 106   CO2 mmol/L 30 31 29   BUN mg/dL 9 10 17   CREATININE mg/dL 0 74 0 64 0 72   CALCIUM mg/dL 8 6 8 9 8 4             Results from last 7 days   Lab Units 04/07/22  0515 04/06/22  2127 04/06/22  1350 04/05/22  0545 04/04/22  0826 04/02/22  1801 04/02/22  1041   INR  1 37*  --   --   --  1 39*  --  1 44*   PTT seconds >210* 33 32   < > 79*   < > 36    < > = values in this interval not displayed  No results found for: TROPONINT  ABG:No results found for: PHART, LXW0WSX, PO2ART, AIS7OGP, T3OPERXY, BEART, SOURCE    Imaging Studies: I have personally reviewed pertinent reports  and I have personally reviewed pertinent films in PACS    CT spine cervical w contrast    Result Date: 3/31/2022  Impression: Large posterior soft tissue fluid collection, morphology suspicious for infection as clinically suspected  Study is nondiagnostic for evaluation of the central canal and epidural abscess  MR is better suited intracanalicular evaluation although will be limited by artifact from metal hardware  No specific findings of osteomyelitis  Workstation performed: [id number]       EKG, Pathology, and Other Studies: I have personally reviewed pertinent reports        VTE Pharmacologic Prophylaxis: Heparin    VTE Mechanical Prophylaxis: sequential compression device

## 2022-04-11 ENCOUNTER — LAB VISIT (OUTPATIENT)
Dept: LAB | Facility: HOSPITAL | Age: 62
End: 2022-04-11
Attending: PHYSICIAN ASSISTANT
Payer: MEDICAID

## 2022-04-11 DIAGNOSIS — E03.8 HYPOTHYROIDISM DUE TO HASHIMOTO'S THYROIDITIS: ICD-10-CM

## 2022-04-11 DIAGNOSIS — E06.3 HYPOTHYROIDISM DUE TO HASHIMOTO'S THYROIDITIS: ICD-10-CM

## 2022-04-11 LAB — TSH SERPL DL<=0.005 MIU/L-ACNC: 1.95 UIU/ML (ref 0.4–4)

## 2022-04-11 PROCEDURE — 84443 ASSAY THYROID STIM HORMONE: CPT | Performed by: PHYSICIAN ASSISTANT

## 2022-04-11 PROCEDURE — 36415 COLL VENOUS BLD VENIPUNCTURE: CPT | Mod: PO | Performed by: PHYSICIAN ASSISTANT

## 2022-04-26 ENCOUNTER — PATIENT MESSAGE (OUTPATIENT)
Dept: ADMINISTRATIVE | Facility: HOSPITAL | Age: 62
End: 2022-04-26
Payer: MEDICAID

## 2022-06-01 ENCOUNTER — HOSPITAL ENCOUNTER (OUTPATIENT)
Dept: RADIOLOGY | Facility: HOSPITAL | Age: 62
Discharge: HOME | End: 2022-06-01
Attending: NURSE PRACTITIONER
Payer: MEDICAID

## 2022-06-01 DIAGNOSIS — F17.210 NICOTINE DEPENDENCE, CIGARETTES, UNCOMPLICATED: ICD-10-CM

## 2022-06-01 DIAGNOSIS — F17.210 NICOTINE DEPENDENCE, CIGARETTES, UNCOMPLICATED: Primary | ICD-10-CM

## 2022-06-01 PROBLEM — J98.4 CALCIFIED GRANULOMA OF LUNG: Status: ACTIVE | Noted: 2022-06-01

## 2022-06-01 PROBLEM — J84.10 CALCIFIED GRANULOMA OF LUNG: Status: ACTIVE | Noted: 2022-06-01

## 2022-06-01 PROBLEM — J43.2 CENTRILOBULAR EMPHYSEMA: Status: ACTIVE | Noted: 2022-06-01

## 2022-06-01 PROCEDURE — 71271 CT THORAX LUNG CANCER SCR C-: CPT | Mod: TC

## 2022-06-01 PROCEDURE — 71271 CT THORAX LUNG CANCER SCR C-: CPT | Mod: 26,,, | Performed by: RADIOLOGY

## 2022-06-01 PROCEDURE — 71271 CT CHEST LUNG SCREENING LOW DOSE: ICD-10-PCS | Mod: 26,,, | Performed by: RADIOLOGY

## 2022-06-01 NOTE — PROGRESS NOTES
Orders Placed This Encounter   Procedures    CT Chest Lung Screening Low Dose     Standing Status:   Future     Standing Expiration Date:   8/22/2024     Order Specific Question:   Is the patient a current or former smoker who quit within the past 15 years?     Answer:   Yes     Order Specific Question:   Is the patient between the age 50-80 years old?     Answer:   Yes     Order Specific Question:   Has the patient ever had lung cancer or an abnormal Chest CT?     Answer:   No     Order Specific Question:   Has the patient smoked 20 or more packs of cigarettes/year?     Answer:   Yes     Order Specific Question:   May the Radiologist modify the order per protocol to meet the clinical needs of the patient?     Answer:   Yes     Order Specific Question:   Does the patient show any signs or symptoms of lung cancer?     Answer:   No     Order Specific Question:   Is this the first (baseline) CT or an annual exam?     Answer:   Annual [2]     Order Specific Question:   Is there documentation of shared decision making for this lung screening exam?     Answer:   Yes     Order Specific Question:   Is this a low dose screening chest CT?     Answer:   Yes     1. Nicotine dependence, cigarettes, uncomplicated  CT Chest Lung Screening Low Dose       CT Chest Lung Screening Low Dose  Narrative: EXAMINATION:  CT CHEST LUNG SCREENING LOW DOSE    CLINICAL HISTORY:  Lung cancer annual screening, asymptomatic, current smoker (min. 30 pack-yrs); Nicotine dependence, cigarettes, uncomplicated    TECHNIQUE:  CT of the thorax was performed with low dose, lung screening protocol.  No contrast was administered.  Sagittal and coronal reconstructions were obtained.    COMPARISON:  Chest x-ray from July 2021.  CT from June of last year.    FINDINGS:  Lungs: There are no abnormal opacities that require further evaluation.  Tiny calcified granuloma anterior right upper lobe.  Lungs show findings consistent with emphysema.    Pleura:   No  effusion..  Very mild areas of pleural thickening/mild plaques are seen.  One of these demonstrates faint calcification on series 4, image 297 left lower lobe for example.  Findings can be seen with prior asbestos exposure.    Heart and pericardium: Normal size without effusion.    Aorta and vasculature: Atherosclerosis including coronary arteries.    Chest wall and skeletal structures: Unremarkable except age-appropriate degenerative changes.    Upper abdomen: Small scattered bilateral renal stones.  Impression: Lung-RADS Category:  1 - Negative - Continue annual screening with LDCT in 12 months.    Clinically or potentially clinically significant non lung cancer finding:  None.    Prior Lung Cancer Modifier:  No history of prior lung cancer.    Other findings as discussed above.    Electronically signed by: Mauro Perez MD  Date:    06/01/2022  Time:    09:54

## 2022-06-20 ENCOUNTER — OFFICE VISIT (OUTPATIENT)
Dept: PULMONOLOGY | Facility: CLINIC | Age: 62
End: 2022-06-20
Payer: MEDICAID

## 2022-06-20 VITALS
OXYGEN SATURATION: 98 % | HEIGHT: 65 IN | RESPIRATION RATE: 18 BRPM | BODY MASS INDEX: 29.27 KG/M2 | DIASTOLIC BLOOD PRESSURE: 86 MMHG | HEART RATE: 72 BPM | SYSTOLIC BLOOD PRESSURE: 140 MMHG | WEIGHT: 175.69 LBS

## 2022-06-20 DIAGNOSIS — J44.9 COPD, MODERATE: Primary | ICD-10-CM

## 2022-06-20 DIAGNOSIS — J43.2 CENTRILOBULAR EMPHYSEMA: ICD-10-CM

## 2022-06-20 DIAGNOSIS — F17.210 NICOTINE DEPENDENCE, CIGARETTES, UNCOMPLICATED: Chronic | ICD-10-CM

## 2022-06-20 DIAGNOSIS — I73.9 PVD (PERIPHERAL VASCULAR DISEASE): ICD-10-CM

## 2022-06-20 DIAGNOSIS — Z12.11 COLON CANCER SCREENING: Primary | ICD-10-CM

## 2022-06-20 PROCEDURE — 3079F PR MOST RECENT DIASTOLIC BLOOD PRESSURE 80-89 MM HG: ICD-10-PCS | Mod: CPTII,,, | Performed by: NURSE PRACTITIONER

## 2022-06-20 PROCEDURE — 1159F MED LIST DOCD IN RCRD: CPT | Mod: CPTII,,, | Performed by: NURSE PRACTITIONER

## 2022-06-20 PROCEDURE — 1160F RVW MEDS BY RX/DR IN RCRD: CPT | Mod: CPTII,,, | Performed by: NURSE PRACTITIONER

## 2022-06-20 PROCEDURE — 3077F PR MOST RECENT SYSTOLIC BLOOD PRESSURE >= 140 MM HG: ICD-10-PCS | Mod: CPTII,,, | Performed by: NURSE PRACTITIONER

## 2022-06-20 PROCEDURE — 99999 PR PBB SHADOW E&M-EST. PATIENT-LVL IV: CPT | Mod: PBBFAC,,, | Performed by: NURSE PRACTITIONER

## 2022-06-20 PROCEDURE — 3077F SYST BP >= 140 MM HG: CPT | Mod: CPTII,,, | Performed by: NURSE PRACTITIONER

## 2022-06-20 PROCEDURE — 99214 OFFICE O/P EST MOD 30 MIN: CPT | Mod: PBBFAC | Performed by: NURSE PRACTITIONER

## 2022-06-20 PROCEDURE — 99214 PR OFFICE/OUTPT VISIT, EST, LEVL IV, 30-39 MIN: ICD-10-PCS | Mod: S$PBB,,, | Performed by: NURSE PRACTITIONER

## 2022-06-20 PROCEDURE — 1159F PR MEDICATION LIST DOCUMENTED IN MEDICAL RECORD: ICD-10-PCS | Mod: CPTII,,, | Performed by: NURSE PRACTITIONER

## 2022-06-20 PROCEDURE — 4010F PR ACE/ARB THEARPY RXD/TAKEN: ICD-10-PCS | Mod: CPTII,,, | Performed by: NURSE PRACTITIONER

## 2022-06-20 PROCEDURE — 4010F ACE/ARB THERAPY RXD/TAKEN: CPT | Mod: CPTII,,, | Performed by: NURSE PRACTITIONER

## 2022-06-20 PROCEDURE — 99999 PR PBB SHADOW E&M-EST. PATIENT-LVL IV: ICD-10-PCS | Mod: PBBFAC,,, | Performed by: NURSE PRACTITIONER

## 2022-06-20 PROCEDURE — 1160F PR REVIEW ALL MEDS BY PRESCRIBER/CLIN PHARMACIST DOCUMENTED: ICD-10-PCS | Mod: CPTII,,, | Performed by: NURSE PRACTITIONER

## 2022-06-20 PROCEDURE — 3008F PR BODY MASS INDEX (BMI) DOCUMENTED: ICD-10-PCS | Mod: CPTII,,, | Performed by: NURSE PRACTITIONER

## 2022-06-20 PROCEDURE — 99214 OFFICE O/P EST MOD 30 MIN: CPT | Mod: S$PBB,,, | Performed by: NURSE PRACTITIONER

## 2022-06-20 PROCEDURE — 3079F DIAST BP 80-89 MM HG: CPT | Mod: CPTII,,, | Performed by: NURSE PRACTITIONER

## 2022-06-20 PROCEDURE — 3008F BODY MASS INDEX DOCD: CPT | Mod: CPTII,,, | Performed by: NURSE PRACTITIONER

## 2022-06-20 RX ORDER — ALBUTEROL SULFATE 90 UG/1
2 AEROSOL, METERED RESPIRATORY (INHALATION) EVERY 4 HOURS PRN
Qty: 18 G | Refills: 11 | Status: SHIPPED | OUTPATIENT
Start: 2022-06-20 | End: 2023-01-18

## 2022-06-20 NOTE — PROGRESS NOTES
Subjective:      Patient ID: Iza Esquivel is a 61 y.o. female.    Patient Active Problem List   Diagnosis    Nicotine dependence, cigarettes, uncomplicated    Hx of cervical cancer    Mixed hyperlipidemia    Bruit of right carotid artery    Near syncope    History of ST elevation myocardial infarction (STEMI)    Atherosclerotic PVD with intermittent claudication    Coronary artery disease of native artery of native heart with stable angina pectoris    PVD (peripheral vascular disease)    Osteopenia    COPD, moderate    Acute bilateral low back pain without sciatica    Impaired functional mobility and activity tolerance    Essential hypertension    Calcified granuloma of lung    Centrilobular emphysema       she has been referred by No ref. provider found for evaluation and management for   Chief Complaint   Patient presents with    COPD       Chief Complaint: COPD      HPI:  Iza Esquivel is a 61 y.o. female presents to pulmonary clinic follow evaluation related to COPD, review LDCT.     Every day cigarette smoker. Weaned from 1.5 pk to 5 cigarettes a day resumed back to 1/2 pack day. 77 pack year smoker.     Symptoms: Shortness of breath and cough improved on ANORO.  no mucous. No wheezing. Never on inhalers.     Moderate COPD seen on 3/1/2021 CPFT.    Current treatment: controller ANORO and rescue Albuterol inhaler.      6/1/2022 LDCT benign, repeat June 2023.    Patient had Sleep Apnea evaluation June 2021 visit, she did not complete sleep study, she is not interested in obstructive sleep apnea evaluation. Does not want to consider CPAP.   Patient had reported observed snoring, awakens with gasping, less daytime sleepiness, still likes to take a nap daily.   Patient reports non restful' sleep.  She denies morning headache.   She reports day time napping.  Day time napping duration 30 - 60 Minutes  She denies recent weight gain.  Cardiovascular risk factors: hypertension, coronary  artery disease and history of prior MI  Bed time is 1000 - 1100  Wake time is 400 - 0500  Sleep onset is within 30 Minutes.  Sleep maintenance difficulties related to non-restful sleep  Wake after sleep onset occurs three times a night.  Nocturia occurs none   Sleep aids : No  Dry mouth : Yes  Sleep walking: No  Sleep talking : No  Sleep eating:No  Vivid Dreams : No  Cataplexy : No     [place] sleepiness score was 6.  Neck circumference is 14.5   Mallampati score 4     STOP - BANG Questionnaire:   1. Snoring : Do you snore loudly ?     YES  2. Tired : Do you often feel tired, fatigued, or sleepy during daytime?   YES  3. Observed: Has anyone observed you stop breathing during your sleep?     YES  4. Blood pressure : Do you have or are you being treated for high blood pressure?    YES  5. BMI :BMI more than 35 kg/m2?   NO  6. Age : Age over 50 yr old?    YES  7. Neck circumference: Neck circumference greater than 40 cm?   NO  8. Gender: Gender male?   NO     SCORE: 5     High risk of GET: Yes 5 - 8  Intermediate risk of GET: Yes 3 - 4  Low risk of GET: Yes 0 - 2    [place] Sleepiness Scale   EPWORTH SLEEPINESS SCALE 6/20/2022 6/2/2021   Sitting and reading 0 3   Watching TV 2 3   Sitting, inactive in a public place (e.g. a theatre or a meeting) 1 3   As a passenger in a car for an hour without a break 0 3   Lying down to rest in the afternoon when circumstances permit 3 3   Sitting and talking to someone 0 0   Sitting quietly after a lunch without alcohol 0 3   In a car, while stopped for a few minutes in traffic 0 0   Total score 6 18            Previous Report Reviewed: lab reports and office notes     Past Medical History: The following portions of the patient's history were reviewed and updated as appropriate:   She  has a past surgical history that includes Lymph node biopsy; Left heart catheterization (Left, 5/25/2020); Percutaneous transluminal balloon angioplasty of coronary artery (5/25/2020); Abdominal  aortography (N/A, 5/25/2020); Left heart catheterization (Left, 6/11/2020); and Aortography with serialography (N/A, 6/30/2020).  Her family history includes Breast cancer in her paternal aunt; Cancer in her father, mother, and paternal aunt; Diabetes in her brother; Hypertension in her brother; Thyroid cancer in her mother; Transient ischemic attack in her father.  She  reports that she has been smoking cigarettes. She started smoking about 52 years ago. She has a 76.50 pack-year smoking history. She has never used smokeless tobacco. She reports that she does not drink alcohol and does not use drugs.  She has a current medication list which includes the following prescription(s): atorvastatin, isosorbide mononitrate, levothyroxine, losartan, metoprolol tartrate, pantoprazole, prasugrel, anoro ellipta, albuterol, and aspirin.  She has No Known Allergies..    Review of Systems   Constitutional: Negative for fever, chills, weight loss, weight gain, activity change, appetite change, fatigue and night sweats.   HENT: Negative for postnasal drip, rhinorrhea, sinus pressure, voice change and congestion.    Eyes: Negative for redness and itching.   Respiratory: Negative for snoring, cough, sputum production, chest tightness, shortness of breath, wheezing, orthopnea, asthma nighttime symptoms, dyspnea on extertion, use of rescue inhaler and somnolence.    Cardiovascular: Negative.  Negative for chest pain, palpitations and leg swelling.   Genitourinary: Negative for difficulty urinating and hematuria.   Endocrine: Negative for cold intolerance and heat intolerance.    Musculoskeletal: Negative for arthralgias, gait problem, joint swelling and myalgias.   Skin: Negative.    Gastrointestinal: Negative for nausea, vomiting, abdominal pain and acid reflux.   Neurological: Negative for dizziness, weakness, light-headedness and headaches.   Hematological: Negative for adenopathy. No excessive bruising.   All other systems  "reviewed and are negative.       Objective:   BP (!) 140/86   Pulse 72   Resp 18   Ht 5' 5" (1.651 m)   Wt 79.7 kg (175 lb 11.3 oz)   LMP  (LMP Unknown)   SpO2 98%   BMI 29.24 kg/m²   Physical Exam  Vitals and nursing note reviewed.   Constitutional:       General: She is not in acute distress.     Appearance: She is well-developed. She is not ill-appearing or toxic-appearing.   HENT:      Head: Normocephalic.      Right Ear: External ear normal.      Left Ear: External ear normal.      Nose: Nose normal.      Mouth/Throat:      Pharynx: No oropharyngeal exudate.   Eyes:      Conjunctiva/sclera: Conjunctivae normal.   Cardiovascular:      Rate and Rhythm: Normal rate and regular rhythm.      Heart sounds: Normal heart sounds.   Pulmonary:      Effort: Pulmonary effort is normal.      Breath sounds: Normal breath sounds. No stridor.   Abdominal:      Palpations: Abdomen is soft.   Musculoskeletal:         General: Normal range of motion.      Cervical back: Normal range of motion and neck supple.   Lymphadenopathy:      Cervical: No cervical adenopathy.   Skin:     General: Skin is warm and dry.   Neurological:      Mental Status: She is alert and oriented to person, place, and time.   Psychiatric:         Behavior: Behavior normal. Behavior is cooperative.         Thought Content: Thought content normal.         Judgment: Judgment normal.       Personal Diagnostic Review    3/1/2021 CPFT   Moderate obstructive airflow defect. Normal lung volumes. Diffusing capacity moderately reduced .                3/1/2021 ABG on room air     Ref. Range 3/1/2021 11:00   POC PH Latest Ref Range: 7.350 - 7.450  7.428   POC PCO2 Latest Ref Range: 35 - 45 mmHg 37.8   POC PO2 Latest Ref Range: 80 - 100 mmHg 106 (H)   POC BE Latest Ref Range: -2 to 2 mmol/L 1   POC HCO3 Latest Ref Range: 24.00 - 28.00 mmol/L 25.0   POC SATURATED O2 Latest Ref Range: 95 - 100 % 98   FiO2 Unknown 0.21   Sample Unknown ARTERIAL   DelSys Unknown " Room Air   Allens Test Unknown Pass   Site Unknown RR   Mode Unknown SPONT         3/1/2021 No desaturations requiring supplemental oxygen at rest or exertion.  Phase Oxygen Assessment Supplemental O2 Heart   Rate Blood Pressure Anayeli Dyspnea Scale Rating   Resting 98 % Room Air 58 bpm 146/67 3   Exercise             Minute             1 99 % Room Air 81 bpm       2 98 % Room Air 85 bpm       3 98 % Room Air 85 bpm       4 99 % Room Air 85 bpm       5 99 % Room Air 96 bpm       6  99 % Room Air 95 bpm 192/74 5-6   Recovery             Minute             1 99 % Room Air 92 bpm       2 98 % Room Air 74 bpm       3 98 % Room Air 66 bpm       4 99 % Room Air 65 bpm 132/63 4      Six Minute Walk Summary  6MWT Status: completed without stopping  Patient Reported: Dyspnea, Leg pain (hip pain)             Interpretation:  Did the patient stop or pause?: No  Total Time Walked (Calculated): 360 seconds  Final Partial Lap Distance (feet): 50 feet  Total Distance Meters (Calculated): 320.04 meters  Predicted Distance Meters (Calculated): 484.4 meters  Percentage of Predicted (Calculated): 66.07  Peak VO2 (Calculated): 13.58  Mets: 3.88  Has The Patient Had a Previous Six Minute Walk Test?: No     Previous 6MWT Results  Has The Patient Had a Previous Six Minute Walk Test?: No        Interpretation:  Total distance walked in six minutes is mildly reduced indicating a reduction in overall  functional capacity. There was no exercise induced hypoxemia with significant oxygen desaturation.  Oxygen desaturation did not meet criteria for supplemental oxygen prescription.     Clinical correlation suggested.     Sha Mcguire MD     Mild exercise-induced hypoxemia described as an arterial oxygen saturation of 93-95% (or 3-4% less than at rest), moderate exercise-induced hypoxemia as 89-93%, and severe exercise induced hypoxemia as < 89% O2 saturation.  Medicare Criteria Comments:   When arterial oxygen saturation is at or below 88% during  exercise (severe exercise induced hypoxemia) then the patient falls under Medicare Group 1 criteria for supplemental oxygen.  Details about Medicare Group Criteria coverage can be found at http://www.cms.hhs.gov/manuals/downloads/        CT Chest Lung Screening Low Dose  Narrative: EXAMINATION:  CT CHEST LUNG SCREENING LOW DOSE     CLINICAL HISTORY:  Lung cancer annual screening, asymptomatic, current smoker (min. 30 pack-yrs); Nicotine dependence, cigarettes, uncomplicated     TECHNIQUE:  CT of the thorax was performed with low dose, lung screening protocol.  No contrast was administered.  Sagittal and coronal reconstructions were obtained.     COMPARISON:  Chest x-ray from July 2021.  CT from June of last year.     FINDINGS:  Lungs: There are no abnormal opacities that require further evaluation.  Tiny calcified granuloma anterior right upper lobe.  Lungs show findings consistent with emphysema.     Pleura:   No effusion..  Very mild areas of pleural thickening/mild plaques are seen.  One of these demonstrates faint calcification on series 4, image 297 left lower lobe for example.  Findings can be seen with prior asbestos exposure.     Heart and pericardium: Normal size without effusion.     Aorta and vasculature: Atherosclerosis including coronary arteries.     Chest wall and skeletal structures: Unremarkable except age-appropriate degenerative changes.     Upper abdomen: Small scattered bilateral renal stones.  Impression: Lung-RADS Category:  1 - Negative - Continue annual screening with LDCT in 12 months.     Clinically or potentially clinically significant non lung cancer finding:  None.     Prior Lung Cancer Modifier:  No history of prior lung cancer.     Other findings as discussed above.     Electronically signed by:         Mauro Perez MD  Date:                                        06/01/2022  Time:                                       09:54    EXAMINATION:  XR CHEST AP PORTABLE     CLINICAL  HISTORY:  Hypotension, unspecified     COMPARISON:  10/12/2020     FINDINGS:  The size of the heart is normal. The lungs are clear. There is no pneumothorax.  The costophrenic angles are sharp.     Impression:     Normal study.        Electronically signed by: Adria Collado MD  Date:                                            07/25/2021  Time:                                           14:33    Assessment:     1. COPD, moderate    2. Nicotine dependence, cigarettes, uncomplicated    3. Centrilobular emphysema    4. PVD (peripheral vascular disease)      Orders Placed This Encounter   Procedures    Ambulatory referral/consult to Smoking Cessation Program     Standing Status:   Future     Standing Expiration Date:   7/20/2023     Referral Priority:   Routine     Referral Type:   Consultation     Referral Reason:   Specialty Services Required     Requested Specialty:   CTTS     Number of Visits Requested:   1    Complete PFT with bronchodilator     Standing Status:   Future     Standing Expiration Date:   6/20/2023     Order Specific Question:   Release to patient     Answer:   Immediate     Medication List with Changes/Refills   New Medications    ALBUTEROL (PROVENTIL/VENTOLIN HFA) 90 MCG/ACTUATION INHALER    Inhale 2 puffs into the lungs every 4 (four) hours as needed for Wheezing or Shortness of Breath. Rescue   Current Medications    ASPIRIN (ECOTRIN) 81 MG EC TABLET    Take 1 tablet (81 mg total) by mouth once daily.    ATORVASTATIN (LIPITOR) 80 MG TABLET    TAKE 1 TABLET(80 MG) BY MOUTH EVERY DAY    ISOSORBIDE MONONITRATE (IMDUR) 60 MG 24 HR TABLET    Take 1 tablet (60 mg total) by mouth once daily.    LEVOTHYROXINE (SYNTHROID) 75 MCG TABLET    Take 1 tablet (75 mcg total) by mouth before breakfast.    LOSARTAN (COZAAR) 25 MG TABLET    TAKE 1 TABLET(25 MG) BY MOUTH TWICE DAILY    METOPROLOL TARTRATE (LOPRESSOR) 25 MG TABLET    TAKE 1 TABLET(25 MG) BY MOUTH TWICE DAILY    PANTOPRAZOLE (PROTONIX) 40 MG TABLET     TAKE 1 TABLET(40 MG) BY MOUTH EVERY DAY    PRASUGREL (EFFIENT) 10 MG TAB    Take 1 tablet (10 mg total) by mouth once daily.    UMECLIDINIUM-VILANTEROL (ANORO ELLIPTA) 62.5-25 MCG/ACTUATION DSDV    Inhale 1 puff into the lungs once daily. Controller   Discontinued Medications    GABAPENTIN (NEURONTIN) 300 MG CAPSULE    TAKE 1 CAPSULE(300 MG) BY MOUTH EVERY EVENING    NICOTINE POLACRILEX 2 MG LOZG    Take 1 lozenge (2 mg total) by mouth as needed (Use in place of cigarette).     Requested Prescriptions     Signed Prescriptions Disp Refills    albuterol (PROVENTIL/VENTOLIN HFA) 90 mcg/actuation inhaler 18 g 11     Sig: Inhale 2 puffs into the lungs every 4 (four) hours as needed for Wheezing or Shortness of Breath. Rescue       Plan:   Discussed diagnosis, its evaluation, treatment and usual course. All questions answered.  Problem List Items Addressed This Visit     Nicotine dependence, cigarettes, uncomplicated (Chronic)    Overview     6/1/2022 LDCT benign, no lung cancer findings. Repeat LDCT 1 year, June 2023.            Current Assessment & Plan     6/1/2022 LDCT benign, no lung cancer findings. Repeat LDCT 1 year, June 2023.              Relevant Orders    Ambulatory referral/consult to Smoking Cessation Program    PVD (peripheral vascular disease)    Current Assessment & Plan     Managed by cardiology             COPD, moderate - Primary    Overview     Moderate COPD seen on 3/1/2021 CPFT. New treatment: controller ANORO and rescue Albuterol inhaler.   3/1/2021 cpft moderate obstructive airflow defect.   3/1/2021 normal ABG and 6mwd.   Complete smoking cessation strongly advised  1 - 1.5 packs day. Last quit attempt Nov 2020.               Current Assessment & Plan     Controlled on ANORO and rescue Albuterol inhaler.   Moderate COPD seen on 3/1/2021 CPFT.  3/1/2021 cpft moderate obstructive airflow defect.   3/1/2021 normal ABG and 6mwd.   Complete smoking cessation strongly advised               Relevant  Medications    albuterol (PROVENTIL/VENTOLIN HFA) 90 mcg/actuation inhaler    Other Relevant Orders    Complete PFT with bronchodilator    Centrilobular emphysema    Overview     6/1/2022 LDCT Lungs show findings consistent with emphysema.           Current Assessment & Plan     6/1/2022 LDCT Lungs show findings consistent with emphysema.             Relevant Orders    Complete PFT with bronchodilator        Patient declines proceeding with obstructive sleep apnea evaluation at this time.   COPD stable on current treatment Anoro.  Albuterol inhaler.  Continue current treatment  Patient continues to be everyday smoker she had weaned down from 1.5 packs daily.  Strong encouragement for complete sensation/risk of continued smoking.  Referral to smoking cessation program.   6/1/2022 LDCT benign, repeat June 2023.  Patient provided list of medication for her trip planned 7/31/2022 to [place] to visit friend x 2 weeks.     Follow up in about 1 year (around 6/20/2023) for COPD review cpft. smoker LDCT .

## 2022-06-20 NOTE — ASSESSMENT & PLAN NOTE
Controlled on ANORO and rescue Albuterol inhaler.   Moderate COPD seen on 3/1/2021 CPFT.  3/1/2021 cpft moderate obstructive airflow defect.   3/1/2021 normal ABG and 6mwd.   Complete smoking cessation strongly advised

## 2022-06-20 NOTE — ADDENDUM NOTE
Addended by: ROBSON AVALOS on: 6/20/2022 03:46 PM     Modules accepted: Orders     No acute issues.  Patient evidently on Plavix per the daughter for history of CAD

## 2022-07-26 ENCOUNTER — LAB VISIT (OUTPATIENT)
Dept: LAB | Facility: HOSPITAL | Age: 62
End: 2022-07-26
Attending: FAMILY MEDICINE
Payer: MEDICAID

## 2022-07-26 DIAGNOSIS — E03.9 HYPOTHYROIDISM, UNSPECIFIED TYPE: ICD-10-CM

## 2022-07-26 LAB — TSH SERPL DL<=0.005 MIU/L-ACNC: 1.97 UIU/ML (ref 0.4–4)

## 2022-07-26 PROCEDURE — 36415 COLL VENOUS BLD VENIPUNCTURE: CPT | Mod: PO | Performed by: PHYSICIAN ASSISTANT

## 2022-07-26 PROCEDURE — 84443 ASSAY THYROID STIM HORMONE: CPT | Performed by: PHYSICIAN ASSISTANT

## 2022-08-09 ENCOUNTER — PATIENT MESSAGE (OUTPATIENT)
Dept: ADMINISTRATIVE | Facility: HOSPITAL | Age: 62
End: 2022-08-09
Payer: MEDICAID

## 2022-08-18 ENCOUNTER — LAB VISIT (OUTPATIENT)
Dept: LAB | Facility: HOSPITAL | Age: 62
End: 2022-08-18
Attending: INTERNAL MEDICINE
Payer: MEDICAID

## 2022-08-18 DIAGNOSIS — I25.118 CORONARY ARTERY DISEASE OF NATIVE ARTERY OF NATIVE HEART WITH STABLE ANGINA PECTORIS: ICD-10-CM

## 2022-08-18 DIAGNOSIS — I70.219 ATHEROSCLEROTIC PVD WITH INTERMITTENT CLAUDICATION: ICD-10-CM

## 2022-08-18 DIAGNOSIS — E78.2 MIXED HYPERLIPIDEMIA: Chronic | ICD-10-CM

## 2022-08-18 LAB
ALBUMIN SERPL BCP-MCNC: 3.4 G/DL (ref 3.5–5.2)
ALP SERPL-CCNC: 123 U/L (ref 55–135)
ALT SERPL W/O P-5'-P-CCNC: 15 U/L (ref 10–44)
ANION GAP SERPL CALC-SCNC: 11 MMOL/L (ref 8–16)
AST SERPL-CCNC: 25 U/L (ref 10–40)
BILIRUB SERPL-MCNC: 0.3 MG/DL (ref 0.1–1)
BUN SERPL-MCNC: 12 MG/DL (ref 8–23)
CALCIUM SERPL-MCNC: 9.4 MG/DL (ref 8.7–10.5)
CHLORIDE SERPL-SCNC: 102 MMOL/L (ref 95–110)
CHOLEST SERPL-MCNC: 125 MG/DL (ref 120–199)
CHOLEST/HDLC SERPL: 3 {RATIO} (ref 2–5)
CO2 SERPL-SCNC: 25 MMOL/L (ref 23–29)
CREAT SERPL-MCNC: 1 MG/DL (ref 0.5–1.4)
EST. GFR  (NO RACE VARIABLE): >60 ML/MIN/1.73 M^2
GLUCOSE SERPL-MCNC: 79 MG/DL (ref 70–110)
HDLC SERPL-MCNC: 42 MG/DL (ref 40–75)
HDLC SERPL: 33.6 % (ref 20–50)
LDLC SERPL CALC-MCNC: 52.2 MG/DL (ref 63–159)
NONHDLC SERPL-MCNC: 83 MG/DL
POTASSIUM SERPL-SCNC: 4.3 MMOL/L (ref 3.5–5.1)
PROT SERPL-MCNC: 6.8 G/DL (ref 6–8.4)
SODIUM SERPL-SCNC: 138 MMOL/L (ref 136–145)
TRIGL SERPL-MCNC: 154 MG/DL (ref 30–150)

## 2022-08-18 PROCEDURE — 80053 COMPREHEN METABOLIC PANEL: CPT | Performed by: INTERNAL MEDICINE

## 2022-08-18 PROCEDURE — 80061 LIPID PANEL: CPT | Performed by: INTERNAL MEDICINE

## 2022-08-18 PROCEDURE — 36415 COLL VENOUS BLD VENIPUNCTURE: CPT | Mod: PO | Performed by: INTERNAL MEDICINE

## 2022-08-23 ENCOUNTER — OFFICE VISIT (OUTPATIENT)
Dept: ENDOCRINOLOGY | Facility: CLINIC | Age: 62
End: 2022-08-23
Payer: MEDICAID

## 2022-08-23 VITALS
SYSTOLIC BLOOD PRESSURE: 140 MMHG | BODY MASS INDEX: 28.69 KG/M2 | DIASTOLIC BLOOD PRESSURE: 82 MMHG | OXYGEN SATURATION: 96 % | HEART RATE: 66 BPM | HEIGHT: 65 IN | WEIGHT: 172.19 LBS | TEMPERATURE: 98 F

## 2022-08-23 DIAGNOSIS — E04.9 ENLARGED THYROID: ICD-10-CM

## 2022-08-23 DIAGNOSIS — E55.9 HYPOVITAMINOSIS D: ICD-10-CM

## 2022-08-23 DIAGNOSIS — E03.8 HYPOTHYROIDISM DUE TO HASHIMOTO'S THYROIDITIS: Primary | ICD-10-CM

## 2022-08-23 DIAGNOSIS — E06.3 HYPOTHYROIDISM DUE TO HASHIMOTO'S THYROIDITIS: Primary | ICD-10-CM

## 2022-08-23 DIAGNOSIS — Z78.0 POSTMENOPAUSAL: ICD-10-CM

## 2022-08-23 PROCEDURE — 1159F MED LIST DOCD IN RCRD: CPT | Mod: CPTII,,, | Performed by: PHYSICIAN ASSISTANT

## 2022-08-23 PROCEDURE — 3008F BODY MASS INDEX DOCD: CPT | Mod: CPTII,,, | Performed by: PHYSICIAN ASSISTANT

## 2022-08-23 PROCEDURE — 4010F ACE/ARB THERAPY RXD/TAKEN: CPT | Mod: CPTII,,, | Performed by: PHYSICIAN ASSISTANT

## 2022-08-23 PROCEDURE — 3077F SYST BP >= 140 MM HG: CPT | Mod: CPTII,,, | Performed by: PHYSICIAN ASSISTANT

## 2022-08-23 PROCEDURE — 99999 PR PBB SHADOW E&M-EST. PATIENT-LVL IV: CPT | Mod: PBBFAC,,, | Performed by: PHYSICIAN ASSISTANT

## 2022-08-23 PROCEDURE — 1160F RVW MEDS BY RX/DR IN RCRD: CPT | Mod: CPTII,,, | Performed by: PHYSICIAN ASSISTANT

## 2022-08-23 PROCEDURE — 99214 OFFICE O/P EST MOD 30 MIN: CPT | Mod: PBBFAC,PO | Performed by: PHYSICIAN ASSISTANT

## 2022-08-23 PROCEDURE — 3079F DIAST BP 80-89 MM HG: CPT | Mod: CPTII,,, | Performed by: PHYSICIAN ASSISTANT

## 2022-08-23 PROCEDURE — 99214 OFFICE O/P EST MOD 30 MIN: CPT | Mod: S$PBB,,, | Performed by: PHYSICIAN ASSISTANT

## 2022-08-23 PROCEDURE — 3079F PR MOST RECENT DIASTOLIC BLOOD PRESSURE 80-89 MM HG: ICD-10-PCS | Mod: CPTII,,, | Performed by: PHYSICIAN ASSISTANT

## 2022-08-23 PROCEDURE — 1160F PR REVIEW ALL MEDS BY PRESCRIBER/CLIN PHARMACIST DOCUMENTED: ICD-10-PCS | Mod: CPTII,,, | Performed by: PHYSICIAN ASSISTANT

## 2022-08-23 PROCEDURE — 99214 PR OFFICE/OUTPT VISIT, EST, LEVL IV, 30-39 MIN: ICD-10-PCS | Mod: S$PBB,,, | Performed by: PHYSICIAN ASSISTANT

## 2022-08-23 PROCEDURE — 3008F PR BODY MASS INDEX (BMI) DOCUMENTED: ICD-10-PCS | Mod: CPTII,,, | Performed by: PHYSICIAN ASSISTANT

## 2022-08-23 PROCEDURE — 1159F PR MEDICATION LIST DOCUMENTED IN MEDICAL RECORD: ICD-10-PCS | Mod: CPTII,,, | Performed by: PHYSICIAN ASSISTANT

## 2022-08-23 PROCEDURE — 3077F PR MOST RECENT SYSTOLIC BLOOD PRESSURE >= 140 MM HG: ICD-10-PCS | Mod: CPTII,,, | Performed by: PHYSICIAN ASSISTANT

## 2022-08-23 PROCEDURE — 99999 PR PBB SHADOW E&M-EST. PATIENT-LVL IV: ICD-10-PCS | Mod: PBBFAC,,, | Performed by: PHYSICIAN ASSISTANT

## 2022-08-23 PROCEDURE — 4010F PR ACE/ARB THEARPY RXD/TAKEN: ICD-10-PCS | Mod: CPTII,,, | Performed by: PHYSICIAN ASSISTANT

## 2022-08-23 NOTE — PATIENT INSTRUCTIONS
Start taking 2000 IU of vitamin D and 1500 mg of calcium. Also, participate in weight bearing and resistance exercises for 40 min 4x weekly to maintain your bone density.

## 2022-08-23 NOTE — PROGRESS NOTES
"CC: Thyromegaly    HPI: Iza Esquivel is a 62 y.o. female here for thyromegaly along with pending conditions listed in the Visit Diagnosis. Diagnosed by her PCP recently after an exam . +FHx of thyroid disease in her mother (possible cancer).  [place]. No hx of neck radiation. Her brother has pancreatic cancer.    Hypothyroidism  LT4 75 mcg daily w/ only water  + fatigue, constipation. No palpitations, tremors, hair loss, diarrhea.  Thyroid u/s 12/20 did not show any nodules but did show an enlarged thyroid.  + occ dysphagia. No sob or voice changes.    DEXA: 1/21 osteopenia in the left hip. One fall last week w/out injury.  No fractures or steriod injections. She walks but her legs start hurting and she has to stop. Not taking ca or vd.     PMHx, PSHx: reviewed in epic.    Social Hx: no ETOH/tobacco use. She is trying to quit smoking. Smokes 0.5 ppw.     Wt Readings from Last 6 Encounters:   08/23/22 78.1 kg (172 lb 2.9 oz)   06/20/22 79.7 kg (175 lb 11.3 oz)   02/02/22 79.8 kg (175 lb 13.1 oz)   01/24/22 79.5 kg (175 lb 4.3 oz)   12/09/21 78.8 kg (173 lb 11.6 oz)   09/17/21 78 kg (172 lb)      ROS:   Constitutional: No recent significant weight change  Eyes: No recent visual changes  Cardiovascular: Denies current anginal symptoms  Respiratory: Denies current respiratory difficulty  Gastrointestinal: Denies recent bowel disturbances  GenitoUrinary - No dysuria  Skin: No new skin rash  Neurologic: No focal neurologic complaints  Musculoskeletal: + knee pain  Endocrine: no polyphagia, polydipsia or polyuria  Remainder ROS negative     BP (!) 140/82 (BP Location: Left arm, Patient Position: Sitting, BP Method: Small (Manual))   Pulse 66   Temp 98.4 °F (36.9 °C) (Oral)   Ht 5' 5" (1.651 m)   Wt 78.1 kg (172 lb 2.9 oz)   LMP  (LMP Unknown)   SpO2 96%   BMI 28.65 kg/m²      Personally reviewed labs below:    Lab Results   Component Value Date    TSH 1.974 07/26/2022    FREET4 0.76 02/02/2022          " Chemistry        Component Value Date/Time     08/18/2022 0838    K 4.3 08/18/2022 0838     08/18/2022 0838    CO2 25 08/18/2022 0838    BUN 12 08/18/2022 0838    CREATININE 1.0 08/18/2022 0838    GLU 79 08/18/2022 0838        Component Value Date/Time    CALCIUM 9.4 08/18/2022 0838    ALKPHOS 123 08/18/2022 0838    AST 25 08/18/2022 0838    ALT 15 08/18/2022 0838    BILITOT 0.3 08/18/2022 0838    ESTGFRAFRICA >60.0 12/02/2021 0758    EGFRNONAA >60.0 12/02/2021 0758         Lab Results   Component Value Date    HGBA1C 5.7 (H) 11/23/2020    HGBA1C 5.3 08/20/2018        PE:  GENERAL: elderly female, well developed, well nourished  NECK: Supple neck, normal thyroid. No bruit  LYMPHATIC: No cervical or supraclavicular lymphadenopathy  CARDIOVASCULAR: Normal heart sounds, no pedal edema  RESPIRATORY: Normal effort, clear to auscultation bl  MUSC: 2+ DTR UE/LE  NEURO: steady gait, CN ll-Xll grossly intact  PSYCH: normal mood and affect    Assessment/Plan:   1. Hypothyroidism due to Hashimoto's thyroiditis     2. Enlarged thyroid     3. Postmenopausal     4. Hypovitaminosis D        Hypothyroidism due to Hashimoto's Disease-TSH today. Repeating TPO because it was very high.   Enlarged thyroid-pt is not symptomatic. No nodules seen. Will not need to repeat unless she has dysphagia, sob or voice changes.   Postmenopausal/Osteopenia-DEXA scan 1/23. Start taking 1500 mg of ca daily. Also, participate in weight bearing and resistance exercises for 40 min 4x weekly.   Hypovitaminosis d-check-start taking 2000 IU of vd daily.    Additional labs approved by .     F/u in 6 mths labs and dexa

## 2022-08-25 ENCOUNTER — OFFICE VISIT (OUTPATIENT)
Dept: CARDIOLOGY | Facility: CLINIC | Age: 62
End: 2022-08-25
Payer: MEDICAID

## 2022-08-25 VITALS
SYSTOLIC BLOOD PRESSURE: 126 MMHG | HEIGHT: 65 IN | OXYGEN SATURATION: 96 % | HEART RATE: 66 BPM | DIASTOLIC BLOOD PRESSURE: 72 MMHG | BODY MASS INDEX: 28.87 KG/M2 | WEIGHT: 173.31 LBS

## 2022-08-25 DIAGNOSIS — R07.9 CHEST PAIN, UNSPECIFIED TYPE: ICD-10-CM

## 2022-08-25 DIAGNOSIS — I25.118 CORONARY ARTERY DISEASE OF NATIVE ARTERY OF NATIVE HEART WITH STABLE ANGINA PECTORIS: Primary | ICD-10-CM

## 2022-08-25 DIAGNOSIS — I25.2 HISTORY OF ST ELEVATION MYOCARDIAL INFARCTION (STEMI): ICD-10-CM

## 2022-08-25 DIAGNOSIS — F17.210 NICOTINE DEPENDENCE, CIGARETTES, UNCOMPLICATED: Chronic | ICD-10-CM

## 2022-08-25 DIAGNOSIS — J44.9 COPD, MODERATE: ICD-10-CM

## 2022-08-25 DIAGNOSIS — R09.89 BRUIT OF RIGHT CAROTID ARTERY: ICD-10-CM

## 2022-08-25 DIAGNOSIS — Z85.41 HX OF CERVICAL CANCER: ICD-10-CM

## 2022-08-25 DIAGNOSIS — I70.219 ATHEROSCLEROTIC PVD WITH INTERMITTENT CLAUDICATION: ICD-10-CM

## 2022-08-25 DIAGNOSIS — E78.2 MIXED HYPERLIPIDEMIA: Chronic | ICD-10-CM

## 2022-08-25 DIAGNOSIS — I10 ESSENTIAL HYPERTENSION: ICD-10-CM

## 2022-08-25 PROCEDURE — 3008F PR BODY MASS INDEX (BMI) DOCUMENTED: ICD-10-PCS | Mod: CPTII,,, | Performed by: INTERNAL MEDICINE

## 2022-08-25 PROCEDURE — 3078F PR MOST RECENT DIASTOLIC BLOOD PRESSURE < 80 MM HG: ICD-10-PCS | Mod: CPTII,,, | Performed by: INTERNAL MEDICINE

## 2022-08-25 PROCEDURE — 4010F ACE/ARB THERAPY RXD/TAKEN: CPT | Mod: CPTII,,, | Performed by: INTERNAL MEDICINE

## 2022-08-25 PROCEDURE — 1160F RVW MEDS BY RX/DR IN RCRD: CPT | Mod: CPTII,,, | Performed by: INTERNAL MEDICINE

## 2022-08-25 PROCEDURE — 99999 PR PBB SHADOW E&M-EST. PATIENT-LVL IV: CPT | Mod: PBBFAC,,, | Performed by: INTERNAL MEDICINE

## 2022-08-25 PROCEDURE — 99999 PR PBB SHADOW E&M-EST. PATIENT-LVL IV: ICD-10-PCS | Mod: PBBFAC,,, | Performed by: INTERNAL MEDICINE

## 2022-08-25 PROCEDURE — 99214 OFFICE O/P EST MOD 30 MIN: CPT | Mod: PBBFAC | Performed by: INTERNAL MEDICINE

## 2022-08-25 PROCEDURE — 1159F MED LIST DOCD IN RCRD: CPT | Mod: CPTII,,, | Performed by: INTERNAL MEDICINE

## 2022-08-25 PROCEDURE — 3078F DIAST BP <80 MM HG: CPT | Mod: CPTII,,, | Performed by: INTERNAL MEDICINE

## 2022-08-25 PROCEDURE — 3074F PR MOST RECENT SYSTOLIC BLOOD PRESSURE < 130 MM HG: ICD-10-PCS | Mod: CPTII,,, | Performed by: INTERNAL MEDICINE

## 2022-08-25 PROCEDURE — 4010F PR ACE/ARB THEARPY RXD/TAKEN: ICD-10-PCS | Mod: CPTII,,, | Performed by: INTERNAL MEDICINE

## 2022-08-25 PROCEDURE — 99214 PR OFFICE/OUTPT VISIT, EST, LEVL IV, 30-39 MIN: ICD-10-PCS | Mod: S$PBB,,, | Performed by: INTERNAL MEDICINE

## 2022-08-25 PROCEDURE — 1160F PR REVIEW ALL MEDS BY PRESCRIBER/CLIN PHARMACIST DOCUMENTED: ICD-10-PCS | Mod: CPTII,,, | Performed by: INTERNAL MEDICINE

## 2022-08-25 PROCEDURE — 99214 OFFICE O/P EST MOD 30 MIN: CPT | Mod: S$PBB,,, | Performed by: INTERNAL MEDICINE

## 2022-08-25 PROCEDURE — 3074F SYST BP LT 130 MM HG: CPT | Mod: CPTII,,, | Performed by: INTERNAL MEDICINE

## 2022-08-25 PROCEDURE — 3008F BODY MASS INDEX DOCD: CPT | Mod: CPTII,,, | Performed by: INTERNAL MEDICINE

## 2022-08-25 PROCEDURE — 1159F PR MEDICATION LIST DOCUMENTED IN MEDICAL RECORD: ICD-10-PCS | Mod: CPTII,,, | Performed by: INTERNAL MEDICINE

## 2022-08-25 NOTE — PROGRESS NOTES
Subjective:   Patient ID:  Iza Esquivel is a 62 y.o. female who presents for follow up of No chief complaint on file.      HPI  8/25/2020  A 59 yo female with old mi nstemi tobacco use has stopped smoking in July had lcx stent she also had severe limiting claudication with multilevel disease limiting her rehabilitation from her mi. She has iliac stenting performed subsequently she still has residual claudication on the rt calfb she can  Walk 1 mile w/o stopping has gained weight still not compliant with diet . Has been taking meds regularily. seh wants to go back to work no angina has some residual shortness of breath.      12/3/2020  Still not smoking can walk still has some residual calf claudication that is non limiting. Has bilatearl sfa disease s/p bilateral iliac stenting. Has noc hest pain or shortness of breath has swollen rt knee. Has been  compliant with meds and diet.   Her bp is controlled at home. she gained weight.has been snacking a lot walks 4 times weekly. Lipids on target     6/3/2021  Here for f/u cad no angina getting over shingles . Has been able top cut grass for 15 minutes before her legs hurt. No discoloration she has been compliant with meds still smoking now going through cessation program.       8/17/2021  Had hypotension symptomatic then rebound her medical therapy is appropriate no further dizziness of lightheadedness or low blood pressure she is compliant.has noticed her leg symptoms worse she is not able to walk as much as she can she was very limited in her trip      12/9/2021   STILL SMOKING TRIES TO BE COMPLAINT WITH DIET NOT WALKING FOR EXERCISE KIND OF ON AND OFF. NO DISCOLORATION IN FEET HAS GAINED WEIGHT NO ANGINA. NO TIA PALPITATION CHF SYNCOPE. HAS CHEST PAIN WHEN SHE COUGHS OR MOVES   HAS NUMBNESS IN RT BIG TOE NO WEAKNESS.    8/25/2022   here fro f/u  had multiple death in family  Had a fall on her face . She tripped. She still has claudication at half mile sometimes can  "walk more. She is smoking. Has chest pain  That is mostly at rest occurs at times with exercise.no discoloration in feet. No ulceration.  Her bp controlled tries to be compliant with diet   Past Medical History:   Diagnosis Date    Atherosclerotic PVD with intermittent claudication 2020    Cervical cancer     Hyperlipidemia     Hypertension     Right carotid bruit     S/P PTCA (percutaneous transluminal coronary angioplasty) 2020    STEMI: stenting of LCx       Past Surgical History:   Procedure Laterality Date    ABDOMINAL AORTOGRAPHY N/A 2020    Procedure: Aortogram, Abdominal;  Surgeon: Shelly Jarvis MD;  Location: [facility] CATH LAB;  Service: Cardiology;  Laterality: N/A;    AORTOGRAPHY WITH SERIALOGRAPHY N/A 2020    Procedure: AORTOGRAM, WITH RUNOFF;  Surgeon: Shelly Jarvis MD;  Location: [facility] CATH LAB;  Service: Cardiology;  Laterality: N/A;    LEFT HEART CATHETERIZATION Left 2020    Procedure: CATHETERIZATION, HEART, LEFT;  Surgeon: Shelly Jarvis MD;  Location: [facility] CATH LAB;  Service: Cardiology;  Laterality: Left;    LEFT HEART CATHETERIZATION Left 2020    Procedure: CATHETERIZATION, HEART, LEFT;  Surgeon: Shelly Jarvis MD;  Location: [facility] CATH LAB;  Service: Cardiology;  Laterality: Left;    LYMPH NODE BIOPSY      PERCUTANEOUS TRANSLUMINAL BALLOON ANGIOPLASTY OF CORONARY ARTERY  2020    Procedure: Angioplasty-coronary;  Surgeon: Shelly Jarvis MD;  Location: [facility] CATH LAB;  Service: Cardiology;;       Social History     Tobacco Use    Smoking status: Current Every Day Smoker     Packs/day: 1.50     Years: 51.00     Pack years: 76.50     Types: Cigarettes     Start date:      Last attempt to quit: 2022     Years since quittin.5    Smokeless tobacco: Never Used    Tobacco comment: On 3/21/22 patient stated she quit "about a month ago"   Substance Use Topics    Alcohol use: No    Drug use: Never       Family History   Problem Relation Age " of Onset    Cancer Mother         Thyroid    Thyroid cancer Mother     Cancer Father         ?    Transient ischemic attack Father         Carotid artery stenosis, CEA    Diabetes Brother     Hypertension Brother     Cancer Paternal Aunt         Breast ca    Breast cancer Paternal Aunt        Current Outpatient Medications   Medication Sig    albuterol (PROVENTIL/VENTOLIN HFA) 90 mcg/actuation inhaler Inhale 2 puffs into the lungs every 4 (four) hours as needed for Wheezing or Shortness of Breath. Rescue    aspirin (ECOTRIN) 81 MG EC tablet Take 1 tablet (81 mg total) by mouth once daily.    atorvastatin (LIPITOR) 80 MG tablet TAKE 1 TABLET(80 MG) BY MOUTH EVERY DAY    isosorbide mononitrate (IMDUR) 60 MG 24 hr tablet Take 1 tablet (60 mg total) by mouth once daily.    levothyroxine (SYNTHROID) 75 MCG tablet Take 1 tablet (75 mcg total) by mouth before breakfast.    losartan (COZAAR) 25 MG tablet TAKE 1 TABLET(25 MG) BY MOUTH TWICE DAILY    metoprolol tartrate (LOPRESSOR) 25 MG tablet TAKE 1 TABLET(25 MG) BY MOUTH TWICE DAILY    pantoprazole (PROTONIX) 40 MG tablet TAKE 1 TABLET(40 MG) BY MOUTH EVERY DAY    prasugreL (EFFIENT) 10 mg Tab Take 1 tablet (10 mg total) by mouth once daily.    umeclidinium-vilanteroL (ANORO ELLIPTA) 62.5-25 mcg/actuation DsDv Inhale 1 puff into the lungs once daily. Controller     No current facility-administered medications for this visit.     Current Outpatient Medications on File Prior to Visit   Medication Sig    albuterol (PROVENTIL/VENTOLIN HFA) 90 mcg/actuation inhaler Inhale 2 puffs into the lungs every 4 (four) hours as needed for Wheezing or Shortness of Breath. Rescue    aspirin (ECOTRIN) 81 MG EC tablet Take 1 tablet (81 mg total) by mouth once daily.    atorvastatin (LIPITOR) 80 MG tablet TAKE 1 TABLET(80 MG) BY MOUTH EVERY DAY    isosorbide mononitrate (IMDUR) 60 MG 24 hr tablet Take 1 tablet (60 mg total) by mouth once daily.    levothyroxine  (SYNTHROID) 75 MCG tablet Take 1 tablet (75 mcg total) by mouth before breakfast.    losartan (COZAAR) 25 MG tablet TAKE 1 TABLET(25 MG) BY MOUTH TWICE DAILY    metoprolol tartrate (LOPRESSOR) 25 MG tablet TAKE 1 TABLET(25 MG) BY MOUTH TWICE DAILY    pantoprazole (PROTONIX) 40 MG tablet TAKE 1 TABLET(40 MG) BY MOUTH EVERY DAY    prasugreL (EFFIENT) 10 mg Tab Take 1 tablet (10 mg total) by mouth once daily.    umeclidinium-vilanteroL (ANORO ELLIPTA) 62.5-25 mcg/actuation DsDv Inhale 1 puff into the lungs once daily. Controller     No current facility-administered medications on file prior to visit.     Review of patient's allergies indicates:  No Known Allergies  Review of Systems   Constitutional: Negative for diaphoresis, malaise/fatigue and weight gain.   HENT: Negative for hoarse voice.    Eyes: Negative for double vision and visual disturbance.   Cardiovascular: Negative for chest pain, claudication, cyanosis, dyspnea on exertion, irregular heartbeat, leg swelling, near-syncope, orthopnea, palpitations, paroxysmal nocturnal dyspnea and syncope.   Respiratory: Negative for cough, hemoptysis, shortness of breath and snoring.    Hematologic/Lymphatic: Negative for bleeding problem. Does not bruise/bleed easily.   Skin: Negative for color change and poor wound healing.   Musculoskeletal: Negative for muscle cramps, muscle weakness and myalgias.   Gastrointestinal: Negative for bloating, abdominal pain, change in bowel habit, diarrhea, heartburn, hematemesis, hematochezia, melena and nausea.   Neurological: Negative for excessive daytime sleepiness, dizziness, headaches, light-headedness, loss of balance, numbness and weakness.   Psychiatric/Behavioral: Negative for memory loss. The patient does not have insomnia.    Allergic/Immunologic: Negative for hives.       Objective:   Physical Exam  Nursing note reviewed.   Constitutional:       General: She is not in acute distress.     Appearance: Normal appearance.  She is well-developed. She is not ill-appearing.   HENT:      Head: Normocephalic and atraumatic.   Eyes:      General: No scleral icterus.     Pupils: Pupils are equal, round, and reactive to light.   Neck:      Thyroid: No thyromegaly.      Vascular: Normal carotid pulses. No carotid bruit, hepatojugular reflux or JVD.      Trachea: No tracheal deviation.   Cardiovascular:      Rate and Rhythm: Normal rate and regular rhythm.      Pulses:           Carotid pulses are 2+ on the right side and 2+ on the left side.       Radial pulses are 2+ on the right side and 2+ on the left side.        Femoral pulses are 2+ on the right side with bruit and 2+ on the left side with bruit.       Popliteal pulses are 1+ on the right side and 1+ on the left side.        Dorsalis pedis pulses are 0 on the right side and 0 on the left side.        Posterior tibial pulses are 1+ on the right side and 1+ on the left side.      Heart sounds: Normal heart sounds. No murmur heard.    No friction rub. No gallop.   Pulmonary:      Effort: Pulmonary effort is normal. No respiratory distress.      Breath sounds: Normal breath sounds. No wheezing, rhonchi or rales.   Chest:      Chest wall: No tenderness.   Abdominal:      General: Bowel sounds are normal. There is no abdominal bruit.      Palpations: Abdomen is soft. There is no hepatomegaly or pulsatile mass.      Tenderness: There is no abdominal tenderness.   Musculoskeletal:      Right shoulder: No deformity.      Cervical back: Normal range of motion and neck supple.   Skin:     General: Skin is warm and dry.      Findings: No erythema or rash.      Nails: There is no clubbing.   Neurological:      Mental Status: She is alert and oriented to person, place, and time.      Cranial Nerves: No cranial nerve deficit.      Coordination: Coordination normal.   Psychiatric:         Speech: Speech normal.         Behavior: Behavior normal.       Vitals:    08/25/22 1445 08/25/22 1450   BP: 126/70  "126/72   BP Location: Left arm    Patient Position: Sitting    Pulse: 66    SpO2: 96%    Weight: 78.6 kg (173 lb 4.5 oz)    Height: 5' 5" (1.651 m)      Lab Results   Component Value Date    CHOL 125 08/18/2022    CHOL 131 12/02/2021    CHOL 143 05/27/2021     Lab Results   Component Value Date    HDL 42 08/18/2022    HDL 50 12/02/2021    HDL 55 05/27/2021     Lab Results   Component Value Date    LDLCALC 52.2 (L) 08/18/2022    LDLCALC 57.4 (L) 12/02/2021    LDLCALC 61.4 (L) 05/27/2021     Lab Results   Component Value Date    TRIG 154 (H) 08/18/2022    TRIG 118 12/02/2021    TRIG 133 05/27/2021     Lab Results   Component Value Date    CHOLHDL 33.6 08/18/2022    CHOLHDL 38.2 12/02/2021    CHOLHDL 38.5 05/27/2021       Chemistry        Component Value Date/Time     08/18/2022 0838    K 4.3 08/18/2022 0838     08/18/2022 0838    CO2 25 08/18/2022 0838    BUN 12 08/18/2022 0838    CREATININE 1.0 08/18/2022 0838    GLU 79 08/18/2022 0838        Component Value Date/Time    CALCIUM 9.4 08/18/2022 0838    ALKPHOS 123 08/18/2022 0838    AST 25 08/18/2022 0838    ALT 15 08/18/2022 0838    BILITOT 0.3 08/18/2022 0838    ESTGFRAFRICA >60.0 12/02/2021 0758    EGFRNONAA >60.0 12/02/2021 0758          Lab Results   Component Value Date    TSH 1.974 07/26/2022     Lab Results   Component Value Date    INR 1.0 09/12/2020    INR 1.3 (H) 09/12/2020    INR 1.0 07/20/2020     Lab Results   Component Value Date    WBC 10.10 07/25/2021    HGB 10.5 (L) 07/25/2021    HCT 34.8 (L) 07/25/2021    MCV 92 07/25/2021     07/25/2021     BMP  Sodium   Date Value Ref Range Status   08/18/2022 138 136 - 145 mmol/L Final     Potassium   Date Value Ref Range Status   08/18/2022 4.3 3.5 - 5.1 mmol/L Final     Chloride   Date Value Ref Range Status   08/18/2022 102 95 - 110 mmol/L Final     CO2   Date Value Ref Range Status   08/18/2022 25 23 - 29 mmol/L Final     BUN   Date Value Ref Range Status   08/18/2022 12 8 - 23 mg/dL Final "     Creatinine   Date Value Ref Range Status   08/18/2022 1.0 0.5 - 1.4 mg/dL Final     Calcium   Date Value Ref Range Status   08/18/2022 9.4 8.7 - 10.5 mg/dL Final     Anion Gap   Date Value Ref Range Status   08/18/2022 11 8 - 16 mmol/L Final     eGFR if    Date Value Ref Range Status   12/02/2021 >60.0 >60 mL/min/1.73 m^2 Final     eGFR if non    Date Value Ref Range Status   12/02/2021 >60.0 >60 mL/min/1.73 m^2 Final     Comment:     Calculation used to obtain the estimated glomerular filtration  rate (eGFR) is the CKD-EPI equation.        CrCl cannot be calculated (Patient's most recent lab result is older than the maximum 7 days allowed.).    Assessment:     1. Coronary artery disease of native artery of native heart with stable angina pectoris    2. COPD, moderate    3. Atherosclerotic PVD with intermittent claudication    4. Bruit of right carotid artery    5. History of ST elevation myocardial infarction (STEMI)    6. Essential hypertension    7. Mixed hyperlipidemia    8. Hx of cervical cancer    9. Nicotine dependence, cigarettes, uncomplicated      pvd with bilateral sfa occlusion stable claudication non limiting counseled about diet compliance smoking cessation exercise walking program no limb loss risk  htn controlled low salt diet emphasized.   chest pain atypical s/p lcx stent will reassess with cardiolite   hlp on target counseled about starches calorie count to address elevated triglycerides. ldl on target  Tobacco use cessation counseling performed.   Carotid stenosis asymptomatic continue current therapy. Warnings for tia given  Plan:   cardiolite   Continue current therapy  Cardiac low salt diet.  Risk factor modification and excercise program.  Smoking cessation counseling  F/u in 6 months with lipid cmp .

## 2022-09-23 ENCOUNTER — HOSPITAL ENCOUNTER (OUTPATIENT)
Dept: CARDIOLOGY | Facility: HOSPITAL | Age: 62
Discharge: HOME | End: 2022-09-23
Attending: INTERNAL MEDICINE
Payer: MEDICARE

## 2022-09-23 ENCOUNTER — HOSPITAL ENCOUNTER (OUTPATIENT)
Dept: RADIOLOGY | Facility: HOSPITAL | Age: 62
Discharge: HOME | End: 2022-09-23
Attending: INTERNAL MEDICINE
Payer: MEDICAID

## 2022-09-23 DIAGNOSIS — I25.118 CORONARY ARTERY DISEASE OF NATIVE ARTERY OF NATIVE HEART WITH STABLE ANGINA PECTORIS: ICD-10-CM

## 2022-09-23 DIAGNOSIS — R07.9 CHEST PAIN, UNSPECIFIED TYPE: ICD-10-CM

## 2022-09-23 LAB
CV STRESS BASE HR: 62 BPM
DIASTOLIC BLOOD PRESSURE: 74 MMHG
NUC REST EJECTION FRACTION: 76
NUC STRESS EJECTION FRACTION: 83 %
OHS CV CPX 85 PERCENT MAX PREDICTED HEART RATE MALE: 127
OHS CV CPX ESTIMATED METS: 1
OHS CV CPX MAX PREDICTED HEART RATE: 150
OHS CV CPX PATIENT IS FEMALE: 1
OHS CV CPX PATIENT IS MALE: 0
OHS CV CPX PEAK DIASTOLIC BLOOD PRESSURE: 66 MMHG
OHS CV CPX PEAK HEAR RATE: 92 BPM
OHS CV CPX PEAK RATE PRESSURE PRODUCT: NORMAL
OHS CV CPX PEAK SYSTOLIC BLOOD PRESSURE: 133 MMHG
OHS CV CPX PERCENT MAX PREDICTED HEART RATE ACHIEVED: 61
OHS CV CPX RATE PRESSURE PRODUCT PRESENTING: 8432
STRESS ECHO POST EXERCISE DUR MIN: 1 MINUTES
STRESS ECHO POST EXERCISE DUR SEC: 7 SECONDS
SYSTOLIC BLOOD PRESSURE: 136 MMHG

## 2022-09-23 PROCEDURE — 93018 STRESS TEST WITH MYOCARDIAL PERFUSION (CUPID ONLY): ICD-10-PCS | Mod: ,,, | Performed by: INTERNAL MEDICINE

## 2022-09-23 PROCEDURE — 93017 CV STRESS TEST TRACING ONLY: CPT

## 2022-09-23 PROCEDURE — 63600175 PHARM REV CODE 636 W HCPCS: Performed by: INTERNAL MEDICINE

## 2022-09-23 PROCEDURE — 93018 CV STRESS TEST I&R ONLY: CPT | Mod: ,,, | Performed by: INTERNAL MEDICINE

## 2022-09-23 PROCEDURE — 78452 HT MUSCLE IMAGE SPECT MULT: CPT | Mod: 26,,, | Performed by: INTERNAL MEDICINE

## 2022-09-23 PROCEDURE — 78452 STRESS TEST WITH MYOCARDIAL PERFUSION (CUPID ONLY): ICD-10-PCS | Mod: 26,,, | Performed by: INTERNAL MEDICINE

## 2022-09-23 PROCEDURE — 93016 STRESS TEST WITH MYOCARDIAL PERFUSION (CUPID ONLY): ICD-10-PCS | Mod: ,,, | Performed by: INTERNAL MEDICINE

## 2022-09-23 PROCEDURE — A9502 TC99M TETROFOSMIN: HCPCS

## 2022-09-23 PROCEDURE — 93016 CV STRESS TEST SUPVJ ONLY: CPT | Mod: ,,, | Performed by: INTERNAL MEDICINE

## 2022-09-23 RX ORDER — REGADENOSON 0.08 MG/ML
0.4 INJECTION, SOLUTION INTRAVENOUS ONCE
Status: COMPLETED | OUTPATIENT
Start: 2022-09-23 | End: 2022-09-23

## 2022-09-23 RX ADMIN — REGADENOSON 0.4 MG: 0.08 INJECTION, SOLUTION INTRAVENOUS at 10:09

## 2022-09-26 ENCOUNTER — PATIENT MESSAGE (OUTPATIENT)
Dept: CARDIOLOGY | Facility: CLINIC | Age: 62
End: 2022-09-26
Payer: MEDICAID

## 2022-09-26 ENCOUNTER — TELEPHONE (OUTPATIENT)
Dept: CARDIOLOGY | Facility: CLINIC | Age: 62
End: 2022-09-26
Payer: MEDICAID

## 2022-09-26 NOTE — TELEPHONE ENCOUNTER
LM for pt to call us back for results and sent via [employer]       ----- Message from Shelly Jarvis MD sent at 9/25/2022 10:05 AM CDT -----  Stress test normal

## 2022-10-04 ENCOUNTER — PATIENT MESSAGE (OUTPATIENT)
Dept: ADMINISTRATIVE | Facility: HOSPITAL | Age: 62
End: 2022-10-04
Payer: MEDICAID

## 2022-12-01 ENCOUNTER — PATIENT MESSAGE (OUTPATIENT)
Dept: CARDIOLOGY | Facility: CLINIC | Age: 62
End: 2022-12-01
Payer: MEDICARE

## 2022-12-05 RX ORDER — CLOPIDOGREL BISULFATE 75 MG/1
75 TABLET ORAL DAILY
COMMUNITY
Start: 2022-12-05 | End: 2022-12-05 | Stop reason: SDUPTHER

## 2022-12-05 RX ORDER — CLOPIDOGREL BISULFATE 75 MG/1
75 TABLET ORAL DAILY
Qty: 30 TABLET | Refills: 6 | Status: SHIPPED | OUTPATIENT
Start: 2022-12-05 | End: 2023-03-24 | Stop reason: SDUPTHER

## 2023-01-18 ENCOUNTER — OFFICE VISIT (OUTPATIENT)
Dept: FAMILY MEDICINE | Facility: CLINIC | Age: 63
End: 2023-01-18
Attending: FAMILY MEDICINE
Payer: MEDICARE

## 2023-01-18 VITALS
HEART RATE: 67 BPM | TEMPERATURE: 98 F | OXYGEN SATURATION: 97 % | BODY MASS INDEX: 29.48 KG/M2 | WEIGHT: 176.94 LBS | DIASTOLIC BLOOD PRESSURE: 80 MMHG | HEIGHT: 65 IN | SYSTOLIC BLOOD PRESSURE: 136 MMHG

## 2023-01-18 DIAGNOSIS — G44.009 CLUSTER HEADACHE, NOT INTRACTABLE, UNSPECIFIED CHRONICITY PATTERN: Primary | ICD-10-CM

## 2023-01-18 DIAGNOSIS — I25.118 CORONARY ARTERY DISEASE OF NATIVE ARTERY OF NATIVE HEART WITH STABLE ANGINA PECTORIS: ICD-10-CM

## 2023-01-18 DIAGNOSIS — H53.8 BLURRY VISION: ICD-10-CM

## 2023-01-18 DIAGNOSIS — J44.9 COPD, MODERATE: ICD-10-CM

## 2023-01-18 DIAGNOSIS — I70.219 ATHEROSCLEROTIC PVD WITH INTERMITTENT CLAUDICATION: ICD-10-CM

## 2023-01-18 DIAGNOSIS — M79.601 PAIN OF RIGHT UPPER EXTREMITY: ICD-10-CM

## 2023-01-18 PROCEDURE — 3075F SYST BP GE 130 - 139MM HG: CPT | Mod: HCNC,CPTII,S$GLB, | Performed by: FAMILY MEDICINE

## 2023-01-18 PROCEDURE — 3079F PR MOST RECENT DIASTOLIC BLOOD PRESSURE 80-89 MM HG: ICD-10-PCS | Mod: HCNC,CPTII,S$GLB, | Performed by: FAMILY MEDICINE

## 2023-01-18 PROCEDURE — 99999 PR PBB SHADOW E&M-EST. PATIENT-LVL V: ICD-10-PCS | Mod: PBBFAC,HCNC,, | Performed by: FAMILY MEDICINE

## 2023-01-18 PROCEDURE — 1160F PR REVIEW ALL MEDS BY PRESCRIBER/CLIN PHARMACIST DOCUMENTED: ICD-10-PCS | Mod: HCNC,CPTII,S$GLB, | Performed by: FAMILY MEDICINE

## 2023-01-18 PROCEDURE — 3008F PR BODY MASS INDEX (BMI) DOCUMENTED: ICD-10-PCS | Mod: HCNC,CPTII,S$GLB, | Performed by: FAMILY MEDICINE

## 2023-01-18 PROCEDURE — 99999 PR PBB SHADOW E&M-EST. PATIENT-LVL V: CPT | Mod: PBBFAC,HCNC,, | Performed by: FAMILY MEDICINE

## 2023-01-18 PROCEDURE — 3008F BODY MASS INDEX DOCD: CPT | Mod: HCNC,CPTII,S$GLB, | Performed by: FAMILY MEDICINE

## 2023-01-18 PROCEDURE — 99214 OFFICE O/P EST MOD 30 MIN: CPT | Mod: HCNC,S$GLB,, | Performed by: FAMILY MEDICINE

## 2023-01-18 PROCEDURE — 1159F MED LIST DOCD IN RCRD: CPT | Mod: HCNC,CPTII,S$GLB, | Performed by: FAMILY MEDICINE

## 2023-01-18 PROCEDURE — 1160F RVW MEDS BY RX/DR IN RCRD: CPT | Mod: HCNC,CPTII,S$GLB, | Performed by: FAMILY MEDICINE

## 2023-01-18 PROCEDURE — 3075F PR MOST RECENT SYSTOLIC BLOOD PRESS GE 130-139MM HG: ICD-10-PCS | Mod: HCNC,CPTII,S$GLB, | Performed by: FAMILY MEDICINE

## 2023-01-18 PROCEDURE — 1159F PR MEDICATION LIST DOCUMENTED IN MEDICAL RECORD: ICD-10-PCS | Mod: HCNC,CPTII,S$GLB, | Performed by: FAMILY MEDICINE

## 2023-01-18 PROCEDURE — 3079F DIAST BP 80-89 MM HG: CPT | Mod: HCNC,CPTII,S$GLB, | Performed by: FAMILY MEDICINE

## 2023-01-18 PROCEDURE — 99214 PR OFFICE/OUTPT VISIT, EST, LEVL IV, 30-39 MIN: ICD-10-PCS | Mod: HCNC,S$GLB,, | Performed by: FAMILY MEDICINE

## 2023-01-18 NOTE — PROGRESS NOTES
Subjective:       Patient ID: Iza Esquivel is a 62 y.o. female.    Chief Complaint: Fall (X 2 days ago - pain in back )    62 y old female who has sustained 2 falls over a span of 6 m here for f.u . The 1st one occur while she was in [place] . She stood up and knee might have buckled . She  landed on her face. She did not have LOC but started sweating profusely after fall. She did not seek med att then.  Since having frontal h/a and  excessive r eye tearing. The second fall was 3  d  ago . She was making her bed and might have trip on computer cord . R forearm landed on Bed board . She has not had an eye exam . She had a recent normal [employer] cardiac stress test .     Review of Systems   Constitutional: Negative.  Negative for activity change and unexpected weight change.   HENT: Negative.  Negative for hearing loss, rhinorrhea and trouble swallowing.    Eyes:  Positive for discharge and visual disturbance.   Respiratory: Negative.  Negative for chest tightness and wheezing.    Cardiovascular: Negative.  Negative for chest pain and palpitations.   Gastrointestinal: Negative.  Negative for blood in stool, constipation, diarrhea and vomiting.   Endocrine: Negative for polydipsia and polyuria.   Genitourinary: Negative.  Negative for difficulty urinating, dysuria, hematuria and menstrual problem.   Musculoskeletal:  Positive for arthralgias. Negative for joint swelling and neck pain.   Skin: Negative.    Neurological:  Positive for headaches. Negative for weakness.   Hematological: Negative.    Psychiatric/Behavioral:  Negative for confusion and dysphoric mood.      Objective:      Physical Exam  Constitutional:       General: She is not in acute distress.     Appearance: She is well-developed. She is not diaphoretic.   HENT:      Head: Normocephalic and atraumatic.      Right Ear: External ear normal.      Left Ear: External ear normal.      Mouth/Throat:      Pharynx: No oropharyngeal exudate.   Eyes:      General:  No scleral icterus.        Right eye: No discharge.         Left eye: No discharge.      Conjunctiva/sclera: Conjunctivae normal.      Pupils: Pupils are equal, round, and reactive to light.   Neck:      Thyroid: No thyromegaly.      Vascular: No JVD.      Trachea: No tracheal deviation.   Cardiovascular:      Rate and Rhythm: Normal rate and regular rhythm.      Heart sounds: Normal heart sounds. No murmur heard.    No friction rub. No gallop.   Pulmonary:      Effort: Pulmonary effort is normal. No respiratory distress.      Breath sounds: Normal breath sounds. No stridor. No wheezing or rales.   Chest:      Chest wall: No tenderness.   Abdominal:      General: Bowel sounds are normal. There is no distension.      Palpations: Abdomen is soft.      Tenderness: There is no abdominal tenderness. There is no guarding or rebound.   Musculoskeletal:         General: Normal range of motion.      Cervical back: Normal range of motion and neck supple.   Lymphadenopathy:      Cervical: No cervical adenopathy.   Skin:     General: Skin is warm and dry.   Neurological:      Mental Status: She is alert and oriented to person, place, and time.   Psychiatric:         Behavior: Behavior normal.         Thought Content: Thought content normal.         Judgment: Judgment normal.       Assessment:           Iza was seen today for fall.    Diagnoses and all orders for this visit:    Cluster headache, not intractable, unspecified chronicity pattern  -     CT Head Without Contrast; Future    Pain of right upper extremity  -     X-Ray Forearm Right; Future    Blurry vision  -     CT Maxillofacial Without Contrast; Future  -     Ambulatory referral/consult to Ophthalmology; Future    Coronary artery disease of native artery of native heart with stable angina pectoris    Atherosclerotic PVD with intermittent claudication    COPD, moderate       Plan:     Iza was seen today for fall.    Diagnoses and all orders for this  visit:    Cluster headache, not intractable, unspecified chronicity pattern  -     CT Head Without Contrast; Future    Pain of right upper extremity  -     X-Ray Forearm Right; Future    Blurry vision  -     CT Maxillofacial Without Contrast; Future  -     Ambulatory referral/consult to Ophthalmology; Future     CT   X rays   Opth   Statin   Card f.u was recommended  Work on smoking cessation

## 2023-01-19 ENCOUNTER — HOSPITAL ENCOUNTER (OUTPATIENT)
Dept: RADIOLOGY | Facility: HOSPITAL | Age: 63
Discharge: HOME | End: 2023-01-19
Attending: FAMILY MEDICINE
Payer: MEDICARE

## 2023-01-19 DIAGNOSIS — M79.601 PAIN OF RIGHT UPPER EXTREMITY: ICD-10-CM

## 2023-01-19 PROCEDURE — 73090 XR FOREARM RIGHT: ICD-10-PCS | Mod: 26,HCNC,RT, | Performed by: RADIOLOGY

## 2023-01-19 PROCEDURE — 73090 X-RAY EXAM OF FOREARM: CPT | Mod: TC,HCNC,PO,RT

## 2023-01-19 PROCEDURE — 73090 X-RAY EXAM OF FOREARM: CPT | Mod: 26,HCNC,RT, | Performed by: RADIOLOGY

## 2023-01-29 ENCOUNTER — HOSPITAL ENCOUNTER (EMERGENCY)
Facility: HOSPITAL | Age: 63
Discharge: HOME | End: 2023-01-29
Attending: EMERGENCY MEDICINE
Payer: MEDICARE

## 2023-01-29 VITALS
BODY MASS INDEX: 29.44 KG/M2 | HEART RATE: 61 BPM | OXYGEN SATURATION: 94 % | HEIGHT: 65 IN | TEMPERATURE: 98 F | DIASTOLIC BLOOD PRESSURE: 68 MMHG | RESPIRATION RATE: 20 BRPM | SYSTOLIC BLOOD PRESSURE: 148 MMHG

## 2023-01-29 DIAGNOSIS — M54.9 BACK PAIN: ICD-10-CM

## 2023-01-29 DIAGNOSIS — M54.6 ACUTE RIGHT-SIDED THORACIC BACK PAIN: Primary | ICD-10-CM

## 2023-01-29 LAB
ALBUMIN SERPL BCP-MCNC: 4 G/DL (ref 3.5–5.2)
ALP SERPL-CCNC: 134 U/L (ref 55–135)
ALT SERPL W/O P-5'-P-CCNC: 23 U/L (ref 10–44)
ANION GAP SERPL CALC-SCNC: 16 MMOL/L (ref 8–16)
AST SERPL-CCNC: 27 U/L (ref 10–40)
BACTERIA #/AREA URNS HPF: NORMAL /HPF
BASOPHILS # BLD AUTO: 0.09 K/UL (ref 0–0.2)
BASOPHILS NFR BLD: 1 % (ref 0–1.9)
BILIRUB SERPL-MCNC: 0.3 MG/DL (ref 0.1–1)
BILIRUB UR QL STRIP: NEGATIVE
BUN SERPL-MCNC: 9 MG/DL (ref 8–23)
CALCIUM SERPL-MCNC: 9.7 MG/DL (ref 8.7–10.5)
CHLORIDE SERPL-SCNC: 101 MMOL/L (ref 95–110)
CLARITY UR: CLEAR
CO2 SERPL-SCNC: 22 MMOL/L (ref 23–29)
COLOR UR: COLORLESS
CREAT SERPL-MCNC: 0.9 MG/DL (ref 0.5–1.4)
D DIMER PPP IA.FEU-MCNC: 0.33 MG/L FEU
DACRYOCYTES BLD QL SMEAR: ABNORMAL
DIFFERENTIAL METHOD: ABNORMAL
EOSINOPHIL # BLD AUTO: 0.1 K/UL (ref 0–0.5)
EOSINOPHIL NFR BLD: 0.6 % (ref 0–8)
ERYTHROCYTE [DISTWIDTH] IN BLOOD BY AUTOMATED COUNT: 15.4 % (ref 11.5–14.5)
EST. GFR  (NO RACE VARIABLE): >60 ML/MIN/1.73 M^2
GLUCOSE SERPL-MCNC: 126 MG/DL (ref 70–110)
GLUCOSE UR QL STRIP: NEGATIVE
HCT VFR BLD AUTO: 40.9 % (ref 37–48.5)
HCV AB SERPL QL IA: NEGATIVE
HEP C VIRUS HOLD SPECIMEN: NORMAL
HGB BLD-MCNC: 13 G/DL (ref 12–16)
HGB UR QL STRIP: NEGATIVE
HIV 1+2 AB+HIV1 P24 AG SERPL QL IA: NEGATIVE
IMM GRANULOCYTES # BLD AUTO: 0.03 K/UL (ref 0–0.04)
IMM GRANULOCYTES NFR BLD AUTO: 0.3 % (ref 0–0.5)
KETONES UR QL STRIP: NEGATIVE
LEUKOCYTE ESTERASE UR QL STRIP: ABNORMAL
LYMPHOCYTES # BLD AUTO: 2.2 K/UL (ref 1–4.8)
LYMPHOCYTES NFR BLD: 23.9 % (ref 18–48)
MCH RBC QN AUTO: 27.8 PG (ref 27–31)
MCHC RBC AUTO-ENTMCNC: 31.8 G/DL (ref 32–36)
MCV RBC AUTO: 87 FL (ref 82–98)
MICROSCOPIC COMMENT: NORMAL
MONOCYTES # BLD AUTO: 0.5 K/UL (ref 0.3–1)
MONOCYTES NFR BLD: 5.4 % (ref 4–15)
NEUTROPHILS # BLD AUTO: 6.4 K/UL (ref 1.8–7.7)
NEUTROPHILS NFR BLD: 68.8 % (ref 38–73)
NITRITE UR QL STRIP: NEGATIVE
NRBC BLD-RTO: 0 /100 WBC
OVALOCYTES BLD QL SMEAR: ABNORMAL
PH UR STRIP: 7 [PH] (ref 5–8)
PLATELET # BLD AUTO: 323 K/UL (ref 150–450)
PMV BLD AUTO: 9.6 FL (ref 9.2–12.9)
POTASSIUM SERPL-SCNC: 3.9 MMOL/L (ref 3.5–5.1)
PROT SERPL-MCNC: 8.1 G/DL (ref 6–8.4)
PROT UR QL STRIP: NEGATIVE
RBC # BLD AUTO: 4.68 M/UL (ref 4–5.4)
RBC #/AREA URNS HPF: 1 /HPF (ref 0–4)
SODIUM SERPL-SCNC: 139 MMOL/L (ref 136–145)
SP GR UR STRIP: 1.01 (ref 1–1.03)
SPHEROCYTES BLD QL SMEAR: ABNORMAL
SQUAMOUS #/AREA URNS HPF: 3 /HPF
STOMATOCYTES BLD QL SMEAR: PRESENT
TROPONIN I SERPL DL<=0.01 NG/ML-MCNC: <0.006 NG/ML (ref 0–0.03)
URN SPEC COLLECT METH UR: ABNORMAL
UROBILINOGEN UR STRIP-ACNC: NEGATIVE EU/DL
WBC # BLD AUTO: 9.36 K/UL (ref 3.9–12.7)
WBC #/AREA URNS HPF: 1 /HPF (ref 0–5)

## 2023-01-29 PROCEDURE — 80053 COMPREHEN METABOLIC PANEL: CPT | Mod: HCNC | Performed by: EMERGENCY MEDICINE

## 2023-01-29 PROCEDURE — 96375 TX/PRO/DX INJ NEW DRUG ADDON: CPT | Mod: HCNC

## 2023-01-29 PROCEDURE — 93010 ELECTROCARDIOGRAM REPORT: CPT | Mod: HCNC,,, | Performed by: STUDENT IN AN ORGANIZED HEALTH CARE EDUCATION/TRAINING PROGRAM

## 2023-01-29 PROCEDURE — 85025 COMPLETE CBC W/AUTO DIFF WBC: CPT | Mod: HCNC | Performed by: PHYSICIAN ASSISTANT

## 2023-01-29 PROCEDURE — 93005 ELECTROCARDIOGRAM TRACING: CPT | Mod: HCNC

## 2023-01-29 PROCEDURE — 93010 EKG 12-LEAD: ICD-10-PCS | Mod: HCNC,,, | Performed by: STUDENT IN AN ORGANIZED HEALTH CARE EDUCATION/TRAINING PROGRAM

## 2023-01-29 PROCEDURE — 84484 ASSAY OF TROPONIN QUANT: CPT | Mod: HCNC | Performed by: EMERGENCY MEDICINE

## 2023-01-29 PROCEDURE — 25000003 PHARM REV CODE 250: Mod: HCNC | Performed by: EMERGENCY MEDICINE

## 2023-01-29 PROCEDURE — 99285 EMERGENCY DEPT VISIT HI MDM: CPT | Mod: 25,HCNC

## 2023-01-29 PROCEDURE — 86803 HEPATITIS C AB TEST: CPT | Mod: HCNC | Performed by: EMERGENCY MEDICINE

## 2023-01-29 PROCEDURE — 63600175 PHARM REV CODE 636 W HCPCS: Mod: HCNC | Performed by: EMERGENCY MEDICINE

## 2023-01-29 PROCEDURE — 96365 THER/PROPH/DIAG IV INF INIT: CPT | Mod: HCNC

## 2023-01-29 PROCEDURE — 81000 URINALYSIS NONAUTO W/SCOPE: CPT | Mod: HCNC | Performed by: PHYSICIAN ASSISTANT

## 2023-01-29 PROCEDURE — 87389 HIV-1 AG W/HIV-1&-2 AB AG IA: CPT | Mod: HCNC | Performed by: EMERGENCY MEDICINE

## 2023-01-29 PROCEDURE — 85379 FIBRIN DEGRADATION QUANT: CPT | Mod: HCNC | Performed by: EMERGENCY MEDICINE

## 2023-01-29 RX ORDER — ONDANSETRON 2 MG/ML
4 INJECTION INTRAMUSCULAR; INTRAVENOUS
Status: COMPLETED | OUTPATIENT
Start: 2023-01-29 | End: 2023-01-29

## 2023-01-29 RX ORDER — METHOCARBAMOL 500 MG/1
500 TABLET, FILM COATED ORAL 4 TIMES DAILY PRN
Qty: 15 TABLET | Refills: 0 | Status: SHIPPED | OUTPATIENT
Start: 2023-01-29 | End: 2023-03-03

## 2023-01-29 RX ORDER — KETOROLAC TROMETHAMINE 30 MG/ML
15 INJECTION, SOLUTION INTRAMUSCULAR; INTRAVENOUS
Status: COMPLETED | OUTPATIENT
Start: 2023-01-29 | End: 2023-01-29

## 2023-01-29 RX ORDER — HYDROCODONE BITARTRATE AND ACETAMINOPHEN 5; 325 MG/1; MG/1
1 TABLET ORAL EVERY 6 HOURS PRN
Qty: 12 TABLET | Refills: 0 | Status: SHIPPED | OUTPATIENT
Start: 2023-01-29 | End: 2023-02-01

## 2023-01-29 RX ORDER — DEXAMETHASONE SODIUM PHOSPHATE 4 MG/ML
4 INJECTION, SOLUTION INTRA-ARTICULAR; INTRALESIONAL; INTRAMUSCULAR; INTRAVENOUS; SOFT TISSUE
Status: COMPLETED | OUTPATIENT
Start: 2023-01-29 | End: 2023-01-29

## 2023-01-29 RX ORDER — MORPHINE SULFATE 4 MG/ML
4 INJECTION, SOLUTION INTRAMUSCULAR; INTRAVENOUS
Status: COMPLETED | OUTPATIENT
Start: 2023-01-29 | End: 2023-01-29

## 2023-01-29 RX ADMIN — ONDANSETRON 4 MG: 2 INJECTION INTRAMUSCULAR; INTRAVENOUS at 12:01

## 2023-01-29 RX ADMIN — MORPHINE SULFATE 4 MG: 4 INJECTION INTRAVENOUS at 12:01

## 2023-01-29 RX ADMIN — KETOROLAC TROMETHAMINE 15 MG: 30 INJECTION, SOLUTION INTRAMUSCULAR; INTRAVENOUS at 12:01

## 2023-01-29 RX ADMIN — METHOCARBAMOL 500 MG: 100 INJECTION, SOLUTION INTRAMUSCULAR; INTRAVENOUS at 01:01

## 2023-01-29 RX ADMIN — DEXAMETHASONE SODIUM PHOSPHATE 4 MG: 4 INJECTION INTRA-ARTICULAR; INTRALESIONAL; INTRAMUSCULAR; INTRAVENOUS; SOFT TISSUE at 02:01

## 2023-01-29 NOTE — ED PROVIDER NOTES
SCRIBE #1 NOTE: I, Skip Connelly, am scribing for, and in the presence of, Mala Peraza DO. I have scribed the entire note.       History     Chief Complaint   Patient presents with    Abdominal Pain     RUQ RLQ pain radiating into R flank     Review of patient's allergies indicates:  No Known Allergies      History of Present Illness     The history is provided by a relative and the patient.     1/29/2023, 12:33 PM  History obtained from the patient      History of Present Illness: Iza Esquivel is a 62 y.o. female patient with a PMHx of MI who presents to the Emergency Department for evaluation of R flank pain radiating to abdomen a couple of days ago, pt started to experience back pain, prompting pt to come to the ED today. Symptoms are constant and moderate in severity. Sxs are worsened with twisting to L side. No associated sxs reported. Patient denies any weight loss, numbness, saddle anesthesia, urinary or bowel retention or incontinence, recent steroid use, dysuria, fever, IVDA, hematuria, CP, and SOB.  Patient does have a very remote history of cervical cancer. No prior Tx reported. No further complaints or concerns at this time.      Arrival mode: Personal Transportation    PCP: Park Gomez MD        Past Medical History:  Past Medical History:   Diagnosis Date    Atherosclerotic PVD with intermittent claudication 5/25/2020    Cervical cancer     Hyperlipidemia     Hypertension     Right carotid bruit     S/P PTCA (percutaneous transluminal coronary angioplasty) 05/25/2020    STEMI: stenting of LCx       Past Surgical History:  Past Surgical History:   Procedure Laterality Date    ABDOMINAL AORTOGRAPHY N/A 5/25/2020    Procedure: Aortogram, Abdominal;  Surgeon: Shelly Jarvis MD;  Location: [facility] CATH LAB;  Service: Cardiology;  Laterality: N/A;    AORTOGRAPHY WITH SERIALOGRAPHY N/A 6/30/2020    Procedure: AORTOGRAM, WITH RUNOFF;  Surgeon: Shelly Jarvis MD;  Location: [facility] CATH LAB;   "Service: Cardiology;  Laterality: N/A;    LEFT HEART CATHETERIZATION Left 2020    Procedure: CATHETERIZATION, HEART, LEFT;  Surgeon: Shelly Jarvis MD;  Location: [facility] CATH LAB;  Service: Cardiology;  Laterality: Left;    LEFT HEART CATHETERIZATION Left 2020    Procedure: CATHETERIZATION, HEART, LEFT;  Surgeon: Shelly Jarvis MD;  Location: [facility] CATH LAB;  Service: Cardiology;  Laterality: Left;    LYMPH NODE BIOPSY      PERCUTANEOUS TRANSLUMINAL BALLOON ANGIOPLASTY OF CORONARY ARTERY  2020    Procedure: Angioplasty-coronary;  Surgeon: Shelly Jarvis MD;  Location: [facility] CATH LAB;  Service: Cardiology;;         Family History:  Family History   Problem Relation Age of Onset    Cancer Mother         Thyroid    Thyroid cancer Mother     Cancer Father         ?    Transient ischemic attack Father         Carotid artery stenosis, CEA    Diabetes Brother     Hypertension Brother     Cancer Paternal Aunt         Breast ca    Breast cancer Paternal Aunt        Social History:  Social History     Tobacco Use    Smoking status: Every Day     Packs/day: 1.50     Years: 51.00     Pack years: 76.50     Types: Cigarettes     Start date:      Last attempt to quit: 2022     Years since quittin.9    Smokeless tobacco: Never    Tobacco comments:     On 3/21/22 patient stated she quit "about a month ago"   Substance and Sexual Activity    Alcohol use: No    Drug use: Never    Sexual activity: Not Currently        Review of Systems     Review of Systems   Constitutional:  Negative for fever and unexpected weight change (weight loss).   Respiratory:  Negative for shortness of breath.    Cardiovascular:  Negative for chest pain.   Genitourinary:  Positive for flank pain (R side, radiating to back and BLE). Negative for dysuria and hematuria.   Musculoskeletal:  Positive for back pain.   Neurological:  Negative for numbness.      Physical Exam     Initial Vitals [23 0807]   BP Pulse Resp Temp SpO2 " "  (!) 197/85 87 20 98.3 °F (36.8 °C) 95 %      MAP       --          Physical Exam  Nursing Notes and Vital Signs Reviewed.  Constitutional: Patient is in no acute distress. Well-developed and well-nourished.  Head: Atraumatic. Normocephalic.  Eyes: No scleral icterus.    Neck:  No midline tenderness, step-offs, or deformities.   Cardiovascular: Regular rate. Regular rhythm. Distal pulses are 2+ and symmetric.  Pulmonary/Chest: No respiratory distress. Clear to auscultation bilaterally. No wheezing or rales.  Abdominal: Soft and non-distended.  There is no tenderness.  No rebound, guarding, or rigidity.  Musculoskeletal: No midline tenderness, stepoffs, or deformities of the cervical, thoracic, and lumbar spine. Tenderness to palpation to R posterior lower ribs. Pain was worse with active rotation to L side. Moves all extremities. No obvious deformities. No edema.   Skin: Warm and dry.  Neurological:  Alert, awake, and appropriate.  Normal speech.  No acute focal neurological deficits are appreciated.   Psychiatric: Normal affect. Good eye contact. Appropriate in content.     ED Course   Procedures  ED Vital Signs:  Vitals:    01/29/23 0807 01/29/23 1213 01/29/23 1253 01/29/23 1318   BP: (!) 197/85 (!) 165/74  (!) 161/72   Pulse: 87 73  68   Resp: 20 18 18 16   Temp: 98.3 °F (36.8 °C) 98.3 °F (36.8 °C)     TempSrc: Oral      SpO2: 95%   (!) 93%   Height: 5' 5" (1.651 m)       01/29/23 1424 01/29/23 1515   BP: (!) 120/57 (!) 148/68   Pulse: 71 61   Resp: 20 20   Temp:  98.4 °F (36.9 °C)   TempSrc:  Oral   SpO2: (!) 94% (!) 94%   Height:         Abnormal Lab Results:  Labs Reviewed   CBC W/ AUTO DIFFERENTIAL - Abnormal; Notable for the following components:       Result Value    MCHC 31.8 (*)     RDW 15.4 (*)     All other components within normal limits   URINALYSIS, REFLEX TO URINE CULTURE - Abnormal; Notable for the following components:    Color, UA Colorless (*)     Leukocytes, UA Trace (*)     All other " components within normal limits    Narrative:     Specimen Source->Urine   COMPREHENSIVE METABOLIC PANEL - Abnormal; Notable for the following components:    CO2 22 (*)     Glucose 126 (*)     All other components within normal limits   HIV 1 / 2 ANTIBODY    Narrative:     Release to patient->Immediate   HEPATITIS C ANTIBODY    Narrative:     Release to patient->Immediate   HEP C VIRUS HOLD SPECIMEN    Narrative:     Release to patient->Immediate   URINALYSIS MICROSCOPIC    Narrative:     Specimen Source->Urine   D DIMER, QUANTITATIVE   TROPONIN I        All Lab Results:  Results for orders placed or performed during the hospital encounter of 01/29/23   HIV 1/2 Ag/Ab (4th Gen)   Result Value Ref Range    HIV 1/2 Ag/Ab Negative Negative   Hepatitis C Antibody   Result Value Ref Range    Hepatitis C Ab Negative Negative   HCV Virus Hold Specimen   Result Value Ref Range    HEP C Virus Hold Specimen Hold for HCV sendout    CBC auto differential   Result Value Ref Range    WBC 9.36 3.90 - 12.70 K/uL    RBC 4.68 4.00 - 5.40 M/uL    Hemoglobin 13.0 12.0 - 16.0 g/dL    Hematocrit 40.9 37.0 - 48.5 %    MCV 87 82 - 98 fL    MCH 27.8 27.0 - 31.0 pg    MCHC 31.8 (L) 32.0 - 36.0 g/dL    RDW 15.4 (H) 11.5 - 14.5 %    Platelets 323 150 - 450 K/uL    MPV 9.6 9.2 - 12.9 fL    Immature Granulocytes 0.3 0.0 - 0.5 %    Gran # (ANC) 6.4 1.8 - 7.7 K/uL    Immature Grans (Abs) 0.03 0.00 - 0.04 K/uL    Lymph # 2.2 1.0 - 4.8 K/uL    Mono # 0.5 0.3 - 1.0 K/uL    Eos # 0.1 0.0 - 0.5 K/uL    Baso # 0.09 0.00 - 0.20 K/uL    nRBC 0 0 /100 WBC    Gran % 68.8 38.0 - 73.0 %    Lymph % 23.9 18.0 - 48.0 %    Mono % 5.4 4.0 - 15.0 %    Eosinophil % 0.6 0.0 - 8.0 %    Basophil % 1.0 0.0 - 1.9 %    Ovalocytes Occasional     Tear Drop Cells Occasional     Stomatocytes Present     Spherocytes Occasional     Differential Method Automated    Urinalysis, Reflex to Urine Culture Urine, Clean Catch    Specimen: Urine   Result Value Ref Range    Specimen UA  Urine, Clean Catch     Color, UA Colorless (A) Yellow, Straw, [place]    Appearance, UA Clear Clear    pH, UA 7.0 5.0 - 8.0    Specific Gravity, UA 1.010 1.005 - 1.030    Protein, UA Negative Negative    Glucose, UA Negative Negative    Ketones, UA Negative Negative    Bilirubin (UA) Negative Negative    Occult Blood UA Negative Negative    Nitrite, UA Negative Negative    Urobilinogen, UA Negative <2.0 EU/dL    Leukocytes, UA Trace (A) Negative   Urinalysis Microscopic   Result Value Ref Range    RBC, UA 1 0 - 4 /hpf    WBC, UA 1 0 - 5 /hpf    Bacteria Rare None-Occ /hpf    Squam Epithel, UA 3 /hpf    Microscopic Comment SEE COMMENT    Comprehensive metabolic panel   Result Value Ref Range    Sodium 139 136 - 145 mmol/L    Potassium 3.9 3.5 - 5.1 mmol/L    Chloride 101 95 - 110 mmol/L    CO2 22 (L) 23 - 29 mmol/L    Glucose 126 (H) 70 - 110 mg/dL    BUN 9 8 - 23 mg/dL    Creatinine 0.9 0.5 - 1.4 mg/dL    Calcium 9.7 8.7 - 10.5 mg/dL    Total Protein 8.1 6.0 - 8.4 g/dL    Albumin 4.0 3.5 - 5.2 g/dL    Total Bilirubin 0.3 0.1 - 1.0 mg/dL    Alkaline Phosphatase 134 55 - 135 U/L    AST 27 10 - 40 U/L    ALT 23 10 - 44 U/L    Anion Gap 16 8 - 16 mmol/L    eGFR >60 >60 mL/min/1.73 m^2   D dimer, quantitative   Result Value Ref Range    D-Dimer 0.33 <0.50 mg/L FEU   Troponin I   Result Value Ref Range    Troponin I <0.006 0.000 - 0.026 ng/mL         Imaging Results:  Imaging Results              CT Renal Stone Study ABD Pelvis WO (Final result)  Result time 01/29/23 10:08:57      Final result by Blayne John MD (01/29/23 10:08:57)                   Impression:      No acute findings.    Normal appendix.  No renal stones.  Extensive vascular calcification.    All CT scans at this facility are performed  using dose modulation techniques as appropriate to performed exam including the following:  automated exposure control; adjustment of mA and/or kV according to the patients size (this includes techniques or  standardized protocols for targeted exams where dose is matched to indication/reason for exam: i.e. extremities or head);  iterative reconstruction technique.      Electronically signed by: Blayne John MD  Date:    01/29/2023  Time:    10:08               Narrative:    EXAMINATION:  CT RENAL STONE STUDY ABD PELVIS WO    CLINICAL HISTORY:  Abdominal pain.  History cervical cancer., Flank pain, kidney stone suspected;    TECHNIQUE:  Limited noncontrast CT scan of the abdomen and pelvis.    COMPARISON:  None    FINDINGS:  The lung bases are clear.    The liver and spleen are not enlarged.    The gallbladder is present.    There is prominent calcification of the aorta and visceral/renal vessels.  Bilateral iliac stents are noted.    Pancreatic region is negative.    No stones or hydronephrosis.  Bilateral renal calcifications are likely vascular in origin.    The bowel loops are nondilated.    The appendix is normal.    No acute findings.    No free fluid.    The uterus and adnexa regions are unremarkable.                                       The EKG was ordered, reviewed, and independently interpreted by the ED provider.  Interpretation time: 13:51  Rate: 70 BPM  Rhythm: normal sinus rhythm  Interpretation: No acute ST changes. No STEMI.           The Emergency Provider reviewed the vital signs and test results, which are outlined above.     ED Discussion       1:37 PM: Reassessed pt, she was given morphine and Ketoralac; pt is currently getting Robaxin infusion. Pt says the pain is not any better.    2:58 PM: Pt says the pain is a 6/10. Pt says she is ready to go.    3:05 PM: Reassessed pt at this time. Discussed with pt all pertinent ED information and results. Discussed pt dx and plan of tx. Gave pt all f/u and return to the ED instructions. All questions and concerns were addressed at this time. Pt expresses understanding of information and instructions, and is comfortable with plan to discharge. Pt is  stable for discharge.    I discussed with patient and/or family/caretaker that evaluation in the ED does not suggest any emergent or life threatening medical conditions requiring immediate intervention beyond what was provided in the ED, and I believe patient is safe for discharge.  Regardless, an unremarkable evaluation in the ED does not preclude the development or presence of a serious of life threatening condition. As such, patient was instructed to return immediately for any worsening or change in current symptoms.         Medical Decision Making:   History:   I obtained history from: someone other than patient.  Initial Assessment:   62-year-old female with a history of coronary disease, COPD, PVD, hyperlipidemia presents with right-sided rib pain that is worse with movement.  Differential Diagnosis:   CT abdomen pelvis showed no signs of acute appendicitis, bowel obstruction, ruptured diverticulitis or ulcer, incarcerated hernia, pancreatitis, acute cholecystis, or AAA.  Suspicion is low for cauda equina, epidural mass lesion, herniated disc causing severe spinal stenosis as she has no midline tenderness on exam and has an intact neuro exam of all extremities.  Additionally pain is made worse with movement improved after treating musculoskeletal etiology.  Independently Interpreted Test(s):   I have ordered and independently interpreted EKG Reading(s) - see summary below       <> Summary of EKG Reading(s): No stemi or ischemic changes.  Clinical Tests:   Lab Tests: Ordered and Reviewed  The following lab test(s) were unremarkable: CBC, CMP, D-Dimer, Troponin and Urinalysis       <> Summary of Lab: CBC negative for anemia or leukocytosis.  CMP shows normal renal function, LFTs, and electrolytes.  Troponin shows no signs of ACS.  D-dimer negative ruling out VTE.  UA shows no significant infection or hematuria.  Radiological Study: Ordered and Reviewed  Medical Tests: Ordered and Reviewed  ED  Management:  Patient's pain was well controlled with IV morphine, Robaxin, ketorolac, and Decadron.  She was given Zofran to counteract the side effects of opiate induced nausea.  She was discharged with a short course of Norco and Robaxin for muscle relaxation.         ED Medication(s):  Medications   morphine injection 4 mg (4 mg Intravenous Given by Other 1/29/23 1253)   ondansetron injection 4 mg (4 mg Intravenous Given by Other 1/29/23 1254)   methocarbamoL (ROBAXIN) 500 mg in dextrose 5 % (D5W) 100 mL IVPB (0 mg Intravenous Stopped 1/29/23 1356)   ketorolac injection 15 mg (15 mg Intravenous Given by Other 1/29/23 1252)   dexAMETHasone injection 4 mg (4 mg Intravenous Given 1/29/23 1400)       Discharge Medication List as of 1/29/2023  3:04 PM        START taking these medications    Details   methocarbamoL (ROBAXIN) 500 MG Tab Take 1 tablet (500 mg total) by mouth 4 (four) times daily as needed (pain)., Starting Sun 1/29/2023, Print      HYDROcodone-acetaminophen (NORCO) 5-325 mg per tablet Take 1 tablet by mouth every 6 (six) hours as needed for Pain., Starting Sun 1/29/2023, Print              Follow-up Information       Park Gomez MD In 3 days.    Specialty: Family Medicine  Contact information:  [address] [place] [id number]  [phone]                                 Scribe Attestation:   Scribe #1: I performed the above scribed service and the documentation accurately describes the services I performed. I attest to the accuracy of the note.     Attending:   Physician Attestation Statement for Scribe #1: I, Mala Peraza DO, personally performed the services described in this documentation, as scribed by Skip Connelly, in my presence, and it is both accurate and complete.           Clinical Impression       ICD-10-CM ICD-9-CM   1. Acute right-sided thoracic back pain  M54.6 724.1   2. Back pain  M54.9 724.5       Disposition:   Disposition: Discharged  Condition:  Stable       Mala Peraza,   02/01/23 1633

## 2023-01-29 NOTE — FIRST PROVIDER EVALUATION
"Medical screening examination initiated.  I have conducted a focused provider triage encounter, findings are as follows:    Brief history of present illness:  Right flank/low back/RLQ pain    Vitals:    01/29/23 0807   BP: (!) 197/85  Comment: did not take BP meds this am   BP Location: Right arm   Patient Position: Sitting   Pulse: 87   Resp: 20   Temp: 98.3 °F (36.8 °C)   TempSrc: Oral   SpO2: 95%   Height: 5' 5" (1.651 m)       Pertinent physical exam:  NAD, alert        Preliminary workup initiated; this workup will be continued and followed by the physician or advanced practice provider that is assigned to the patient when roomed.  "

## 2023-01-30 ENCOUNTER — HOSPITAL ENCOUNTER (OUTPATIENT)
Dept: RADIOLOGY | Facility: HOSPITAL | Age: 63
Discharge: HOME | End: 2023-01-30
Attending: FAMILY MEDICINE
Payer: MEDICARE

## 2023-01-30 DIAGNOSIS — G44.009 CLUSTER HEADACHE, NOT INTRACTABLE, UNSPECIFIED CHRONICITY PATTERN: ICD-10-CM

## 2023-01-30 DIAGNOSIS — H53.8 BLURRY VISION: ICD-10-CM

## 2023-01-30 PROCEDURE — 70450 CT HEAD/BRAIN W/O DYE: CPT | Mod: 26,HCNC,, | Performed by: RADIOLOGY

## 2023-01-30 PROCEDURE — 70450 CT HEAD/BRAIN W/O DYE: CPT | Mod: TC,HCNC

## 2023-01-30 PROCEDURE — 70486 CT MAXILLOFACIAL W/O DYE: CPT | Mod: 26,HCNC,, | Performed by: RADIOLOGY

## 2023-01-30 PROCEDURE — 70486 CT MAXILLOFACIAL WITHOUT CONTRAST: ICD-10-PCS | Mod: 26,HCNC,, | Performed by: RADIOLOGY

## 2023-01-30 PROCEDURE — 70486 CT MAXILLOFACIAL W/O DYE: CPT | Mod: TC,HCNC

## 2023-01-30 PROCEDURE — 70450 CT HEAD WITHOUT CONTRAST: ICD-10-PCS | Mod: 26,HCNC,, | Performed by: RADIOLOGY

## 2023-02-01 ENCOUNTER — HOSPITAL ENCOUNTER (OUTPATIENT)
Dept: RADIOLOGY | Facility: HOSPITAL | Age: 63
Discharge: HOME | End: 2023-02-01
Attending: FAMILY MEDICINE
Payer: MEDICARE

## 2023-02-01 ENCOUNTER — OFFICE VISIT (OUTPATIENT)
Dept: FAMILY MEDICINE | Facility: CLINIC | Age: 63
End: 2023-02-01
Attending: FAMILY MEDICINE
Payer: MEDICARE

## 2023-02-01 ENCOUNTER — PATIENT MESSAGE (OUTPATIENT)
Dept: FAMILY MEDICINE | Facility: CLINIC | Age: 63
End: 2023-02-01

## 2023-02-01 VITALS
DIASTOLIC BLOOD PRESSURE: 80 MMHG | WEIGHT: 177 LBS | HEIGHT: 65 IN | HEART RATE: 87 BPM | OXYGEN SATURATION: 97 % | TEMPERATURE: 98 F | SYSTOLIC BLOOD PRESSURE: 132 MMHG | BODY MASS INDEX: 29.49 KG/M2

## 2023-02-01 DIAGNOSIS — M54.50 LUMBAR PAIN: ICD-10-CM

## 2023-02-01 DIAGNOSIS — M51.36 DDD (DEGENERATIVE DISC DISEASE), LUMBAR: Primary | ICD-10-CM

## 2023-02-01 DIAGNOSIS — M54.50 LUMBAR PAIN: Primary | ICD-10-CM

## 2023-02-01 PROCEDURE — 3008F BODY MASS INDEX DOCD: CPT | Mod: HCNC,CPTII,S$GLB, | Performed by: FAMILY MEDICINE

## 2023-02-01 PROCEDURE — 99214 OFFICE O/P EST MOD 30 MIN: CPT | Mod: HCNC,S$GLB,, | Performed by: FAMILY MEDICINE

## 2023-02-01 PROCEDURE — 99999 PR PBB SHADOW E&M-EST. PATIENT-LVL IV: CPT | Mod: PBBFAC,HCNC,, | Performed by: FAMILY MEDICINE

## 2023-02-01 PROCEDURE — 4010F ACE/ARB THERAPY RXD/TAKEN: CPT | Mod: HCNC,CPTII,S$GLB, | Performed by: FAMILY MEDICINE

## 2023-02-01 PROCEDURE — 3075F PR MOST RECENT SYSTOLIC BLOOD PRESS GE 130-139MM HG: ICD-10-PCS | Mod: HCNC,CPTII,S$GLB, | Performed by: FAMILY MEDICINE

## 2023-02-01 PROCEDURE — 72100 X-RAY EXAM L-S SPINE 2/3 VWS: CPT | Mod: 26,HCNC,, | Performed by: RADIOLOGY

## 2023-02-01 PROCEDURE — 3075F SYST BP GE 130 - 139MM HG: CPT | Mod: HCNC,CPTII,S$GLB, | Performed by: FAMILY MEDICINE

## 2023-02-01 PROCEDURE — 99999 PR PBB SHADOW E&M-EST. PATIENT-LVL IV: ICD-10-PCS | Mod: PBBFAC,HCNC,, | Performed by: FAMILY MEDICINE

## 2023-02-01 PROCEDURE — 3008F PR BODY MASS INDEX (BMI) DOCUMENTED: ICD-10-PCS | Mod: HCNC,CPTII,S$GLB, | Performed by: FAMILY MEDICINE

## 2023-02-01 PROCEDURE — 1159F MED LIST DOCD IN RCRD: CPT | Mod: HCNC,CPTII,S$GLB, | Performed by: FAMILY MEDICINE

## 2023-02-01 PROCEDURE — 1160F RVW MEDS BY RX/DR IN RCRD: CPT | Mod: HCNC,CPTII,S$GLB, | Performed by: FAMILY MEDICINE

## 2023-02-01 PROCEDURE — 72100 X-RAY EXAM L-S SPINE 2/3 VWS: CPT | Mod: TC,HCNC,PO

## 2023-02-01 PROCEDURE — 3079F DIAST BP 80-89 MM HG: CPT | Mod: HCNC,CPTII,S$GLB, | Performed by: FAMILY MEDICINE

## 2023-02-01 PROCEDURE — 1160F PR REVIEW ALL MEDS BY PRESCRIBER/CLIN PHARMACIST DOCUMENTED: ICD-10-PCS | Mod: HCNC,CPTII,S$GLB, | Performed by: FAMILY MEDICINE

## 2023-02-01 PROCEDURE — 1159F PR MEDICATION LIST DOCUMENTED IN MEDICAL RECORD: ICD-10-PCS | Mod: HCNC,CPTII,S$GLB, | Performed by: FAMILY MEDICINE

## 2023-02-01 PROCEDURE — 99214 PR OFFICE/OUTPT VISIT, EST, LEVL IV, 30-39 MIN: ICD-10-PCS | Mod: HCNC,S$GLB,, | Performed by: FAMILY MEDICINE

## 2023-02-01 PROCEDURE — 72100 XR LUMBAR SPINE AP AND LATERAL: ICD-10-PCS | Mod: 26,HCNC,, | Performed by: RADIOLOGY

## 2023-02-01 PROCEDURE — 3079F PR MOST RECENT DIASTOLIC BLOOD PRESSURE 80-89 MM HG: ICD-10-PCS | Mod: HCNC,CPTII,S$GLB, | Performed by: FAMILY MEDICINE

## 2023-02-01 PROCEDURE — 4010F PR ACE/ARB THEARPY RXD/TAKEN: ICD-10-PCS | Mod: HCNC,CPTII,S$GLB, | Performed by: FAMILY MEDICINE

## 2023-02-01 RX ORDER — PREDNISONE 20 MG/1
TABLET ORAL
Qty: 20 TABLET | Refills: 0 | Status: SHIPPED | OUTPATIENT
Start: 2023-02-01 | End: 2023-03-03

## 2023-02-01 RX ORDER — GABAPENTIN 300 MG/1
300 CAPSULE ORAL NIGHTLY
Qty: 30 CAPSULE | Refills: 0 | Status: SHIPPED | OUTPATIENT
Start: 2023-02-01 | End: 2023-03-03

## 2023-02-01 NOTE — PROGRESS NOTES
Subjective:       Patient ID: Iza Eqsuivel is a 62 y.o. female.    Chief Complaint: Follow-up (ER- 1/29/23)    62 y old female  with R mid spinal / lumbar pain for 4 days . She was evaluated at ER .  Kidney stone was ruled out .Prescribed Robaxin and hydrocodone which dont help . Pain is irradiated to RLE . Also with RLE paresthesia . Pain worsen with prolonged sitting and standing . No gi/ gu symptoms     Review of Systems   Constitutional: Negative.  Negative for activity change and unexpected weight change.   HENT: Negative.  Negative for hearing loss, rhinorrhea and trouble swallowing.    Eyes: Negative.  Negative for discharge and visual disturbance.   Respiratory: Negative.  Negative for chest tightness and wheezing.    Cardiovascular: Negative.  Negative for chest pain and palpitations.   Gastrointestinal: Negative.  Negative for blood in stool, constipation, diarrhea and vomiting.   Endocrine: Negative for polydipsia and polyuria.   Genitourinary: Negative.  Negative for difficulty urinating, dysuria, hematuria and menstrual problem.   Musculoskeletal:  Positive for arthralgias. Negative for joint swelling and neck pain.   Skin: Negative.    Neurological:  Negative for weakness and headaches.   Hematological: Negative.    Psychiatric/Behavioral:  Negative for confusion and dysphoric mood.      Objective:      Physical Exam  Constitutional:       General: She is not in acute distress.     Appearance: She is well-developed. She is not diaphoretic.   HENT:      Head: Normocephalic and atraumatic.      Right Ear: External ear normal.      Left Ear: External ear normal.      Mouth/Throat:      Pharynx: No oropharyngeal exudate.   Eyes:      General: No scleral icterus.        Right eye: No discharge.         Left eye: No discharge.      Conjunctiva/sclera: Conjunctivae normal.      Pupils: Pupils are equal, round, and reactive to light.   Neck:      Thyroid: No thyromegaly.      Vascular: No JVD.      Trachea:  No tracheal deviation.   Cardiovascular:      Rate and Rhythm: Normal rate and regular rhythm.      Heart sounds: Normal heart sounds. No murmur heard.    No friction rub. No gallop.   Pulmonary:      Effort: Pulmonary effort is normal. No respiratory distress.      Breath sounds: Normal breath sounds. No stridor. No wheezing or rales.   Chest:      Chest wall: No tenderness.   Abdominal:      General: Bowel sounds are normal. There is no distension.      Palpations: Abdomen is soft.      Tenderness: There is no abdominal tenderness. There is no guarding or rebound.   Musculoskeletal:      Cervical back: Normal range of motion and neck supple.      Lumbar back: Spasms and tenderness present. Positive right straight leg raise test.        Back:    Lymphadenopathy:      Cervical: No cervical adenopathy.   Skin:     General: Skin is warm and dry.   Neurological:      Mental Status: She is alert and oriented to person, place, and time.   Psychiatric:         Behavior: Behavior normal.         Thought Content: Thought content normal.         Judgment: Judgment normal.       Assessment:       Iza was seen today for follow-up.    Diagnoses and all orders for this visit:    Lumbar pain  -     X-Ray Lumbar Spine Ap And Lateral; Future    Other orders  -     gabapentin (NEURONTIN) 300 MG capsule; Take 1 capsule (300 mg total) by mouth every evening.  -     predniSONE (DELTASONE) 20 MG tablet; Take 3 pills daily for 3 days, then 2 pills daily for 3 days, then 1 pill daily for 3 days, then 1/2 pill daily for 4 days.     We will continue to f.u   WS discussed.

## 2023-02-03 ENCOUNTER — PATIENT OUTREACH (OUTPATIENT)
Dept: ADMINISTRATIVE | Facility: HOSPITAL | Age: 63
End: 2023-02-03
Payer: MEDICARE

## 2023-02-03 ENCOUNTER — TELEPHONE (OUTPATIENT)
Dept: PAIN MEDICINE | Facility: CLINIC | Age: 63
End: 2023-02-03
Payer: MEDICARE

## 2023-02-03 NOTE — PROGRESS NOTES
Attempted to contact patient by phone for a MAMMOGRAM SCHEDULING, was able to speak with patient and patient declined..  Updated Care Everywhere and Team.

## 2023-02-07 ENCOUNTER — TELEPHONE (OUTPATIENT)
Dept: CARDIOLOGY | Facility: CLINIC | Age: 63
End: 2023-02-07
Payer: MEDICARE

## 2023-02-07 DIAGNOSIS — Z00.00 ENCOUNTER FOR MEDICARE ANNUAL WELLNESS EXAM: ICD-10-CM

## 2023-02-07 NOTE — TELEPHONE ENCOUNTER
Spoke with [facility] nurse, Pt is currently at the office and they want to get the okay from you to give the pt a steroid injection.      Please advise, thank you.          FYI- As long as her blood pressure is controlled fine by me - per Dr. Jarvis

## 2023-02-07 NOTE — TELEPHONE ENCOUNTER
I answered this already by saying it is ok if her blood pressure was ciontrolled      The patient has been notified of this information and all questions answered.      Needs steroid injection in SI joint  ?  Please advise          ----- Message from Praful Roberts sent at 2/7/2023  9:55 AM CST -----  Contact: Emilie // [email or web address]  Emilie is requesting a call back at [phone] ext [phone]. Patient is currently aT the clinic he just wants to know if it okay to gave the patient a steroid injection in her hip .     Thanks  Cf

## 2023-02-09 DIAGNOSIS — Z00.00 ENCOUNTER FOR MEDICARE ANNUAL WELLNESS EXAM: ICD-10-CM

## 2023-03-02 ENCOUNTER — OFFICE VISIT (OUTPATIENT)
Dept: CARDIOLOGY | Facility: CLINIC | Age: 63
End: 2023-03-02
Payer: MEDICARE

## 2023-03-02 VITALS
HEIGHT: 65 IN | DIASTOLIC BLOOD PRESSURE: 76 MMHG | OXYGEN SATURATION: 98 % | HEART RATE: 83 BPM | SYSTOLIC BLOOD PRESSURE: 138 MMHG | BODY MASS INDEX: 28.98 KG/M2 | WEIGHT: 173.94 LBS

## 2023-03-02 DIAGNOSIS — R55 NEAR SYNCOPE: ICD-10-CM

## 2023-03-02 DIAGNOSIS — I70.219 ATHEROSCLEROTIC PVD WITH INTERMITTENT CLAUDICATION: ICD-10-CM

## 2023-03-02 DIAGNOSIS — I25.2 HISTORY OF ST ELEVATION MYOCARDIAL INFARCTION (STEMI): ICD-10-CM

## 2023-03-02 DIAGNOSIS — R09.89 BRUIT OF RIGHT CAROTID ARTERY: ICD-10-CM

## 2023-03-02 DIAGNOSIS — E78.2 MIXED HYPERLIPIDEMIA: Chronic | ICD-10-CM

## 2023-03-02 DIAGNOSIS — F17.210 NICOTINE DEPENDENCE, CIGARETTES, UNCOMPLICATED: Chronic | ICD-10-CM

## 2023-03-02 DIAGNOSIS — J84.10 CALCIFIED GRANULOMA OF LUNG: ICD-10-CM

## 2023-03-02 DIAGNOSIS — J44.9 COPD, MODERATE: ICD-10-CM

## 2023-03-02 DIAGNOSIS — I25.118 CORONARY ARTERY DISEASE OF NATIVE ARTERY OF NATIVE HEART WITH STABLE ANGINA PECTORIS: Primary | ICD-10-CM

## 2023-03-02 DIAGNOSIS — J43.2 CENTRILOBULAR EMPHYSEMA: ICD-10-CM

## 2023-03-02 DIAGNOSIS — I73.9 PVD (PERIPHERAL VASCULAR DISEASE): ICD-10-CM

## 2023-03-02 DIAGNOSIS — I10 ESSENTIAL HYPERTENSION: ICD-10-CM

## 2023-03-02 DIAGNOSIS — Z74.09 IMPAIRED FUNCTIONAL MOBILITY AND ACTIVITY TOLERANCE: ICD-10-CM

## 2023-03-02 PROCEDURE — 3008F PR BODY MASS INDEX (BMI) DOCUMENTED: ICD-10-PCS | Mod: HCNC,CPTII,S$GLB, | Performed by: INTERNAL MEDICINE

## 2023-03-02 PROCEDURE — 1159F PR MEDICATION LIST DOCUMENTED IN MEDICAL RECORD: ICD-10-PCS | Mod: HCNC,CPTII,S$GLB, | Performed by: INTERNAL MEDICINE

## 2023-03-02 PROCEDURE — 3008F BODY MASS INDEX DOCD: CPT | Mod: HCNC,CPTII,S$GLB, | Performed by: INTERNAL MEDICINE

## 2023-03-02 PROCEDURE — 3075F SYST BP GE 130 - 139MM HG: CPT | Mod: HCNC,CPTII,S$GLB, | Performed by: INTERNAL MEDICINE

## 2023-03-02 PROCEDURE — 3075F PR MOST RECENT SYSTOLIC BLOOD PRESS GE 130-139MM HG: ICD-10-PCS | Mod: HCNC,CPTII,S$GLB, | Performed by: INTERNAL MEDICINE

## 2023-03-02 PROCEDURE — 99214 PR OFFICE/OUTPT VISIT, EST, LEVL IV, 30-39 MIN: ICD-10-PCS | Mod: HCNC,S$GLB,, | Performed by: INTERNAL MEDICINE

## 2023-03-02 PROCEDURE — 3078F DIAST BP <80 MM HG: CPT | Mod: HCNC,CPTII,S$GLB, | Performed by: INTERNAL MEDICINE

## 2023-03-02 PROCEDURE — 4010F PR ACE/ARB THEARPY RXD/TAKEN: ICD-10-PCS | Mod: HCNC,CPTII,S$GLB, | Performed by: INTERNAL MEDICINE

## 2023-03-02 PROCEDURE — 99999 PR PBB SHADOW E&M-EST. PATIENT-LVL IV: CPT | Mod: PBBFAC,HCNC,, | Performed by: INTERNAL MEDICINE

## 2023-03-02 PROCEDURE — 1159F MED LIST DOCD IN RCRD: CPT | Mod: HCNC,CPTII,S$GLB, | Performed by: INTERNAL MEDICINE

## 2023-03-02 PROCEDURE — 4010F ACE/ARB THERAPY RXD/TAKEN: CPT | Mod: HCNC,CPTII,S$GLB, | Performed by: INTERNAL MEDICINE

## 2023-03-02 PROCEDURE — 99999 PR PBB SHADOW E&M-EST. PATIENT-LVL IV: ICD-10-PCS | Mod: PBBFAC,HCNC,, | Performed by: INTERNAL MEDICINE

## 2023-03-02 PROCEDURE — 3078F PR MOST RECENT DIASTOLIC BLOOD PRESSURE < 80 MM HG: ICD-10-PCS | Mod: HCNC,CPTII,S$GLB, | Performed by: INTERNAL MEDICINE

## 2023-03-02 PROCEDURE — 99214 OFFICE O/P EST MOD 30 MIN: CPT | Mod: HCNC,S$GLB,, | Performed by: INTERNAL MEDICINE

## 2023-03-02 NOTE — PROGRESS NOTES
Subjective:   Patient ID:  Iza Esquivel is a 62 y.o. female who presents for follow up of No chief complaint on file.      HPI  8/25/2020  A 59 yo female with old mi nstemi tobacco use has stopped smoking in July had lcx stent she also had severe limiting claudication with multilevel disease limiting her rehabilitation from her mi. She has iliac stenting performed subsequently she still has residual claudication on the rt calfb she can  Walk 1 mile w/o stopping has gained weight still not compliant with diet . Has been taking meds regularily. seh wants to go back to work no angina has some residual shortness of breath.      12/3/2020  Still not smoking can walk still has some residual calf claudication that is non limiting. Has bilatearl sfa disease s/p bilateral iliac stenting. Has noc hest pain or shortness of breath has swollen rt knee. Has been  compliant with meds and diet.   Her bp is controlled at home. she gained weight.has been snacking a lot walks 4 times weekly. Lipids on target     6/3/2021  Here for f/u cad no angina getting over shingles . Has been able top cut grass for 15 minutes before her legs hurt. No discoloration she has been compliant with meds still smoking now going through cessation program.       8/17/2021  Had hypotension symptomatic then rebound her medical therapy is appropriate no further dizziness of lightheadedness or low blood pressure she is compliant.has noticed her leg symptoms worse she is not able to walk as much as she can she was very limited in her trip      12/9/2021   STILL SMOKING TRIES TO BE COMPLAINT WITH DIET NOT WALKING FOR EXERCISE KIND OF ON AND OFF. NO DISCOLORATION IN FEET HAS GAINED WEIGHT NO ANGINA. NO TIA PALPITATION CHF SYNCOPE. HAS CHEST PAIN WHEN SHE COUGHS OR MOVES   HAS NUMBNESS IN RT BIG TOE NO WEAKNESS.     8/25/2022   here fro f/u  had multiple death in family  Had a fall on her face . She tripped. She still has claudication at half mile sometimes can  walk more. She is smoking. Has chest pain  That is mostly at rest occurs at times with exercise.no discoloration in feet. No ulceration.  Her bp controlled tries to be compliant with diet     3/2/2023   Still smoking had 2 falls due to mechanical issues no cardiac etiology has no chest pain no arrythmias . Has lost 4 lbs .MAC plans to quit smoking compliant with meds. Has rt knee pain . Needs an injection in her back.  Has rt knee pain her knee gives up on her.   Has foot pain upon flexion has also rt calf pain with walking that is not consistent.   Past Medical History:   Diagnosis Date    Atherosclerotic PVD with intermittent claudication 5/25/2020    Cervical cancer     Hyperlipidemia     Hypertension     Right carotid bruit     S/P PTCA (percutaneous transluminal coronary angioplasty) 05/25/2020    STEMI: stenting of LCx       Past Surgical History:   Procedure Laterality Date    ABDOMINAL AORTOGRAPHY N/A 5/25/2020    Procedure: Aortogram, Abdominal;  Surgeon: Shelly Jarvis MD;  Location: [facility] CATH LAB;  Service: Cardiology;  Laterality: N/A;    AORTOGRAPHY WITH SERIALOGRAPHY N/A 6/30/2020    Procedure: AORTOGRAM, WITH RUNOFF;  Surgeon: Shelly Jarvis MD;  Location: [facility] CATH LAB;  Service: Cardiology;  Laterality: N/A;    LEFT HEART CATHETERIZATION Left 5/25/2020    Procedure: CATHETERIZATION, HEART, LEFT;  Surgeon: Shelly Jarvis MD;  Location: [facility] CATH LAB;  Service: Cardiology;  Laterality: Left;    LEFT HEART CATHETERIZATION Left 6/11/2020    Procedure: CATHETERIZATION, HEART, LEFT;  Surgeon: Shelly Jarvis MD;  Location: [facility] CATH LAB;  Service: Cardiology;  Laterality: Left;    LYMPH NODE BIOPSY      PERCUTANEOUS TRANSLUMINAL BALLOON ANGIOPLASTY OF CORONARY ARTERY  5/25/2020    Procedure: Angioplasty-coronary;  Surgeon: Shelly Jarvis MD;  Location: [facility] CATH LAB;  Service: Cardiology;;       Social History     Tobacco Use    Smoking status: Every Day     Packs/day: 1.50     Years: 51.00     Pack  "years: 76.50     Types: Cigarettes     Start date:      Last attempt to quit: 2022     Years since quittin.0    Smokeless tobacco: Never    Tobacco comments:     On 3/21/22 patient stated she quit "about a month ago"   Substance Use Topics    Alcohol use: No    Drug use: Never       Family History   Problem Relation Age of Onset    Cancer Mother         Thyroid    Thyroid cancer Mother     Cancer Father         ?    Transient ischemic attack Father         Carotid artery stenosis, CEA    Diabetes Brother     Hypertension Brother     Cancer Paternal Aunt         Breast ca    Breast cancer Paternal Aunt        Current Outpatient Medications   Medication Sig    aspirin (ECOTRIN) 81 MG EC tablet Take 1 tablet (81 mg total) by mouth once daily.    atorvastatin (LIPITOR) 80 MG tablet TAKE 1 TABLET(80 MG) BY MOUTH EVERY DAY    clopidogreL (PLAVIX) 75 mg tablet Take 1 tablet (75 mg total) by mouth once daily.    isosorbide mononitrate (IMDUR) 60 MG 24 hr tablet Take 1 tablet (60 mg total) by mouth once daily.    levothyroxine (SYNTHROID) 75 MCG tablet Take 1 tablet (75 mcg total) by mouth before breakfast.    losartan (COZAAR) 25 MG tablet TAKE 1 TABLET(25 MG) BY MOUTH TWICE DAILY    metoprolol tartrate (LOPRESSOR) 25 MG tablet TAKE 1 TABLET(25 MG) BY MOUTH TWICE DAILY    pantoprazole (PROTONIX) 40 MG tablet Take 1 tablet (40 mg total) by mouth once daily.    gabapentin (NEURONTIN) 300 MG capsule Take 1 capsule (300 mg total) by mouth every evening.    methocarbamoL (ROBAXIN) 500 MG Tab Take 1 tablet (500 mg total) by mouth 4 (four) times daily as needed (pain).    predniSONE (DELTASONE) 20 MG tablet Take 3 pills daily for 3 days, then 2 pills daily for 3 days, then 1 pill daily for 3 days, then 1/2 pill daily for 4 days.     No current facility-administered medications for this visit.     Current Outpatient Medications on File Prior to Visit   Medication Sig    aspirin (ECOTRIN) 81 MG EC tablet Take 1 tablet " (81 mg total) by mouth once daily.    atorvastatin (LIPITOR) 80 MG tablet TAKE 1 TABLET(80 MG) BY MOUTH EVERY DAY    clopidogreL (PLAVIX) 75 mg tablet Take 1 tablet (75 mg total) by mouth once daily.    isosorbide mononitrate (IMDUR) 60 MG 24 hr tablet Take 1 tablet (60 mg total) by mouth once daily.    levothyroxine (SYNTHROID) 75 MCG tablet Take 1 tablet (75 mcg total) by mouth before breakfast.    losartan (COZAAR) 25 MG tablet TAKE 1 TABLET(25 MG) BY MOUTH TWICE DAILY    metoprolol tartrate (LOPRESSOR) 25 MG tablet TAKE 1 TABLET(25 MG) BY MOUTH TWICE DAILY    pantoprazole (PROTONIX) 40 MG tablet Take 1 tablet (40 mg total) by mouth once daily.    gabapentin (NEURONTIN) 300 MG capsule Take 1 capsule (300 mg total) by mouth every evening.    methocarbamoL (ROBAXIN) 500 MG Tab Take 1 tablet (500 mg total) by mouth 4 (four) times daily as needed (pain).    predniSONE (DELTASONE) 20 MG tablet Take 3 pills daily for 3 days, then 2 pills daily for 3 days, then 1 pill daily for 3 days, then 1/2 pill daily for 4 days.     No current facility-administered medications on file prior to visit.     Review of patient's allergies indicates:  No Known Allergies   Review of Systems   Constitutional: Positive for weight loss. Negative for diaphoresis, malaise/fatigue, night sweats and weight gain.   HENT:  Negative for hoarse voice.    Eyes:  Negative for double vision and visual disturbance.   Cardiovascular:  Positive for claudication. Negative for chest pain, cyanosis, dyspnea on exertion, irregular heartbeat, leg swelling, near-syncope, orthopnea, palpitations, paroxysmal nocturnal dyspnea and syncope.   Respiratory:  Negative for cough, hemoptysis, shortness of breath and snoring.    Hematologic/Lymphatic: Negative for bleeding problem. Does not bruise/bleed easily.   Skin:  Negative for color change and poor wound healing.   Musculoskeletal:  Positive for arthritis, back pain and joint pain. Negative for muscle cramps,  muscle weakness and myalgias.   Gastrointestinal:  Negative for bloating, abdominal pain, change in bowel habit, diarrhea, heartburn, hematemesis, hematochezia, melena and nausea.   Neurological:  Negative for excessive daytime sleepiness, dizziness, headaches, light-headedness, loss of balance, numbness and weakness.   Psychiatric/Behavioral:  Negative for memory loss. The patient does not have insomnia.    Allergic/Immunologic: Negative for hives.     Objective:   Physical Exam  Vitals and nursing note reviewed.   Constitutional:       General: She is not in acute distress.     Appearance: Normal appearance. She is well-developed. She is not ill-appearing.   HENT:      Head: Normocephalic and atraumatic.   Eyes:      General: No scleral icterus.     Pupils: Pupils are equal, round, and reactive to light.   Neck:      Thyroid: No thyromegaly.      Vascular: Normal carotid pulses. No carotid bruit, hepatojugular reflux or JVD.      Trachea: No tracheal deviation.   Cardiovascular:      Rate and Rhythm: Normal rate and regular rhythm.      Pulses:           Carotid pulses are 2+ on the right side and 2+ on the left side.       Radial pulses are 2+ on the right side and 2+ on the left side.        Femoral pulses are 2+ on the right side with bruit and 2+ on the left side with bruit.       Popliteal pulses are 1+ on the right side and 1+ on the left side.        Dorsalis pedis pulses are 0 on the right side and 0 on the left side.        Posterior tibial pulses are 1+ on the right side and 1+ on the left side.      Heart sounds: Normal heart sounds. No murmur heard.    No friction rub. No gallop.   Pulmonary:      Effort: Pulmonary effort is normal. No respiratory distress.      Breath sounds: Normal breath sounds. No wheezing, rhonchi or rales.   Chest:      Chest wall: No tenderness.   Abdominal:      General: Bowel sounds are normal. There is no abdominal bruit.      Palpations: Abdomen is soft. There is no  "hepatomegaly or pulsatile mass.      Tenderness: There is no abdominal tenderness.   Musculoskeletal:      Right shoulder: No deformity.      Cervical back: Normal range of motion and neck supple.      Right lower leg: No edema.      Left lower leg: No edema.      Comments: Both feet tender to touch more on the rt. Capillary refill normal equal bilateral. Has mild rubor.   Skin:     General: Skin is warm and dry.      Findings: No erythema or rash.      Nails: There is no clubbing.   Neurological:      Mental Status: She is alert and oriented to person, place, and time.      Cranial Nerves: No cranial nerve deficit.      Coordination: Coordination normal.   Psychiatric:         Speech: Speech normal.         Behavior: Behavior normal.         Thought Content: Thought content normal.     Vitals:    03/02/23 1038 03/02/23 1041   BP: 132/70 138/76   BP Location: Left arm Right arm   Patient Position: Sitting Sitting   BP Method: Medium (Manual) Medium (Manual)   Pulse: 83    SpO2: 98%    Weight: 78.9 kg (173 lb 15.1 oz)    Height: 5' 5" (1.651 m)      Lab Results   Component Value Date    CHOL 125 08/18/2022    CHOL 131 12/02/2021    CHOL 143 05/27/2021     Lab Results   Component Value Date    HDL 42 08/18/2022    HDL 50 12/02/2021    HDL 55 05/27/2021     Lab Results   Component Value Date    LDLCALC 52.2 (L) 08/18/2022    LDLCALC 57.4 (L) 12/02/2021    LDLCALC 61.4 (L) 05/27/2021     Lab Results   Component Value Date    TRIG 154 (H) 08/18/2022    TRIG 118 12/02/2021    TRIG 133 05/27/2021     Lab Results   Component Value Date    CHOLHDL 33.6 08/18/2022    CHOLHDL 38.2 12/02/2021    CHOLHDL 38.5 05/27/2021       Chemistry        Component Value Date/Time     01/29/2023 1112    K 3.9 01/29/2023 1112     01/29/2023 1112    CO2 22 (L) 01/29/2023 1112    BUN 9 01/29/2023 1112    CREATININE 0.9 01/29/2023 1112     (H) 01/29/2023 1112        Component Value Date/Time    CALCIUM 9.7 01/29/2023 1112    " ALKPHOS 134 01/29/2023 1112    AST 27 01/29/2023 1112    ALT 23 01/29/2023 1112    BILITOT 0.3 01/29/2023 1112    ESTGFRAFRICA >60.0 12/02/2021 0758    EGFRNONAA >60.0 12/02/2021 0758          Lab Results   Component Value Date    TSH 1.974 07/26/2022     Lab Results   Component Value Date    INR 1.0 09/12/2020    INR 1.3 (H) 09/12/2020    INR 1.0 07/20/2020     Lab Results   Component Value Date    WBC 9.36 01/29/2023    HGB 13.0 01/29/2023    HCT 40.9 01/29/2023    MCV 87 01/29/2023     01/29/2023     BMP  Sodium   Date Value Ref Range Status   01/29/2023 139 136 - 145 mmol/L Final     Potassium   Date Value Ref Range Status   01/29/2023 3.9 3.5 - 5.1 mmol/L Final     Chloride   Date Value Ref Range Status   01/29/2023 101 95 - 110 mmol/L Final     CO2   Date Value Ref Range Status   01/29/2023 22 (L) 23 - 29 mmol/L Final     BUN   Date Value Ref Range Status   01/29/2023 9 8 - 23 mg/dL Final     Creatinine   Date Value Ref Range Status   01/29/2023 0.9 0.5 - 1.4 mg/dL Final     Calcium   Date Value Ref Range Status   01/29/2023 9.7 8.7 - 10.5 mg/dL Final     Anion Gap   Date Value Ref Range Status   01/29/2023 16 8 - 16 mmol/L Final     eGFR if    Date Value Ref Range Status   12/02/2021 >60.0 >60 mL/min/1.73 m^2 Final     eGFR if non    Date Value Ref Range Status   12/02/2021 >60.0 >60 mL/min/1.73 m^2 Final     Comment:     Calculation used to obtain the estimated glomerular filtration  rate (eGFR) is the CKD-EPI equation.        CrCl cannot be calculated (Patient's most recent lab result is older than the maximum 7 days allowed.).  Conclusion         Normal myocardial perfusion scan. There is no evidence of myocardial ischemia or infarction.    The gated perfusion images showed an ejection fraction of 76% at rest. The gated perfusion images showed an ejection fraction of 83% post stress.    There is normal wall motion at rest and post stress.    The EKG portion of this  study is negative for ischemia.  Assessment:     1. Coronary artery disease of native artery of native heart with stable angina pectoris    2. Atherosclerotic PVD with intermittent claudication    3. Bruit of right carotid artery    4. Centrilobular emphysema    5. Calcified granuloma of lung    6. COPD, moderate    7. Essential hypertension    8. History of ST elevation myocardial infarction (STEMI)    9. PVD (peripheral vascular disease)    10. Mixed hyperlipidemia    11. Impaired functional mobility and activity tolerance    12. Nicotine dependence, cigarettes, uncomplicated    13. Near syncope      Pvd s/p bilateral iliac stenting with chronic  sfa has multifactorial etiology for her leg pain including musculoskeletal and may be a component of plantar fascitis on the rt will get podiatry input and will repeat ishan and arterial duplex. Counseled about smoking cessation.   Copd per pulmonary f/u scheduled  Hlp on target needs repeat labs   Htn controlled low salt diet advised.  Cad s/p mi asymptomatic continue current therapy.  Carotid disease asymptomatic continue current therapy smoking cessation advised.  Needs to follow with ortho to address back and knee complaints.   Plan:   Continue current therapy  Cardiac low salt diet.  Risk factor modification and excercise program./weight loss  Smoking cessation counseling  F/u in 6 months with lipid cmp

## 2023-03-03 ENCOUNTER — OFFICE VISIT (OUTPATIENT)
Dept: PODIATRY | Facility: CLINIC | Age: 63
End: 2023-03-03
Payer: MEDICARE

## 2023-03-03 VITALS — WEIGHT: 173.94 LBS | HEIGHT: 65 IN | BODY MASS INDEX: 28.98 KG/M2

## 2023-03-03 DIAGNOSIS — M72.2 PLANTAR FASCIITIS, RIGHT: Primary | ICD-10-CM

## 2023-03-03 DIAGNOSIS — I25.118 CORONARY ARTERY DISEASE OF NATIVE ARTERY OF NATIVE HEART WITH STABLE ANGINA PECTORIS: ICD-10-CM

## 2023-03-03 DIAGNOSIS — M48.062 LUMBAR STENOSIS WITH NEUROGENIC CLAUDICATION: ICD-10-CM

## 2023-03-03 DIAGNOSIS — F17.210 NICOTINE DEPENDENCE, CIGARETTES, UNCOMPLICATED: Chronic | ICD-10-CM

## 2023-03-03 DIAGNOSIS — I70.219 ATHEROSCLEROTIC PVD WITH INTERMITTENT CLAUDICATION: ICD-10-CM

## 2023-03-03 DIAGNOSIS — M79.671 PAIN IN RIGHT FOOT: ICD-10-CM

## 2023-03-03 PROCEDURE — 99999 PR PBB SHADOW E&M-EST. PATIENT-LVL III: ICD-10-PCS | Mod: PBBFAC,HCNC,, | Performed by: PODIATRIST

## 2023-03-03 PROCEDURE — 1159F PR MEDICATION LIST DOCUMENTED IN MEDICAL RECORD: ICD-10-PCS | Mod: HCNC,CPTII,S$GLB,ICN | Performed by: PODIATRIST

## 2023-03-03 PROCEDURE — 99203 OFFICE O/P NEW LOW 30 MIN: CPT | Mod: 25,HCNC,S$GLB,ICN | Performed by: PODIATRIST

## 2023-03-03 PROCEDURE — 99203 PR OFFICE/OUTPT VISIT, NEW, LEVL III, 30-44 MIN: ICD-10-PCS | Mod: 25,HCNC,S$GLB,ICN | Performed by: PODIATRIST

## 2023-03-03 PROCEDURE — 4010F ACE/ARB THERAPY RXD/TAKEN: CPT | Mod: HCNC,CPTII,S$GLB,ICN | Performed by: PODIATRIST

## 2023-03-03 PROCEDURE — 20550 NJX 1 TENDON SHEATH/LIGAMENT: CPT | Mod: HCNC,RT,S$GLB,ICN | Performed by: PODIATRIST

## 2023-03-03 PROCEDURE — 3008F BODY MASS INDEX DOCD: CPT | Mod: HCNC,CPTII,S$GLB,ICN | Performed by: PODIATRIST

## 2023-03-03 PROCEDURE — 20550 PR INJECT TENDON SHEATH/LIGAMENT: ICD-10-PCS | Mod: HCNC,RT,S$GLB,ICN | Performed by: PODIATRIST

## 2023-03-03 PROCEDURE — 1159F MED LIST DOCD IN RCRD: CPT | Mod: HCNC,CPTII,S$GLB,ICN | Performed by: PODIATRIST

## 2023-03-03 PROCEDURE — 1160F RVW MEDS BY RX/DR IN RCRD: CPT | Mod: HCNC,CPTII,S$GLB,ICN | Performed by: PODIATRIST

## 2023-03-03 PROCEDURE — 4010F PR ACE/ARB THEARPY RXD/TAKEN: ICD-10-PCS | Mod: HCNC,CPTII,S$GLB,ICN | Performed by: PODIATRIST

## 2023-03-03 PROCEDURE — 99999 PR PBB SHADOW E&M-EST. PATIENT-LVL III: CPT | Mod: PBBFAC,HCNC,, | Performed by: PODIATRIST

## 2023-03-03 PROCEDURE — 1160F PR REVIEW ALL MEDS BY PRESCRIBER/CLIN PHARMACIST DOCUMENTED: ICD-10-PCS | Mod: HCNC,CPTII,S$GLB,ICN | Performed by: PODIATRIST

## 2023-03-03 PROCEDURE — 3008F PR BODY MASS INDEX (BMI) DOCUMENTED: ICD-10-PCS | Mod: HCNC,CPTII,S$GLB,ICN | Performed by: PODIATRIST

## 2023-03-03 RX ORDER — NAPROXEN 500 MG/1
500 TABLET ORAL 2 TIMES DAILY WITH MEALS
Qty: 60 TABLET | Refills: 0 | Status: SHIPPED | OUTPATIENT
Start: 2023-03-03 | End: 2023-03-03

## 2023-03-03 RX ORDER — TRIAMCINOLONE ACETONIDE 40 MG/ML
40 INJECTION, SUSPENSION INTRA-ARTICULAR; INTRAMUSCULAR ONCE
Status: COMPLETED | OUTPATIENT
Start: 2023-03-03 | End: 2023-03-03

## 2023-03-03 RX ORDER — DEXAMETHASONE SODIUM PHOSPHATE 4 MG/ML
4 INJECTION, SOLUTION INTRA-ARTICULAR; INTRALESIONAL; INTRAMUSCULAR; INTRAVENOUS; SOFT TISSUE ONCE
Status: COMPLETED | OUTPATIENT
Start: 2023-03-03 | End: 2023-03-03

## 2023-03-03 RX ADMIN — TRIAMCINOLONE ACETONIDE 40 MG: 40 INJECTION, SUSPENSION INTRA-ARTICULAR; INTRAMUSCULAR at 12:03

## 2023-03-03 RX ADMIN — DEXAMETHASONE SODIUM PHOSPHATE 4 MG: 4 INJECTION, SOLUTION INTRA-ARTICULAR; INTRALESIONAL; INTRAMUSCULAR; INTRAVENOUS; SOFT TISSUE at 12:03

## 2023-03-03 NOTE — PROGRESS NOTES
Subjective:       Patient ID: Iza Esquivel is a 62 y.o. female.    Chief Complaint: Foot Pain (Pt c/o BL foot pain 6/10, non-diabetic pt wears tennis shoes, PCP Dr. Sharif last seen 23)      HPI: Iza Esquivel complains of moderate to severe pains to the right foot. States pains are sharp and stabbing-like in nature. Pains are to the plantar foot, mostly with walking and standing. Rates the pains at approx. 6/10. States post-static dyskinesia. Denies any recent identifiable trauma. Does limp with gait. No NSAID medications thus far. Pains have been present for the past several weeks to months and the pains have worsened over the past couple of weeks. She does not have a history of plantar fasciitis. States walking and standing causes and/or exacerbates the symptoms. Patient has had no corticosteroid injection(s) to the right foot, prior. Patient's Primary Care Provider is Park Gomez MD. Also have a Hx of PAD and lumbar spine DJD.     Review of patient's allergies indicates:  No Known Allergies    Past Medical History:   Diagnosis Date    Atherosclerotic PVD with intermittent claudication 2020    Cervical cancer     Hyperlipidemia     Hypertension     Right carotid bruit     S/P PTCA (percutaneous transluminal coronary angioplasty) 2020    STEMI: stenting of LCx       Family History   Problem Relation Age of Onset    Cancer Mother         Thyroid    Thyroid cancer Mother     Cancer Father         ?    Transient ischemic attack Father         Carotid artery stenosis, CEA    Diabetes Brother     Hypertension Brother     Cancer Paternal Aunt         Breast ca    Breast cancer Paternal Aunt        Social History     Socioeconomic History    Marital status: Single   Tobacco Use    Smoking status: Every Day     Packs/day: 1.50     Years: 51.00     Pack years: 76.50     Types: Cigarettes     Start date:      Last attempt to quit: 2022     Years since quittin.0    Smokeless  "tobacco: Never    Tobacco comments:     On 3/21/22 patient stated she quit "about a month ago"   Substance and Sexual Activity    Alcohol use: No    Drug use: Never    Sexual activity: Not Currently   Social History Narrative    Lives with sister.  SDM: Ninoska Hicks, sister [phone].     Social Determinants of Health     Financial Resource Strain: Low Risk     Difficulty of Paying Living Expenses: Not hard at all   Food Insecurity: No Food Insecurity    Worried About Running Out of Food in the Last Year: Never true    Ran Out of Food in the Last Year: Never true   Transportation Needs: No Transportation Needs    Lack of Transportation (Medical): No    Lack of Transportation (Non-Medical): No   Physical Activity: Insufficiently Active    Days of Exercise per Week: 1 day    Minutes of Exercise per Session: 20 min   Stress: No Stress Concern Present    Feeling of Stress : Not at all   Social Connections: Unknown    Frequency of Communication with Friends and Family: Once a week    Frequency of Social Gatherings with Friends and Family: More than three times a week    Active Member of Clubs or Organizations: No    Attends Club or Organization Meetings: Never    Marital Status: Never    Housing Stability: Low Risk     Unable to Pay for Housing in the Last Year: No    Number of Places Lived in the Last Year: 2    Unstable Housing in the Last Year: No       Past Surgical History:   Procedure Laterality Date    ABDOMINAL AORTOGRAPHY N/A 5/25/2020    Procedure: Aortogram, Abdominal;  Surgeon: Shelly Jarvis MD;  Location: [facility] CATH LAB;  Service: Cardiology;  Laterality: N/A;    AORTOGRAPHY WITH SERIALOGRAPHY N/A 6/30/2020    Procedure: AORTOGRAM, WITH RUNOFF;  Surgeon: Shelly Jarvis MD;  Location: [facility] CATH LAB;  Service: Cardiology;  Laterality: N/A;    LEFT HEART CATHETERIZATION Left 5/25/2020    Procedure: CATHETERIZATION, HEART, LEFT;  Surgeon: Shelly Jarvis MD;  Location: [facility] CATH LAB;  Service: " "Cardiology;  Laterality: Left;    LEFT HEART CATHETERIZATION Left 6/11/2020    Procedure: CATHETERIZATION, HEART, LEFT;  Surgeon: Shelly Jarvis MD;  Location: [facility] CATH LAB;  Service: Cardiology;  Laterality: Left;    LYMPH NODE BIOPSY      PERCUTANEOUS TRANSLUMINAL BALLOON ANGIOPLASTY OF CORONARY ARTERY  5/25/2020    Procedure: Angioplasty-coronary;  Surgeon: Shelly Jarvis MD;  Location: [facility] CATH LAB;  Service: Cardiology;;       Review of Systems   Constitutional:  Negative for chills, fatigue and fever.   HENT:  Negative for hearing loss.    Eyes:  Negative for photophobia and visual disturbance.   Respiratory:  Negative for cough, chest tightness, shortness of breath and wheezing.    Cardiovascular:  Negative for chest pain and palpitations.   Gastrointestinal:  Negative for constipation, diarrhea, nausea and vomiting.   Endocrine: Negative for cold intolerance and heat intolerance.   Genitourinary:  Negative for flank pain.   Musculoskeletal:  Positive for arthralgias, back pain and gait problem. Negative for neck pain and neck stiffness.   Neurological:  Negative for light-headedness and headaches.   Psychiatric/Behavioral:  Negative for sleep disturbance.        Objective:   Ht 5' 5" (1.651 m)   Wt 78.9 kg (173 lb 15.1 oz)   LMP  (LMP Unknown)   BMI 28.95 kg/m²     X-Ray Lumbar Spine Ap And Lateral  Narrative: EXAM:  XR LUMBAR SPINE AP AND LATERAL    CLINICAL HISTORY:    Low back pain    TECHNIQUE: 3 views of the lumbar spine.    COMPARISON: 04/18/2021 x-ray for comparison.    FINDINGS:    Calcification of aorta and visceral vessels with bilateral iliac artery stents.    Mild degenerative disc space narrowing L5-S1 disc level with minor spondylosis at several levels notably L4-5 and L5-S1.    Minor facet arthrosis at L5-S1.    Similar to the previous study.  Impression:  No acute findings.  See above.    Finalized on: 2/1/2023 3:48 PM By:  Blayne John MD  BRRG# [id number]      2023-02-01 " 15:50:14.509    [facility]      Physical Exam   LOWER EXTREMITY PHYSICAL EXAMINATION    NEUROLOGY: Proprioception is intact. Sensation to light touch is intact. Negative Tinel's Sign and negative Valleix Sign. No neurological sensations with compression of the area of Sanchez's Nerve in the area of the Abductor Hallucis muscle belly.    ORTHOPEDIC: There is tenderness to palpation of the area around the plantar medial calcaneal tubercle on the righg foot. Pains are characterized as sharp and stabbing-like with direct palpation of the area. There is also mild pain to palpation of the immediate plantar aspect of the heel, and mild pains to the lateral band of the fascia. No edema is noted. No fullness is noted. No pains or defects are noted within the plantar fascia at the arch. No plantar fibromas are noted. No defects are noted within the ligament. Dorsiflexion of the MTPJs with simultaneous palpation of the fascia at the arch, does worsen and exacerbate the pains. No pains with medial to lateral compression of the heel. Equinus contracture is noted. Antalgic gait pattern is noted.     DERMATOLOGY: Skin is supple, dry and intact.    Assessment:     1. Plantar fasciitis, right    2. Pain in right foot    3. Atherosclerotic PVD with intermittent claudication    4. Lumbar stenosis with neurogenic claudication    5. Nicotine dependence, cigarettes, uncomplicated    6. Coronary artery disease of native artery of native heart with stable angina pectoris          Plan:     Plantar fasciitis, right  -     triamcinolone acetonide injection 40 mg  -     dexAMETHasone injection 4 mg  -     Discontinue: naproxen (NAPROSYN) 500 MG tablet; Take 1 tablet (500 mg total) by mouth 2 (two) times daily with meals.  Dispense: 60 tablet; Refill: 0    Pain in right foot  -     Discontinue: naproxen (NAPROSYN) 500 MG tablet; Take 1 tablet (500 mg total) by mouth 2 (two) times daily with meals.  Dispense: 60 tablet; Refill: 0    Atherosclerotic  PVD with intermittent claudication  -     Ambulatory referral/consult to Podiatry    Lumbar stenosis with neurogenic claudication    Nicotine dependence, cigarettes, uncomplicated    Coronary artery disease of native artery of native heart with stable angina pectoris      Thorough discussion is had with the patient today, concerning the diagnosis, its etiology, and the treatment algorithm at present.     Patient's past medical history is thoroughly reviewed today with the patient and/or family members. Complete chart review is performed at this time.    Following sterile preparation with alcohol swab and/or betadine paint, injection was given into and around the area of the RLE plantar medial heel, using 25-gauge needle. Injection consisted of approximately 0.5cc of Dexamethasone Sodium Phosphate, 0.5cc of Kenalog-40 and 0.5cc of 0.50% Marcaine w/ Epinephrine. Bandage application thereafter. Patient tolerated procedure well, and without complications or complaints. Patient educated that the area of pathology, might actually be slightly more painful, due to the injection, over the course of the next one to two days.     Restart Gabapentin at 300mg BID for now.    CSI to foot to R/O Lumbar Spine or Foot as etiology.          Future Appointments   Date Time Provider Department Center   3/15/2023  9:45 AM CARDIOVASCULAR 1, ONL ON SPECCPR  [facility]   3/15/2023 10:30 AM CARDIOVASCULAR 1, ON ON SPECCPR  [facility]   6/2/2023 10:10 AM [facility] CT1 LIMIT 500 LBS [facility] CT SCAN [facility]   7/7/2023  7:30 AM Carlos Pak MD ONLC IN PN  [facility]   8/31/2023  8:00 AM LABORATORY, [facility] LAB [facility]   9/7/2023  1:20 PM Shelly Jarvis MD ONLC CARDIO  [facility]

## 2023-03-15 ENCOUNTER — HOSPITAL ENCOUNTER (OUTPATIENT)
Dept: CARDIOLOGY | Facility: HOSPITAL | Age: 63
Discharge: HOME | End: 2023-03-15
Attending: INTERNAL MEDICINE
Payer: MEDICARE

## 2023-03-15 VITALS
HEIGHT: 65 IN | DIASTOLIC BLOOD PRESSURE: 72 MMHG | SYSTOLIC BLOOD PRESSURE: 153 MMHG | BODY MASS INDEX: 28.82 KG/M2 | WEIGHT: 173 LBS

## 2023-03-15 VITALS — HEIGHT: 65 IN | BODY MASS INDEX: 28.82 KG/M2 | WEIGHT: 173 LBS

## 2023-03-15 DIAGNOSIS — I70.219 ATHEROSCLEROTIC PVD WITH INTERMITTENT CLAUDICATION: ICD-10-CM

## 2023-03-15 LAB
LEFT ABI: 0.72
LEFT ANT TIBIAL SYS PSV: 17 CM/S
LEFT ARM BP: 151 MMHG
LEFT CFA PSV: 146 CM/S
LEFT DORSALIS PEDIS: 110 MMHG
LEFT PERONEAL SYS PSV: 14 CM/S
LEFT POPLITEAL PSV: 42 CM/S
LEFT POST TIBIAL SYS PSV: 25 CM/S
LEFT POSTERIOR TIBIAL: 109 MMHG
LEFT PROFUNDA SYS PSV: 404 CM/S
LEFT SUPER FEMORAL DIST SYS PSV: 34 CM/S
LEFT SUPER FEMORAL MID SYS PSV: 73 CM/S
LEFT SUPER FEMORAL OSTIAL SYS PSV: 10 CM/S
LEFT TBI: 0.06
LEFT TIB/PER TRUNK SYS PSV: 43 CM/S
LEFT TOE PRESSURE: 9 MMHG
OHS CV LEFT LOWER EXTREMITY ABI (NO CALC): 0.72
OHS CV RIGHT ABI LOWER EXTREMITY (NO CALC): 0.8
RIGHT ABI: 0.8
RIGHT ANT TIBIAL SYS PSV: 20 CM/S
RIGHT ARM BP: 153 MMHG
RIGHT CFA PSV: 190 CM/S
RIGHT DORSALIS PEDIS: 110 MMHG
RIGHT PERONEAL SYS PSV: 25 CM/S
RIGHT POPLITEAL PSV: 62 CM/S
RIGHT POST TIBIAL SYS PSV: 22 CM/S
RIGHT POSTERIOR TIBIAL: 122 MMHG
RIGHT PROFUNDA SYS PSV: 84 CM/S
RIGHT SUPER FEMORAL DIST SYS PSV: 53 CM/S
RIGHT SUPER FEMORAL MID SYS PSV: 386 CM/S
RIGHT SUPER FEMORAL PROX SYS PSV: 73 CM/S
RIGHT TBI: 0.05
RIGHT TIB/PER TRUNK SYS PSV: 22 CM/S
RIGHT TOE PRESSURE: 7 MMHG

## 2023-03-15 PROCEDURE — 93925 LOWER EXTREMITY STUDY: CPT | Mod: HCNC

## 2023-03-15 PROCEDURE — 93922 UPR/L XTREMITY ART 2 LEVELS: CPT | Mod: HCNC

## 2023-03-15 PROCEDURE — 93922 UPR/L XTREMITY ART 2 LEVELS: CPT | Mod: 26,HCNC,, | Performed by: INTERNAL MEDICINE

## 2023-03-15 PROCEDURE — 93925 CV US DOPPLER ARTERIAL LEGS BILATERAL (CUPID ONLY): ICD-10-PCS | Mod: 26,HCNC,, | Performed by: INTERNAL MEDICINE

## 2023-03-15 PROCEDURE — 93925 LOWER EXTREMITY STUDY: CPT | Mod: 26,HCNC,, | Performed by: INTERNAL MEDICINE

## 2023-03-15 PROCEDURE — 93922 ANKLE BRACHIAL INDICES (ABI): ICD-10-PCS | Mod: 26,HCNC,, | Performed by: INTERNAL MEDICINE

## 2023-03-17 ENCOUNTER — PATIENT MESSAGE (OUTPATIENT)
Dept: CARDIOLOGY | Facility: CLINIC | Age: 63
End: 2023-03-17
Payer: MEDICARE

## 2023-03-17 ENCOUNTER — TELEPHONE (OUTPATIENT)
Dept: CARDIOLOGY | Facility: CLINIC | Age: 63
End: 2023-03-17
Payer: MEDICARE

## 2023-03-23 ENCOUNTER — OFFICE VISIT (OUTPATIENT)
Dept: FAMILY MEDICINE | Facility: CLINIC | Age: 63
End: 2023-03-23
Attending: FAMILY MEDICINE
Payer: MEDICARE

## 2023-03-23 ENCOUNTER — PATIENT MESSAGE (OUTPATIENT)
Dept: FAMILY MEDICINE | Facility: CLINIC | Age: 63
End: 2023-03-23

## 2023-03-23 VITALS
HEIGHT: 65 IN | WEIGHT: 167.13 LBS | SYSTOLIC BLOOD PRESSURE: 130 MMHG | DIASTOLIC BLOOD PRESSURE: 80 MMHG | OXYGEN SATURATION: 97 % | HEART RATE: 89 BPM | BODY MASS INDEX: 27.85 KG/M2 | TEMPERATURE: 99 F

## 2023-03-23 DIAGNOSIS — R93.89 ABNORMAL FINDINGS ON DIAGNOSTIC IMAGING OF OTHER SPECIFIED BODY STRUCTURES: ICD-10-CM

## 2023-03-23 DIAGNOSIS — E06.3 HYPOTHYROIDISM DUE TO HASHIMOTO'S THYROIDITIS: ICD-10-CM

## 2023-03-23 DIAGNOSIS — E03.8 HYPOTHYROIDISM DUE TO HASHIMOTO'S THYROIDITIS: ICD-10-CM

## 2023-03-23 DIAGNOSIS — K59.00 CONSTIPATION, UNSPECIFIED CONSTIPATION TYPE: ICD-10-CM

## 2023-03-23 DIAGNOSIS — R10.31 RIGHT LOWER QUADRANT ABDOMINAL PAIN: Primary | ICD-10-CM

## 2023-03-23 PROCEDURE — 4010F ACE/ARB THERAPY RXD/TAKEN: CPT | Mod: HCNC,CPTII,S$GLB, | Performed by: FAMILY MEDICINE

## 2023-03-23 PROCEDURE — 3008F PR BODY MASS INDEX (BMI) DOCUMENTED: ICD-10-PCS | Mod: HCNC,CPTII,S$GLB, | Performed by: FAMILY MEDICINE

## 2023-03-23 PROCEDURE — 1160F PR REVIEW ALL MEDS BY PRESCRIBER/CLIN PHARMACIST DOCUMENTED: ICD-10-PCS | Mod: HCNC,CPTII,S$GLB, | Performed by: FAMILY MEDICINE

## 2023-03-23 PROCEDURE — 1160F RVW MEDS BY RX/DR IN RCRD: CPT | Mod: HCNC,CPTII,S$GLB, | Performed by: FAMILY MEDICINE

## 2023-03-23 PROCEDURE — 3075F SYST BP GE 130 - 139MM HG: CPT | Mod: HCNC,CPTII,S$GLB, | Performed by: FAMILY MEDICINE

## 2023-03-23 PROCEDURE — 99214 PR OFFICE/OUTPT VISIT, EST, LEVL IV, 30-39 MIN: ICD-10-PCS | Mod: HCNC,S$GLB,, | Performed by: FAMILY MEDICINE

## 2023-03-23 PROCEDURE — 3079F DIAST BP 80-89 MM HG: CPT | Mod: HCNC,CPTII,S$GLB, | Performed by: FAMILY MEDICINE

## 2023-03-23 PROCEDURE — 3008F BODY MASS INDEX DOCD: CPT | Mod: HCNC,CPTII,S$GLB, | Performed by: FAMILY MEDICINE

## 2023-03-23 PROCEDURE — 99999 PR PBB SHADOW E&M-EST. PATIENT-LVL V: CPT | Mod: PBBFAC,HCNC,, | Performed by: FAMILY MEDICINE

## 2023-03-23 PROCEDURE — 4010F PR ACE/ARB THEARPY RXD/TAKEN: ICD-10-PCS | Mod: HCNC,CPTII,S$GLB, | Performed by: FAMILY MEDICINE

## 2023-03-23 PROCEDURE — 1159F MED LIST DOCD IN RCRD: CPT | Mod: HCNC,CPTII,S$GLB, | Performed by: FAMILY MEDICINE

## 2023-03-23 PROCEDURE — 1159F PR MEDICATION LIST DOCUMENTED IN MEDICAL RECORD: ICD-10-PCS | Mod: HCNC,CPTII,S$GLB, | Performed by: FAMILY MEDICINE

## 2023-03-23 PROCEDURE — 3075F PR MOST RECENT SYSTOLIC BLOOD PRESS GE 130-139MM HG: ICD-10-PCS | Mod: HCNC,CPTII,S$GLB, | Performed by: FAMILY MEDICINE

## 2023-03-23 PROCEDURE — 99214 OFFICE O/P EST MOD 30 MIN: CPT | Mod: HCNC,S$GLB,, | Performed by: FAMILY MEDICINE

## 2023-03-23 PROCEDURE — 99999 PR PBB SHADOW E&M-EST. PATIENT-LVL V: ICD-10-PCS | Mod: PBBFAC,HCNC,, | Performed by: FAMILY MEDICINE

## 2023-03-23 PROCEDURE — 3079F PR MOST RECENT DIASTOLIC BLOOD PRESSURE 80-89 MM HG: ICD-10-PCS | Mod: HCNC,CPTII,S$GLB, | Performed by: FAMILY MEDICINE

## 2023-03-23 RX ORDER — POLYETHYLENE GLYCOL 3350 17 G/17G
17 POWDER, FOR SOLUTION ORAL DAILY
Qty: 30 PACKET | Refills: 0 | Status: SHIPPED | OUTPATIENT
Start: 2023-03-23 | End: 2024-02-23

## 2023-03-23 NOTE — PROGRESS NOTES
"Subjective:       Patient ID: Iza Esquivel is a 62 y.o. female.    Chief Complaint: Constipation    62 y old female with a 3 w hx of constipation and r flank pain . She has tried suppositories. Keeping a high fiber intake. No fever , no dysuria , weight loss. BM are pebble like . She had a normal Cologuard 2 y ago . Taking new med prescribed by pod that's starts with letter " N". Compliant with levothyroxine     Constipation  Associated symptoms include abdominal pain. Pertinent negatives include no anorexia. There is no history of abdominal surgery or irritable bowel syndrome.   Abdominal Pain  This is a new problem. The current episode started more than 1 month ago. The onset quality is sudden. The problem occurs constantly. The most recent episode lasted 24 hours. The problem has been unchanged. The pain is located in the RLQ. The pain is at a severity of 6/10. The pain is mild. The quality of the pain is aching, cramping and dull. Associated symptoms include constipation and frequency. Pertinent negatives include no anorexia. The pain is aggravated by being still, bowel movement and coughing. The pain is relieved by Nothing. She has tried nothing for the symptoms. There is no history of abdominal surgery, colon cancer, Crohn's disease, gallstones, GERD, irritable bowel syndrome, pancreatitis, PUD or ulcerative colitis. Patient's medical history does not include kidney stones and UTI.   Review of Systems   Constitutional: Negative.    HENT: Negative.     Eyes: Negative.    Respiratory: Negative.     Cardiovascular: Negative.    Gastrointestinal:  Positive for abdominal pain and constipation. Negative for anorexia.   Genitourinary:  Positive for frequency.   Musculoskeletal: Negative.    Skin: Negative.    Hematological: Negative.      Objective:      Physical Exam  Constitutional:       General: She is not in acute distress.     Appearance: She is well-developed. She is not diaphoretic.   HENT:      Head: " Normocephalic and atraumatic.      Right Ear: External ear normal.      Left Ear: External ear normal.      Mouth/Throat:      Pharynx: No oropharyngeal exudate.   Eyes:      General: No scleral icterus.        Right eye: No discharge.         Left eye: No discharge.      Conjunctiva/sclera: Conjunctivae normal.      Pupils: Pupils are equal, round, and reactive to light.   Neck:      Thyroid: No thyromegaly.      Vascular: No JVD.      Trachea: No tracheal deviation.   Cardiovascular:      Rate and Rhythm: Normal rate and regular rhythm.      Heart sounds: Normal heart sounds. No murmur heard.    No friction rub. No gallop.   Pulmonary:      Effort: Pulmonary effort is normal. No respiratory distress.      Breath sounds: Normal breath sounds. No stridor. No wheezing or rales.   Chest:      Chest wall: No tenderness.   Abdominal:      General: Bowel sounds are normal. There is no distension.      Palpations: Abdomen is soft.      Tenderness: There is abdominal tenderness in the right lower quadrant, left upper quadrant and left lower quadrant. There is no guarding or rebound.   Musculoskeletal:         General: Normal range of motion.      Cervical back: Normal range of motion and neck supple.   Lymphadenopathy:      Cervical: No cervical adenopathy.   Skin:     General: Skin is warm and dry.   Neurological:      Mental Status: She is alert and oriented to person, place, and time.   Psychiatric:         Behavior: Behavior normal.         Thought Content: Thought content normal.         Judgment: Judgment normal.       Assessment:           Iza was seen today for constipation.    Diagnoses and all orders for this visit:    Right lower quadrant abdominal pain  -     CBC Auto Differential; Future  -     Comprehensive Metabolic Panel; Future  -     TSH; Future  -     Urinalysis Microscopic  -     CULTURE, URINE; Future  -     CT Abdomen Pelvis With Contrast; Future    Abnormal findings on diagnostic imaging of other  specified body structures  -     TSH; Future    Constipation, unspecified constipation type  -     polyethylene glycol (GLYCOLAX) 17 gram PwPk; Take 17 g by mouth once daily.  -     X-Ray Abdomen AP 1 View; Future  -     Ambulatory referral/consult to Endo Procedure ; Future       Plan:     Iza was seen today for constipation.    Diagnoses and all orders for this visit:    Right lower quadrant abdominal pain  -     CBC Auto Differential; Future  -     Comprehensive Metabolic Panel; Future  -     TSH; Future  -     Urinalysis Microscopic  -     CULTURE, URINE; Future  -     CT Abdomen Pelvis With Contrast; Future    Abnormal findings on diagnostic imaging of other specified body structures  -     TSH; Future     We will continue to f.u   WS discussed.

## 2023-03-24 ENCOUNTER — PATIENT MESSAGE (OUTPATIENT)
Dept: FAMILY MEDICINE | Facility: CLINIC | Age: 63
End: 2023-03-24
Payer: MEDICARE

## 2023-03-24 ENCOUNTER — HOSPITAL ENCOUNTER (OUTPATIENT)
Dept: RADIOLOGY | Facility: HOSPITAL | Age: 63
Discharge: HOME | End: 2023-03-24
Attending: FAMILY MEDICINE
Payer: MEDICARE

## 2023-03-24 ENCOUNTER — OFFICE VISIT (OUTPATIENT)
Dept: PODIATRY | Facility: CLINIC | Age: 63
End: 2023-03-24
Payer: MEDICARE

## 2023-03-24 DIAGNOSIS — M72.2 PLANTAR FASCIITIS, RIGHT: Primary | ICD-10-CM

## 2023-03-24 DIAGNOSIS — I25.10 CORONARY ARTERY DISEASE INVOLVING NATIVE CORONARY ARTERY OF NATIVE HEART WITHOUT ANGINA PECTORIS: ICD-10-CM

## 2023-03-24 DIAGNOSIS — M79.671 PAIN IN RIGHT FOOT: ICD-10-CM

## 2023-03-24 DIAGNOSIS — M48.062 LUMBAR STENOSIS WITH NEUROGENIC CLAUDICATION: ICD-10-CM

## 2023-03-24 DIAGNOSIS — I70.219 ATHEROSCLEROTIC PVD WITH INTERMITTENT CLAUDICATION: ICD-10-CM

## 2023-03-24 DIAGNOSIS — E78.2 MIXED HYPERLIPIDEMIA: Chronic | ICD-10-CM

## 2023-03-24 DIAGNOSIS — F17.210 NICOTINE DEPENDENCE, CIGARETTES, UNCOMPLICATED: ICD-10-CM

## 2023-03-24 DIAGNOSIS — K59.00 CONSTIPATION, UNSPECIFIED CONSTIPATION TYPE: ICD-10-CM

## 2023-03-24 DIAGNOSIS — R94.31 NONSPECIFIC ABNORMAL ELECTROCARDIOGRAM (ECG) (EKG): ICD-10-CM

## 2023-03-24 PROCEDURE — 1160F PR REVIEW ALL MEDS BY PRESCRIBER/CLIN PHARMACIST DOCUMENTED: ICD-10-PCS | Mod: HCNC,CPTII,S$GLB, | Performed by: PODIATRIST

## 2023-03-24 PROCEDURE — 74018 RADEX ABDOMEN 1 VIEW: CPT | Mod: TC,HCNC,PO

## 2023-03-24 PROCEDURE — 20550 PR INJECT TENDON SHEATH/LIGAMENT: ICD-10-PCS | Mod: HCNC,RT,S$GLB, | Performed by: PODIATRIST

## 2023-03-24 PROCEDURE — 99999 PR PBB SHADOW E&M-EST. PATIENT-LVL III: CPT | Mod: PBBFAC,HCNC,, | Performed by: PODIATRIST

## 2023-03-24 PROCEDURE — 99999 PR PBB SHADOW E&M-EST. PATIENT-LVL III: ICD-10-PCS | Mod: PBBFAC,HCNC,, | Performed by: PODIATRIST

## 2023-03-24 PROCEDURE — 4010F ACE/ARB THERAPY RXD/TAKEN: CPT | Mod: HCNC,CPTII,S$GLB, | Performed by: PODIATRIST

## 2023-03-24 PROCEDURE — 4010F PR ACE/ARB THEARPY RXD/TAKEN: ICD-10-PCS | Mod: HCNC,CPTII,S$GLB, | Performed by: PODIATRIST

## 2023-03-24 PROCEDURE — 74018 RADEX ABDOMEN 1 VIEW: CPT | Mod: 26,HCNC,, | Performed by: RADIOLOGY

## 2023-03-24 PROCEDURE — 1159F PR MEDICATION LIST DOCUMENTED IN MEDICAL RECORD: ICD-10-PCS | Mod: HCNC,CPTII,S$GLB, | Performed by: PODIATRIST

## 2023-03-24 PROCEDURE — 74018 XR ABDOMEN AP 1 VIEW: ICD-10-PCS | Mod: 26,HCNC,, | Performed by: RADIOLOGY

## 2023-03-24 PROCEDURE — 99213 PR OFFICE/OUTPT VISIT, EST, LEVL III, 20-29 MIN: ICD-10-PCS | Mod: 25,HCNC,S$GLB, | Performed by: PODIATRIST

## 2023-03-24 PROCEDURE — 1160F RVW MEDS BY RX/DR IN RCRD: CPT | Mod: HCNC,CPTII,S$GLB, | Performed by: PODIATRIST

## 2023-03-24 PROCEDURE — 20550 NJX 1 TENDON SHEATH/LIGAMENT: CPT | Mod: HCNC,RT,S$GLB, | Performed by: PODIATRIST

## 2023-03-24 PROCEDURE — 99213 OFFICE O/P EST LOW 20 MIN: CPT | Mod: 25,HCNC,S$GLB, | Performed by: PODIATRIST

## 2023-03-24 PROCEDURE — 1159F MED LIST DOCD IN RCRD: CPT | Mod: HCNC,CPTII,S$GLB, | Performed by: PODIATRIST

## 2023-03-24 RX ORDER — TRIAMCINOLONE ACETONIDE 40 MG/ML
40 INJECTION, SUSPENSION INTRA-ARTICULAR; INTRAMUSCULAR ONCE
Status: COMPLETED | OUTPATIENT
Start: 2023-03-24 | End: 2023-03-24

## 2023-03-24 RX ORDER — DEXAMETHASONE SODIUM PHOSPHATE 4 MG/ML
4 INJECTION, SOLUTION INTRA-ARTICULAR; INTRALESIONAL; INTRAMUSCULAR; INTRAVENOUS; SOFT TISSUE ONCE
Status: COMPLETED | OUTPATIENT
Start: 2023-03-24 | End: 2023-03-24

## 2023-03-24 RX ORDER — CLOPIDOGREL BISULFATE 75 MG/1
75 TABLET ORAL DAILY
Qty: 90 TABLET | Refills: 3 | Status: SHIPPED | OUTPATIENT
Start: 2023-03-24 | End: 2024-03-04 | Stop reason: SDUPTHER

## 2023-03-24 RX ORDER — ATORVASTATIN CALCIUM 80 MG/1
80 TABLET, FILM COATED ORAL DAILY
Qty: 90 TABLET | Refills: 3 | Status: SHIPPED | OUTPATIENT
Start: 2023-03-24 | End: 2024-03-04 | Stop reason: SDUPTHER

## 2023-03-24 RX ADMIN — TRIAMCINOLONE ACETONIDE 40 MG: 40 INJECTION, SUSPENSION INTRA-ARTICULAR; INTRAMUSCULAR at 11:03

## 2023-03-24 RX ADMIN — DEXAMETHASONE SODIUM PHOSPHATE 4 MG: 4 INJECTION, SOLUTION INTRA-ARTICULAR; INTRALESIONAL; INTRAMUSCULAR; INTRAVENOUS; SOFT TISSUE at 11:03

## 2023-03-24 NOTE — PROGRESS NOTES
"  Subjective:       Patient ID: Iza Esquivel is a 62 y.o. female.    Chief Complaint: Foot Problem      HPI: Iza Esquivel presents to the office today, for follow-up concerning plantar fasciitis of the right foot. At this time, pains are 2/10 and are described as mild in nature. States less of an analgic gait pattern. States walking and standing is not as painful as compared to prior. To this juncture, patient has had 1 cortisone injections to the right foot. Patient's Primary Care Provider is Park Gomez MD.     Review of patient's allergies indicates:  No Known Allergies    Past Medical History:   Diagnosis Date    Atherosclerotic PVD with intermittent claudication 2020    Cervical cancer     Hyperlipidemia     Hypertension     Right carotid bruit     S/P PTCA (percutaneous transluminal coronary angioplasty) 2020    STEMI: stenting of LCx       Family History   Problem Relation Age of Onset    Cancer Mother         Thyroid    Thyroid cancer Mother     Cancer Father         ?    Transient ischemic attack Father         Carotid artery stenosis, CEA    Diabetes Brother     Hypertension Brother     Cancer Paternal Aunt         Breast ca    Breast cancer Paternal Aunt        Social History     Socioeconomic History    Marital status: Single   Tobacco Use    Smoking status: Every Day     Packs/day: 1.50     Years: 51.00     Pack years: 76.50     Types: Cigarettes     Start date:      Last attempt to quit: 2022     Years since quittin.0    Smokeless tobacco: Never    Tobacco comments:     On 3/21/22 patient stated she quit "about a month ago"   Substance and Sexual Activity    Alcohol use: No    Drug use: Never    Sexual activity: Not Currently   Social History Narrative    Lives with sister.  SDM: Ninoska Hicks, sister [phone].     Social Determinants of Health     Financial Resource Strain: Low Risk     Difficulty of Paying Living Expenses: Not hard at all   Food " Insecurity: No Food Insecurity    Worried About Running Out of Food in the Last Year: Never true    Ran Out of Food in the Last Year: Never true   Transportation Needs: No Transportation Needs    Lack of Transportation (Medical): No    Lack of Transportation (Non-Medical): No   Physical Activity: Insufficiently Active    Days of Exercise per Week: 2 days    Minutes of Exercise per Session: 60 min   Stress: No Stress Concern Present    Feeling of Stress : Not at all   Social Connections: Unknown    Frequency of Communication with Friends and Family: Three times a week    Frequency of Social Gatherings with Friends and Family: More than three times a week    Active Member of Clubs or Organizations: No    Attends Club or Organization Meetings: Never    Marital Status: Never    Housing Stability: Low Risk     Unable to Pay for Housing in the Last Year: No    Number of Places Lived in the Last Year: 2    Unstable Housing in the Last Year: No       Past Surgical History:   Procedure Laterality Date    ABDOMINAL AORTOGRAPHY N/A 5/25/2020    Procedure: Aortogram, Abdominal;  Surgeon: Shelly Jarvis MD;  Location: [facility] CATH LAB;  Service: Cardiology;  Laterality: N/A;    AORTOGRAPHY WITH SERIALOGRAPHY N/A 6/30/2020    Procedure: AORTOGRAM, WITH RUNOFF;  Surgeon: Shelly Jarvis MD;  Location: [facility] CATH LAB;  Service: Cardiology;  Laterality: N/A;    LEFT HEART CATHETERIZATION Left 5/25/2020    Procedure: CATHETERIZATION, HEART, LEFT;  Surgeon: Shelly Jarvis MD;  Location: [facility] CATH LAB;  Service: Cardiology;  Laterality: Left;    LEFT HEART CATHETERIZATION Left 6/11/2020    Procedure: CATHETERIZATION, HEART, LEFT;  Surgeon: Shelly Jarvis MD;  Location: [facility] CATH LAB;  Service: Cardiology;  Laterality: Left;    LYMPH NODE BIOPSY      PERCUTANEOUS TRANSLUMINAL BALLOON ANGIOPLASTY OF CORONARY ARTERY  5/25/2020    Procedure: Angioplasty-coronary;  Surgeon: Shelly Jarvis MD;  Location: [facility] CATH LAB;  Service:  Cardiology;;       Review of Systems   Constitutional:  Negative for chills, fatigue and fever.   HENT:  Negative for hearing loss.    Eyes:  Negative for photophobia and visual disturbance.   Respiratory:  Negative for cough, chest tightness, shortness of breath and wheezing.    Cardiovascular:  Negative for chest pain and palpitations.   Gastrointestinal:  Negative for constipation, diarrhea, nausea and vomiting.   Endocrine: Negative for cold intolerance and heat intolerance.   Genitourinary:  Negative for flank pain.   Musculoskeletal:  Positive for gait problem. Negative for back pain, neck pain and neck stiffness.   Neurological:  Negative for light-headedness and headaches.   Psychiatric/Behavioral:  Negative for sleep disturbance.        Objective:   LMP  (LMP Unknown)     Physical Exam    LOWER EXTREMITY PHYSICAL EXAMINATION    ORTHOPEDIC: There is mild tenderness to palpation of the area around the plantar medial calcaneal tubercle on the right foot. Pains are characterized as sharp and stabbing-like with direct palpation of the area. There is also mild pain to palpation of the immediate plantar aspect of the heel, and no pains to the lateral band of the fascia. No edema is noted. No fullness is noted. No pains or defects are noted within the plantar fascia at the arch. No plantar fibromas are noted. No defects are noted within the ligament. No pains with medial to lateral compression of the heel. Equinus contracture is noted. Non-antalgic gait pattern is noted.     Assessment:     1. Plantar fasciitis, right    2. Pain in right foot    3. Lumbar stenosis with neurogenic claudication    4. Nicotine dependence, cigarettes, uncomplicated          Plan:     Plantar fasciitis, right  -     dexAMETHasone injection 4 mg  -     triamcinolone acetonide injection 40 mg    Pain in right foot    Lumbar stenosis with neurogenic claudication    Nicotine dependence, cigarettes, uncomplicated      Thorough discussion is  had with the patient today, concerning the diagnosis, its etiology, and the treatment algorithm at present.     XRAYS are reviewed in detail with the patient. All questions and concerns regarding findings and its/their implications are outlined and discussed.    Following sterile preparation with alcohol swab and/or betadine paint, injection was given into and around the area of the RLE plantar medial calcaneal tubercle, using 25-gauge needle. Injection consisted of approximately 0.5cc of Dexamethasone Sodium Phosphate, 0.5cc of Kenalog-40 and 0.5cc of 0.50% Marcaine w/ Epinephrine. Bandage application thereafter. Patient tolerated procedure well, and without complications or complaints. Patient educated that the area of pathology, might actually be slightly more painful, due to the injection, over the course of the next one to two days.     Stretching exercises are discussed, taught, and are demonstrated to the patient this afternoon due to concomitant diagnosis of equinus contracture. The relationship between equinus contracture and the other aforementioned pathologies are detailed and outlined to the patient. The patient does acknowledge understanding, and we will embark on a vigorous stretching algorithm for the lower extremity.                 Future Appointments   Date Time Provider Department Center   3/31/2023  3:30 PM PRE-ADMIT, ENDO -[facility] PREADMT [place]   4/10/2023  4:00 PM [facility] CT1 LIMIT 500 LBS [facility] CT SCAN [place]   7/7/2023  7:30 AM Carlos Pak MD ONLC IN PN [facility]   8/31/2023  8:00 AM LABORATORY, [facility]   9/7/2023  1:20 PM Shelly Jarvis MD ONLC CARDIO [facility]

## 2023-03-28 ENCOUNTER — PATIENT MESSAGE (OUTPATIENT)
Dept: FAMILY MEDICINE | Facility: CLINIC | Age: 63
End: 2023-03-28
Payer: MEDICARE

## 2023-03-28 DIAGNOSIS — K59.00 CONSTIPATION, UNSPECIFIED CONSTIPATION TYPE: Primary | ICD-10-CM

## 2023-03-28 RX ORDER — LEVOTHYROXINE SODIUM 88 UG/1
TABLET ORAL
Qty: 30 TABLET | Refills: 1 | Status: SHIPPED | OUTPATIENT
Start: 2023-03-28

## 2023-04-03 PROBLEM — I70.0 ATHEROSCLEROSIS OF AORTA: Status: ACTIVE | Noted: 2023-04-03

## 2023-04-19 ENCOUNTER — PATIENT MESSAGE (OUTPATIENT)
Dept: GASTROENTEROLOGY | Facility: CLINIC | Age: 63
End: 2023-04-19
Payer: MEDICARE

## 2023-05-04 ENCOUNTER — PATIENT MESSAGE (OUTPATIENT)
Dept: CARDIOLOGY | Facility: CLINIC | Age: 63
End: 2023-05-04
Payer: MEDICARE

## 2023-05-10 ENCOUNTER — PATIENT MESSAGE (OUTPATIENT)
Dept: CARDIOLOGY | Facility: CLINIC | Age: 63
End: 2023-05-10
Payer: MEDICARE

## 2023-05-11 ENCOUNTER — PATIENT MESSAGE (OUTPATIENT)
Dept: CARDIOLOGY | Facility: CLINIC | Age: 63
End: 2023-05-11
Payer: MEDICARE

## 2023-06-01 ENCOUNTER — OFFICE VISIT (OUTPATIENT)
Dept: PODIATRY | Facility: CLINIC | Age: 63
End: 2023-06-01
Payer: MEDICARE

## 2023-06-01 VITALS — WEIGHT: 167 LBS | BODY MASS INDEX: 27.82 KG/M2 | HEIGHT: 65 IN

## 2023-06-01 DIAGNOSIS — M48.062 LUMBAR STENOSIS WITH NEUROGENIC CLAUDICATION: ICD-10-CM

## 2023-06-01 DIAGNOSIS — M72.2 PLANTAR FASCIITIS: Primary | ICD-10-CM

## 2023-06-01 DIAGNOSIS — M79.671 PAIN IN RIGHT FOOT: ICD-10-CM

## 2023-06-01 DIAGNOSIS — F17.210 NICOTINE DEPENDENCE, CIGARETTES, UNCOMPLICATED: ICD-10-CM

## 2023-06-01 PROCEDURE — 99213 PR OFFICE/OUTPT VISIT, EST, LEVL III, 20-29 MIN: ICD-10-PCS | Mod: 25,S$GLB,, | Performed by: PODIATRIST

## 2023-06-01 PROCEDURE — 20550 PR INJECT TENDON SHEATH/LIGAMENT: ICD-10-PCS | Mod: RT,S$GLB,, | Performed by: PODIATRIST

## 2023-06-01 PROCEDURE — 99999 PR PBB SHADOW E&M-EST. PATIENT-LVL III: ICD-10-PCS | Mod: PBBFAC,,, | Performed by: PODIATRIST

## 2023-06-01 PROCEDURE — 99213 OFFICE O/P EST LOW 20 MIN: CPT | Mod: 25,S$GLB,, | Performed by: PODIATRIST

## 2023-06-01 PROCEDURE — 99999 PR PBB SHADOW E&M-EST. PATIENT-LVL III: CPT | Mod: PBBFAC,,, | Performed by: PODIATRIST

## 2023-06-01 PROCEDURE — 1159F MED LIST DOCD IN RCRD: CPT | Mod: CPTII,S$GLB,, | Performed by: PODIATRIST

## 2023-06-01 PROCEDURE — 4010F ACE/ARB THERAPY RXD/TAKEN: CPT | Mod: CPTII,S$GLB,, | Performed by: PODIATRIST

## 2023-06-01 PROCEDURE — 3008F PR BODY MASS INDEX (BMI) DOCUMENTED: ICD-10-PCS | Mod: CPTII,S$GLB,, | Performed by: PODIATRIST

## 2023-06-01 PROCEDURE — 3008F BODY MASS INDEX DOCD: CPT | Mod: CPTII,S$GLB,, | Performed by: PODIATRIST

## 2023-06-01 PROCEDURE — 20550 NJX 1 TENDON SHEATH/LIGAMENT: CPT | Mod: RT,S$GLB,, | Performed by: PODIATRIST

## 2023-06-01 PROCEDURE — 4010F PR ACE/ARB THEARPY RXD/TAKEN: ICD-10-PCS | Mod: CPTII,S$GLB,, | Performed by: PODIATRIST

## 2023-06-01 PROCEDURE — 1159F PR MEDICATION LIST DOCUMENTED IN MEDICAL RECORD: ICD-10-PCS | Mod: CPTII,S$GLB,, | Performed by: PODIATRIST

## 2023-06-01 RX ORDER — DEXAMETHASONE SODIUM PHOSPHATE 4 MG/ML
4 INJECTION, SOLUTION INTRA-ARTICULAR; INTRALESIONAL; INTRAMUSCULAR; INTRAVENOUS; SOFT TISSUE ONCE
Status: COMPLETED | OUTPATIENT
Start: 2023-06-01 | End: 2023-06-01

## 2023-06-01 RX ORDER — TRIAMCINOLONE ACETONIDE 40 MG/ML
40 INJECTION, SUSPENSION INTRA-ARTICULAR; INTRAMUSCULAR ONCE
Status: COMPLETED | OUTPATIENT
Start: 2023-06-01 | End: 2023-06-01

## 2023-06-01 RX ADMIN — TRIAMCINOLONE ACETONIDE 40 MG: 40 INJECTION, SUSPENSION INTRA-ARTICULAR; INTRAMUSCULAR at 03:06

## 2023-06-01 RX ADMIN — DEXAMETHASONE SODIUM PHOSPHATE 4 MG: 4 INJECTION, SOLUTION INTRA-ARTICULAR; INTRALESIONAL; INTRAMUSCULAR; INTRAVENOUS; SOFT TISSUE at 03:06

## 2023-06-01 NOTE — PROGRESS NOTES
"  Subjective:       Patient ID: Iza Esquivel is a 62 y.o. female.    Chief Complaint: Heel Pain (C/o right heel pain, plantar aspect, rates pain 10/10, x 1 week, non diabetic wears tennis shoes and socks, Last seen PCP Dr. Gomez 2023)      HPI: Iza Esquivel presents to the office today, for follow-up concerning plantar fasciitis of the right foot. At this time, pains are 10/10 and are described as moderate in nature. States analgic gait pattern. States walking and standing is  painful. To this juncture, patient has had 2 cortisone injections to the right foot. Patient's Primary Care Provider is Park Gomez MD. Has a history of lumbar spine pathology. Was to see Pain Management, but has not.     Review of patient's allergies indicates:  No Known Allergies    Past Medical History:   Diagnosis Date    Atherosclerotic PVD with intermittent claudication 2020    Cervical cancer     Hyperlipidemia     Hypertension     Right carotid bruit     S/P PTCA (percutaneous transluminal coronary angioplasty) 2020    STEMI: stenting of LCx       Family History   Problem Relation Age of Onset    Cancer Mother         Thyroid    Thyroid cancer Mother     Cancer Father         ?    Transient ischemic attack Father         Carotid artery stenosis, CEA    Diabetes Brother     Hypertension Brother     Cancer Paternal Aunt         Breast ca    Breast cancer Paternal Aunt        Social History     Socioeconomic History    Marital status: Single   Tobacco Use    Smoking status: Every Day     Packs/day: 1.50     Years: 51.00     Pack years: 76.50     Types: Cigarettes     Start date:      Last attempt to quit: 2022     Years since quittin.2    Smokeless tobacco: Never    Tobacco comments:     On 3/21/22 patient stated she quit "about a month ago"   Substance and Sexual Activity    Alcohol use: No    Drug use: Never    Sexual activity: Not Currently   Social History Narrative    Lives " with sister.  SDM: Ninoska Hicks, sister [phone].     Social Determinants of Health     Financial Resource Strain: Low Risk     Difficulty of Paying Living Expenses: Not hard at all   Food Insecurity: No Food Insecurity    Worried About Running Out of Food in the Last Year: Never true    Ran Out of Food in the Last Year: Never true   Transportation Needs: No Transportation Needs    Lack of Transportation (Medical): No    Lack of Transportation (Non-Medical): No   Physical Activity: Insufficiently Active    Days of Exercise per Week: 2 days    Minutes of Exercise per Session: 60 min   Stress: No Stress Concern Present    Feeling of Stress : Not at all   Social Connections: Unknown    Frequency of Communication with Friends and Family: Three times a week    Frequency of Social Gatherings with Friends and Family: More than three times a week    Active Member of Clubs or Organizations: No    Attends Club or Organization Meetings: Never    Marital Status: Never    Housing Stability: Low Risk     Unable to Pay for Housing in the Last Year: No    Number of Places Lived in the Last Year: 2    Unstable Housing in the Last Year: No       Past Surgical History:   Procedure Laterality Date    ABDOMINAL AORTOGRAPHY N/A 5/25/2020    Procedure: Aortogram, Abdominal;  Surgeon: Shelly Jarvis MD;  Location: [facility] CATH LAB;  Service: Cardiology;  Laterality: N/A;    AORTOGRAPHY WITH SERIALOGRAPHY N/A 6/30/2020    Procedure: AORTOGRAM, WITH RUNOFF;  Surgeon: Shelly Jarvis MD;  Location: [facility] CATH LAB;  Service: Cardiology;  Laterality: N/A;    LEFT HEART CATHETERIZATION Left 5/25/2020    Procedure: CATHETERIZATION, HEART, LEFT;  Surgeon: Shelly Jarvis MD;  Location: [facility] CATH LAB;  Service: Cardiology;  Laterality: Left;    LEFT HEART CATHETERIZATION Left 6/11/2020    Procedure: CATHETERIZATION, HEART, LEFT;  Surgeon: Shelly Jarvis MD;  Location: [facility] CATH LAB;  Service: Cardiology;  Laterality: Left;    LYMPH NODE  "BIOPSY      PERCUTANEOUS TRANSLUMINAL BALLOON ANGIOPLASTY OF CORONARY ARTERY  5/25/2020    Procedure: Angioplasty-coronary;  Surgeon: Shelly Jarvis MD;  Location: [facility] CATH LAB;  Service: Cardiology;;       Review of Systems   Constitutional:  Negative for chills, fatigue and fever.   HENT:  Negative for hearing loss.    Eyes:  Negative for photophobia and visual disturbance.   Respiratory:  Negative for cough, chest tightness, shortness of breath and wheezing.    Cardiovascular:  Negative for chest pain and palpitations.   Gastrointestinal:  Negative for constipation, diarrhea, nausea and vomiting.   Endocrine: Negative for cold intolerance and heat intolerance.   Genitourinary:  Negative for flank pain.   Musculoskeletal:  Positive for gait problem. Negative for back pain, neck pain and neck stiffness.   Neurological:  Negative for light-headedness and headaches.   Psychiatric/Behavioral:  Negative for sleep disturbance.        Objective:   Ht 5' 5" (1.651 m)   Wt 75.8 kg (167 lb)   LMP  (LMP Unknown)   BMI 27.79 kg/m²     Physical Exam    LOWER EXTREMITY PHYSICAL EXAMINATION    ORTHOPEDIC: There is moderate tenderness to palpation of the area around the plantar medial calcaneal tubercle on the right foot. Pains are characterized as sharp and stabbing-like with direct palpation of the area. There is also moderate pain to palpation of the immediate plantar aspect of the heel, and no pains to the lateral band of the fascia. No edema is noted. No fullness is noted. No pains or defects are noted within the plantar fascia at the arch. No plantar fibromas are noted. No defects are noted within the ligament. No pains with medial to lateral compression of the heel. Equinus contracture is noted. Non-antalgic gait pattern is noted.     Assessment:     1. Plantar fasciitis    2. Pain in right foot    3. Lumbar stenosis with neurogenic claudication    4. Nicotine dependence, cigarettes, uncomplicated          Plan: "     Plantar fasciitis  -     dexAMETHasone injection 4 mg  -     triamcinolone acetonide injection 40 mg    Pain in right foot    Lumbar stenosis with neurogenic claudication    Nicotine dependence, cigarettes, uncomplicated      Thorough discussion is had with the patient today, concerning the diagnosis, its etiology, and the treatment algorithm at present.     XRAYS are reviewed in detail with the patient. All questions and concerns regarding findings and its/their implications are outlined and discussed.    Following sterile preparation with alcohol swab and/or betadine paint, injection was given into and around the area of the RLE plantar medial calcaneal tubercle, using 25-gauge needle. Injection consisted of approximately 0.5cc of Dexamethasone Sodium Phosphate, 0.5cc of Kenalog-40 and 0.5cc of 0.50% Marcaine w/ Epinephrine. Bandage application thereafter. Patient tolerated procedure well, and without complications or complaints. Patient educated that the area of pathology, might actually be slightly more painful, due to the injection, over the course of the next one to two days.     Stretching exercises are discussed, taught, and are demonstrated to the patient this afternoon due to concomitant diagnosis of equinus contracture. The relationship between equinus contracture and the other aforementioned pathologies are detailed and outlined to the patient. The patient does acknowledge understanding, and we will embark on a vigorous stretching algorithm for the lower extremity.    Did discuss in detail the harmful effects of nicotine/tobacco/cigarette/ marijuana smoking, especially in relation to the lower extremity. I do rec. consultation with primary care provider for further discussed of smoking cessation methods. Smoking & Tobacco use cessation couseling was rendered at today's visit; intermediate, bewteen 3 and 10 minutes.                 Future Appointments   Date Time Provider Department Center   8/31/2023   8:00 AM LABORATORY, [facility] LAB DS [facility]   9/7/2023  1:20 PM Shelly Jarvis MD [facility]

## 2023-06-20 ENCOUNTER — TELEPHONE (OUTPATIENT)
Dept: CARDIOLOGY | Facility: CLINIC | Age: 63
End: 2023-06-20
Payer: MEDICARE

## 2023-07-07 ENCOUNTER — PATIENT MESSAGE (OUTPATIENT)
Dept: INFECTIOUS DISEASES | Facility: CLINIC | Age: 63
End: 2023-07-07
Payer: MEDICARE

## 2023-07-14 ENCOUNTER — TELEPHONE (OUTPATIENT)
Dept: CARDIOLOGY | Facility: CLINIC | Age: 63
End: 2023-07-14
Payer: MEDICARE

## 2023-07-14 NOTE — TELEPHONE ENCOUNTER
Returned call to Esperanza but office is closed----- Message from Herlinda Naik sent at 7/14/2023  9:05 AM CDT -----  Regarding: Provider to Provider  Contact: Esperanza Mata from Dr. Wells with [facility] office is requesting a receipt status on a clearance form they faxed to [phone].       Esperanza can be reached at [phone] ext [phone]     Thanks

## 2023-07-19 ENCOUNTER — TELEPHONE (OUTPATIENT)
Dept: CARDIOLOGY | Facility: CLINIC | Age: 63
End: 2023-07-19
Payer: MEDICARE

## 2023-07-19 NOTE — TELEPHONE ENCOUNTER
Refaxed clearance and fax was successful----- Message from Melissa Good sent at 7/19/2023  8:38 AM CDT -----  Contact: Esperanza Gastro Milan Mata from [facility] is calling to speak with the nurse regarding clearance faxed over. Esperanza would like to know was the fax received and addressed. Please give Esperanza a call back at [phone]. Thanks KD.     If Esperanza doesn't answer please leave a voicemail.

## 2023-08-14 ENCOUNTER — PATIENT MESSAGE (OUTPATIENT)
Dept: ADMINISTRATIVE | Facility: OTHER | Age: 63
End: 2023-08-14
Payer: MEDICARE

## 2023-08-31 ENCOUNTER — LAB VISIT (OUTPATIENT)
Dept: LAB | Facility: HOSPITAL | Age: 63
End: 2023-08-31
Attending: INTERNAL MEDICINE
Payer: MEDICARE

## 2023-08-31 DIAGNOSIS — I25.118 CORONARY ARTERY DISEASE OF NATIVE ARTERY OF NATIVE HEART WITH STABLE ANGINA PECTORIS: ICD-10-CM

## 2023-08-31 DIAGNOSIS — E78.2 MIXED HYPERLIPIDEMIA: Chronic | ICD-10-CM

## 2023-08-31 DIAGNOSIS — I70.219 ATHEROSCLEROTIC PVD WITH INTERMITTENT CLAUDICATION: ICD-10-CM

## 2023-08-31 LAB
ALBUMIN SERPL BCP-MCNC: 3.4 G/DL (ref 3.5–5.2)
ALP SERPL-CCNC: 100 U/L (ref 55–135)
ALT SERPL W/O P-5'-P-CCNC: 10 U/L (ref 10–44)
ANION GAP SERPL CALC-SCNC: 11 MMOL/L (ref 8–16)
AST SERPL-CCNC: 18 U/L (ref 10–40)
BILIRUB SERPL-MCNC: 0.3 MG/DL (ref 0.1–1)
BUN SERPL-MCNC: 9 MG/DL (ref 8–23)
CALCIUM SERPL-MCNC: 9.6 MG/DL (ref 8.7–10.5)
CHLORIDE SERPL-SCNC: 103 MMOL/L (ref 95–110)
CHOLEST SERPL-MCNC: 124 MG/DL (ref 120–199)
CHOLEST/HDLC SERPL: 3 {RATIO} (ref 2–5)
CO2 SERPL-SCNC: 31 MMOL/L (ref 23–29)
CREAT SERPL-MCNC: 0.8 MG/DL (ref 0.5–1.4)
EST. GFR  (NO RACE VARIABLE): >60 ML/MIN/1.73 M^2
GLUCOSE SERPL-MCNC: 90 MG/DL (ref 70–110)
HDLC SERPL-MCNC: 42 MG/DL (ref 40–75)
HDLC SERPL: 33.9 % (ref 20–50)
LDLC SERPL CALC-MCNC: 47.8 MG/DL (ref 63–159)
NONHDLC SERPL-MCNC: 82 MG/DL
POTASSIUM SERPL-SCNC: 4.8 MMOL/L (ref 3.5–5.1)
PROT SERPL-MCNC: 7.2 G/DL (ref 6–8.4)
SODIUM SERPL-SCNC: 145 MMOL/L (ref 136–145)
TRIGL SERPL-MCNC: 171 MG/DL (ref 30–150)

## 2023-08-31 PROCEDURE — 80053 COMPREHEN METABOLIC PANEL: CPT | Performed by: INTERNAL MEDICINE

## 2023-08-31 PROCEDURE — 80061 LIPID PANEL: CPT | Performed by: INTERNAL MEDICINE

## 2023-08-31 PROCEDURE — 36415 COLL VENOUS BLD VENIPUNCTURE: CPT | Mod: PO | Performed by: INTERNAL MEDICINE

## 2023-09-06 DIAGNOSIS — I25.118 CORONARY ARTERY DISEASE OF NATIVE ARTERY OF NATIVE HEART WITH STABLE ANGINA PECTORIS: Primary | ICD-10-CM

## 2023-09-07 ENCOUNTER — OFFICE VISIT (OUTPATIENT)
Dept: CARDIOLOGY | Facility: CLINIC | Age: 63
End: 2023-09-07
Payer: MEDICARE

## 2023-09-07 ENCOUNTER — HOSPITAL ENCOUNTER (OUTPATIENT)
Dept: CARDIOLOGY | Facility: HOSPITAL | Age: 63
Discharge: HOME | End: 2023-09-07
Attending: INTERNAL MEDICINE
Payer: MEDICARE

## 2023-09-07 VITALS
HEIGHT: 65 IN | WEIGHT: 171.5 LBS | DIASTOLIC BLOOD PRESSURE: 82 MMHG | BODY MASS INDEX: 28.57 KG/M2 | HEART RATE: 74 BPM | SYSTOLIC BLOOD PRESSURE: 136 MMHG | OXYGEN SATURATION: 98 %

## 2023-09-07 DIAGNOSIS — I70.219 ATHEROSCLEROTIC PVD WITH INTERMITTENT CLAUDICATION: Primary | ICD-10-CM

## 2023-09-07 DIAGNOSIS — E78.2 MIXED HYPERLIPIDEMIA: Chronic | ICD-10-CM

## 2023-09-07 DIAGNOSIS — I25.2 HISTORY OF ST ELEVATION MYOCARDIAL INFARCTION (STEMI): ICD-10-CM

## 2023-09-07 DIAGNOSIS — I70.0 ATHEROSCLEROSIS OF AORTA: ICD-10-CM

## 2023-09-07 DIAGNOSIS — I10 ESSENTIAL HYPERTENSION: ICD-10-CM

## 2023-09-07 DIAGNOSIS — I25.118 CORONARY ARTERY DISEASE OF NATIVE ARTERY OF NATIVE HEART WITH STABLE ANGINA PECTORIS: ICD-10-CM

## 2023-09-07 DIAGNOSIS — R73.09 ELEVATED HEMOGLOBIN A1C: ICD-10-CM

## 2023-09-07 DIAGNOSIS — R09.89 BRUIT OF RIGHT CAROTID ARTERY: ICD-10-CM

## 2023-09-07 DIAGNOSIS — F17.210 NICOTINE DEPENDENCE, CIGARETTES, UNCOMPLICATED: Chronic | ICD-10-CM

## 2023-09-07 DIAGNOSIS — I73.9 PVD (PERIPHERAL VASCULAR DISEASE): ICD-10-CM

## 2023-09-07 DIAGNOSIS — J44.9 COPD, MODERATE: ICD-10-CM

## 2023-09-07 PROCEDURE — 93010 EKG 12-LEAD: ICD-10-PCS | Mod: ,,, | Performed by: INTERNAL MEDICINE

## 2023-09-07 PROCEDURE — 3288F FALL RISK ASSESSMENT DOCD: CPT | Mod: CPTII,S$GLB,, | Performed by: INTERNAL MEDICINE

## 2023-09-07 PROCEDURE — 93005 ELECTROCARDIOGRAM TRACING: CPT

## 2023-09-07 PROCEDURE — 3079F DIAST BP 80-89 MM HG: CPT | Mod: CPTII,S$GLB,, | Performed by: INTERNAL MEDICINE

## 2023-09-07 PROCEDURE — 1160F RVW MEDS BY RX/DR IN RCRD: CPT | Mod: CPTII,S$GLB,, | Performed by: INTERNAL MEDICINE

## 2023-09-07 PROCEDURE — 3008F BODY MASS INDEX DOCD: CPT | Mod: CPTII,S$GLB,, | Performed by: INTERNAL MEDICINE

## 2023-09-07 PROCEDURE — 3079F PR MOST RECENT DIASTOLIC BLOOD PRESSURE 80-89 MM HG: ICD-10-PCS | Mod: CPTII,S$GLB,, | Performed by: INTERNAL MEDICINE

## 2023-09-07 PROCEDURE — 99999 PR PBB SHADOW E&M-EST. PATIENT-LVL IV: ICD-10-PCS | Mod: PBBFAC,,, | Performed by: INTERNAL MEDICINE

## 2023-09-07 PROCEDURE — 3075F SYST BP GE 130 - 139MM HG: CPT | Mod: CPTII,S$GLB,, | Performed by: INTERNAL MEDICINE

## 2023-09-07 PROCEDURE — 3008F PR BODY MASS INDEX (BMI) DOCUMENTED: ICD-10-PCS | Mod: CPTII,S$GLB,, | Performed by: INTERNAL MEDICINE

## 2023-09-07 PROCEDURE — 1101F PT FALLS ASSESS-DOCD LE1/YR: CPT | Mod: CPTII,S$GLB,, | Performed by: INTERNAL MEDICINE

## 2023-09-07 PROCEDURE — 3288F PR FALLS RISK ASSESSMENT DOCUMENTED: ICD-10-PCS | Mod: CPTII,S$GLB,, | Performed by: INTERNAL MEDICINE

## 2023-09-07 PROCEDURE — 99999 PR PBB SHADOW E&M-EST. PATIENT-LVL IV: CPT | Mod: PBBFAC,,, | Performed by: INTERNAL MEDICINE

## 2023-09-07 PROCEDURE — 99214 PR OFFICE/OUTPT VISIT, EST, LEVL IV, 30-39 MIN: ICD-10-PCS | Mod: S$GLB,,, | Performed by: INTERNAL MEDICINE

## 2023-09-07 PROCEDURE — 1159F MED LIST DOCD IN RCRD: CPT | Mod: CPTII,S$GLB,, | Performed by: INTERNAL MEDICINE

## 2023-09-07 PROCEDURE — 3075F PR MOST RECENT SYSTOLIC BLOOD PRESS GE 130-139MM HG: ICD-10-PCS | Mod: CPTII,S$GLB,, | Performed by: INTERNAL MEDICINE

## 2023-09-07 PROCEDURE — 1159F PR MEDICATION LIST DOCUMENTED IN MEDICAL RECORD: ICD-10-PCS | Mod: CPTII,S$GLB,, | Performed by: INTERNAL MEDICINE

## 2023-09-07 PROCEDURE — 93010 ELECTROCARDIOGRAM REPORT: CPT | Mod: ,,, | Performed by: INTERNAL MEDICINE

## 2023-09-07 PROCEDURE — 1160F PR REVIEW ALL MEDS BY PRESCRIBER/CLIN PHARMACIST DOCUMENTED: ICD-10-PCS | Mod: CPTII,S$GLB,, | Performed by: INTERNAL MEDICINE

## 2023-09-07 PROCEDURE — 99214 OFFICE O/P EST MOD 30 MIN: CPT | Mod: S$GLB,,, | Performed by: INTERNAL MEDICINE

## 2023-09-07 PROCEDURE — 1101F PR PT FALLS ASSESS DOC 0-1 FALLS W/OUT INJ PAST YR: ICD-10-PCS | Mod: CPTII,S$GLB,, | Performed by: INTERNAL MEDICINE

## 2023-09-07 PROCEDURE — 4010F PR ACE/ARB THEARPY RXD/TAKEN: ICD-10-PCS | Mod: CPTII,S$GLB,, | Performed by: INTERNAL MEDICINE

## 2023-09-07 PROCEDURE — 4010F ACE/ARB THERAPY RXD/TAKEN: CPT | Mod: CPTII,S$GLB,, | Performed by: INTERNAL MEDICINE

## 2023-09-07 NOTE — PROGRESS NOTES
Subjective:   Patient ID:  Iza Esquivel is a 63 y.o. female who presents for follow up of No chief complaint on file.      HPI  8/25/2020  A 61 yo female with old mi nstemi tobacco use has stopped smoking in July had lcx stent she also had severe limiting claudication with multilevel disease limiting her rehabilitation from her mi. She has iliac stenting performed subsequently she still has residual claudication on the rt calfb she can  Walk 1 mile w/o stopping has gained weight still not compliant with diet . Has been taking meds regularily. seh wants to go back to work no angina has some residual shortness of breath.      12/3/2020  Still not smoking can walk still has some residual calf claudication that is non limiting. Has bilatearl sfa disease s/p bilateral iliac stenting. Has noc hest pain or shortness of breath has swollen rt knee. Has been  compliant with meds and diet.   Her bp is controlled at home. she gained weight.has been snacking a lot walks 4 times weekly. Lipids on target     6/3/2021  Here for f/u cad no angina getting over shingles . Has been able top cut grass for 15 minutes before her legs hurt. No discoloration she has been compliant with meds still smoking now going through cessation program.       8/17/2021  Had hypotension symptomatic then rebound her medical therapy is appropriate no further dizziness of lightheadedness or low blood pressure she is compliant.has noticed her leg symptoms worse she is not able to walk as much as she can she was very limited in her trip      12/9/2021   STILL SMOKING TRIES TO BE COMPLAINT WITH DIET NOT WALKING FOR EXERCISE KIND OF ON AND OFF. NO DISCOLORATION IN FEET HAS GAINED WEIGHT NO ANGINA. NO TIA PALPITATION CHF SYNCOPE. HAS CHEST PAIN WHEN SHE COUGHS OR MOVES   HAS NUMBNESS IN RT BIG TOE NO WEAKNESS.     8/25/2022   here fro f/u  had multiple death in family  Had a fall on her face . She tripped. She still has claudication at half mile sometimes can  walk more. She is smoking. Has chest pain  That is mostly at rest occurs at times with exercise.no discoloration in feet. No ulceration.  Her bp controlled tries to be compliant with diet      3/2/2023   Still smoking had 2 falls due to mechanical issues no cardiac etiology has no chest pain no arrythmias . Has lost 4 lbs .MAC plans to quit smoking compliant with meds. Has rt knee pain . Needs an injection in her back.  Has rt knee pain her knee gives up on her.   Has foot pain upon flexion has also rt calf pain with walking that is not consistent.     9/7/2023  No change in status. Has no chest pain no shortness of breath her blood pressure fluctuates based on he rsalt intake. Her lipids diabetes on target . Denies any limiting claudication.still  smoking   Past Medical History:   Diagnosis Date    Atherosclerotic PVD with intermittent claudication 5/25/2020    Cervical cancer     Hyperlipidemia     Hypertension     Right carotid bruit     S/P PTCA (percutaneous transluminal coronary angioplasty) 05/25/2020    STEMI: stenting of LCx       Past Surgical History:   Procedure Laterality Date    ABDOMINAL AORTOGRAPHY N/A 5/25/2020    Procedure: Aortogram, Abdominal;  Surgeon: Shelly Jarvis MD;  Location: [facility] CATH LAB;  Service: Cardiology;  Laterality: N/A;    AORTOGRAPHY WITH SERIALOGRAPHY N/A 6/30/2020    Procedure: AORTOGRAM, WITH RUNOFF;  Surgeon: Shelly Jarvis MD;  Location: [facility] CATH LAB;  Service: Cardiology;  Laterality: N/A;    LEFT HEART CATHETERIZATION Left 5/25/2020    Procedure: CATHETERIZATION, HEART, LEFT;  Surgeon: Shelly Jarvis MD;  Location: [facility] CATH LAB;  Service: Cardiology;  Laterality: Left;    LEFT HEART CATHETERIZATION Left 6/11/2020    Procedure: CATHETERIZATION, HEART, LEFT;  Surgeon: Shelly Jarvis MD;  Location: [facility] CATH LAB;  Service: Cardiology;  Laterality: Left;    LYMPH NODE BIOPSY      PERCUTANEOUS TRANSLUMINAL BALLOON ANGIOPLASTY OF CORONARY ARTERY  5/25/2020    Procedure:  "Angioplasty-coronary;  Surgeon: Shelly Jarvis MD;  Location: [facility] CATH LAB;  Service: Cardiology;;       Social History     Tobacco Use    Smoking status: Every Day     Current packs/day: 0.00     Average packs/day: 1.5 packs/day for 52.1 years (78.2 ttl pk-yrs)     Types: Cigarettes     Start date:      Last attempt to quit: 2022     Years since quittin.5    Smokeless tobacco: Never    Tobacco comments:     On 3/21/22 patient stated she quit "about a month ago"   Substance Use Topics    Alcohol use: No    Drug use: Never       Family History   Problem Relation Age of Onset    Cancer Mother         Thyroid    Thyroid cancer Mother     Cancer Father         ?    Transient ischemic attack Father         Carotid artery stenosis, CEA    Diabetes Brother     Hypertension Brother     Cancer Paternal Aunt         Breast ca    Breast cancer Paternal Aunt        Current Outpatient Medications   Medication Sig    aspirin (ECOTRIN) 81 MG EC tablet Take 1 tablet (81 mg total) by mouth once daily.    atorvastatin (LIPITOR) 80 MG tablet Take 1 tablet (80 mg total) by mouth once daily.    clopidogreL (PLAVIX) 75 mg tablet Take 1 tablet (75 mg total) by mouth once daily.    isosorbide mononitrate (IMDUR) 60 MG 24 hr tablet Take 1 tablet (60 mg total) by mouth once daily. (Patient taking differently: Take 30 mg by mouth once daily.)    levothyroxine (SYNTHROID) 88 MCG tablet 1 tab po qd  before breakfast    LINZESS 145 mcg Cap capsule TAKE 1 CAPSULE(145 MCG) BY MOUTH BEFORE BREAKFAST    losartan (COZAAR) 25 MG tablet TAKE 1 TABLET(25 MG) BY MOUTH TWICE DAILY    metoprolol tartrate (LOPRESSOR) 25 MG tablet TAKE 1 TABLET(25 MG) BY MOUTH TWICE DAILY (Patient taking differently: 12.5 mg.)    pantoprazole (PROTONIX) 40 MG tablet Take 1 tablet (40 mg total) by mouth once daily.    polyethylene glycol (GLYCOLAX) 17 gram PwPk Take 17 g by mouth once daily.     No current facility-administered medications for this visit. "     Current Outpatient Medications on File Prior to Visit   Medication Sig    aspirin (ECOTRIN) 81 MG EC tablet Take 1 tablet (81 mg total) by mouth once daily.    atorvastatin (LIPITOR) 80 MG tablet Take 1 tablet (80 mg total) by mouth once daily.    clopidogreL (PLAVIX) 75 mg tablet Take 1 tablet (75 mg total) by mouth once daily.    isosorbide mononitrate (IMDUR) 60 MG 24 hr tablet Take 1 tablet (60 mg total) by mouth once daily. (Patient taking differently: Take 30 mg by mouth once daily.)    levothyroxine (SYNTHROID) 88 MCG tablet 1 tab po qd  before breakfast    LINZESS 145 mcg Cap capsule TAKE 1 CAPSULE(145 MCG) BY MOUTH BEFORE BREAKFAST    losartan (COZAAR) 25 MG tablet TAKE 1 TABLET(25 MG) BY MOUTH TWICE DAILY    metoprolol tartrate (LOPRESSOR) 25 MG tablet TAKE 1 TABLET(25 MG) BY MOUTH TWICE DAILY (Patient taking differently: 12.5 mg.)    pantoprazole (PROTONIX) 40 MG tablet Take 1 tablet (40 mg total) by mouth once daily.    polyethylene glycol (GLYCOLAX) 17 gram PwPk Take 17 g by mouth once daily.     No current facility-administered medications on file prior to visit.     Review of patient's allergies indicates:  No Known Allergies   Review of Systems   Constitutional: Negative for diaphoresis, malaise/fatigue and weight gain.   HENT:  Negative for hoarse voice.    Eyes:  Negative for double vision and visual disturbance.   Cardiovascular:  Negative for chest pain, claudication, cyanosis, dyspnea on exertion, irregular heartbeat, leg swelling, near-syncope, orthopnea, palpitations, paroxysmal nocturnal dyspnea and syncope.   Respiratory:  Negative for cough, hemoptysis, shortness of breath and snoring.    Hematologic/Lymphatic: Negative for bleeding problem. Does not bruise/bleed easily.   Skin:  Negative for color change and poor wound healing.   Musculoskeletal:  Negative for muscle cramps, muscle weakness and myalgias.   Gastrointestinal:  Negative for bloating, abdominal pain, change in bowel habit,  diarrhea, heartburn, hematemesis, hematochezia, melena and nausea.   Neurological:  Negative for excessive daytime sleepiness, dizziness, headaches, light-headedness, loss of balance, numbness and weakness.   Psychiatric/Behavioral:  Negative for memory loss. The patient does not have insomnia.    Allergic/Immunologic: Negative for hives.       Objective:   Physical Exam  Vitals and nursing note reviewed.   Constitutional:       General: She is not in acute distress.     Appearance: Normal appearance. She is well-developed. She is not ill-appearing.   HENT:      Head: Normocephalic and atraumatic.   Eyes:      General: No scleral icterus.     Pupils: Pupils are equal, round, and reactive to light.   Neck:      Thyroid: No thyromegaly.      Vascular: Normal carotid pulses. No carotid bruit, hepatojugular reflux or JVD.      Trachea: No tracheal deviation.   Cardiovascular:      Rate and Rhythm: Normal rate and regular rhythm.      Pulses:           Popliteal pulses are 1+ on the right side and 1+ on the left side.        Dorsalis pedis pulses are 0 on the right side and 0 on the left side.        Posterior tibial pulses are 1+ on the right side and 1+ on the left side.      Heart sounds: Normal heart sounds. No murmur heard.     No friction rub. No gallop.   Pulmonary:      Effort: Pulmonary effort is normal. No respiratory distress.      Breath sounds: Normal breath sounds. No wheezing, rhonchi or rales.   Chest:      Chest wall: No tenderness.   Abdominal:      General: Bowel sounds are normal. There is no abdominal bruit.      Palpations: Abdomen is soft. There is no hepatomegaly or pulsatile mass.      Tenderness: There is no abdominal tenderness.   Musculoskeletal:      Right shoulder: No deformity.      Cervical back: Normal range of motion and neck supple.   Skin:     General: Skin is warm and dry.      Findings: No erythema or rash.      Nails: There is no clubbing.   Neurological:      Mental Status: She is  "alert and oriented to person, place, and time.      Cranial Nerves: No cranial nerve deficit.      Coordination: Coordination normal.   Psychiatric:         Speech: Speech normal.         Behavior: Behavior normal.       Vitals:    09/07/23 1312 09/07/23 1313   BP: 134/78 136/82   BP Location: Left arm Right arm   Patient Position: Sitting Sitting   BP Method: Medium (Manual) Medium (Manual)   Pulse: 74    SpO2: 98%    Weight: 77.8 kg (171 lb 8.3 oz)    Height: 5' 5" (1.651 m)      Lab Results   Component Value Date    CHOL 124 08/31/2023    CHOL 125 08/18/2022    CHOL 131 12/02/2021      Body mass index is 28.54 kg/m².   Lab Results   Component Value Date    HGBA1C 5.7 (H) 11/23/2020      BMP  Lab Results   Component Value Date     08/31/2023    K 4.8 08/31/2023     08/31/2023    CO2 31 (H) 08/31/2023    BUN 9 08/31/2023    CREATININE 0.8 08/31/2023    CALCIUM 9.6 08/31/2023    ANIONGAP 11 08/31/2023    EGFRNORACEVR >60.0 08/31/2023      Lab Results   Component Value Date    HDL 42 08/31/2023    HDL 42 08/18/2022    HDL 50 12/02/2021     Lab Results   Component Value Date    LDLCALC 47.8 (L) 08/31/2023    LDLCALC 52.2 (L) 08/18/2022    LDLCALC 57.4 (L) 12/02/2021     Lab Results   Component Value Date    TRIG 171 (H) 08/31/2023    TRIG 154 (H) 08/18/2022    TRIG 118 12/02/2021     Lab Results   Component Value Date    CHOLHDL 33.9 08/31/2023    CHOLHDL 33.6 08/18/2022    CHOLHDL 38.2 12/02/2021       Chemistry        Component Value Date/Time     08/31/2023 0803    K 4.8 08/31/2023 0803     08/31/2023 0803    CO2 31 (H) 08/31/2023 0803    BUN 9 08/31/2023 0803    CREATININE 0.8 08/31/2023 0803    GLU 90 08/31/2023 0803        Component Value Date/Time    CALCIUM 9.6 08/31/2023 0803    ALKPHOS 100 08/31/2023 0803    AST 18 08/31/2023 0803    ALT 10 08/31/2023 0803    BILITOT 0.3 08/31/2023 0803    ESTGFRAFRICA >60.0 12/02/2021 0758    EGFRNONAA >60.0 12/02/2021 0758          Lab Results "   Component Value Date    TSH 7.153 (H) 03/24/2023     Lab Results   Component Value Date    INR 1.0 09/12/2020    INR 1.3 (H) 09/12/2020    INR 1.0 07/20/2020     Lab Results   Component Value Date    WBC 9.63 03/24/2023    HGB 11.9 (L) 03/24/2023    HCT 39.8 03/24/2023    MCV 92 03/24/2023     03/24/2023     BMP  Sodium   Date Value Ref Range Status   08/31/2023 145 136 - 145 mmol/L Final     Potassium   Date Value Ref Range Status   08/31/2023 4.8 3.5 - 5.1 mmol/L Final     Chloride   Date Value Ref Range Status   08/31/2023 103 95 - 110 mmol/L Final     CO2   Date Value Ref Range Status   08/31/2023 31 (H) 23 - 29 mmol/L Final     BUN   Date Value Ref Range Status   08/31/2023 9 8 - 23 mg/dL Final     Creatinine   Date Value Ref Range Status   08/31/2023 0.8 0.5 - 1.4 mg/dL Final     Calcium   Date Value Ref Range Status   08/31/2023 9.6 8.7 - 10.5 mg/dL Final     Anion Gap   Date Value Ref Range Status   08/31/2023 11 8 - 16 mmol/L Final     eGFR if    Date Value Ref Range Status   12/02/2021 >60.0 >60 mL/min/1.73 m^2 Final     eGFR if non    Date Value Ref Range Status   12/02/2021 >60.0 >60 mL/min/1.73 m^2 Final     Comment:     Calculation used to obtain the estimated glomerular filtration  rate (eGFR) is the CKD-EPI equation.        CrCl cannot be calculated (Patient's most recent lab result is older than the maximum 7 days allowed.).    Assessment:     1. Atherosclerotic PVD with intermittent claudication    2. COPD, moderate    3. Atherosclerosis of aorta    4. Bruit of right carotid artery    5. Coronary artery disease of native artery of native heart with stable angina pectoris    6. Essential hypertension    7. History of ST elevation myocardial infarction (STEMI)    8. Mixed hyperlipidemia    9. PVD (peripheral vascular disease)    10. Nicotine dependence, cigarettes, uncomplicated      Claudication stable needs to stop smoking exam unchanged she was counseled  discussed limb loss in setting pvd diabetes and smoking  Htn controlled in digital program discussed low salt diet smoking cessation.   Copd appears stable follows with pulmonary discussed smoking cessation   Cad old mi asymptomatic stable appropriate therapy on board   Hlp ldl on target continue same stable.   A1c elevated suggest prediabetes counsled about low carb diet starches control willr epeat on f/u   Plan:   Continue current therapy  Cardiac low salt diet.  Risk factor modification and excercise program.  Smoking cessation counseling  F/u in 6 months with lipid cmp a1c

## 2023-09-27 DIAGNOSIS — I25.10 CORONARY ARTERY DISEASE INVOLVING NATIVE CORONARY ARTERY OF NATIVE HEART WITHOUT ANGINA PECTORIS: ICD-10-CM

## 2023-09-27 RX ORDER — ISOSORBIDE MONONITRATE 60 MG/1
60 TABLET, EXTENDED RELEASE ORAL
Qty: 30 TABLET | Refills: 6 | Status: SHIPPED | OUTPATIENT
Start: 2023-09-27

## 2023-11-29 DIAGNOSIS — R94.31 NONSPECIFIC ABNORMAL ELECTROCARDIOGRAM (ECG) (EKG): ICD-10-CM

## 2023-11-29 DIAGNOSIS — R09.89 BRUIT OF RIGHT CAROTID ARTERY: ICD-10-CM

## 2023-11-29 DIAGNOSIS — I25.10 CORONARY ARTERY DISEASE INVOLVING NATIVE CORONARY ARTERY OF NATIVE HEART WITHOUT ANGINA PECTORIS: ICD-10-CM

## 2023-11-29 DIAGNOSIS — E78.2 MIXED HYPERLIPIDEMIA: Chronic | ICD-10-CM

## 2023-11-29 DIAGNOSIS — Z72.0 TOBACCO ABUSE: Chronic | ICD-10-CM

## 2023-11-29 DIAGNOSIS — Z01.818 PRE-OP EVALUATION: ICD-10-CM

## 2023-11-29 DIAGNOSIS — I70.219 ATHEROSCLEROTIC PVD WITH INTERMITTENT CLAUDICATION: ICD-10-CM

## 2023-11-29 RX ORDER — PANTOPRAZOLE SODIUM 40 MG/1
40 TABLET, DELAYED RELEASE ORAL DAILY
Qty: 90 TABLET | Refills: 3 | Status: SHIPPED | OUTPATIENT
Start: 2023-11-29 | End: 2024-03-04 | Stop reason: SDUPTHER

## 2024-01-02 ENCOUNTER — TELEPHONE (OUTPATIENT)
Dept: CARDIOLOGY | Facility: CLINIC | Age: 64
End: 2024-01-02
Payer: MEDICARE

## 2024-01-02 NOTE — TELEPHONE ENCOUNTER
[facility] for patient to call back----- Message from Alisha Moser sent at 1/2/2024 11:42 AM CST -----  Contact: Iza Pickett is calling to speak with the nurse regarding [employer]. Please call patient at .[phone].

## 2024-02-06 DIAGNOSIS — I25.118 CORONARY ARTERY DISEASE OF NATIVE ARTERY OF NATIVE HEART WITH STABLE ANGINA PECTORIS: ICD-10-CM

## 2024-02-06 DIAGNOSIS — I70.219 ATHEROSCLEROTIC PVD WITH INTERMITTENT CLAUDICATION: Primary | ICD-10-CM

## 2024-02-08 ENCOUNTER — HOSPITAL ENCOUNTER (OUTPATIENT)
Dept: CARDIOLOGY | Facility: HOSPITAL | Age: 64
Discharge: HOME | End: 2024-02-08
Attending: INTERNAL MEDICINE
Payer: MEDICARE

## 2024-02-08 ENCOUNTER — OFFICE VISIT (OUTPATIENT)
Dept: CARDIOLOGY | Facility: CLINIC | Age: 64
End: 2024-02-08
Payer: MEDICARE

## 2024-02-08 VITALS
WEIGHT: 165 LBS | SYSTOLIC BLOOD PRESSURE: 173 MMHG | BODY MASS INDEX: 27.49 KG/M2 | HEIGHT: 65 IN | DIASTOLIC BLOOD PRESSURE: 70 MMHG | DIASTOLIC BLOOD PRESSURE: 70 MMHG | HEIGHT: 65 IN | BODY MASS INDEX: 27.49 KG/M2 | WEIGHT: 165 LBS | SYSTOLIC BLOOD PRESSURE: 173 MMHG

## 2024-02-08 VITALS
DIASTOLIC BLOOD PRESSURE: 90 MMHG | HEIGHT: 65 IN | SYSTOLIC BLOOD PRESSURE: 134 MMHG | BODY MASS INDEX: 27.63 KG/M2 | OXYGEN SATURATION: 96 % | WEIGHT: 165.81 LBS | HEART RATE: 73 BPM

## 2024-02-08 DIAGNOSIS — I10 ESSENTIAL HYPERTENSION: ICD-10-CM

## 2024-02-08 DIAGNOSIS — I70.219 ATHEROSCLEROTIC PVD WITH INTERMITTENT CLAUDICATION: Primary | ICD-10-CM

## 2024-02-08 DIAGNOSIS — R09.89 BRUIT OF RIGHT CAROTID ARTERY: ICD-10-CM

## 2024-02-08 DIAGNOSIS — I25.2 HISTORY OF ST ELEVATION MYOCARDIAL INFARCTION (STEMI): ICD-10-CM

## 2024-02-08 DIAGNOSIS — I25.118 CORONARY ARTERY DISEASE OF NATIVE ARTERY OF NATIVE HEART WITH STABLE ANGINA PECTORIS: ICD-10-CM

## 2024-02-08 DIAGNOSIS — I70.0 ATHEROSCLEROSIS OF AORTA: ICD-10-CM

## 2024-02-08 DIAGNOSIS — M79.609 PAIN IN EXTREMITY, UNSPECIFIED EXTREMITY: ICD-10-CM

## 2024-02-08 DIAGNOSIS — F17.210 NICOTINE DEPENDENCE, CIGARETTES, UNCOMPLICATED: Chronic | ICD-10-CM

## 2024-02-08 DIAGNOSIS — E78.2 MIXED HYPERLIPIDEMIA: Chronic | ICD-10-CM

## 2024-02-08 DIAGNOSIS — I70.219 ATHEROSCLEROTIC PVD WITH INTERMITTENT CLAUDICATION: ICD-10-CM

## 2024-02-08 DIAGNOSIS — I73.9 PVD (PERIPHERAL VASCULAR DISEASE): ICD-10-CM

## 2024-02-08 DIAGNOSIS — J44.9 COPD, MODERATE: ICD-10-CM

## 2024-02-08 DIAGNOSIS — J43.2 CENTRILOBULAR EMPHYSEMA: ICD-10-CM

## 2024-02-08 LAB
OHS QRS DURATION: 84 MS
OHS QTC CALCULATION: 396 MS

## 2024-02-08 PROCEDURE — 93005 ELECTROCARDIOGRAM TRACING: CPT

## 2024-02-08 PROCEDURE — 3288F FALL RISK ASSESSMENT DOCD: CPT | Mod: CPTII,S$GLB,, | Performed by: INTERNAL MEDICINE

## 2024-02-08 PROCEDURE — 99214 OFFICE O/P EST MOD 30 MIN: CPT | Mod: S$GLB,,, | Performed by: INTERNAL MEDICINE

## 2024-02-08 PROCEDURE — 1160F RVW MEDS BY RX/DR IN RCRD: CPT | Mod: CPTII,S$GLB,, | Performed by: INTERNAL MEDICINE

## 2024-02-08 PROCEDURE — 1125F AMNT PAIN NOTED PAIN PRSNT: CPT | Mod: CPTII,S$GLB,, | Performed by: INTERNAL MEDICINE

## 2024-02-08 PROCEDURE — 93925 LOWER EXTREMITY STUDY: CPT | Mod: 26,,, | Performed by: INTERNAL MEDICINE

## 2024-02-08 PROCEDURE — 93922 UPR/L XTREMITY ART 2 LEVELS: CPT | Mod: 26,,, | Performed by: INTERNAL MEDICINE

## 2024-02-08 PROCEDURE — 93925 LOWER EXTREMITY STUDY: CPT

## 2024-02-08 PROCEDURE — 1159F MED LIST DOCD IN RCRD: CPT | Mod: CPTII,S$GLB,, | Performed by: INTERNAL MEDICINE

## 2024-02-08 PROCEDURE — 99999 PR PBB SHADOW E&M-EST. PATIENT-LVL IV: CPT | Mod: PBBFAC,,, | Performed by: INTERNAL MEDICINE

## 2024-02-08 PROCEDURE — 3075F SYST BP GE 130 - 139MM HG: CPT | Mod: CPTII,S$GLB,, | Performed by: INTERNAL MEDICINE

## 2024-02-08 PROCEDURE — 93010 ELECTROCARDIOGRAM REPORT: CPT | Mod: ,,, | Performed by: INTERNAL MEDICINE

## 2024-02-08 PROCEDURE — 3008F BODY MASS INDEX DOCD: CPT | Mod: CPTII,S$GLB,, | Performed by: INTERNAL MEDICINE

## 2024-02-08 PROCEDURE — 3079F DIAST BP 80-89 MM HG: CPT | Mod: CPTII,S$GLB,, | Performed by: INTERNAL MEDICINE

## 2024-02-08 PROCEDURE — 93922 UPR/L XTREMITY ART 2 LEVELS: CPT

## 2024-02-08 PROCEDURE — 1101F PT FALLS ASSESS-DOCD LE1/YR: CPT | Mod: CPTII,S$GLB,, | Performed by: INTERNAL MEDICINE

## 2024-02-08 NOTE — PROGRESS NOTES
Subjective:   Patient ID:  Iza Esquivel is a 63 y.o. female who presents for follow up of No chief complaint on file.      HPI  8/25/2020  A 59 yo female with old mi nstemi tobacco use has stopped smoking in July had lcx stent she also had severe limiting claudication with multilevel disease limiting her rehabilitation from her mi. She has iliac stenting performed subsequently she still has residual claudication on the rt calfb she can  Walk 1 mile w/o stopping has gained weight still not compliant with diet . Has been taking meds regularily. seh wants to go back to work no angina has some residual shortness of breath.      12/3/2020  Still not smoking can walk still has some residual calf claudication that is non limiting. Has bilatearl sfa disease s/p bilateral iliac stenting. Has noc hest pain or shortness of breath has swollen rt knee. Has been  compliant with meds and diet.   Her bp is controlled at home. she gained weight.has been snacking a lot walks 4 times weekly. Lipids on target     6/3/2021  Here for f/u cad no angina getting over shingles . Has been able top cut grass for 15 minutes before her legs hurt. No discoloration she has been compliant with meds still smoking now going through cessation program.       8/17/2021  Had hypotension symptomatic then rebound her medical therapy is appropriate no further dizziness of lightheadedness or low blood pressure she is compliant.has noticed her leg symptoms worse she is not able to walk as much as she can she was very limited in her trip      12/9/2021   STILL SMOKING TRIES TO BE COMPLAINT WITH DIET NOT WALKING FOR EXERCISE KIND OF ON AND OFF. NO DISCOLORATION IN FEET HAS GAINED WEIGHT NO ANGINA. NO TIA PALPITATION CHF SYNCOPE. HAS CHEST PAIN WHEN SHE COUGHS OR MOVES   HAS NUMBNESS IN RT BIG TOE NO WEAKNESS.     8/25/2022   here fro f/u  had multiple death in family  Had a fall on her face . She tripped. She still has claudication at half mile sometimes can  walk more. She is smoking. Has chest pain  That is mostly at rest occurs at times with exercise.no discoloration in feet. No ulceration.  Her bp controlled tries to be compliant with diet      3/2/2023   Still smoking had 2 falls due to mechanical issues no cardiac etiology has no chest pain no arrythmias . Has lost 4 lbs .MAC plans to quit smoking compliant with meds. Has rt knee pain . Needs an injection in her back.  Has rt knee pain her knee gives up on her.   Has foot pain upon flexion has also rt calf pain with walking that is not consistent.      9/7/2023  No change in status. Has no chest pain no shortness of breath her blood pressure fluctuates based on he rsalt intake. Her lipids diabetes on target . Denies any limiting claudication.still  smoking     2/8/2024   Earlier ead xpectyed visit had health assessment her ishan on the left is 0.32 which is a drop from previous. Has  worsening claudication on the left side very limited activity. Has to stop for relief affecting lifestyle and daily activities. Has managed to stop smoking has been losing weight more compliant with diet and meds.   Past Medical History:   Diagnosis Date    Atherosclerotic PVD with intermittent claudication 5/25/2020    Cervical cancer     Hyperlipidemia     Hypertension     Right carotid bruit     S/P PTCA (percutaneous transluminal coronary angioplasty) 05/25/2020    STEMI: stenting of LCx       Past Surgical History:   Procedure Laterality Date    ABDOMINAL AORTOGRAPHY N/A 5/25/2020    Procedure: Aortogram, Abdominal;  Surgeon: Shelly Jarvis MD;  Location: [facility] CATH LAB;  Service: Cardiology;  Laterality: N/A;    AORTOGRAPHY WITH SERIALOGRAPHY N/A 6/30/2020    Procedure: AORTOGRAM, WITH RUNOFF;  Surgeon: Shelly Jarvis MD;  Location: [facility] CATH LAB;  Service: Cardiology;  Laterality: N/A;    LEFT HEART CATHETERIZATION Left 5/25/2020    Procedure: CATHETERIZATION, HEART, LEFT;  Surgeon: Shelly Jarvis MD;  Location: [facility] CATH LAB;  " Service: Cardiology;  Laterality: Left;    LEFT HEART CATHETERIZATION Left 2020    Procedure: CATHETERIZATION, HEART, LEFT;  Surgeon: Shelly Jarvis MD;  Location: [facility] CATH LAB;  Service: Cardiology;  Laterality: Left;    LYMPH NODE BIOPSY      PERCUTANEOUS TRANSLUMINAL BALLOON ANGIOPLASTY OF CORONARY ARTERY  2020    Procedure: Angioplasty-coronary;  Surgeon: Shelly Jarvis MD;  Location: [facility] CATH LAB;  Service: Cardiology;;       Social History     Tobacco Use    Smoking status: Every Day     Current packs/day: 0.00     Average packs/day: 1.5 packs/day for 52.1 years (78.2 ttl pk-yrs)     Types: Cigarettes     Start date:      Last attempt to quit: 2022     Years since quittin.9    Smokeless tobacco: Never    Tobacco comments:     On 3/21/22 patient stated she quit "about a month ago"   Substance Use Topics    Alcohol use: No    Drug use: Never       Family History   Problem Relation Age of Onset    Cancer Mother         Thyroid    Thyroid cancer Mother     Cancer Father         ?    Transient ischemic attack Father         Carotid artery stenosis, CEA    Diabetes Brother     Hypertension Brother     Cancer Paternal Aunt         Breast ca    Breast cancer Paternal Aunt        Current Outpatient Medications   Medication Sig    aspirin (ECOTRIN) 81 MG EC tablet Take 1 tablet (81 mg total) by mouth once daily.    atorvastatin (LIPITOR) 80 MG tablet Take 1 tablet (80 mg total) by mouth once daily.    clopidogreL (PLAVIX) 75 mg tablet Take 1 tablet (75 mg total) by mouth once daily.    isosorbide mononitrate (IMDUR) 60 MG 24 hr tablet TAKE 1 TABLET(60 MG) BY MOUTH EVERY DAY    levothyroxine (SYNTHROID) 88 MCG tablet 1 tab po qd  before breakfast    LINZESS 145 mcg Cap capsule TAKE 1 CAPSULE(145 MCG) BY MOUTH BEFORE BREAKFAST    losartan (COZAAR) 25 MG tablet TAKE 1 TABLET(25 MG) BY MOUTH TWICE DAILY    metoprolol tartrate (LOPRESSOR) 25 MG tablet TAKE 1 TABLET(25 MG) BY MOUTH TWICE DAILY " (Patient taking differently: 12.5 mg.)    pantoprazole (PROTONIX) 40 MG tablet TAKE 1 TABLET (40 MG TOTAL) BY MOUTH ONCE DAILY.    polyethylene glycol (GLYCOLAX) 17 gram PwPk Take 17 g by mouth once daily.     No current facility-administered medications for this visit.     Current Outpatient Medications on File Prior to Visit   Medication Sig    aspirin (ECOTRIN) 81 MG EC tablet Take 1 tablet (81 mg total) by mouth once daily.    atorvastatin (LIPITOR) 80 MG tablet Take 1 tablet (80 mg total) by mouth once daily.    clopidogreL (PLAVIX) 75 mg tablet Take 1 tablet (75 mg total) by mouth once daily.    isosorbide mononitrate (IMDUR) 60 MG 24 hr tablet TAKE 1 TABLET(60 MG) BY MOUTH EVERY DAY    levothyroxine (SYNTHROID) 88 MCG tablet 1 tab po qd  before breakfast    LINZESS 145 mcg Cap capsule TAKE 1 CAPSULE(145 MCG) BY MOUTH BEFORE BREAKFAST    losartan (COZAAR) 25 MG tablet TAKE 1 TABLET(25 MG) BY MOUTH TWICE DAILY    metoprolol tartrate (LOPRESSOR) 25 MG tablet TAKE 1 TABLET(25 MG) BY MOUTH TWICE DAILY (Patient taking differently: 12.5 mg.)    pantoprazole (PROTONIX) 40 MG tablet TAKE 1 TABLET (40 MG TOTAL) BY MOUTH ONCE DAILY.    polyethylene glycol (GLYCOLAX) 17 gram PwPk Take 17 g by mouth once daily.     No current facility-administered medications on file prior to visit.     Review of patient's allergies indicates:  No Known Allergies   Review of Systems   Constitutional: Negative for diaphoresis, malaise/fatigue and weight gain.   HENT:  Negative for hoarse voice.    Eyes:  Negative for double vision and visual disturbance.   Cardiovascular:  Positive for claudication. Negative for chest pain, cyanosis, dyspnea on exertion, irregular heartbeat, leg swelling, near-syncope, orthopnea, palpitations, paroxysmal nocturnal dyspnea and syncope.   Respiratory:  Negative for cough, hemoptysis, shortness of breath and snoring.    Hematologic/Lymphatic: Negative for bleeding problem. Does not bruise/bleed easily.    Skin:  Negative for color change and poor wound healing.   Musculoskeletal:  Negative for muscle cramps, muscle weakness and myalgias.   Gastrointestinal:  Negative for bloating, abdominal pain, change in bowel habit, diarrhea, heartburn, hematemesis, hematochezia, melena and nausea.   Neurological:  Negative for excessive daytime sleepiness, dizziness, headaches, light-headedness, loss of balance, numbness and weakness.   Psychiatric/Behavioral:  Negative for memory loss. The patient does not have insomnia.    Allergic/Immunologic: Negative for hives.       Objective:   Physical Exam  Vitals and nursing note reviewed.   Constitutional:       General: She is not in acute distress.     Appearance: Normal appearance. She is well-developed. She is not ill-appearing.   HENT:      Head: Normocephalic and atraumatic.   Eyes:      General: No scleral icterus.     Pupils: Pupils are equal, round, and reactive to light.   Neck:      Thyroid: No thyromegaly.      Vascular: Normal carotid pulses. Carotid bruit present. No hepatojugular reflux or JVD.      Trachea: No tracheal deviation.   Cardiovascular:      Rate and Rhythm: Normal rate and regular rhythm.      Pulses:           Carotid pulses are 2+ on the right side and 2+ on the left side.       Radial pulses are 2+ on the right side and 2+ on the left side.        Femoral pulses are 2+ on the right side and 1+ on the left side.       Popliteal pulses are 0 on the right side and 0 on the left side.        Dorsalis pedis pulses are 0 on the right side and 0 on the left side.        Posterior tibial pulses are 0 on the right side and 0 on the left side.      Heart sounds: Normal heart sounds. No murmur heard.     No friction rub. No gallop.      Comments: Has rubor bilaterally  Pulmonary:      Effort: Pulmonary effort is normal. No respiratory distress.      Breath sounds: Normal breath sounds. No wheezing, rhonchi or rales.   Chest:      Chest wall: No tenderness.  "  Abdominal:      General: Bowel sounds are normal. There is no abdominal bruit.      Palpations: Abdomen is soft. There is no hepatomegaly or pulsatile mass.      Tenderness: There is no abdominal tenderness.   Musculoskeletal:      Right shoulder: No deformity.      Cervical back: Normal range of motion and neck supple.   Skin:     General: Skin is warm and dry.      Findings: No erythema or rash.      Nails: There is no clubbing.   Neurological:      Mental Status: She is alert and oriented to person, place, and time.      Cranial Nerves: No cranial nerve deficit.      Coordination: Coordination normal.   Psychiatric:         Speech: Speech normal.         Behavior: Behavior normal.       Vitals:    02/08/24 1042 02/08/24 1043   BP: 130/88 (!) 134/90   BP Location: Right arm Left arm   Patient Position: Sitting Sitting   BP Method: Medium (Manual) Medium (Manual)   Pulse: 73    SpO2: 96%    Weight: 75.2 kg (165 lb 12.6 oz)    Height: 5' 5" (1.651 m)      Lab Results   Component Value Date    CHOL 124 08/31/2023    CHOL 125 08/18/2022    CHOL 131 12/02/2021      Body mass index is 27.59 kg/m².   Lab Results   Component Value Date    HGBA1C 5.7 (H) 11/23/2020      BMP  Lab Results   Component Value Date     08/31/2023    K 4.8 08/31/2023     08/31/2023    CO2 31 (H) 08/31/2023    BUN 9 08/31/2023    CREATININE 0.8 08/31/2023    CALCIUM 9.6 08/31/2023    ANIONGAP 11 08/31/2023    EGFRNORACEVR >60.0 08/31/2023      Lab Results   Component Value Date    HDL 42 08/31/2023    HDL 42 08/18/2022    HDL 50 12/02/2021     Lab Results   Component Value Date    LDLCALC 47.8 (L) 08/31/2023    LDLCALC 52.2 (L) 08/18/2022    LDLCALC 57.4 (L) 12/02/2021     Lab Results   Component Value Date    TRIG 171 (H) 08/31/2023    TRIG 154 (H) 08/18/2022    TRIG 118 12/02/2021     Lab Results   Component Value Date    CHOLHDL 33.9 08/31/2023    CHOLHDL 33.6 08/18/2022    CHOLHDL 38.2 12/02/2021       Chemistry        Component " Value Date/Time     08/31/2023 0803    K 4.8 08/31/2023 0803     08/31/2023 0803    CO2 31 (H) 08/31/2023 0803    BUN 9 08/31/2023 0803    CREATININE 0.8 08/31/2023 0803    GLU 90 08/31/2023 0803        Component Value Date/Time    CALCIUM 9.6 08/31/2023 0803    ALKPHOS 100 08/31/2023 0803    AST 18 08/31/2023 0803    ALT 10 08/31/2023 0803    BILITOT 0.3 08/31/2023 0803    ESTGFRAFRICA >60.0 12/02/2021 0758    EGFRNONAA >60.0 12/02/2021 0758          Lab Results   Component Value Date    TSH 7.153 (H) 03/24/2023     Lab Results   Component Value Date    INR 1.0 09/12/2020    INR 1.3 (H) 09/12/2020    INR 1.0 07/20/2020     Lab Results   Component Value Date    WBC 9.63 03/24/2023    HGB 11.9 (L) 03/24/2023    HCT 39.8 03/24/2023    MCV 92 03/24/2023     03/24/2023     BMP  Sodium   Date Value Ref Range Status   08/31/2023 145 136 - 145 mmol/L Final     Potassium   Date Value Ref Range Status   08/31/2023 4.8 3.5 - 5.1 mmol/L Final     Chloride   Date Value Ref Range Status   08/31/2023 103 95 - 110 mmol/L Final     CO2   Date Value Ref Range Status   08/31/2023 31 (H) 23 - 29 mmol/L Final     BUN   Date Value Ref Range Status   08/31/2023 9 8 - 23 mg/dL Final     Creatinine   Date Value Ref Range Status   08/31/2023 0.8 0.5 - 1.4 mg/dL Final     Calcium   Date Value Ref Range Status   08/31/2023 9.6 8.7 - 10.5 mg/dL Final     Anion Gap   Date Value Ref Range Status   08/31/2023 11 8 - 16 mmol/L Final     eGFR if    Date Value Ref Range Status   12/02/2021 >60.0 >60 mL/min/1.73 m^2 Final     eGFR if non    Date Value Ref Range Status   12/02/2021 >60.0 >60 mL/min/1.73 m^2 Final     Comment:     Calculation used to obtain the estimated glomerular filtration  rate (eGFR) is the CKD-EPI equation.        CrCl cannot be calculated (Patient's most recent lab result is older than the maximum 7 days allowed.).  Findings    GONZÁLEZ The right resting GONZÁLEZ reduction is mildly  decreased.   The right ankle [DT] artery has monophasic flow.   The right ankle [DP] artery has monophasic flow.   The left resting GONZÁLEZ reduction is moderately decreased.   The left ankle [PT] artery has monophasic flow.  The left ankle [DP] artery has monophasic flow.           US Measurements - GONZÁLEZ    Right   Right arm  mmHg         Right posterior tibial 122 mmHg         Right dorsalis pedis 110 mmHg         Right GONZÁLEZ 0.8         Right toe pressure 7 mmHg         Right TBI 0.05          Left   Left arm  mmHg         Left posterior tibial 109 mmHg         Left dorsalis pedis 110 mmHg         Left GONZÁLEZ 0.72         Left toe pressure 9 mmHg         Left TBI 0.06              Assessment:     1. Atherosclerotic PVD with intermittent claudication    2. Nicotine dependence, cigarettes, uncomplicated    3. Mixed hyperlipidemia    4. Coronary artery disease of native artery of native heart with stable angina pectoris    5. Atherosclerosis of aorta    6. Bruit of right carotid artery    7. History of ST elevation myocardial infarction (STEMI)    8. COPD, moderate    9. Essential hypertension    10. Centrilobular emphysema      Has new onset worsening claudication on the left side limiting her lifestyle despite smoking cessation and walking exercise. Her home  health evaluation showed significantly low and depressed left gonzález to 0.32 which is a drastic change compared jennifer previous. Will evaluate objectively and make decision on angiography.   Her risk factors are all being addressed she is on appropriate therapy.   Cad boland has no angina will continue same emds.   Plan:   González   Bilateral lower extremity duplex.  Make decision on angiography pending review.

## 2024-02-08 NOTE — H&P (VIEW-ONLY)
Subjective:   Patient ID:  Iza Esquivel is a 63 y.o. female who presents for follow up of No chief complaint on file.      HPI  8/25/2020  A 59 yo female with old mi nstemi tobacco use has stopped smoking in July had lcx stent she also had severe limiting claudication with multilevel disease limiting her rehabilitation from her mi. She has iliac stenting performed subsequently she still has residual claudication on the rt calfb she can  Walk 1 mile w/o stopping has gained weight still not compliant with diet . Has been taking meds regularily. seh wants to go back to work no angina has some residual shortness of breath.      12/3/2020  Still not smoking can walk still has some residual calf claudication that is non limiting. Has bilatearl sfa disease s/p bilateral iliac stenting. Has noc hest pain or shortness of breath has swollen rt knee. Has been  compliant with meds and diet.   Her bp is controlled at home. she gained weight.has been snacking a lot walks 4 times weekly. Lipids on target     6/3/2021  Here for f/u cad no angina getting over shingles . Has been able top cut grass for 15 minutes before her legs hurt. No discoloration she has been compliant with meds still smoking now going through cessation program.       8/17/2021  Had hypotension symptomatic then rebound her medical therapy is appropriate no further dizziness of lightheadedness or low blood pressure she is compliant.has noticed her leg symptoms worse she is not able to walk as much as she can she was very limited in her trip      12/9/2021   STILL SMOKING TRIES TO BE COMPLAINT WITH DIET NOT WALKING FOR EXERCISE KIND OF ON AND OFF. NO DISCOLORATION IN FEET HAS GAINED WEIGHT NO ANGINA. NO TIA PALPITATION CHF SYNCOPE. HAS CHEST PAIN WHEN SHE COUGHS OR MOVES   HAS NUMBNESS IN RT BIG TOE NO WEAKNESS.     8/25/2022   here fro f/u  had multiple death in family  Had a fall on her face . She tripped. She still has claudication at half mile sometimes can  walk more. She is smoking. Has chest pain  That is mostly at rest occurs at times with exercise.no discoloration in feet. No ulceration.  Her bp controlled tries to be compliant with diet      3/2/2023   Still smoking had 2 falls due to mechanical issues no cardiac etiology has no chest pain no arrythmias . Has lost 4 lbs .MAC plans to quit smoking compliant with meds. Has rt knee pain . Needs an injection in her back.  Has rt knee pain her knee gives up on her.   Has foot pain upon flexion has also rt calf pain with walking that is not consistent.      9/7/2023  No change in status. Has no chest pain no shortness of breath her blood pressure fluctuates based on he rsalt intake. Her lipids diabetes on target . Denies any limiting claudication.still  smoking     2/8/2024   Earlier eda xpectyed visit had health assessment her ishan on the left is 0.32 which is a drop from previous. Has  worsening claudication on the left side very limited activity. Has to stop for relief affecting lifestyle and daily activities. Has managed to stop smoking has been losing weight more compliant with diet and meds.   Past Medical History:   Diagnosis Date    Atherosclerotic PVD with intermittent claudication 5/25/2020    Cervical cancer     Hyperlipidemia     Hypertension     Right carotid bruit     S/P PTCA (percutaneous transluminal coronary angioplasty) 05/25/2020    STEMI: stenting of LCx       Past Surgical History:   Procedure Laterality Date    ABDOMINAL AORTOGRAPHY N/A 5/25/2020    Procedure: Aortogram, Abdominal;  Surgeon: Shelly Jarvis MD;  Location: [facility] CATH LAB;  Service: Cardiology;  Laterality: N/A;    AORTOGRAPHY WITH SERIALOGRAPHY N/A 6/30/2020    Procedure: AORTOGRAM, WITH RUNOFF;  Surgeon: Shelly Jarvis MD;  Location: [facility] CATH LAB;  Service: Cardiology;  Laterality: N/A;    LEFT HEART CATHETERIZATION Left 5/25/2020    Procedure: CATHETERIZATION, HEART, LEFT;  Surgeon: Shelly Jarvis MD;  Location: [facility] CATH LAB;  " Service: Cardiology;  Laterality: Left;    LEFT HEART CATHETERIZATION Left 2020    Procedure: CATHETERIZATION, HEART, LEFT;  Surgeon: Shelly Jarvis MD;  Location: [facility] CATH LAB;  Service: Cardiology;  Laterality: Left;    LYMPH NODE BIOPSY      PERCUTANEOUS TRANSLUMINAL BALLOON ANGIOPLASTY OF CORONARY ARTERY  2020    Procedure: Angioplasty-coronary;  Surgeon: Shelly Jarvis MD;  Location: [facility] CATH LAB;  Service: Cardiology;;       Social History     Tobacco Use    Smoking status: Every Day     Current packs/day: 0.00     Average packs/day: 1.5 packs/day for 52.1 years (78.2 ttl pk-yrs)     Types: Cigarettes     Start date:      Last attempt to quit: 2022     Years since quittin.9    Smokeless tobacco: Never    Tobacco comments:     On 3/21/22 patient stated she quit "about a month ago"   Substance Use Topics    Alcohol use: No    Drug use: Never       Family History   Problem Relation Age of Onset    Cancer Mother         Thyroid    Thyroid cancer Mother     Cancer Father         ?    Transient ischemic attack Father         Carotid artery stenosis, CEA    Diabetes Brother     Hypertension Brother     Cancer Paternal Aunt         Breast ca    Breast cancer Paternal Aunt        Current Outpatient Medications   Medication Sig    aspirin (ECOTRIN) 81 MG EC tablet Take 1 tablet (81 mg total) by mouth once daily.    atorvastatin (LIPITOR) 80 MG tablet Take 1 tablet (80 mg total) by mouth once daily.    clopidogreL (PLAVIX) 75 mg tablet Take 1 tablet (75 mg total) by mouth once daily.    isosorbide mononitrate (IMDUR) 60 MG 24 hr tablet TAKE 1 TABLET(60 MG) BY MOUTH EVERY DAY    levothyroxine (SYNTHROID) 88 MCG tablet 1 tab po qd  before breakfast    LINZESS 145 mcg Cap capsule TAKE 1 CAPSULE(145 MCG) BY MOUTH BEFORE BREAKFAST    losartan (COZAAR) 25 MG tablet TAKE 1 TABLET(25 MG) BY MOUTH TWICE DAILY    metoprolol tartrate (LOPRESSOR) 25 MG tablet TAKE 1 TABLET(25 MG) BY MOUTH TWICE DAILY " (Patient taking differently: 12.5 mg.)    pantoprazole (PROTONIX) 40 MG tablet TAKE 1 TABLET (40 MG TOTAL) BY MOUTH ONCE DAILY.    polyethylene glycol (GLYCOLAX) 17 gram PwPk Take 17 g by mouth once daily.     No current facility-administered medications for this visit.     Current Outpatient Medications on File Prior to Visit   Medication Sig    aspirin (ECOTRIN) 81 MG EC tablet Take 1 tablet (81 mg total) by mouth once daily.    atorvastatin (LIPITOR) 80 MG tablet Take 1 tablet (80 mg total) by mouth once daily.    clopidogreL (PLAVIX) 75 mg tablet Take 1 tablet (75 mg total) by mouth once daily.    isosorbide mononitrate (IMDUR) 60 MG 24 hr tablet TAKE 1 TABLET(60 MG) BY MOUTH EVERY DAY    levothyroxine (SYNTHROID) 88 MCG tablet 1 tab po qd  before breakfast    LINZESS 145 mcg Cap capsule TAKE 1 CAPSULE(145 MCG) BY MOUTH BEFORE BREAKFAST    losartan (COZAAR) 25 MG tablet TAKE 1 TABLET(25 MG) BY MOUTH TWICE DAILY    metoprolol tartrate (LOPRESSOR) 25 MG tablet TAKE 1 TABLET(25 MG) BY MOUTH TWICE DAILY (Patient taking differently: 12.5 mg.)    pantoprazole (PROTONIX) 40 MG tablet TAKE 1 TABLET (40 MG TOTAL) BY MOUTH ONCE DAILY.    polyethylene glycol (GLYCOLAX) 17 gram PwPk Take 17 g by mouth once daily.     No current facility-administered medications on file prior to visit.     Review of patient's allergies indicates:  No Known Allergies   Review of Systems   Constitutional: Negative for diaphoresis, malaise/fatigue and weight gain.   HENT:  Negative for hoarse voice.    Eyes:  Negative for double vision and visual disturbance.   Cardiovascular:  Positive for claudication. Negative for chest pain, cyanosis, dyspnea on exertion, irregular heartbeat, leg swelling, near-syncope, orthopnea, palpitations, paroxysmal nocturnal dyspnea and syncope.   Respiratory:  Negative for cough, hemoptysis, shortness of breath and snoring.    Hematologic/Lymphatic: Negative for bleeding problem. Does not bruise/bleed easily.    Skin:  Negative for color change and poor wound healing.   Musculoskeletal:  Negative for muscle cramps, muscle weakness and myalgias.   Gastrointestinal:  Negative for bloating, abdominal pain, change in bowel habit, diarrhea, heartburn, hematemesis, hematochezia, melena and nausea.   Neurological:  Negative for excessive daytime sleepiness, dizziness, headaches, light-headedness, loss of balance, numbness and weakness.   Psychiatric/Behavioral:  Negative for memory loss. The patient does not have insomnia.    Allergic/Immunologic: Negative for hives.       Objective:   Physical Exam  Vitals and nursing note reviewed.   Constitutional:       General: She is not in acute distress.     Appearance: Normal appearance. She is well-developed. She is not ill-appearing.   HENT:      Head: Normocephalic and atraumatic.   Eyes:      General: No scleral icterus.     Pupils: Pupils are equal, round, and reactive to light.   Neck:      Thyroid: No thyromegaly.      Vascular: Normal carotid pulses. Carotid bruit present. No hepatojugular reflux or JVD.      Trachea: No tracheal deviation.   Cardiovascular:      Rate and Rhythm: Normal rate and regular rhythm.      Pulses:           Carotid pulses are 2+ on the right side and 2+ on the left side.       Radial pulses are 2+ on the right side and 2+ on the left side.        Femoral pulses are 2+ on the right side and 1+ on the left side.       Popliteal pulses are 0 on the right side and 0 on the left side.        Dorsalis pedis pulses are 0 on the right side and 0 on the left side.        Posterior tibial pulses are 0 on the right side and 0 on the left side.      Heart sounds: Normal heart sounds. No murmur heard.     No friction rub. No gallop.      Comments: Has rubor bilaterally  Pulmonary:      Effort: Pulmonary effort is normal. No respiratory distress.      Breath sounds: Normal breath sounds. No wheezing, rhonchi or rales.   Chest:      Chest wall: No tenderness.  "  Abdominal:      General: Bowel sounds are normal. There is no abdominal bruit.      Palpations: Abdomen is soft. There is no hepatomegaly or pulsatile mass.      Tenderness: There is no abdominal tenderness.   Musculoskeletal:      Right shoulder: No deformity.      Cervical back: Normal range of motion and neck supple.   Skin:     General: Skin is warm and dry.      Findings: No erythema or rash.      Nails: There is no clubbing.   Neurological:      Mental Status: She is alert and oriented to person, place, and time.      Cranial Nerves: No cranial nerve deficit.      Coordination: Coordination normal.   Psychiatric:         Speech: Speech normal.         Behavior: Behavior normal.       Vitals:    02/08/24 1042 02/08/24 1043   BP: 130/88 (!) 134/90   BP Location: Right arm Left arm   Patient Position: Sitting Sitting   BP Method: Medium (Manual) Medium (Manual)   Pulse: 73    SpO2: 96%    Weight: 75.2 kg (165 lb 12.6 oz)    Height: 5' 5" (1.651 m)      Lab Results   Component Value Date    CHOL 124 08/31/2023    CHOL 125 08/18/2022    CHOL 131 12/02/2021      Body mass index is 27.59 kg/m².   Lab Results   Component Value Date    HGBA1C 5.7 (H) 11/23/2020      BMP  Lab Results   Component Value Date     08/31/2023    K 4.8 08/31/2023     08/31/2023    CO2 31 (H) 08/31/2023    BUN 9 08/31/2023    CREATININE 0.8 08/31/2023    CALCIUM 9.6 08/31/2023    ANIONGAP 11 08/31/2023    EGFRNORACEVR >60.0 08/31/2023      Lab Results   Component Value Date    HDL 42 08/31/2023    HDL 42 08/18/2022    HDL 50 12/02/2021     Lab Results   Component Value Date    LDLCALC 47.8 (L) 08/31/2023    LDLCALC 52.2 (L) 08/18/2022    LDLCALC 57.4 (L) 12/02/2021     Lab Results   Component Value Date    TRIG 171 (H) 08/31/2023    TRIG 154 (H) 08/18/2022    TRIG 118 12/02/2021     Lab Results   Component Value Date    CHOLHDL 33.9 08/31/2023    CHOLHDL 33.6 08/18/2022    CHOLHDL 38.2 12/02/2021       Chemistry        Component " Value Date/Time     08/31/2023 0803    K 4.8 08/31/2023 0803     08/31/2023 0803    CO2 31 (H) 08/31/2023 0803    BUN 9 08/31/2023 0803    CREATININE 0.8 08/31/2023 0803    GLU 90 08/31/2023 0803        Component Value Date/Time    CALCIUM 9.6 08/31/2023 0803    ALKPHOS 100 08/31/2023 0803    AST 18 08/31/2023 0803    ALT 10 08/31/2023 0803    BILITOT 0.3 08/31/2023 0803    ESTGFRAFRICA >60.0 12/02/2021 0758    EGFRNONAA >60.0 12/02/2021 0758          Lab Results   Component Value Date    TSH 7.153 (H) 03/24/2023     Lab Results   Component Value Date    INR 1.0 09/12/2020    INR 1.3 (H) 09/12/2020    INR 1.0 07/20/2020     Lab Results   Component Value Date    WBC 9.63 03/24/2023    HGB 11.9 (L) 03/24/2023    HCT 39.8 03/24/2023    MCV 92 03/24/2023     03/24/2023     BMP  Sodium   Date Value Ref Range Status   08/31/2023 145 136 - 145 mmol/L Final     Potassium   Date Value Ref Range Status   08/31/2023 4.8 3.5 - 5.1 mmol/L Final     Chloride   Date Value Ref Range Status   08/31/2023 103 95 - 110 mmol/L Final     CO2   Date Value Ref Range Status   08/31/2023 31 (H) 23 - 29 mmol/L Final     BUN   Date Value Ref Range Status   08/31/2023 9 8 - 23 mg/dL Final     Creatinine   Date Value Ref Range Status   08/31/2023 0.8 0.5 - 1.4 mg/dL Final     Calcium   Date Value Ref Range Status   08/31/2023 9.6 8.7 - 10.5 mg/dL Final     Anion Gap   Date Value Ref Range Status   08/31/2023 11 8 - 16 mmol/L Final     eGFR if    Date Value Ref Range Status   12/02/2021 >60.0 >60 mL/min/1.73 m^2 Final     eGFR if non    Date Value Ref Range Status   12/02/2021 >60.0 >60 mL/min/1.73 m^2 Final     Comment:     Calculation used to obtain the estimated glomerular filtration  rate (eGFR) is the CKD-EPI equation.        CrCl cannot be calculated (Patient's most recent lab result is older than the maximum 7 days allowed.).  Findings    GONZÁLEZ The right resting GONZÁLEZ reduction is mildly  decreased.   The right ankle [DT] artery has monophasic flow.   The right ankle [DP] artery has monophasic flow.   The left resting GONZÁLEZ reduction is moderately decreased.   The left ankle [PT] artery has monophasic flow.  The left ankle [DP] artery has monophasic flow.           US Measurements - GONZÁLEZ    Right   Right arm  mmHg         Right posterior tibial 122 mmHg         Right dorsalis pedis 110 mmHg         Right GONZÁLEZ 0.8         Right toe pressure 7 mmHg         Right TBI 0.05          Left   Left arm  mmHg         Left posterior tibial 109 mmHg         Left dorsalis pedis 110 mmHg         Left GONZÁLEZ 0.72         Left toe pressure 9 mmHg         Left TBI 0.06              Assessment:     1. Atherosclerotic PVD with intermittent claudication    2. Nicotine dependence, cigarettes, uncomplicated    3. Mixed hyperlipidemia    4. Coronary artery disease of native artery of native heart with stable angina pectoris    5. Atherosclerosis of aorta    6. Bruit of right carotid artery    7. History of ST elevation myocardial infarction (STEMI)    8. COPD, moderate    9. Essential hypertension    10. Centrilobular emphysema      Has new onset worsening claudication on the left side limiting her lifestyle despite smoking cessation and walking exercise. Her home  health evaluation showed significantly low and depressed left gonzález to 0.32 which is a drastic change compared jennifer previous. Will evaluate objectively and make decision on angiography.   Her risk factors are all being addressed she is on appropriate therapy.   Cad boland has no angina will continue same emds.   Plan:   González   Bilateral lower extremity duplex.  Make decision on angiography pending review.

## 2024-02-09 LAB
LEFT ABI: 0.42
LEFT ANT TIBIAL SYS PSV: 19 CM/S
LEFT ARM BP: 169 MMHG
LEFT CFA PSV: 106 CM/S
LEFT DORSALIS PEDIS: 57 MMHG
LEFT EXTERNAL ILIAC PSV: 52 CM/S
LEFT PERONEAL SYS PSV: 11 CM/S
LEFT POPLITEAL PSV: 37 CM/S
LEFT POST TIBIAL SYS PSV: 28 CM/S
LEFT POSTERIOR TIBIAL: 73 MMHG
LEFT PROFUNDA SYS PSV: 63 CM/S
LEFT SUPER FEMORAL DIST SYS PSV: 26 CM/S
LEFT SUPER FEMORAL MID SYS PSV: 17 CM/S
LEFT SUPER FEMORAL OSTIAL SYS PSV: 452 CM/S
LEFT SUPER FEMORAL PROX SYS PSV: 7 CM/S
LEFT TIB/PER TRUNK SYS PSV: 26 CM/S
OHS CV LEFT LOWER EXTREMITY ABI (NO CALC): 0.42
OHS CV RIGHT ABI LOWER EXTREMITY (NO CALC): 0.77
RIGHT ABI: 0.77
RIGHT ANT TIBIAL SYS PSV: 34 CM/S
RIGHT ARM BP: 173 MMHG
RIGHT CFA PSV: 168 CM/S
RIGHT DORSALIS PEDIS: 94 MMHG
RIGHT PERONEAL SYS PSV: 28 CM/S
RIGHT POPLITEAL PSV: 104 CM/S
RIGHT POST TIBIAL SYS PSV: 42 CM/S
RIGHT POSTERIOR TIBIAL: 133 MMHG
RIGHT PROFUNDA SYS PSV: 185 CM/S
RIGHT SUPER FEMORAL DIST SYS PSV: 55 CM/S
RIGHT SUPER FEMORAL MID SYS PSV: 23 CM/S
RIGHT SUPER FEMORAL OSTIAL SYS PSV: 156 CM/S
RIGHT SUPER FEMORAL PROX SYS PSV: 156 CM/S
RIGHT TBI: 0.53
RIGHT TIB/PER TRUNK SYS PSV: 42 CM/S
RIGHT TOE PRESSURE: 91 MMHG

## 2024-02-12 ENCOUNTER — HOSPITAL ENCOUNTER (OUTPATIENT)
Facility: HOSPITAL | Age: 64
Discharge: HOME | End: 2024-02-12
Attending: INTERNAL MEDICINE | Admitting: INTERNAL MEDICINE
Payer: MEDICARE

## 2024-02-12 DIAGNOSIS — I70.0 ATHEROSCLEROSIS OF AORTA: Primary | ICD-10-CM

## 2024-02-12 DIAGNOSIS — I70.219 ATHEROSCLEROTIC PVD WITH INTERMITTENT CLAUDICATION: ICD-10-CM

## 2024-02-12 PROCEDURE — 36245 INS CATH ABD/L-EXT ART 1ST: CPT | Mod: LT,,, | Performed by: INTERNAL MEDICINE

## 2024-02-12 PROCEDURE — 63600175 PHARM REV CODE 636 W HCPCS: Performed by: INTERNAL MEDICINE

## 2024-02-12 PROCEDURE — 75630 X-RAY AORTA LEG ARTERIES: CPT | Performed by: INTERNAL MEDICINE

## 2024-02-12 PROCEDURE — C1769 GUIDE WIRE: HCPCS | Performed by: INTERNAL MEDICINE

## 2024-02-12 PROCEDURE — 36245 INS CATH ABD/L-EXT ART 1ST: CPT | Mod: LT | Performed by: INTERNAL MEDICINE

## 2024-02-12 PROCEDURE — 99153 MOD SED SAME PHYS/QHP EA: CPT | Performed by: INTERNAL MEDICINE

## 2024-02-12 PROCEDURE — 99152 MOD SED SAME PHYS/QHP 5/>YRS: CPT | Mod: ,,, | Performed by: INTERNAL MEDICINE

## 2024-02-12 PROCEDURE — 99152 MOD SED SAME PHYS/QHP 5/>YRS: CPT | Performed by: INTERNAL MEDICINE

## 2024-02-12 PROCEDURE — C1894 INTRO/SHEATH, NON-LASER: HCPCS | Performed by: INTERNAL MEDICINE

## 2024-02-12 PROCEDURE — 75630 X-RAY AORTA LEG ARTERIES: CPT | Mod: 26,,, | Performed by: INTERNAL MEDICINE

## 2024-02-12 PROCEDURE — 25000003 PHARM REV CODE 250: Performed by: INTERNAL MEDICINE

## 2024-02-12 RX ORDER — SODIUM CHLORIDE 0.9 % (FLUSH) 0.9 %
10 SYRINGE (ML) INJECTION
Status: DISCONTINUED | OUTPATIENT
Start: 2024-02-12 | End: 2024-02-12 | Stop reason: HOSPADM

## 2024-02-12 RX ORDER — MIDAZOLAM HYDROCHLORIDE 1 MG/ML
INJECTION, SOLUTION INTRAMUSCULAR; INTRAVENOUS
Status: DISCONTINUED | OUTPATIENT
Start: 2024-02-12 | End: 2024-02-12 | Stop reason: HOSPADM

## 2024-02-12 RX ORDER — HYDROMORPHONE HYDROCHLORIDE 2 MG/ML
INJECTION, SOLUTION INTRAMUSCULAR; INTRAVENOUS; SUBCUTANEOUS
Status: DISCONTINUED | OUTPATIENT
Start: 2024-02-12 | End: 2024-02-12 | Stop reason: HOSPADM

## 2024-02-12 RX ORDER — DIPHENHYDRAMINE HCL 50 MG
50 CAPSULE ORAL ONCE
Status: COMPLETED | OUTPATIENT
Start: 2024-02-12 | End: 2024-02-12

## 2024-02-12 RX ORDER — ONDANSETRON 8 MG/1
8 TABLET, ORALLY DISINTEGRATING ORAL EVERY 8 HOURS PRN
Status: DISCONTINUED | OUTPATIENT
Start: 2024-02-12 | End: 2024-02-12 | Stop reason: HOSPADM

## 2024-02-12 RX ORDER — NAPROXEN SODIUM 220 MG/1
81 TABLET, FILM COATED ORAL ONCE
Status: COMPLETED | OUTPATIENT
Start: 2024-02-12 | End: 2024-02-12

## 2024-02-12 RX ORDER — LIDOCAINE HYDROCHLORIDE 20 MG/ML
INJECTION, SOLUTION EPIDURAL; INFILTRATION; INTRACAUDAL; PERINEURAL
Status: DISCONTINUED | OUTPATIENT
Start: 2024-02-12 | End: 2024-02-12 | Stop reason: HOSPADM

## 2024-02-12 RX ORDER — FENTANYL CITRATE 50 UG/ML
INJECTION, SOLUTION INTRAMUSCULAR; INTRAVENOUS
Status: DISCONTINUED | OUTPATIENT
Start: 2024-02-12 | End: 2024-02-12 | Stop reason: HOSPADM

## 2024-02-12 RX ORDER — SODIUM CHLORIDE 9 MG/ML
INJECTION, SOLUTION INTRAVENOUS CONTINUOUS
Status: ACTIVE | OUTPATIENT
Start: 2024-02-12 | End: 2024-02-12

## 2024-02-12 RX ORDER — DIAZEPAM 5 MG/1
5 TABLET ORAL
Status: DISCONTINUED | OUTPATIENT
Start: 2024-02-12 | End: 2024-02-12 | Stop reason: HOSPADM

## 2024-02-12 RX ORDER — ACETAMINOPHEN 325 MG/1
650 TABLET ORAL EVERY 4 HOURS PRN
Status: DISCONTINUED | OUTPATIENT
Start: 2024-02-12 | End: 2024-02-12 | Stop reason: HOSPADM

## 2024-02-12 RX ADMIN — ONDANSETRON 8 MG: 8 TABLET, ORALLY DISINTEGRATING ORAL at 06:02

## 2024-02-12 RX ADMIN — DIAZEPAM 5 MG: 5 TABLET ORAL at 10:02

## 2024-02-12 RX ADMIN — ASPIRIN 81 MG CHEWABLE TABLET 81 MG: 81 TABLET CHEWABLE at 10:02

## 2024-02-12 RX ADMIN — SODIUM CHLORIDE: 9 INJECTION, SOLUTION INTRAVENOUS at 10:02

## 2024-02-12 RX ADMIN — DIPHENHYDRAMINE HYDROCHLORIDE 50 MG: 50 CAPSULE ORAL at 10:02

## 2024-02-12 NOTE — DISCHARGE INSTRUCTIONS
"                                      Post-op Heart Catheterization    1. DIET: It is advisable for you to follow a diet that limits the intake of salt, sugar, saturated fats and cholesterol.     2. FOR THE NEXT 24 HOURS:   For the next 8 hours, you should be watched by a responsible adult. This person should make sure your condition is not getting worse.   Don't drink any alcohol for the next 24 hours.  Don't drive, operate dangerous machinery, or make important business or personal decisions during the next 24 hours.  Your healthcare provider may tell you not to take any medicine by mouth for pain or sleep in the next 4 hours. These medicines may react with the medicines you were given in the hospital. This could cause a much stronger response than usual.       3. ACTIVITY:                                Day of discharge:             NO vigorous activity, lifting or straining                                                   Day after discharge:         Avoid heavy lifting (up to 10 lbs)                                                                        The day after discharge you may shower, but avoid tub baths for 5 days                                                                         Wash site gently with soap and water        NO powder or lotion to your procedure site.                 Before you shower, remove dressing, apply bandaid if desired                                                                                                                                                                            2nd day after discharge:  Resume normal activities                                                                         Exercise program as instructed      4. WOUND CARE: It is not unusual to have a small amount of bruising appear in the groin area. It is also common to have a tender "knot" develop beneath the skin at the puncture site in the groin. This is scar tissue only and is not a " "cause for concern or alarm. This tender knot may take several weeks to fully resolve. The bruise will usually spread over several days. If the lump gets bigger, call you doctor immediately.    5. DISCOMFORT: For general discomfort at the puncture site, you may take 1 or 2 Acetaminophen (Tylenol) tablets every 4 hours as needed. (Do not take more than 4000 mg a day)                 6. CALL YOUR HEALTHCARE PROVIDER IF YOU START TO HAVE THE FOLLOWING SYMPTOMS:        1. Problems with the affected leg: Pain, discomfort, loss of warmth, numbness or tingling                                                                                                                            2. Problems at the groin site: Bleeding, pain that is sudden/sharp/persistent,                   swelling at site or a change in "lump" size, increased redness or drainage at                     puncture site                                                               3. High fever (101 degrees or higher)       4.  Drowsiness that doesn't get better       5. Weakness or dizziness that doesn't get better            6. Repeated vomiting        7. GO TO  THE EMERGENCY ROOM OR CALL 911 IF YOU HAVE: Chest pains or discomforts not relieved with 3 nitroglycerin doses (sublingual tablets or spray), numbness or severe pain or if your foot or leg becomes cold or discolored or uncontrolled bleeding from site (apply direct pressure above site).  "

## 2024-02-12 NOTE — OP NOTE
INPATIENT Operative Note         SUMMARY     Surgery Date: 2/12/2024     Surgeon(s) and Role:     * Shelly Jarvis MD - Primary    ASSISTANT:none    Pre-op Diagnosis:  Atherosclerotic PVD with intermittent claudication [I70.219]      Post-op Diagnosis:  Atherosclerotic PVD with intermittent claudication [I70.219]    Procedure(s) (LRB):  AORTOGRAM, WITH SERIALOGRAPHY (N/A)    COMPLICATION:none    Anesthesia: RN IV Sedation    Findings/Key Components:  Left cfa 99% stenosis involving profunda with occluded sfa bilaterally   3 vessel run off bilateral.   Patent bilateral iliac stents.   50% rt cfa .      Estimated Blood Loss: < 50 ML.         SPECIMEN: NONE    Devices/Prostetics: None    PLAN: routine post cath care   Consult vascular surgery for cfa endarterectomy and fem pop bypasss.

## 2024-02-12 NOTE — Clinical Note
An angiography was performed of the distal suprarenal abdominal aorta  via power injection. Injection was performed with 20 mL contrast at 15 mL/s.

## 2024-02-12 NOTE — Clinical Note
Pt needs refill for duloxetine 30mg sent to her pharmacy .   The catheter was repositioned to the  proximal right common iliac arteries.

## 2024-02-12 NOTE — Clinical Note
An angiography of the  bilateral lower extremity was performed with the catheter and power injected with 60 mL contrast at at 6 mL/s.

## 2024-02-13 NOTE — DISCHARGE SUMMARY
[facility] - Cath Lab (Hospital)  Discharge Note  Short Stay    Procedure(s) (LRB):  AORTOGRAM, WITH SERIALOGRAPHY (N/A)  Angiogram, Lower Arterial, Bilateral      OUTCOME: Patient tolerated treatment/procedure well without complication and is now ready for discharge.    DISPOSITION: Home or Self Care    FINAL DIAGNOSIS:  Atherosclerotic PVD with intermittent claudication    FOLLOWUP: In clinic    DISCHARGE INSTRUCTIONS:    Discharge Procedure Orders   Ambulatory referral/consult to Vascular Surgery   Standing Status: Future   Referral Priority: Routine Referral Type: Consultation   Referral Reason: Specialty Services Required   Requested Specialty: Vascular Surgery   Number of Visits Requested: 1     Diet general     Call MD for:  temperature >100.4     Call MD for:  persistent nausea and vomiting     Call MD for:  severe uncontrolled pain     Call MD for:  difficulty breathing, headache or visual disturbances     Call MD for:  redness, tenderness, or signs of infection (pain, swelling, redness, odor or green/yellow discharge around incision site)     Call MD for:  hives     Call MD for:  persistent dizziness or light-headedness     Call MD for:  extreme fatigue        TIME SPENT ON DISCHARGE: 35 minutes

## 2024-02-13 NOTE — PLAN OF CARE
Went over discharge instructions with patient.   Stressed importance of making and keeping all follow ups as well as making prescribed medication changes.   Gave education material for safe mobility after discharge.   Walked patient to ensure patient was safe to go home.   Patient verbalized understanding and has no questions in regards to discharge.  IV removed, catheter intact.  Primary nurse notified of pt's discharge status.   Made sure patient has someone to drive them and explained not to drive for 24 hours.   Dressing to right groin CDI with no signs of bleeding or hematoma.

## 2024-02-20 DIAGNOSIS — I73.9 PERIPHERAL VASCULAR DISEASE: Primary | ICD-10-CM

## 2024-02-23 ENCOUNTER — OFFICE VISIT (OUTPATIENT)
Dept: CARDIOLOGY | Facility: CLINIC | Age: 64
End: 2024-02-23
Payer: MEDICARE

## 2024-02-23 VITALS
DIASTOLIC BLOOD PRESSURE: 73 MMHG | BODY MASS INDEX: 27.73 KG/M2 | HEART RATE: 90 BPM | HEIGHT: 65 IN | SYSTOLIC BLOOD PRESSURE: 122 MMHG | OXYGEN SATURATION: 94 % | WEIGHT: 166.44 LBS

## 2024-02-23 DIAGNOSIS — E78.2 MIXED HYPERLIPIDEMIA: Chronic | ICD-10-CM

## 2024-02-23 DIAGNOSIS — I73.9 PVD (PERIPHERAL VASCULAR DISEASE): ICD-10-CM

## 2024-02-23 DIAGNOSIS — I70.0 ATHEROSCLEROSIS OF AORTA: ICD-10-CM

## 2024-02-23 DIAGNOSIS — Z01.810 PRE-OPERATIVE CARDIOVASCULAR EXAMINATION: ICD-10-CM

## 2024-02-23 DIAGNOSIS — R09.89 BRUIT OF RIGHT CAROTID ARTERY: ICD-10-CM

## 2024-02-23 DIAGNOSIS — I25.2 HISTORY OF ST ELEVATION MYOCARDIAL INFARCTION (STEMI): ICD-10-CM

## 2024-02-23 DIAGNOSIS — I25.118 CORONARY ARTERY DISEASE OF NATIVE ARTERY OF NATIVE HEART WITH STABLE ANGINA PECTORIS: Primary | ICD-10-CM

## 2024-02-23 DIAGNOSIS — I10 ESSENTIAL HYPERTENSION: ICD-10-CM

## 2024-02-23 DIAGNOSIS — I70.219 ATHEROSCLEROTIC PVD WITH INTERMITTENT CLAUDICATION: ICD-10-CM

## 2024-02-23 PROCEDURE — 99215 OFFICE O/P EST HI 40 MIN: CPT | Mod: S$GLB,,,

## 2024-02-23 PROCEDURE — 1159F MED LIST DOCD IN RCRD: CPT | Mod: CPTII,S$GLB,,

## 2024-02-23 PROCEDURE — 1160F RVW MEDS BY RX/DR IN RCRD: CPT | Mod: CPTII,S$GLB,,

## 2024-02-23 PROCEDURE — 3074F SYST BP LT 130 MM HG: CPT | Mod: CPTII,S$GLB,,

## 2024-02-23 PROCEDURE — 3288F FALL RISK ASSESSMENT DOCD: CPT | Mod: CPTII,S$GLB,,

## 2024-02-23 PROCEDURE — 99999 PR PBB SHADOW E&M-EST. PATIENT-LVL III: CPT | Mod: PBBFAC,,,

## 2024-02-23 PROCEDURE — 1125F AMNT PAIN NOTED PAIN PRSNT: CPT | Mod: CPTII,S$GLB,,

## 2024-02-23 PROCEDURE — 3008F BODY MASS INDEX DOCD: CPT | Mod: CPTII,S$GLB,,

## 2024-02-23 PROCEDURE — 1101F PT FALLS ASSESS-DOCD LE1/YR: CPT | Mod: CPTII,S$GLB,,

## 2024-02-23 PROCEDURE — 3078F DIAST BP <80 MM HG: CPT | Mod: CPTII,S$GLB,,

## 2024-02-23 RX ORDER — METOPROLOL SUCCINATE 25 MG/1
12.5 TABLET, EXTENDED RELEASE ORAL DAILY
Qty: 15 TABLET | Refills: 11
Start: 2024-02-23 | End: 2025-02-22

## 2024-02-23 NOTE — PROGRESS NOTES
Subjective:   Patient ID:  Iza Esquivel is a 63 y.o. female who presents for evaluation of No chief complaint on file.      HPI 64y/o F with PMHx of PVD, hlp, HTN, tobacco abuse, CAD followed by Dr. Jarvis in cards clinic/vascular found to have worsening claudication in left side. Here today for HFU s/p peripheral angio Left cfa 99% stenosis involving profunda with occluded sfa bilaterally   3 vessel run off bilateral.   Patent bilateral iliac stents.   50% rt cfa   Vascular sx was consulted for cfa endarterectomy and fem pop bypass.  Has some atypical chest pains at rest and with activity also has SOB with eval with nuc stress and Echo  Poor historian unsure which medications she is taking, per PCP pura she is taking ToprolXL, is not taking losartan due to hypotension. BP stable in clinic today    Does not exercise  Smokes 1 pack over 3 days  No ETOH  FH HTN  Lipids controlled  Past Medical History:   Diagnosis Date    Atherosclerotic PVD with intermittent claudication 5/25/2020    Cervical cancer     Hyperlipidemia     Hypertension     Right carotid bruit     S/P PTCA (percutaneous transluminal coronary angioplasty) 05/25/2020    STEMI: stenting of LCx       Past Surgical History:   Procedure Laterality Date    ABDOMINAL AORTOGRAPHY N/A 5/25/2020    Procedure: Aortogram, Abdominal;  Surgeon: Shelly Jarvis MD;  Location: [facility] CATH LAB;  Service: Cardiology;  Laterality: N/A;    ANGIOGRAM, LOWER ARTERIAL, BILATERAL  2/12/2024    Procedure: Angiogram, Lower Arterial, Bilateral;  Surgeon: Shelly Jarvis MD;  Location: [facility] CATH LAB;  Service: Cardiology;;    AORTOGRAPHY WITH SERIALOGRAPHY N/A 6/30/2020    Procedure: AORTOGRAM, WITH RUNOFF;  Surgeon: Shelly Jarvis MD;  Location: [facility] CATH LAB;  Service: Cardiology;  Laterality: N/A;    AORTOGRAPHY WITH SERIALOGRAPHY N/A 2/12/2024    Procedure: AORTOGRAM, WITH SERIALOGRAPHY;  Surgeon: Shelly Jarvis MD;  Location: [facility] CATH LAB;  Service: Cardiology;   "Laterality: N/A;    LEFT HEART CATHETERIZATION Left 2020    Procedure: CATHETERIZATION, HEART, LEFT;  Surgeon: Shelly Jarvis MD;  Location: [facility] CATH LAB;  Service: Cardiology;  Laterality: Left;    LEFT HEART CATHETERIZATION Left 2020    Procedure: CATHETERIZATION, HEART, LEFT;  Surgeon: Shelly Jarvis MD;  Location: [facility] CATH LAB;  Service: Cardiology;  Laterality: Left;    LYMPH NODE BIOPSY      PERCUTANEOUS TRANSLUMINAL BALLOON ANGIOPLASTY OF CORONARY ARTERY  2020    Procedure: Angioplasty-coronary;  Surgeon: Shelly Jarvis MD;  Location: [facility] CATH LAB;  Service: Cardiology;;       Social History     Tobacco Use    Smoking status: Every Day     Current packs/day: 0.00     Average packs/day: 1.5 packs/day for 52.1 years (78.2 ttl pk-yrs)     Types: Cigarettes     Start date:      Last attempt to quit: 2022     Years since quittin.0    Smokeless tobacco: Never    Tobacco comments:     On 3/21/22 patient stated she quit "about a month ago"   Substance Use Topics    Alcohol use: No    Drug use: Never       Family History   Problem Relation Age of Onset    Cancer Mother         Thyroid    Thyroid cancer Mother     Cancer Father         ?    Transient ischemic attack Father         Carotid artery stenosis, CEA    Diabetes Brother     Hypertension Brother     Cancer Paternal Aunt         Breast ca    Breast cancer Paternal Aunt        Current Outpatient Medications on File Prior to Visit   Medication Sig Dispense Refill    aspirin (ECOTRIN) 81 MG EC tablet Take 1 tablet (81 mg total) by mouth once daily. 30 tablet 0    atorvastatin (LIPITOR) 80 MG tablet Take 1 tablet (80 mg total) by mouth once daily. 90 tablet 3    clopidogreL (PLAVIX) 75 mg tablet Take 1 tablet (75 mg total) by mouth once daily. 90 tablet 3    isosorbide mononitrate (IMDUR) 60 MG 24 hr tablet TAKE 1 TABLET(60 MG) BY MOUTH EVERY DAY 30 tablet 6    levothyroxine (SYNTHROID) 88 MCG tablet 1 tab po qd  before breakfast 30 " tablet 1    LINZESS 145 mcg Cap capsule TAKE 1 CAPSULE(145 MCG) BY MOUTH BEFORE BREAKFAST 30 capsule 0    pantoprazole (PROTONIX) 40 MG tablet TAKE 1 TABLET (40 MG TOTAL) BY MOUTH ONCE DAILY. 90 tablet 3    [DISCONTINUED] metoprolol tartrate (LOPRESSOR) 25 MG tablet TAKE 1 TABLET(25 MG) BY MOUTH TWICE DAILY (Patient taking differently: 12.5 mg.) 60 tablet 11    [DISCONTINUED] losartan (COZAAR) 25 MG tablet TAKE 1 TABLET(25 MG) BY MOUTH TWICE DAILY 60 tablet 6    [DISCONTINUED] polyethylene glycol (GLYCOLAX) 17 gram PwPk Take 17 g by mouth once daily. (Patient not taking: Reported on 2/23/2024) 30 packet 0     No current facility-administered medications on file prior to visit.      Wt Readings from Last 3 Encounters:   02/23/24 75.5 kg (166 lb 7.2 oz)   02/12/24 74.8 kg (165 lb)   02/08/24 74.8 kg (165 lb)     Temp Readings from Last 3 Encounters:   02/12/24 97.9 °F (36.6 °C) (Temporal)   03/23/23 98.8 °F (37.1 °C)   02/01/23 98 °F (36.7 °C) (Tympanic)     BP Readings from Last 3 Encounters:   02/23/24 122/73   02/12/24 (!) 145/63   02/08/24 (!) 173/70     Pulse Readings from Last 3 Encounters:   02/23/24 90   02/12/24 (!) 57   02/08/24 73        Review of Systems   Constitutional: Positive for malaise/fatigue.   HENT: Negative.     Eyes: Negative.    Cardiovascular:  Positive for chest pain.   Respiratory:  Positive for shortness of breath.    Skin: Negative.    Musculoskeletal:  Positive for arthritis, back pain, joint pain, muscle cramps, muscle weakness and myalgias.   Gastrointestinal: Negative.    Genitourinary: Negative.    Neurological: Negative.    Psychiatric/Behavioral: Negative.         Objective:   Physical Exam  Vitals and nursing note reviewed.   Constitutional:       Appearance: Normal appearance.   HENT:      Head: Normocephalic.   Eyes:      Pupils: Pupils are equal, round, and reactive to light.   Cardiovascular:      Rate and Rhythm: Normal rate and regular rhythm.      Heart sounds: Normal heart  sounds, S1 normal and S2 normal. No murmur heard.     No S3 or S4 sounds.   Pulmonary:      Effort: Pulmonary effort is normal.      Breath sounds: Normal breath sounds.   Abdominal:      General: Bowel sounds are normal.      Palpations: Abdomen is soft.   Musculoskeletal:         General: Normal range of motion.      Cervical back: Normal range of motion.   Skin:     Capillary Refill: Capillary refill takes less than 2 seconds.      Comments: R groin site C/D/I   Neurological:      General: No focal deficit present.      Mental Status: She is alert and oriented to person, place, and time.   Psychiatric:         Mood and Affect: Mood normal.         Behavior: Behavior normal.         Thought Content: Thought content normal.         Lab Results   Component Value Date    CHOL 124 08/31/2023    CHOL 125 08/18/2022    CHOL 131 12/02/2021     Lab Results   Component Value Date    HDL 42 08/31/2023    HDL 42 08/18/2022    HDL 50 12/02/2021     Lab Results   Component Value Date    LDLCALC 47.8 (L) 08/31/2023    LDLCALC 52.2 (L) 08/18/2022    LDLCALC 57.4 (L) 12/02/2021     Lab Results   Component Value Date    TRIG 171 (H) 08/31/2023    TRIG 154 (H) 08/18/2022    TRIG 118 12/02/2021     Lab Results   Component Value Date    CHOLHDL 33.9 08/31/2023    CHOLHDL 33.6 08/18/2022    CHOLHDL 38.2 12/02/2021       Chemistry        Component Value Date/Time     02/08/2024 1348    K 4.7 02/08/2024 1348     02/08/2024 1348    CO2 28 02/08/2024 1348    BUN 7 (L) 02/08/2024 1348    CREATININE 0.8 02/08/2024 1348    GLU 76 02/08/2024 1348        Component Value Date/Time    CALCIUM 9.8 02/08/2024 1348    ALKPHOS 100 08/31/2023 0803    AST 18 08/31/2023 0803    ALT 10 08/31/2023 0803    BILITOT 0.3 08/31/2023 0803    ESTGFRAFRICA >60.0 12/02/2021 0758    EGFRNONAA >60.0 12/02/2021 0758          Lab Results   Component Value Date    TSH 7.153 (H) 03/24/2023     Lab Results   Component Value Date    INR 1.0 02/08/2024    INR  1.0 09/12/2020    INR 1.3 (H) 09/12/2020     @RESUFAST(WBC,HGB,HCT,MCV,PLT)  @LABRCNTIP(BNP,BNPTRIAGEBLO)@  CrCl cannot be calculated (Patient's most recent lab result is older than the maximum 7 days allowed.).     Results for orders placed during the hospital encounter of 06/11/20    Echo Color Flow Doppler? Yes    Interpretation Summary  · Concentric left ventricular remodeling.  · Normal left ventricular systolic function. The estimated ejection fraction is 55%.  · Normal LV diastolic function.  · No wall motion abnormalities.  · Normal right ventricular systolic function.  · Normal central venous pressure (3 mmHg).  · The estimated PA systolic pressure is 24 mmHg.     Results for orders placed during the hospital encounter of 09/23/22    Nuclear Stress - Cardiology Interpreted    Interpretation Summary    Normal myocardial perfusion scan. There is no evidence of myocardial ischemia or infarction.    The gated perfusion images showed an ejection fraction of 76% at rest. The gated perfusion images showed an ejection fraction of 83% post stress.    There is normal wall motion at rest and post stress.    The EKG portion of this study is negative for ischemia.     Assessment:      1. Coronary artery disease of native artery of native heart with stable angina pectoris    2. Mixed hyperlipidemia    3. Bruit of right carotid artery    4. History of ST elevation myocardial infarction (STEMI)    5. Atherosclerotic PVD with intermittent claudication    6. PVD (peripheral vascular disease)    7. Essential hypertension    8. Atherosclerosis of aorta    9. Pre-operative cardiovascular examination        Plan:   Coronary artery disease of native artery of native heart with stable angina pectoris  -     Nuclear Stress - Cardiology Interpreted; Future  -     Echo; Future  -     metoprolol succinate (TOPROL-XL) 25 MG 24 hr tablet; Take 0.5 tablets (12.5 mg total) by mouth once daily.  Dispense: 15 tablet; Refill: 11    Mixed  hyperlipidemia  -     Nuclear Stress - Cardiology Interpreted; Future  -     Echo; Future    Bruit of right carotid artery    History of ST elevation myocardial infarction (STEMI)  -     Nuclear Stress - Cardiology Interpreted; Future  -     Echo; Future    Atherosclerotic PVD with intermittent claudication    PVD (peripheral vascular disease)    Essential hypertension    Atherosclerosis of aorta  -     Nuclear Stress - Cardiology Interpreted; Future  -     Echo; Future    Pre-operative cardiovascular examination      Updated med list, rx for ToprolXL  Nuc stress and echo given chest pains/SOB as well as pre op for Vascular clearance    Cont current CV medications  RF modifications  Smoking cessation  Daily exercise as tolerated  Low na low fat diet  Keep f/u with Vascular    RTC 4 mos or sooner if needed    Praveena Silveira, FNP-C [facility], Cardiology

## 2024-02-26 ENCOUNTER — INITIAL CONSULT (OUTPATIENT)
Dept: VASCULAR SURGERY | Facility: CLINIC | Age: 64
End: 2024-02-26
Payer: MEDICARE

## 2024-02-26 ENCOUNTER — HOSPITAL ENCOUNTER (OUTPATIENT)
Dept: VASCULAR SURGERY | Facility: HOSPITAL | Age: 64
Discharge: HOME | End: 2024-02-26
Attending: STUDENT IN AN ORGANIZED HEALTH CARE EDUCATION/TRAINING PROGRAM
Payer: MEDICARE

## 2024-02-26 VITALS — SYSTOLIC BLOOD PRESSURE: 125 MMHG | DIASTOLIC BLOOD PRESSURE: 63 MMHG

## 2024-02-26 DIAGNOSIS — R09.89 BRUIT OF RIGHT CAROTID ARTERY: ICD-10-CM

## 2024-02-26 DIAGNOSIS — I10 ESSENTIAL HYPERTENSION: ICD-10-CM

## 2024-02-26 DIAGNOSIS — I70.0 ATHEROSCLEROSIS OF AORTA: ICD-10-CM

## 2024-02-26 DIAGNOSIS — E78.2 MIXED HYPERLIPIDEMIA: Chronic | ICD-10-CM

## 2024-02-26 DIAGNOSIS — I73.9 PERIPHERAL VASCULAR DISEASE: ICD-10-CM

## 2024-02-26 DIAGNOSIS — I70.219 ATHEROSCLEROTIC PVD WITH INTERMITTENT CLAUDICATION: Primary | ICD-10-CM

## 2024-02-26 PROCEDURE — 99999 PR PBB SHADOW E&M-EST. PATIENT-LVL III: CPT | Mod: PBBFAC,,, | Performed by: STUDENT IN AN ORGANIZED HEALTH CARE EDUCATION/TRAINING PROGRAM

## 2024-02-26 PROCEDURE — 93985 DUP-SCAN HEMO COMPL BI STD: CPT

## 2024-02-26 PROCEDURE — 99205 OFFICE O/P NEW HI 60 MIN: CPT | Mod: S$GLB,,, | Performed by: STUDENT IN AN ORGANIZED HEALTH CARE EDUCATION/TRAINING PROGRAM

## 2024-02-26 PROCEDURE — 93985 DUP-SCAN HEMO COMPL BI STD: CPT | Mod: 26,,, | Performed by: STUDENT IN AN ORGANIZED HEALTH CARE EDUCATION/TRAINING PROGRAM

## 2024-02-26 NOTE — PROGRESS NOTES
Frederick Perera    OFFICE NOTE    DATE OF VISIT: 2024  PATIENT NAME: Iza Esquivel  : 1960  MRN: [id number]  PRIMARY CARE PHYSICIAN: Park Gomez MD  CARDIOLOGIST: Matheus  REFERRING PROVIDER: Shelly Jarvis MD    CHIEF COMPLAINT   Chief Complaint   Patient presents with    Consult     Consult for PAD.        HISTORY OF PRESENT ILLNESS:  Iza Esquivel is a 63 y.o. female who presents to clinic today patient of dr jarvis who performed angiogram and found long segment sfa occlusion b/l. She has ishan 0.41 on the left and 0.77 on right she is in rest pain in left leg and very short distance claudication on b/l lower ext she would like very urgent treatment. Smokes about half pack a day since her rest pain has gotten worse she has tried exercise walking program but not able to tolerate it. She has b/l kissing iliac stents that are patent and appears to have reasonable in flow to the groins.    ALLERGIES:  Review of patient's allergies indicates:  No Known Allergies    PAST MEDICAL HISTORY:  Past Medical History:   Diagnosis Date    Atherosclerotic PVD with intermittent claudication 2020    Cervical cancer     Hyperlipidemia     Hypertension     Right carotid bruit     S/P PTCA (percutaneous transluminal coronary angioplasty) 2020    STEMI: stenting of LCx       PAST SURGICAL HISTORY:  Past Surgical History:   Procedure Laterality Date    ABDOMINAL AORTOGRAPHY N/A 2020    Procedure: Aortogram, Abdominal;  Surgeon: Shelly Jarvis MD;  Location: [facility] CATH LAB;  Service: Cardiology;  Laterality: N/A;    ANGIOGRAM, LOWER ARTERIAL, BILATERAL  2024    Procedure: Angiogram, Lower Arterial, Bilateral;  Surgeon: Shelly Jarvis MD;  Location: [facility] CATH LAB;  Service: Cardiology;;    AORTOGRAPHY WITH SERIALOGRAPHY N/A 2020    Procedure: AORTOGRAM, WITH RUNOFF;  Surgeon: Shelly Jarvis MD;  Location: [facility] CATH LAB;  Service: Cardiology;  Laterality: N/A;    AORTOGRAPHY WITH  "SERIALOGRAPHY N/A 2024    Procedure: AORTOGRAM, WITH SERIALOGRAPHY;  Surgeon: Shelly Jarvis MD;  Location: [facility] CATH LAB;  Service: Cardiology;  Laterality: N/A;    LEFT HEART CATHETERIZATION Left 2020    Procedure: CATHETERIZATION, HEART, LEFT;  Surgeon: Shelly Jarvis MD;  Location: [facility] CATH LAB;  Service: Cardiology;  Laterality: Left;    LEFT HEART CATHETERIZATION Left 2020    Procedure: CATHETERIZATION, HEART, LEFT;  Surgeon: Shelly Jarvis MD;  Location: [facility] CATH LAB;  Service: Cardiology;  Laterality: Left;    LYMPH NODE BIOPSY      PERCUTANEOUS TRANSLUMINAL BALLOON ANGIOPLASTY OF CORONARY ARTERY  2020    Procedure: Angioplasty-coronary;  Surgeon: Shelly Jarvis MD;  Location: [facility] CATH LAB;  Service: Cardiology;;       SOCIAL HISTORY:   Social History     Tobacco Use    Smoking status: Every Day     Current packs/day: 0.00     Average packs/day: 1.5 packs/day for 52.1 years (78.2 ttl pk-yrs)     Types: Cigarettes     Start date:      Last attempt to quit: 2022     Years since quittin.0    Smokeless tobacco: Never    Tobacco comments:     On 3/21/22 patient stated she quit "about a month ago"   Substance Use Topics    Alcohol use: No    Drug use: Never       FAMILY HISTORY:  Family History   Problem Relation Age of Onset    Cancer Mother         Thyroid    Thyroid cancer Mother     Cancer Father         ?    Transient ischemic attack Father         Carotid artery stenosis, CEA    Diabetes Brother     Hypertension Brother     Cancer Paternal Aunt         Breast ca    Breast cancer Paternal Aunt        REVIEW OF SYSTEMS:  ROS  10 pt ros otherwise negative  PHYSICAL EXAM:  Vitals:    24 0919   BP: 125/63      Physical Exam      Gen nad alert oriented  Some distress from leg pain in the left side  Non palpable pulses pedal  No wounds or open sores on feet/legs  Abd soft nt nd    VASCULAR LAB STUDIES:  Has adequate vein conduit on left leg 3.6, 3.4, 3.0 in thigh and " 2.7 2.6 and 2.5 in the left calf. The right gsv is sclerotic      ASSESSMENT AND PLAN:  Atherosclerotic PVD with intermittent claudication  She has borderline rest pain in the left leg I think she would benefit greatly from left fem to above knee popliteal bypass she has adequate saphenous vein on the left I will plan to obtain stress test and echo in coming weeks and schedule her for  surgery thereafter she is on asa/plavix  per dr burt. Risks and benefits were described to her in detail she is interested in pursuing this therapy    Mixed hyperlipidemia  Medical management    Bruit of right carotid artery  She is asymptomatic but reasonable to obtain carotid duplex in the coming months to assess for potential carotid disease. Last scan was few years ago showed minimal plaque    Essential hypertension  Continue medical therapy      Iza was seen today for consult.    Diagnoses and all orders for this visit:    Atherosclerotic PVD with intermittent claudication  -     Ambulatory referral/consult to Vascular Surgery    Atherosclerosis of aorta  -     Ambulatory referral/consult to Vascular Surgery    Mixed hyperlipidemia    Bruit of right carotid artery    Essential hypertension        No follow-ups on file.        Frederick Perera  [facility]  Vascular Surgery  [phone] (Clinic Number)

## 2024-02-26 NOTE — H&P (VIEW-ONLY)
Frederick Perera    OFFICE NOTE    DATE OF VISIT: 2024  PATIENT NAME: Iza Esquivel  : 1960  MRN: [id number]  PRIMARY CARE PHYSICIAN: Park Gomez MD  CARDIOLOGIST: Matheus  REFERRING PROVIDER: Shelly Jarvis MD    CHIEF COMPLAINT   Chief Complaint   Patient presents with    Consult     Consult for PAD.        HISTORY OF PRESENT ILLNESS:  Iza Esquivel is a 63 y.o. female who presents to clinic today patient of dr jarvis who performed angiogram and found long segment sfa occlusion b/l. She has ishan 0.41 on the left and 0.77 on right she is in rest pain in left leg and very short distance claudication on b/l lower ext she would like very urgent treatment. Smokes about half pack a day since her rest pain has gotten worse she has tried exercise walking program but not able to tolerate it. She has b/l kissing iliac stents that are patent and appears to have reasonable in flow to the groins.    ALLERGIES:  Review of patient's allergies indicates:  No Known Allergies    PAST MEDICAL HISTORY:  Past Medical History:   Diagnosis Date    Atherosclerotic PVD with intermittent claudication 2020    Cervical cancer     Hyperlipidemia     Hypertension     Right carotid bruit     S/P PTCA (percutaneous transluminal coronary angioplasty) 2020    STEMI: stenting of LCx       PAST SURGICAL HISTORY:  Past Surgical History:   Procedure Laterality Date    ABDOMINAL AORTOGRAPHY N/A 2020    Procedure: Aortogram, Abdominal;  Surgeon: Shelly Jarvis MD;  Location: [facility] CATH LAB;  Service: Cardiology;  Laterality: N/A;    ANGIOGRAM, LOWER ARTERIAL, BILATERAL  2024    Procedure: Angiogram, Lower Arterial, Bilateral;  Surgeon: Shelly Jarvis MD;  Location: [facility] CATH LAB;  Service: Cardiology;;    AORTOGRAPHY WITH SERIALOGRAPHY N/A 2020    Procedure: AORTOGRAM, WITH RUNOFF;  Surgeon: Shelly Jarvis MD;  Location: [facility] CATH LAB;  Service: Cardiology;  Laterality: N/A;    AORTOGRAPHY WITH  "SERIALOGRAPHY N/A 2024    Procedure: AORTOGRAM, WITH SERIALOGRAPHY;  Surgeon: Shelly Jarvis MD;  Location: [facility] CATH LAB;  Service: Cardiology;  Laterality: N/A;    LEFT HEART CATHETERIZATION Left 2020    Procedure: CATHETERIZATION, HEART, LEFT;  Surgeon: Shelly Jarvis MD;  Location: [facility] CATH LAB;  Service: Cardiology;  Laterality: Left;    LEFT HEART CATHETERIZATION Left 2020    Procedure: CATHETERIZATION, HEART, LEFT;  Surgeon: Shelly Jarvis MD;  Location: [facility] CATH LAB;  Service: Cardiology;  Laterality: Left;    LYMPH NODE BIOPSY      PERCUTANEOUS TRANSLUMINAL BALLOON ANGIOPLASTY OF CORONARY ARTERY  2020    Procedure: Angioplasty-coronary;  Surgeon: Shelly Jarvis MD;  Location: [facility] CATH LAB;  Service: Cardiology;;       SOCIAL HISTORY:   Social History     Tobacco Use    Smoking status: Every Day     Current packs/day: 0.00     Average packs/day: 1.5 packs/day for 52.1 years (78.2 ttl pk-yrs)     Types: Cigarettes     Start date:      Last attempt to quit: 2022     Years since quittin.0    Smokeless tobacco: Never    Tobacco comments:     On 3/21/22 patient stated she quit "about a month ago"   Substance Use Topics    Alcohol use: No    Drug use: Never       FAMILY HISTORY:  Family History   Problem Relation Age of Onset    Cancer Mother         Thyroid    Thyroid cancer Mother     Cancer Father         ?    Transient ischemic attack Father         Carotid artery stenosis, CEA    Diabetes Brother     Hypertension Brother     Cancer Paternal Aunt         Breast ca    Breast cancer Paternal Aunt        REVIEW OF SYSTEMS:  ROS  10 pt ros otherwise negative  PHYSICAL EXAM:  Vitals:    24 0919   BP: 125/63      Physical Exam      Gen nad alert oriented  Some distress from leg pain in the left side  Non palpable pulses pedal  No wounds or open sores on feet/legs  Abd soft nt nd    VASCULAR LAB STUDIES:  Has adequate vein conduit on left leg 3.6, 3.4, 3.0 in thigh and " 2.7 2.6 and 2.5 in the left calf. The right gsv is sclerotic      ASSESSMENT AND PLAN:  Atherosclerotic PVD with intermittent claudication  She has borderline rest pain in the left leg I think she would benefit greatly from left fem to above knee popliteal bypass she has adequate saphenous vein on the left I will plan to obtain stress test and echo in coming weeks and schedule her for  surgery thereafter she is on asa/plavix  per dr burt. Risks and benefits were described to her in detail she is interested in pursuing this therapy    Mixed hyperlipidemia  Medical management    Bruit of right carotid artery  She is asymptomatic but reasonable to obtain carotid duplex in the coming months to assess for potential carotid disease. Last scan was few years ago showed minimal plaque    Essential hypertension  Continue medical therapy      Iza was seen today for consult.    Diagnoses and all orders for this visit:    Atherosclerotic PVD with intermittent claudication  -     Ambulatory referral/consult to Vascular Surgery    Atherosclerosis of aorta  -     Ambulatory referral/consult to Vascular Surgery    Mixed hyperlipidemia    Bruit of right carotid artery    Essential hypertension        No follow-ups on file.        Frederick Perera  [facility]  Vascular Surgery  [phone] (Clinic Number)

## 2024-02-26 NOTE — ASSESSMENT & PLAN NOTE
She is asymptomatic but reasonable to obtain carotid duplex in the coming months to assess for potential carotid disease. Last scan was few years ago showed minimal plaque

## 2024-02-26 NOTE — ASSESSMENT & PLAN NOTE
She has borderline rest pain in the left leg I think she would benefit greatly from left fem to above knee popliteal bypass she has adequate saphenous vein on the left I will plan to obtain stress test and echo in coming weeks and schedule her for  surgery thereafter she is on asa/plavix  per dr burt. Risks and benefits were described to her in detail she is interested in pursuing this therapy

## 2024-02-28 LAB
LEFT GS DIA ANKLE: 0.25 CM
LEFT GS TRANS CALF PROX: 0.26 CM
LEFT GS TRANS KNEE: 0.27 CM
LEFT GS TRANS THIGH DIST: 0.3 CM
LEFT GS TRANS THIGH MID: 0.34 CM
LEFT GS TRANS THIGH PROX: 0.36 CM
RIGHT GS DIA ANKLE: 0.21 CM
RIGHT GS TRANS CALF PROX: 0.1 CM
RIGHT GS TRANS KNEE: 0.28 CM
RIGHT GS TRANS THIGH DIST: 0.29 CM
RIGHT GS TRANS THIGH MID: 0.29 CM
RIGHT GS TRANS THIGH PROX: 0.48 CM

## 2024-02-29 VITALS
WEIGHT: 165 LBS | OXYGEN SATURATION: 100 % | SYSTOLIC BLOOD PRESSURE: 145 MMHG | HEART RATE: 57 BPM | RESPIRATION RATE: 12 BRPM | HEIGHT: 65 IN | TEMPERATURE: 98 F | BODY MASS INDEX: 27.49 KG/M2 | DIASTOLIC BLOOD PRESSURE: 63 MMHG

## 2024-03-01 ENCOUNTER — HOSPITAL ENCOUNTER (OUTPATIENT)
Dept: CARDIOLOGY | Facility: HOSPITAL | Age: 64
Discharge: HOME | End: 2024-03-01
Payer: MEDICARE

## 2024-03-01 ENCOUNTER — HOSPITAL ENCOUNTER (OUTPATIENT)
Dept: RADIOLOGY | Facility: HOSPITAL | Age: 64
Discharge: HOME | End: 2024-03-01
Payer: MEDICARE

## 2024-03-01 VITALS
HEIGHT: 65 IN | BODY MASS INDEX: 27.66 KG/M2 | SYSTOLIC BLOOD PRESSURE: 125 MMHG | DIASTOLIC BLOOD PRESSURE: 63 MMHG | HEART RATE: 81 BPM | WEIGHT: 166 LBS

## 2024-03-01 DIAGNOSIS — I25.118 CORONARY ARTERY DISEASE OF NATIVE ARTERY OF NATIVE HEART WITH STABLE ANGINA PECTORIS: ICD-10-CM

## 2024-03-01 DIAGNOSIS — E78.2 MIXED HYPERLIPIDEMIA: Chronic | ICD-10-CM

## 2024-03-01 DIAGNOSIS — I25.2 HISTORY OF ST ELEVATION MYOCARDIAL INFARCTION (STEMI): ICD-10-CM

## 2024-03-01 DIAGNOSIS — I70.0 ATHEROSCLEROSIS OF AORTA: ICD-10-CM

## 2024-03-01 LAB
AORTIC ROOT ANNULUS: 3 CM
ASCENDING AORTA: 2.23 CM
AV INDEX (PROSTH): 0.93
AV MEAN GRADIENT: 8 MMHG
AV PEAK GRADIENT: 16 MMHG
AV VALVE AREA BY VELOCITY RATIO: 2.69 CM²
AV VALVE AREA: 3 CM²
AV VELOCITY RATIO: 0.83
BSA FOR ECHO PROCEDURE: 1.86 M2
CV ECHO LV RWT: 0.5 CM
CV STRESS BASE HR: 71 BPM
DIASTOLIC BLOOD PRESSURE: 77 MMHG
DOP CALC AO PEAK VEL: 2.01 M/S
DOP CALC AO VTI: 38.3 CM
DOP CALC LVOT AREA: 3.2 CM2
DOP CALC LVOT DIAMETER: 2.03 CM
DOP CALC LVOT PEAK VEL: 1.67 M/S
DOP CALC LVOT STROKE VOLUME: 114.84 CM3
DOP CALC RVOT PEAK VEL: 0.85 M/S
DOP CALC RVOT VTI: 19.5 CM
DOP CALCLVOT PEAK VEL VTI: 35.5 CM
E WAVE DECELERATION TIME: 316.86 MSEC
E/A RATIO: 0.79
E/E' RATIO: 11.13 M/S
ECHO LV POSTERIOR WALL: 0.94 CM (ref 0.6–1.1)
FRACTIONAL SHORTENING: 37 % (ref 28–44)
INTERVENTRICULAR SEPTUM: 1.2 CM (ref 0.6–1.1)
IVC DIAMETER: 1.46 CM
IVRT: 77.07 MSEC
LA MAJOR: 4.14 CM
LA MINOR: 4.49 CM
LA WIDTH: 2.8 CM
LEFT ATRIUM SIZE: 3.43 CM
LEFT ATRIUM VOLUME INDEX MOD: 18.9 ML/M2
LEFT ATRIUM VOLUME INDEX: 19.2 ML/M2
LEFT ATRIUM VOLUME MOD: 34.65 CM3
LEFT ATRIUM VOLUME: 35.17 CM3
LEFT INTERNAL DIMENSION IN SYSTOLE: 2.39 CM (ref 2.1–4)
LEFT VENTRICLE DIASTOLIC VOLUME INDEX: 33.59 ML/M2
LEFT VENTRICLE DIASTOLIC VOLUME: 61.47 ML
LEFT VENTRICLE MASS INDEX: 70 G/M2
LEFT VENTRICLE SYSTOLIC VOLUME INDEX: 10.9 ML/M2
LEFT VENTRICLE SYSTOLIC VOLUME: 19.9 ML
LEFT VENTRICULAR INTERNAL DIMENSION IN DIASTOLE: 3.79 CM (ref 3.5–6)
LEFT VENTRICULAR MASS: 128.8 G
LV LATERAL E/E' RATIO: 9.89 M/S
LV SEPTAL E/E' RATIO: 12.71 M/S
LVOT MG: 5.93 MMHG
LVOT MV: 1.16 CM/S
MV PEAK A VEL: 1.12 M/S
MV PEAK E VEL: 0.89 M/S
NUC REST EJECTION FRACTION: 89
NUC STRESS EJECTION FRACTION: 82 %
OHS CV CPX 85 PERCENT MAX PREDICTED HEART RATE MALE: 132
OHS CV CPX MAX PREDICTED HEART RATE: 155
OHS CV CPX PATIENT IS FEMALE: 1
OHS CV CPX PATIENT IS MALE: 0
OHS CV CPX PEAK DIASTOLIC BLOOD PRESSURE: 67 MMHG
OHS CV CPX PEAK HEAR RATE: 121 BPM
OHS CV CPX PEAK RATE PRESSURE PRODUCT: NORMAL
OHS CV CPX PEAK SYSTOLIC BLOOD PRESSURE: 129 MMHG
OHS CV CPX PERCENT MAX PREDICTED HEART RATE ACHIEVED: 81
OHS CV CPX RATE PRESSURE PRODUCT PRESENTING: NORMAL
PISA TR MAX VEL: 2.79 M/S
PULM VEIN S/D RATIO: 1.61
PV MEAN GRADIENT: 1 MMHG
PV PEAK D VEL: 0.41 M/S
PV PEAK GRADIENT: 6 MMHG
PV PEAK S VEL: 0.66 M/S
PV PEAK VELOCITY: 1.26 M/S
RA MAJOR: 2.61 CM
RA PRESSURE ESTIMATED: 3 MMHG
RA WIDTH: 2.51 CM
RIGHT VENTRICULAR END-DIASTOLIC DIMENSION: 2.9 CM
RV TB RVSP: 6 MMHG
SINUS: 2.64 CM
STJ: 2.51 CM
SYSTOLIC BLOOD PRESSURE: 142 MMHG
TDI LATERAL: 0.09 M/S
TDI SEPTAL: 0.07 M/S
TDI: 0.08 M/S
TR MAX PG: 31 MMHG
TRICUSPID ANNULAR PLANE SYSTOLIC EXCURSION: 1.85 CM
TV REST PULMONARY ARTERY PRESSURE: 34 MMHG
Z-SCORE OF LEFT VENTRICULAR DIMENSION IN END DIASTOLE: -2.91
Z-SCORE OF LEFT VENTRICULAR DIMENSION IN END SYSTOLE: -2.14

## 2024-03-01 PROCEDURE — 93017 CV STRESS TEST TRACING ONLY: CPT

## 2024-03-01 PROCEDURE — 93306 TTE W/DOPPLER COMPLETE: CPT

## 2024-03-01 PROCEDURE — 93018 CV STRESS TEST I&R ONLY: CPT | Mod: ,,, | Performed by: INTERNAL MEDICINE

## 2024-03-01 PROCEDURE — A9502 TC99M TETROFOSMIN: HCPCS

## 2024-03-01 PROCEDURE — 78452 HT MUSCLE IMAGE SPECT MULT: CPT

## 2024-03-01 PROCEDURE — 63600175 PHARM REV CODE 636 W HCPCS

## 2024-03-01 PROCEDURE — 93306 TTE W/DOPPLER COMPLETE: CPT | Mod: 26,,, | Performed by: INTERNAL MEDICINE

## 2024-03-01 PROCEDURE — 78452 HT MUSCLE IMAGE SPECT MULT: CPT | Mod: 26,,, | Performed by: INTERNAL MEDICINE

## 2024-03-01 PROCEDURE — 93016 CV STRESS TEST SUPVJ ONLY: CPT | Mod: ,,, | Performed by: INTERNAL MEDICINE

## 2024-03-01 RX ORDER — REGADENOSON 0.08 MG/ML
0.4 INJECTION, SOLUTION INTRAVENOUS ONCE
Status: COMPLETED | OUTPATIENT
Start: 2024-03-01 | End: 2024-03-01

## 2024-03-01 RX ADMIN — REGADENOSON 0.4 MG: 0.08 INJECTION, SOLUTION INTRAVENOUS at 01:03

## 2024-03-01 RX ADMIN — TETROFOSMIN 31.9 MILLICURIE: 1.38 INJECTION, POWDER, LYOPHILIZED, FOR SOLUTION INTRAVENOUS at 01:03

## 2024-03-01 RX ADMIN — TETROFOSMIN 10.5 MILLICURIE: 1.38 INJECTION, POWDER, LYOPHILIZED, FOR SOLUTION INTRAVENOUS at 11:03

## 2024-03-04 ENCOUNTER — PATIENT MESSAGE (OUTPATIENT)
Dept: CARDIOLOGY | Facility: CLINIC | Age: 64
End: 2024-03-04
Payer: MEDICARE

## 2024-03-04 ENCOUNTER — TELEPHONE (OUTPATIENT)
Dept: CARDIOLOGY | Facility: CLINIC | Age: 64
End: 2024-03-04
Payer: MEDICARE

## 2024-03-04 DIAGNOSIS — R94.31 NONSPECIFIC ABNORMAL ELECTROCARDIOGRAM (ECG) (EKG): ICD-10-CM

## 2024-03-04 DIAGNOSIS — E78.2 MIXED HYPERLIPIDEMIA: Chronic | ICD-10-CM

## 2024-03-04 DIAGNOSIS — I25.10 CORONARY ARTERY DISEASE INVOLVING NATIVE CORONARY ARTERY OF NATIVE HEART WITHOUT ANGINA PECTORIS: ICD-10-CM

## 2024-03-04 DIAGNOSIS — Z01.818 PRE-OP EVALUATION: ICD-10-CM

## 2024-03-04 DIAGNOSIS — Z72.0 TOBACCO ABUSE: Chronic | ICD-10-CM

## 2024-03-04 DIAGNOSIS — I70.219 ATHEROSCLEROTIC PVD WITH INTERMITTENT CLAUDICATION: ICD-10-CM

## 2024-03-04 DIAGNOSIS — R09.89 BRUIT OF RIGHT CAROTID ARTERY: ICD-10-CM

## 2024-03-04 RX ORDER — PANTOPRAZOLE SODIUM 40 MG/1
40 TABLET, DELAYED RELEASE ORAL DAILY
Qty: 90 TABLET | Refills: 3 | Status: SHIPPED | OUTPATIENT
Start: 2024-03-04 | End: 2024-03-05 | Stop reason: SDUPTHER

## 2024-03-04 RX ORDER — ATORVASTATIN CALCIUM 80 MG/1
80 TABLET, FILM COATED ORAL DAILY
Qty: 90 TABLET | Refills: 3 | Status: SHIPPED | OUTPATIENT
Start: 2024-03-04 | End: 2024-03-05 | Stop reason: SDUPTHER

## 2024-03-04 RX ORDER — CLOPIDOGREL BISULFATE 75 MG/1
75 TABLET ORAL DAILY
Qty: 90 TABLET | Refills: 3 | Status: SHIPPED | OUTPATIENT
Start: 2024-03-04 | End: 2024-03-05 | Stop reason: SDUPTHER

## 2024-03-04 NOTE — TELEPHONE ENCOUNTER
Contacted pt and informed her Stress test did not reveal any ischemia. ECHO revealed normal heart function. Pt scheduled with Dr Jarvis in 3 months on 06/12/24. Pt vu w/o q/c    ----- Message from XIMENA Lozano sent at 3/4/2024  1:47 PM CST -----  Please notify patient  Stress test did not reveal any ischemia  ECHO revealed normal heart function  Needs appt with Dr Jarvis in 3 months    Thanks  Mala

## 2024-03-05 ENCOUNTER — PATIENT MESSAGE (OUTPATIENT)
Dept: VASCULAR SURGERY | Facility: CLINIC | Age: 64
End: 2024-03-05
Payer: MEDICARE

## 2024-03-05 DIAGNOSIS — I70.219 ATHEROSCLEROTIC PVD WITH INTERMITTENT CLAUDICATION: ICD-10-CM

## 2024-03-05 DIAGNOSIS — Z01.818 PRE-OP EVALUATION: ICD-10-CM

## 2024-03-05 DIAGNOSIS — Z72.0 TOBACCO ABUSE: Chronic | ICD-10-CM

## 2024-03-05 DIAGNOSIS — R94.31 NONSPECIFIC ABNORMAL ELECTROCARDIOGRAM (ECG) (EKG): ICD-10-CM

## 2024-03-05 DIAGNOSIS — I25.10 CORONARY ARTERY DISEASE INVOLVING NATIVE CORONARY ARTERY OF NATIVE HEART WITHOUT ANGINA PECTORIS: ICD-10-CM

## 2024-03-05 DIAGNOSIS — R09.89 BRUIT OF RIGHT CAROTID ARTERY: ICD-10-CM

## 2024-03-05 DIAGNOSIS — E78.2 MIXED HYPERLIPIDEMIA: Chronic | ICD-10-CM

## 2024-03-05 RX ORDER — PANTOPRAZOLE SODIUM 40 MG/1
40 TABLET, DELAYED RELEASE ORAL DAILY
Qty: 90 TABLET | Refills: 3 | Status: SHIPPED | OUTPATIENT
Start: 2024-03-05

## 2024-03-05 RX ORDER — ATORVASTATIN CALCIUM 80 MG/1
80 TABLET, FILM COATED ORAL DAILY
Qty: 90 TABLET | Refills: 3 | Status: SHIPPED | OUTPATIENT
Start: 2024-03-05

## 2024-03-05 RX ORDER — CLOPIDOGREL BISULFATE 75 MG/1
75 TABLET ORAL DAILY
Qty: 90 TABLET | Refills: 3 | Status: SHIPPED | OUTPATIENT
Start: 2024-03-05

## 2024-03-12 ENCOUNTER — TELEPHONE (OUTPATIENT)
Dept: PREADMISSION TESTING | Facility: HOSPITAL | Age: 64
End: 2024-03-12
Payer: MEDICARE

## 2024-03-12 NOTE — TELEPHONE ENCOUNTER
Pre op instructions reviewed with Pt over telephone, verbalized understanding.    To confirm, Surgery is scheduled on 3/19/24. We will call you late afternoon the business day prior to surgery with your arrival time.    *Please report to the [facility] Lobby (1st Floor) located off of [facility] (2nd Entrance/Building on the left, in front of the flag pole).  Address: [address] [place] [address] [place] [address]      INSTRUCTIONS IMPORTANT!!!  DO NOT Eat, Drink, or Smoke after 12 midnight unless instructed otherwise by your Surgeon. OK to brush teeth, no gum, candy or mints!    >>>MEDICATION INSTRUCTIONS<<<: Morning of Surgery, take small sip of water with ONLY these medications:  LEVOTHYROXINE    *Diabetic/ Prediabetic Patients: !!!If you take diabetic or weight loss medication, Do NOT take morning of surgery unless instructed by Doctor!!!  Metformin to be stopped 24 hrs prior to surgery.   Ozempic/ Mounjaro/ Wegovy/ Trulicity/ Semaglutide injections or weight loss medication to be stopped 7 days prior to surgery.    Vitamins/supplements/ OTC Aspirin products: Stop 7 days prior to surgery!    Weight Loss Injections/medications: Stop 7 days prior to surgery!    ____  Avoid Alcoholic beverages 3 days prior to surgery, as it can thin the blood.  ____  NO Acrylic/fake nails or nail polish worn day of surgery (specifically hand/arm & foot surgeries).  ____  NO powder, lotions, deodorants, oils or cream on body.  ____  Remove all jewelry & piercings & foreign objects before arrival & leave at home.  ____  Remove Dentures, Hearing Aids & Contact Lens prior to surgery.  ____  Bring photo ID and insurance information to hospital (Leave Valuables at Home).  ____  If going home the same day, arrange for a ride home. You will not be able to drive for 24 hrs if Anesthesia was used.   ____  Females (ages 11-60): may need to give a urine sample the morning of surgery; please see Pre op Nurse prior to using the  restroom.  ____  Males: Stop ED medications (Viagra, Cialis) 24 hrs prior to surgery.  ____  Wear clean, loose fitting clothing to allow for dressings/ bandages.      Bathing Instructions:    -Shower with anti-bacterial Soap (Hibiclens or Dial) the night before surgery and the morning of.   -Do not use Hibiclens on your face or genitals.   -Apply clean clothes after shower.  -Do not shave your face or body 2 days prior to surgery unless instructed otherwise by your Surgeon.  -Do not shave pubic hair 7 days prior to surgery (gyn pt's).    [facility]/Ride Policy:  Only 2 adults allowed in pre op/recovery area during your procedure. You MUST HAVE A RIDE HOME from a responsible adult that you know and trust. Medical Transport, Uber or Lyft can ONLY be used if patient has a responsible adult to accompany them during ride home.       *Signs and symptoms of Infection Before or After Surgery:               !!!If you experience any fever, chills, nausea/ vomiting, foul odor/ excessive drainage from surgical site, flu-like symptoms, new wounds or cuts, PLEASE CALL THE SURGEON OFFICE at [phone] or SEND MESSAGE THROUGH [employer] PORTAL!!!       *If you are running late the morning of surgery, please call the Hospital Surgery Dept @ [phone].     *Billing questions:  [phone]       Thank you,  -[facility] Pre Admit Dept.  [phone] or [phone]  M-F 7:30 am-4:00 pm (Closed Major Holidays)

## 2024-03-13 DIAGNOSIS — Z78.0 MENOPAUSE: ICD-10-CM

## 2024-03-18 ENCOUNTER — TELEPHONE (OUTPATIENT)
Dept: PREADMISSION TESTING | Facility: HOSPITAL | Age: 64
End: 2024-03-18
Payer: MEDICARE

## 2024-03-18 NOTE — TELEPHONE ENCOUNTER
Called and spoke with patient about the following:     Please arrive to [facility] ([facility]) at 12:00PM, NOON on 3/19/24 for your scheduled procedure.  Address: [address] [place] [address] [place]. [address] (2nd Building on left, 1st Floor Lobby)  >>>NO eating or drinking after midnight unless instructed otherwise by your Surgeon<<<    Thank you,  -[facility] Pre Ad[facility] Testing Dept.  Mon-Fri 7:30 am - 4 pm [phone]

## 2024-03-19 ENCOUNTER — ANESTHESIA EVENT (OUTPATIENT)
Dept: SURGERY | Facility: HOSPITAL | Age: 64
DRG: 254 | End: 2024-03-19
Payer: MEDICARE

## 2024-03-19 ENCOUNTER — HOSPITAL ENCOUNTER (INPATIENT)
Facility: HOSPITAL | Age: 64
LOS: 3 days | Discharge: HOME | DRG: 254 | End: 2024-03-22
Attending: STUDENT IN AN ORGANIZED HEALTH CARE EDUCATION/TRAINING PROGRAM | Admitting: STUDENT IN AN ORGANIZED HEALTH CARE EDUCATION/TRAINING PROGRAM
Payer: MEDICARE

## 2024-03-19 ENCOUNTER — ANESTHESIA (OUTPATIENT)
Dept: SURGERY | Facility: HOSPITAL | Age: 64
DRG: 254 | End: 2024-03-19
Payer: MEDICARE

## 2024-03-19 DIAGNOSIS — J44.9 COPD, MODERATE: ICD-10-CM

## 2024-03-19 DIAGNOSIS — I70.219 ATHEROSCLEROTIC PVD WITH INTERMITTENT CLAUDICATION: Primary | ICD-10-CM

## 2024-03-19 DIAGNOSIS — I73.9 PAD (PERIPHERAL ARTERY DISEASE): ICD-10-CM

## 2024-03-19 LAB
ABO + RH BLD: NORMAL
ANION GAP SERPL CALC-SCNC: 12 MMOL/L (ref 8–16)
BASOPHILS # BLD AUTO: 0.11 K/UL (ref 0–0.2)
BASOPHILS NFR BLD: 1.1 % (ref 0–1.9)
BLD GP AB SCN CELLS X3 SERPL QL: NORMAL
BUN SERPL-MCNC: 8 MG/DL (ref 8–23)
CALCIUM SERPL-MCNC: 9.7 MG/DL (ref 8.7–10.5)
CHLORIDE SERPL-SCNC: 100 MMOL/L (ref 95–110)
CO2 SERPL-SCNC: 28 MMOL/L (ref 23–29)
CREAT SERPL-MCNC: 0.9 MG/DL (ref 0.5–1.4)
DIFFERENTIAL METHOD BLD: ABNORMAL
EOSINOPHIL # BLD AUTO: 0.1 K/UL (ref 0–0.5)
EOSINOPHIL NFR BLD: 1 % (ref 0–8)
ERYTHROCYTE [DISTWIDTH] IN BLOOD BY AUTOMATED COUNT: 14.2 % (ref 11.5–14.5)
EST. GFR  (NO RACE VARIABLE): >60 ML/MIN/1.73 M^2
GLUCOSE SERPL-MCNC: 86 MG/DL (ref 70–110)
HCT VFR BLD AUTO: 38.2 % (ref 37–48.5)
HGB BLD-MCNC: 12.2 G/DL (ref 12–16)
IMM GRANULOCYTES # BLD AUTO: 0.03 K/UL (ref 0–0.04)
IMM GRANULOCYTES NFR BLD AUTO: 0.3 % (ref 0–0.5)
LYMPHOCYTES # BLD AUTO: 2.8 K/UL (ref 1–4.8)
LYMPHOCYTES NFR BLD: 27.7 % (ref 18–48)
MCH RBC QN AUTO: 28.6 PG (ref 27–31)
MCHC RBC AUTO-ENTMCNC: 31.9 G/DL (ref 32–36)
MCV RBC AUTO: 90 FL (ref 82–98)
MONOCYTES # BLD AUTO: 0.6 K/UL (ref 0.3–1)
MONOCYTES NFR BLD: 6.3 % (ref 4–15)
NEUTROPHILS # BLD AUTO: 6.4 K/UL (ref 1.8–7.7)
NEUTROPHILS NFR BLD: 63.6 % (ref 38–73)
NRBC BLD-RTO: 0 /100 WBC
PLATELET # BLD AUTO: 352 K/UL (ref 150–450)
PMV BLD AUTO: 9.9 FL (ref 9.2–12.9)
POTASSIUM SERPL-SCNC: 4.5 MMOL/L (ref 3.5–5.1)
RBC # BLD AUTO: 4.27 M/UL (ref 4–5.4)
SODIUM SERPL-SCNC: 140 MMOL/L (ref 136–145)
SPECIMEN OUTDATE: NORMAL
WBC # BLD AUTO: 10 K/UL (ref 3.9–12.7)

## 2024-03-19 PROCEDURE — 63600175 PHARM REV CODE 636 W HCPCS

## 2024-03-19 PROCEDURE — 71000039 HC RECOVERY, EACH ADD'L HOUR: Performed by: STUDENT IN AN ORGANIZED HEALTH CARE EDUCATION/TRAINING PROGRAM

## 2024-03-19 PROCEDURE — 35556 ART BYP GRFT FEM-POPLITEAL: CPT | Mod: LT,,, | Performed by: STUDENT IN AN ORGANIZED HEALTH CARE EDUCATION/TRAINING PROGRAM

## 2024-03-19 PROCEDURE — 21400001 HC TELEMETRY ROOM

## 2024-03-19 PROCEDURE — 04CL0ZZ EXTIRPATION OF MATTER FROM LEFT FEMORAL ARTERY, OPEN APPROACH: ICD-10-PCS | Performed by: STUDENT IN AN ORGANIZED HEALTH CARE EDUCATION/TRAINING PROGRAM

## 2024-03-19 PROCEDURE — 37000008 HC ANESTHESIA 1ST 15 MINUTES: Performed by: STUDENT IN AN ORGANIZED HEALTH CARE EDUCATION/TRAINING PROGRAM

## 2024-03-19 PROCEDURE — 25000003 PHARM REV CODE 250: Performed by: STUDENT IN AN ORGANIZED HEALTH CARE EDUCATION/TRAINING PROGRAM

## 2024-03-19 PROCEDURE — 85025 COMPLETE CBC W/AUTO DIFF WBC: CPT | Performed by: NURSE PRACTITIONER

## 2024-03-19 PROCEDURE — 63600175 PHARM REV CODE 636 W HCPCS: Performed by: STUDENT IN AN ORGANIZED HEALTH CARE EDUCATION/TRAINING PROGRAM

## 2024-03-19 PROCEDURE — 80048 BASIC METABOLIC PNL TOTAL CA: CPT | Performed by: NURSE PRACTITIONER

## 2024-03-19 PROCEDURE — 36000712 HC OR TIME LEV V 1ST 15 MIN: Performed by: STUDENT IN AN ORGANIZED HEALTH CARE EDUCATION/TRAINING PROGRAM

## 2024-03-19 PROCEDURE — 35556 ART BYP GRFT FEM-POPLITEAL: CPT | Mod: AS,LT,, | Performed by: NURSE PRACTITIONER

## 2024-03-19 PROCEDURE — 37000009 HC ANESTHESIA EA ADD 15 MINS: Performed by: STUDENT IN AN ORGANIZED HEALTH CARE EDUCATION/TRAINING PROGRAM

## 2024-03-19 PROCEDURE — C1757 CATH, THROMBECTOMY/EMBOLECT: HCPCS | Performed by: STUDENT IN AN ORGANIZED HEALTH CARE EDUCATION/TRAINING PROGRAM

## 2024-03-19 PROCEDURE — 36415 COLL VENOUS BLD VENIPUNCTURE: CPT | Performed by: STUDENT IN AN ORGANIZED HEALTH CARE EDUCATION/TRAINING PROGRAM

## 2024-03-19 PROCEDURE — 06BQ4ZZ EXCISION OF LEFT SAPHENOUS VEIN, PERCUTANEOUS ENDOSCOPIC APPROACH: ICD-10-PCS | Performed by: STUDENT IN AN ORGANIZED HEALTH CARE EDUCATION/TRAINING PROGRAM

## 2024-03-19 PROCEDURE — 041L09L BYPASS LEFT FEMORAL ARTERY TO POPLITEAL ARTERY WITH AUTOLOGOUS VENOUS TISSUE, OPEN APPROACH: ICD-10-PCS | Performed by: STUDENT IN AN ORGANIZED HEALTH CARE EDUCATION/TRAINING PROGRAM

## 2024-03-19 PROCEDURE — 04UL0KZ SUPPLEMENT LEFT FEMORAL ARTERY WITH NONAUTOLOGOUS TISSUE SUBSTITUTE, OPEN APPROACH: ICD-10-PCS | Performed by: STUDENT IN AN ORGANIZED HEALTH CARE EDUCATION/TRAINING PROGRAM

## 2024-03-19 PROCEDURE — C1768 GRAFT, VASCULAR: HCPCS | Performed by: STUDENT IN AN ORGANIZED HEALTH CARE EDUCATION/TRAINING PROGRAM

## 2024-03-19 PROCEDURE — 27201423 OPTIME MED/SURG SUP & DEVICES STERILE SUPPLY: Performed by: STUDENT IN AN ORGANIZED HEALTH CARE EDUCATION/TRAINING PROGRAM

## 2024-03-19 PROCEDURE — 86901 BLOOD TYPING SEROLOGIC RH(D): CPT | Performed by: NURSE PRACTITIONER

## 2024-03-19 PROCEDURE — 86920 COMPATIBILITY TEST SPIN: CPT | Performed by: NURSE PRACTITIONER

## 2024-03-19 PROCEDURE — 71000033 HC RECOVERY, INTIAL HOUR: Performed by: STUDENT IN AN ORGANIZED HEALTH CARE EDUCATION/TRAINING PROGRAM

## 2024-03-19 PROCEDURE — 36000713 HC OR TIME LEV V EA ADD 15 MIN: Performed by: STUDENT IN AN ORGANIZED HEALTH CARE EDUCATION/TRAINING PROGRAM

## 2024-03-19 PROCEDURE — 25000003 PHARM REV CODE 250

## 2024-03-19 DEVICE — VASCU-GUARD PERIPHERAL VASCULAR PATCH IS PREPARED FROM BOVINE PERICARDIUM WHICH IS CROSS-LINKED WITH GLUTARALDEHYDE. THE PERICARDIUM IS PROCURED FROM CATTLE ORIGINATING IN THE UNITED STATES. VASCU-GUARD PERIPHERAL VASCULAR PATCH IS CHEMICALLY STERILIZED USING ETHANOL AND PROPYLENE OXIDE. VASCU-GUARD PERIPHERAL VASCULAR PATCH HAS BEEN TREATED WITH 1 MOLAR SODIUM HYDROXIDE FOR A MINIMUM OF 60 MINUTES AT 20 - 25 C.  VASCU-GUARD PERIPHERAL VASCULAR PATCH IS PACKAGED IN A CONTAINER FILLED WITH STERILE, NON-PYROGENIC WATER CONTAINING PROPYLENE OXIDE. THE CONTENTS OF THE UNOPENED, UNDAMAGED CONTAINER ARE STERILE.
Type: IMPLANTABLE DEVICE | Site: LEG | Status: FUNCTIONAL
Brand: VASCU-GUARD PERIPHERAL VASCULAR PATCH

## 2024-03-19 RX ORDER — LIDOCAINE HYDROCHLORIDE 20 MG/ML
INJECTION INTRAVENOUS
Status: DISCONTINUED | OUTPATIENT
Start: 2024-03-19 | End: 2024-03-19

## 2024-03-19 RX ORDER — DEXAMETHASONE SODIUM PHOSPHATE 4 MG/ML
INJECTION, SOLUTION INTRA-ARTICULAR; INTRALESIONAL; INTRAMUSCULAR; INTRAVENOUS; SOFT TISSUE
Status: DISCONTINUED | OUTPATIENT
Start: 2024-03-19 | End: 2024-03-19

## 2024-03-19 RX ORDER — LIDOCAINE HYDROCHLORIDE 10 MG/ML
1 INJECTION, SOLUTION EPIDURAL; INFILTRATION; INTRACAUDAL; PERINEURAL ONCE
Status: ACTIVE | OUTPATIENT
Start: 2024-03-19

## 2024-03-19 RX ORDER — HYDROMORPHONE HYDROCHLORIDE 2 MG/ML
0.2 INJECTION, SOLUTION INTRAMUSCULAR; INTRAVENOUS; SUBCUTANEOUS EVERY 5 MIN PRN
Status: DISCONTINUED | OUTPATIENT
Start: 2024-03-19 | End: 2024-03-19 | Stop reason: HOSPADM

## 2024-03-19 RX ORDER — KETAMINE HCL IN 0.9 % NACL 50 MG/5 ML
SYRINGE (ML) INTRAVENOUS
Status: DISCONTINUED | OUTPATIENT
Start: 2024-03-19 | End: 2024-03-19

## 2024-03-19 RX ORDER — CHLORHEXIDINE GLUCONATE ORAL RINSE 1.2 MG/ML
10 SOLUTION DENTAL
Status: ACTIVE | OUTPATIENT
Start: 2024-03-19

## 2024-03-19 RX ORDER — MIDAZOLAM HYDROCHLORIDE 1 MG/ML
INJECTION INTRAMUSCULAR; INTRAVENOUS
Status: DISCONTINUED | OUTPATIENT
Start: 2024-03-19 | End: 2024-03-19

## 2024-03-19 RX ORDER — FENTANYL CITRATE 50 UG/ML
INJECTION, SOLUTION INTRAMUSCULAR; INTRAVENOUS
Status: DISCONTINUED | OUTPATIENT
Start: 2024-03-19 | End: 2024-03-19

## 2024-03-19 RX ORDER — OXYCODONE AND ACETAMINOPHEN 5; 325 MG/1; MG/1
1 TABLET ORAL
Status: DISCONTINUED | OUTPATIENT
Start: 2024-03-19 | End: 2024-03-19 | Stop reason: HOSPADM

## 2024-03-19 RX ORDER — HYDROMORPHONE HYDROCHLORIDE 2 MG/ML
INJECTION, SOLUTION INTRAMUSCULAR; INTRAVENOUS; SUBCUTANEOUS
Status: DISCONTINUED | OUTPATIENT
Start: 2024-03-19 | End: 2024-03-19

## 2024-03-19 RX ORDER — HEPARIN SODIUM 1000 [USP'U]/ML
INJECTION, SOLUTION INTRAVENOUS; SUBCUTANEOUS
Status: DISCONTINUED | OUTPATIENT
Start: 2024-03-19 | End: 2024-03-19

## 2024-03-19 RX ORDER — LIDOCAINE HYDROCHLORIDE 10 MG/ML
INJECTION, SOLUTION EPIDURAL; INFILTRATION; INTRACAUDAL; PERINEURAL
Status: DISCONTINUED | OUTPATIENT
Start: 2024-03-19 | End: 2024-03-19 | Stop reason: HOSPADM

## 2024-03-19 RX ORDER — KETOROLAC TROMETHAMINE 30 MG/ML
15 INJECTION, SOLUTION INTRAMUSCULAR; INTRAVENOUS EVERY 8 HOURS PRN
Status: DISCONTINUED | OUTPATIENT
Start: 2024-03-19 | End: 2024-03-19 | Stop reason: HOSPADM

## 2024-03-19 RX ORDER — SUCCINYLCHOLINE CHLORIDE 20 MG/ML
INJECTION INTRAMUSCULAR; INTRAVENOUS
Status: DISCONTINUED | OUTPATIENT
Start: 2024-03-19 | End: 2024-03-19

## 2024-03-19 RX ORDER — HEPARIN SODIUM 1000 [USP'U]/ML
INJECTION, SOLUTION INTRAVENOUS; SUBCUTANEOUS
Status: DISCONTINUED | OUTPATIENT
Start: 2024-03-19 | End: 2024-03-19 | Stop reason: HOSPADM

## 2024-03-19 RX ORDER — SODIUM CHLORIDE 9 MG/ML
INJECTION, SOLUTION INTRAVENOUS CONTINUOUS
Status: DISCONTINUED | OUTPATIENT
Start: 2024-03-19 | End: 2024-03-20

## 2024-03-19 RX ORDER — CEFAZOLIN SODIUM 2 G/50ML
2 SOLUTION INTRAVENOUS
Status: COMPLETED | OUTPATIENT
Start: 2024-03-19 | End: 2024-03-19

## 2024-03-19 RX ORDER — ROCURONIUM BROMIDE 10 MG/ML
INJECTION, SOLUTION INTRAVENOUS
Status: DISCONTINUED | OUTPATIENT
Start: 2024-03-19 | End: 2024-03-19

## 2024-03-19 RX ORDER — PROPOFOL 10 MG/ML
VIAL (ML) INTRAVENOUS
Status: DISCONTINUED | OUTPATIENT
Start: 2024-03-19 | End: 2024-03-19

## 2024-03-19 RX ORDER — ONDANSETRON HYDROCHLORIDE 2 MG/ML
4 INJECTION, SOLUTION INTRAVENOUS DAILY PRN
Status: DISCONTINUED | OUTPATIENT
Start: 2024-03-19 | End: 2024-03-19 | Stop reason: HOSPADM

## 2024-03-19 RX ORDER — PROTAMINE SULFATE 10 MG/ML
INJECTION, SOLUTION INTRAVENOUS
Status: DISCONTINUED | OUTPATIENT
Start: 2024-03-19 | End: 2024-03-19

## 2024-03-19 RX ORDER — CEFAZOLIN 1 G/1
INJECTION, POWDER, FOR SOLUTION INTRAVENOUS
Status: DISCONTINUED | OUTPATIENT
Start: 2024-03-19 | End: 2024-03-19 | Stop reason: HOSPADM

## 2024-03-19 RX ORDER — ONDANSETRON HYDROCHLORIDE 2 MG/ML
INJECTION, SOLUTION INTRAVENOUS
Status: DISCONTINUED | OUTPATIENT
Start: 2024-03-19 | End: 2024-03-19

## 2024-03-19 RX ADMIN — SUGAMMADEX 200 MG: 100 INJECTION, SOLUTION INTRAVENOUS at 06:03

## 2024-03-19 RX ADMIN — SODIUM CHLORIDE: 9 INJECTION, SOLUTION INTRAVENOUS at 10:03

## 2024-03-19 RX ADMIN — Medication 10 MG: at 05:03

## 2024-03-19 RX ADMIN — PROTAMINE SULFATE 40 MG: 10 INJECTION, SOLUTION INTRAVENOUS at 06:03

## 2024-03-19 RX ADMIN — PROPOFOL 20 MG: 10 INJECTION, EMULSION INTRAVENOUS at 04:03

## 2024-03-19 RX ADMIN — FENTANYL CITRATE 50 MCG: 50 INJECTION, SOLUTION INTRAMUSCULAR; INTRAVENOUS at 01:03

## 2024-03-19 RX ADMIN — MIDAZOLAM HYDROCHLORIDE 2 MG: 1 INJECTION, SOLUTION INTRAMUSCULAR; INTRAVENOUS at 01:03

## 2024-03-19 RX ADMIN — PROPOFOL 50 MG: 10 INJECTION, EMULSION INTRAVENOUS at 03:03

## 2024-03-19 RX ADMIN — ROCURONIUM BROMIDE 5 MG: 10 SOLUTION INTRAVENOUS at 01:03

## 2024-03-19 RX ADMIN — Medication 25 MG: at 02:03

## 2024-03-19 RX ADMIN — CEFAZOLIN SODIUM 2 G: 2 SOLUTION INTRAVENOUS at 04:03

## 2024-03-19 RX ADMIN — LIDOCAINE HYDROCHLORIDE 100 MG: 20 INJECTION INTRAVENOUS at 01:03

## 2024-03-19 RX ADMIN — SODIUM CHLORIDE, SODIUM LACTATE, POTASSIUM CHLORIDE, AND CALCIUM CHLORIDE: .6; .31; .03; .02 INJECTION, SOLUTION INTRAVENOUS at 01:03

## 2024-03-19 RX ADMIN — SUCCINYLCHOLINE CHLORIDE 120 MG: 20 INJECTION, SOLUTION INTRAMUSCULAR; INTRAVENOUS; PARENTERAL at 01:03

## 2024-03-19 RX ADMIN — PROPOFOL 20 MG: 10 INJECTION, EMULSION INTRAVENOUS at 05:03

## 2024-03-19 RX ADMIN — ROCURONIUM BROMIDE 15 MG: 10 SOLUTION INTRAVENOUS at 06:03

## 2024-03-19 RX ADMIN — ROCURONIUM BROMIDE 35 MG: 10 SOLUTION INTRAVENOUS at 01:03

## 2024-03-19 RX ADMIN — Medication 15 MG: at 03:03

## 2024-03-19 RX ADMIN — ONDANSETRON 4 MG: 2 INJECTION INTRAMUSCULAR; INTRAVENOUS at 06:03

## 2024-03-19 RX ADMIN — HYDROMORPHONE HYDROCHLORIDE 0.4 MG: 2 INJECTION INTRAMUSCULAR; INTRAVENOUS; SUBCUTANEOUS at 02:03

## 2024-03-19 RX ADMIN — DEXAMETHASONE SODIUM PHOSPHATE 4 MG: 4 INJECTION, SOLUTION INTRA-ARTICULAR; INTRALESIONAL; INTRAMUSCULAR; INTRAVENOUS; SOFT TISSUE at 02:03

## 2024-03-19 RX ADMIN — PROPOFOL 30 MG: 10 INJECTION, EMULSION INTRAVENOUS at 04:03

## 2024-03-19 RX ADMIN — ROCURONIUM BROMIDE 20 MG: 10 SOLUTION INTRAVENOUS at 04:03

## 2024-03-19 RX ADMIN — ROCURONIUM BROMIDE 20 MG: 10 SOLUTION INTRAVENOUS at 05:03

## 2024-03-19 RX ADMIN — PROPOFOL 50 MG: 10 INJECTION, EMULSION INTRAVENOUS at 05:03

## 2024-03-19 RX ADMIN — CEFAZOLIN SODIUM 2 G: 2 SOLUTION INTRAVENOUS at 02:03

## 2024-03-19 RX ADMIN — HEPARIN SODIUM 2000 UNITS: 1000 INJECTION, SOLUTION INTRAVENOUS; SUBCUTANEOUS at 05:03

## 2024-03-19 RX ADMIN — FENTANYL CITRATE 50 MCG: 50 INJECTION, SOLUTION INTRAMUSCULAR; INTRAVENOUS at 02:03

## 2024-03-19 RX ADMIN — ROCURONIUM BROMIDE 20 MG: 10 SOLUTION INTRAVENOUS at 03:03

## 2024-03-19 RX ADMIN — PROPOFOL 150 MG: 10 INJECTION, EMULSION INTRAVENOUS at 01:03

## 2024-03-19 RX ADMIN — PROPOFOL 30 MG: 10 INJECTION, EMULSION INTRAVENOUS at 03:03

## 2024-03-19 RX ADMIN — HEPARIN SODIUM 8000 UNITS: 1000 INJECTION, SOLUTION INTRAVENOUS; SUBCUTANEOUS at 04:03

## 2024-03-19 NOTE — ANESTHESIA PREPROCEDURE EVALUATION
03/19/2024  Iza Esquivel is a 63 y.o., female.    Patient Active Problem List   Diagnosis    Nicotine dependence, cigarettes, uncomplicated    Hx of cervical cancer    Mixed hyperlipidemia    Bruit of right carotid artery    Near syncope    History of ST elevation myocardial infarction (STEMI)    Atherosclerotic PVD with intermittent claudication    Coronary artery disease of native artery of native heart with stable angina pectoris    PVD (peripheral vascular disease)    Osteopenia    COPD, moderate    Acute bilateral low back pain without sciatica    Impaired functional mobility and activity tolerance    Essential hypertension    Calcified granuloma of lung    Centrilobular emphysema    Atherosclerosis of aorta    Pre-operative cardiovascular examination     Past Surgical History:   Procedure Laterality Date    ABDOMINAL AORTOGRAPHY N/A 5/25/2020    Procedure: Aortogram, Abdominal;  Surgeon: Shelly Jarvis MD;  Location: [facility] CATH LAB;  Service: Cardiology;  Laterality: N/A;    ANGIOGRAM, LOWER ARTERIAL, BILATERAL  2/12/2024    Procedure: Angiogram, Lower Arterial, Bilateral;  Surgeon: Shelly Jarvis MD;  Location: [facility] CATH LAB;  Service: Cardiology;;    AORTOGRAPHY WITH SERIALOGRAPHY N/A 6/30/2020    Procedure: AORTOGRAM, WITH RUNOFF;  Surgeon: Shelly Jarvis MD;  Location: [facility] CATH LAB;  Service: Cardiology;  Laterality: N/A;    AORTOGRAPHY WITH SERIALOGRAPHY N/A 2/12/2024    Procedure: AORTOGRAM, WITH SERIALOGRAPHY;  Surgeon: Shelly Jarvis MD;  Location: [facility] CATH LAB;  Service: Cardiology;  Laterality: N/A;    LEFT HEART CATHETERIZATION Left 5/25/2020    Procedure: CATHETERIZATION, HEART, LEFT;  Surgeon: Shelly Jarvis MD;  Location: [facility] CATH LAB;  Service: Cardiology;  Laterality: Left;    LEFT HEART CATHETERIZATION Left 6/11/2020    Procedure: CATHETERIZATION, HEART, LEFT;  Surgeon:  Shelly Jarvis MD;  Location: [facility] CATH LAB;  Service: Cardiology;  Laterality: Left;    LYMPH NODE BIOPSY      PERCUTANEOUS TRANSLUMINAL BALLOON ANGIOPLASTY OF CORONARY ARTERY  5/25/2020    Procedure: Angioplasty-coronary;  Surgeon: Shelly Jarvis MD;  Location: [facility] CATH LAB;  Service: Cardiology;;       Pre-op Assessment    I have reviewed the Patient Summary Reports.    I have reviewed the NPO Status.   I have reviewed the Medications.     Review of Systems  Anesthesia Hx:  No problems with previous Anesthesia                Social:  Smoker       Hematology/Oncology:  Hematology Normal                                     Cardiovascular:     Hypertension   CAD   CABG/stent   Angina    PVD hyperlipidemia   ECG has been reviewed. S/P PTCA                         Pulmonary:   COPD                     Renal/:  Renal/ Normal                 Hepatic/GI:  Hepatic/GI Normal                 Neurological:  Neurology Normal          Acute bilateral low back pain without sciatica                            Endocrine:  Endocrine Normal                Physical Exam  General: Well nourished    Airway:  Mallampati: II   Mouth Opening: Normal  TM Distance: Normal  Neck ROM: Normal ROM    Dental:  Intact        Anesthesia Plan  Type of Anesthesia, risks & benefits discussed:    Anesthesia Type: Gen ETT  Intra-op Monitoring Plan: Standard ASA Monitors and Art Line  Post Op Pain Control Plan: multimodal analgesia  Induction:  IV  Airway Plan: , Post-Induction  Informed Consent: Informed consent signed with the Patient and all parties understand the risks and agree with anesthesia plan.  All questions answered.   ASA Score: 3    Ready For Surgery From Anesthesia Perspective.     .      Chemistry        Component Value Date/Time     02/08/2024 1348    K 4.7 02/08/2024 1348     02/08/2024 1348    CO2 28 02/08/2024 1348    BUN 7 (L) 02/08/2024 1348    CREATININE 0.8 02/08/2024 1348    GLU 76 02/08/2024 1348         Component Value Date/Time    CALCIUM 9.8 02/08/2024 1348    ALKPHOS 100 08/31/2023 0803    AST 18 08/31/2023 0803    ALT 10 08/31/2023 0803    BILITOT 0.3 08/31/2023 0803    ESTGFRAFRICA >60.0 12/02/2021 0758    EGFRNONAA >60.0 12/02/2021 0758        Lab Results   Component Value Date    WBC 8.76 02/08/2024    HGB 11.8 (L) 02/08/2024    HCT 38.8 02/08/2024    MCV 94 02/08/2024     02/08/2024

## 2024-03-20 LAB
BASOPHILS # BLD AUTO: 0.03 K/UL (ref 0–0.2)
BASOPHILS NFR BLD: 0.2 % (ref 0–1.9)
DIFFERENTIAL METHOD BLD: ABNORMAL
EOSINOPHIL # BLD AUTO: 0 K/UL (ref 0–0.5)
EOSINOPHIL NFR BLD: 0 % (ref 0–8)
ERYTHROCYTE [DISTWIDTH] IN BLOOD BY AUTOMATED COUNT: 14 % (ref 11.5–14.5)
HCT VFR BLD AUTO: 31 % (ref 37–48.5)
HGB BLD-MCNC: 9.8 G/DL (ref 12–16)
IMM GRANULOCYTES # BLD AUTO: 0.06 K/UL (ref 0–0.04)
IMM GRANULOCYTES NFR BLD AUTO: 0.4 % (ref 0–0.5)
LYMPHOCYTES # BLD AUTO: 1.7 K/UL (ref 1–4.8)
LYMPHOCYTES NFR BLD: 12.5 % (ref 18–48)
MCH RBC QN AUTO: 28.4 PG (ref 27–31)
MCHC RBC AUTO-ENTMCNC: 31.6 G/DL (ref 32–36)
MCV RBC AUTO: 90 FL (ref 82–98)
MONOCYTES # BLD AUTO: 0.8 K/UL (ref 0.3–1)
MONOCYTES NFR BLD: 6 % (ref 4–15)
NEUTROPHILS # BLD AUTO: 11 K/UL (ref 1.8–7.7)
NEUTROPHILS NFR BLD: 80.9 % (ref 38–73)
NRBC BLD-RTO: 0 /100 WBC
PLATELET # BLD AUTO: 300 K/UL (ref 150–450)
PMV BLD AUTO: 10.3 FL (ref 9.2–12.9)
RBC # BLD AUTO: 3.45 M/UL (ref 4–5.4)
WBC # BLD AUTO: 13.64 K/UL (ref 3.9–12.7)

## 2024-03-20 PROCEDURE — 94761 N-INVAS EAR/PLS OXIMETRY MLT: CPT

## 2024-03-20 PROCEDURE — 27000221 HC OXYGEN, UP TO 24 HOURS

## 2024-03-20 PROCEDURE — 63600175 PHARM REV CODE 636 W HCPCS: Performed by: NURSE PRACTITIONER

## 2024-03-20 PROCEDURE — 99900035 HC TECH TIME PER 15 MIN (STAT)

## 2024-03-20 PROCEDURE — 25000003 PHARM REV CODE 250: Performed by: HOSPITALIST

## 2024-03-20 PROCEDURE — 21400001 HC TELEMETRY ROOM

## 2024-03-20 PROCEDURE — 25000003 PHARM REV CODE 250: Performed by: STUDENT IN AN ORGANIZED HEALTH CARE EDUCATION/TRAINING PROGRAM

## 2024-03-20 PROCEDURE — 36415 COLL VENOUS BLD VENIPUNCTURE: CPT | Performed by: NURSE PRACTITIONER

## 2024-03-20 PROCEDURE — 85025 COMPLETE CBC W/AUTO DIFF WBC: CPT | Performed by: NURSE PRACTITIONER

## 2024-03-20 PROCEDURE — 25000003 PHARM REV CODE 250: Performed by: NURSE PRACTITIONER

## 2024-03-20 RX ORDER — MORPHINE SULFATE 2 MG/ML
2 INJECTION, SOLUTION INTRAMUSCULAR; INTRAVENOUS EVERY 6 HOURS PRN
Status: DISCONTINUED | OUTPATIENT
Start: 2024-03-20 | End: 2024-03-22 | Stop reason: HOSPADM

## 2024-03-20 RX ORDER — ATORVASTATIN CALCIUM 40 MG/1
80 TABLET, FILM COATED ORAL DAILY
Status: DISCONTINUED | OUTPATIENT
Start: 2024-03-20 | End: 2024-03-22 | Stop reason: HOSPADM

## 2024-03-20 RX ORDER — ONDANSETRON HYDROCHLORIDE 2 MG/ML
4 INJECTION, SOLUTION INTRAVENOUS EVERY 12 HOURS PRN
Status: DISCONTINUED | OUTPATIENT
Start: 2024-03-20 | End: 2024-03-22 | Stop reason: HOSPADM

## 2024-03-20 RX ORDER — PANTOPRAZOLE SODIUM 40 MG/1
40 TABLET, DELAYED RELEASE ORAL NIGHTLY
Status: DISCONTINUED | OUTPATIENT
Start: 2024-03-20 | End: 2024-03-22 | Stop reason: HOSPADM

## 2024-03-20 RX ORDER — CHLORHEXIDINE GLUCONATE ORAL RINSE 1.2 MG/ML
10 SOLUTION DENTAL 2 TIMES DAILY
Status: DISCONTINUED | OUTPATIENT
Start: 2024-03-20 | End: 2024-03-22 | Stop reason: HOSPADM

## 2024-03-20 RX ORDER — SODIUM CHLORIDE 9 MG/ML
INJECTION, SOLUTION INTRAVENOUS CONTINUOUS
Status: DISCONTINUED | OUTPATIENT
Start: 2024-03-20 | End: 2024-03-21

## 2024-03-20 RX ORDER — OXYCODONE AND ACETAMINOPHEN 5; 325 MG/1; MG/1
1 TABLET ORAL EVERY 4 HOURS PRN
Status: DISCONTINUED | OUTPATIENT
Start: 2024-03-20 | End: 2024-03-22 | Stop reason: HOSPADM

## 2024-03-20 RX ORDER — HYDROCODONE BITARTRATE AND ACETAMINOPHEN 5; 325 MG/1; MG/1
1 TABLET ORAL EVERY 4 HOURS PRN
Status: DISCONTINUED | OUTPATIENT
Start: 2024-03-20 | End: 2024-03-22 | Stop reason: HOSPADM

## 2024-03-20 RX ORDER — ISOSORBIDE MONONITRATE 60 MG/1
60 TABLET, EXTENDED RELEASE ORAL NIGHTLY
Status: DISCONTINUED | OUTPATIENT
Start: 2024-03-20 | End: 2024-03-22 | Stop reason: HOSPADM

## 2024-03-20 RX ORDER — CLOPIDOGREL BISULFATE 75 MG/1
75 TABLET ORAL DAILY
Status: DISCONTINUED | OUTPATIENT
Start: 2024-03-20 | End: 2024-03-22 | Stop reason: HOSPADM

## 2024-03-20 RX ORDER — ASPIRIN 81 MG/1
81 TABLET ORAL DAILY
Status: DISCONTINUED | OUTPATIENT
Start: 2024-03-20 | End: 2024-03-22 | Stop reason: HOSPADM

## 2024-03-20 RX ORDER — LEVOTHYROXINE SODIUM 88 UG/1
88 TABLET ORAL
Status: DISCONTINUED | OUTPATIENT
Start: 2024-03-20 | End: 2024-03-22 | Stop reason: HOSPADM

## 2024-03-20 RX ADMIN — ISOSORBIDE MONONITRATE 60 MG: 60 TABLET, EXTENDED RELEASE ORAL at 08:03

## 2024-03-20 RX ADMIN — HYDROCODONE BITARTRATE AND ACETAMINOPHEN 1 TABLET: 5; 325 TABLET ORAL at 08:03

## 2024-03-20 RX ADMIN — HYDROCODONE BITARTRATE AND ACETAMINOPHEN 1 TABLET: 5; 325 TABLET ORAL at 12:03

## 2024-03-20 RX ADMIN — OXYCODONE HYDROCHLORIDE AND ACETAMINOPHEN 1 TABLET: 5; 325 TABLET ORAL at 05:03

## 2024-03-20 RX ADMIN — CLOPIDOGREL BISULFATE 75 MG: 75 TABLET ORAL at 09:03

## 2024-03-20 RX ADMIN — ASPIRIN 81 MG: 81 TABLET, COATED ORAL at 09:03

## 2024-03-20 RX ADMIN — METOPROLOL SUCCINATE 12.5 MG: 25 TABLET, EXTENDED RELEASE ORAL at 08:03

## 2024-03-20 RX ADMIN — CHLORHEXIDINE GLUCONATE 0.12% ORAL RINSE 10 ML: 1.2 LIQUID ORAL at 09:03

## 2024-03-20 RX ADMIN — LEVOTHYROXINE SODIUM 88 MCG: 88 TABLET ORAL at 05:03

## 2024-03-20 RX ADMIN — SODIUM CHLORIDE: 9 INJECTION, SOLUTION INTRAVENOUS at 12:03

## 2024-03-20 RX ADMIN — ISOSORBIDE MONONITRATE 60 MG: 60 TABLET, EXTENDED RELEASE ORAL at 05:03

## 2024-03-20 RX ADMIN — ATORVASTATIN CALCIUM 80 MG: 40 TABLET, FILM COATED ORAL at 09:03

## 2024-03-20 RX ADMIN — PANTOPRAZOLE SODIUM 40 MG: 40 TABLET, DELAYED RELEASE ORAL at 08:03

## 2024-03-20 RX ADMIN — SODIUM CHLORIDE: 9 INJECTION, SOLUTION INTRAVENOUS at 04:03

## 2024-03-20 RX ADMIN — CEFAZOLIN 2 G: 2 INJECTION, POWDER, FOR SOLUTION INTRAMUSCULAR; INTRAVENOUS at 01:03

## 2024-03-20 RX ADMIN — CHLORHEXIDINE GLUCONATE 0.12% ORAL RINSE 10 ML: 1.2 LIQUID ORAL at 08:03

## 2024-03-20 RX ADMIN — CEFAZOLIN 2 G: 2 INJECTION, POWDER, FOR SOLUTION INTRAMUSCULAR; INTRAVENOUS at 05:03

## 2024-03-20 RX ADMIN — PANTOPRAZOLE SODIUM 40 MG: 40 TABLET, DELAYED RELEASE ORAL at 05:03

## 2024-03-20 RX ADMIN — METOPROLOL SUCCINATE 12.5 MG: 25 TABLET, EXTENDED RELEASE ORAL at 05:03

## 2024-03-20 NOTE — SUBJECTIVE & OBJECTIVE
Past Medical History:   Diagnosis Date    Atherosclerotic PVD with intermittent claudication 5/25/2020    Cervical cancer     Hyperlipidemia     Hypertension     Right carotid bruit     S/P PTCA (percutaneous transluminal coronary angioplasty) 05/25/2020    STEMI: stenting of LCx       Past Surgical History:   Procedure Laterality Date    ABDOMINAL AORTOGRAPHY N/A 5/25/2020    Procedure: Aortogram, Abdominal;  Surgeon: Shelly Jarvis MD;  Location: [facility] CATH LAB;  Service: Cardiology;  Laterality: N/A;    ANGIOGRAM, LOWER ARTERIAL, BILATERAL  2/12/2024    Procedure: Angiogram, Lower Arterial, Bilateral;  Surgeon: Shelly Jarvis MD;  Location: [facility] CATH LAB;  Service: Cardiology;;    AORTOGRAPHY WITH SERIALOGRAPHY N/A 6/30/2020    Procedure: AORTOGRAM, WITH RUNOFF;  Surgeon: Shelly Jarvis MD;  Location: [facility] CATH LAB;  Service: Cardiology;  Laterality: N/A;    AORTOGRAPHY WITH SERIALOGRAPHY N/A 2/12/2024    Procedure: AORTOGRAM, WITH SERIALOGRAPHY;  Surgeon: Shelly Jarvis MD;  Location: [facility] CATH LAB;  Service: Cardiology;  Laterality: N/A;    CREATION OF FEMOROPOPLITEAL ARTERIAL BYPASS USING GRAFT Left 3/19/2024    Procedure: CREATION, BYPASS, ARTERIAL, FEMORAL TO POPLITEAL, USING GRAFT;  Surgeon: Frederick Perera MD;  Location: [facility] OR;  Service: Peripheral Vascular;  Laterality: Left;    ENDARTERECTOMY OF FEMORAL ARTERY Left 3/19/2024    Procedure: ENDARTERECTOMY, FEMORAL;  Surgeon: Frederick Perera MD;  Location: [facility] OR;  Service: Peripheral Vascular;  Laterality: Left;  iliofemoral    ENDOSCOPIC HARVEST OF VEIN Left 3/19/2024    Procedure: SURGICAL PROCUREMENT, VEIN, ENDOSCOPIC;  Surgeon: Frederick Perera MD;  Location: [facility] OR;  Service: Peripheral Vascular;  Laterality: Left;    LEFT HEART CATHETERIZATION Left 5/25/2020    Procedure: CATHETERIZATION, HEART, LEFT;  Surgeon: Shelly Jarvis MD;  Location: [facility] CATH LAB;  Service: Cardiology;  Laterality: Left;    LEFT HEART CATHETERIZATION Left  6/11/2020    Procedure: CATHETERIZATION, HEART, LEFT;  Surgeon: Shelly Jarvis MD;  Location: [facility] CATH LAB;  Service: Cardiology;  Laterality: Left;    LYMPH NODE BIOPSY      PERCUTANEOUS TRANSLUMINAL BALLOON ANGIOPLASTY OF CORONARY ARTERY  5/25/2020    Procedure: Angioplasty-coronary;  Surgeon: Shelly Jarvis MD;  Location: [facility] CATH LAB;  Service: Cardiology;;    REPAIR OF BLOOD VESSEL WITH PATCH GRAFT Left 3/19/2024    Procedure: ANGIOPLASTY, USING PATCH GRAFT;  Surgeon: Frederick Perera MD;  Location: [facility] OR;  Service: Peripheral Vascular;  Laterality: Left;       Review of patient's allergies indicates:  No Known Allergies    No current facility-administered medications on file prior to encounter.     Current Outpatient Medications on File Prior to Encounter   Medication Sig    aspirin (ECOTRIN) 81 MG EC tablet Take 1 tablet (81 mg total) by mouth once daily.    atorvastatin (LIPITOR) 80 MG tablet Take 1 tablet (80 mg total) by mouth once daily. (Patient taking differently: Take 80 mg by mouth every evening.)    clopidogreL (PLAVIX) 75 mg tablet Take 1 tablet (75 mg total) by mouth once daily.    isosorbide mononitrate (IMDUR) 60 MG 24 hr tablet TAKE 1 TABLET(60 MG) BY MOUTH EVERY DAY (Patient taking differently: Take 60 mg by mouth every evening.)    levothyroxine (SYNTHROID) 88 MCG tablet 1 tab po qd  before breakfast    metoprolol succinate (TOPROL-XL) 25 MG 24 hr tablet Take 0.5 tablets (12.5 mg total) by mouth once daily. (Patient taking differently: Take 12.5 mg by mouth every evening.)    pantoprazole (PROTONIX) 40 MG tablet Take 1 tablet (40 mg total) by mouth once daily. (Patient taking differently: Take 40 mg by mouth every evening.)    LINZESS 145 mcg Cap capsule TAKE 1 CAPSULE(145 MCG) BY MOUTH BEFORE BREAKFAST     Family History       Problem Relation (Age of Onset)    Breast cancer Paternal Aunt    Cancer Mother, Father, Paternal Aunt    Diabetes Brother    Hypertension Brother    Thyroid  "cancer Mother    Transient ischemic attack Father          Tobacco Use    Smoking status: Every Day     Current packs/day: 0.00     Average packs/day: 1.5 packs/day for 52.1 years (78.2 ttl pk-yrs)     Types: Cigarettes     Start date:      Last attempt to quit: 2022     Years since quittin.0    Smokeless tobacco: Never    Tobacco comments:     On 3/21/22 patient stated she quit "about a month ago"   Substance and Sexual Activity    Alcohol use: No    Drug use: Never    Sexual activity: Not Currently     Review of Systems   All other systems reviewed and are negative.    Objective:     Vital Signs (Most Recent):  Temp: 98.5 °F (36.9 °C) (24 1623)  Pulse: 77 (24 1623)  Resp: 19 (24 1623)  BP: (!) 101/52 (24 1623)  SpO2: 96 % (24 1623) Vital Signs (24h Range):  Temp:  [97.4 °F (36.3 °C)-98.6 °F (37 °C)] 98.5 °F (36.9 °C)  Pulse:  [56-90] 77  Resp:  [10-21] 19  SpO2:  [92 %-100 %] 96 %  BP: ()/(51-74) 101/52     Weight: 75.6 kg (166 lb 10.7 oz)  Body mass index is 27.73 kg/m².     Physical Exam  Vitals and nursing note reviewed.   Constitutional:       General: She is not in acute distress.     Appearance: Normal appearance. She is normal weight.   Cardiovascular:      Rate and Rhythm: Normal rate and regular rhythm.      Heart sounds: No murmur heard.  Pulmonary:      Effort: Pulmonary effort is normal. No respiratory distress.      Breath sounds: No wheezing.      Comments: 3 L NC  Musculoskeletal:         General: Swelling present.      Left lower leg: Edema present.   Neurological:      General: No focal deficit present.      Mental Status: She is alert and oriented to person, place, and time.   Psychiatric:         Mood and Affect: Mood normal.         Behavior: Behavior normal.          Significant Labs: All pertinent labs within the past 24 hours have been reviewed.  Recent Lab Results         24  0429        Baso # 0.03       Basophil % 0.2       " Differential Method Automated       Eos # 0.0       Eos % 0.0       Gran # (ANC) 11.0       Gran % 80.9       Hematocrit 31.0       Hemoglobin 9.8       Immature Grans (Abs) 0.06  Comment: Mild elevation in immature granulocytes is non specific and   can be seen in a variety of conditions including stress response,   acute inflammation, trauma and pregnancy. Correlation with other   laboratory and clinical findings is essential.         Immature Granulocytes 0.4       Lymph # 1.7       Lymph % 12.5       MCH 28.4       MCHC 31.6       MCV 90       Mono # 0.8       Mono % 6.0       MPV 10.3       nRBC 0       Platelet Count 300       RBC 3.45       RDW 14.0       WBC 13.64               Significant Imaging: I have reviewed all pertinent imaging results/findings within the past 24 hours.    No orders to display

## 2024-03-20 NOTE — HPI
62 y/o female with PMHx of COPD, PVD, HTN, HLD, cervical cancer who is POD # 1 s/p left leg CFA endarterectomy and left common femoral to above-knee popliteal artery bypass 3/20/24 by vascular surgery, Dr. Frederick Perera. Patient reports LLE swelling, pain in left upper thigh, radiating down to knee. Reports numbness/tingling in left foot, chronic, able to wiggle toes, move foot, warm to touch. She denies chest pain, SOB, fevers/chills, nausea/vomiting or any other complaints. Discussed with Dr. Perera; [facility] consulted to assist with medical management.

## 2024-03-20 NOTE — PLAN OF CARE
[facility] - Telemetry (Hospital)  Initial Discharge Assessment       Primary Care Provider: Park Gomez MD    Admission Diagnosis: Atherosclerosis of native artery of both lower extremities with intermittent claudication [I70.213]  PAD (peripheral artery disease) [I73.9]    Admission Date: 3/19/2024  Expected Discharge Date:     Transition of Care Barriers: None    Payor: HU[employer] MANAGED MEDICARE / Plan: HU[employer] MEDICARE PPO / Product Type: Medicare Advantage /     Extended Emergency Contact Information  Primary Emergency Contact: Ninoska Hicks  Address: [address] [place] [address] [facility]  Mobile Phone: [phone]  Relation: Sister    Discharge Plan A: Home  Discharge Plan B: Home      [facility]  [address] [place] [address]  Phone: [phone] Fax: [phone]    [facility] Mail [facility] - [facility] [address] [facility]  [address] [facility] [address]  Phone: [phone] Fax: [phone]    [facility] [address] [place] [address] [address] [facility]  [address] [place] [address]  Phone: [phone] Fax: [phone]      Initial Assessment (most recent)       Adult Discharge Assessment - 03/20/24 1252          Discharge Assessment    Assessment Type Discharge Planning Assessment     Confirmed/corrected address, phone number and insurance Yes     Confirmed Demographics Correct on Facesheet     Source of Information patient     Communicated NATALIE with patient/caregiver Date not available/Unable to determine     People in Home sibling(s)     Facility Arrived From: Home     Do you expect to return to your current living situation? Yes     Do you have help at home or someone to help you manage your care at home? Yes     Who are your caregiver(s) and their phone number(s)? Pt's Ninoska wong     Prior to hospitilization cognitive status: Alert/Oriented     Current  cognitive status: Alert/Oriented     Walking or Climbing Stairs Difficulty no     Dressing/Bathing Difficulty no     Equipment Currently Used at Home none     Readmission within 30 days? No     Patient currently being followed by outpatient case management? No     Do you currently have service(s) that help you manage your care at home? No     Do you take prescription medications? Yes     Do you have prescription coverage? Yes     Do you have any problems affording any of your prescribed medications? No     Is the patient taking medications as prescribed? yes     Who is going to help you get home at discharge? Pt's sister     How do you get to doctors appointments? family or friend will provide;car, drives self     Are you on dialysis? No     Do you take coumadin? No     Discharge Plan A Home     Discharge Plan B Home     DME Needed Upon Discharge  none     Discharge Plan discussed with: Patient     Transition of Care Barriers None                   SW met with patient at bedside to complete discharge assessment. Pt reports living at home with sister, [place]. Pt reports independent with ADLs and does not use DME.   No CM needs expressed at this time.   Pt's whiteboard updated with CM contact and anticipated discharge disposition. SW to remain available as needed.

## 2024-03-20 NOTE — ANESTHESIA POSTPROCEDURE EVALUATION
Anesthesia Post Evaluation    Patient: Iza Esquivel    Procedure(s) Performed: Procedure(s) (LRB):  ENDARTERECTOMY, FEMORAL (Left)  CREATION, BYPASS, ARTERIAL, FEMORAL TO POPLITEAL, USING GRAFT (Left)  ANGIOPLASTY, USING PATCH GRAFT (Left)    Final Anesthesia Type: general      Patient location during evaluation: PACU  Patient participation: Yes- Able to Participate  Level of consciousness: awake and alert  Post-procedure vital signs: reviewed and stable  Pain management: adequate  Airway patency: patent  GET mitigation strategies: Verification of full reversal of neuromuscular block  PONV status at discharge: No PONV  Anesthetic complications: no      Cardiovascular status: hemodynamically stable  Respiratory status: spontaneous ventilation  Hydration status: euvolemic  Follow-up not needed.              Vitals Value Taken Time   /63 03/20/24 0436   Temp 36.8 °C (98.3 °F) 03/20/24 0436   Pulse 67 03/20/24 0436   Resp 18 03/20/24 0436   SpO2 96 % 03/20/24 0436         Event Time   Out of Recovery 21:06:00         Pain/Kaveh Score: Kaveh Score: 9 (3/19/2024  8:30 PM)

## 2024-03-20 NOTE — ASSESSMENT & PLAN NOTE
3/20/24  s/p left leg CFA endarterectomy, left common femoral to above-knee popliteal artery bypass  Pain control, adjusted PRN regimen today  On ASA, Plavix, statin  Vascular Surgery following     Group Therapy Note    Date: 11/20/2021    Group Start Time: 1400  Group End Time: 2984  Group Topic: Recreational    TIESHA BHPOWER Roberts, CTRS        Group Therapy Note    Attendees: 7/14         Pt did not participate in Recreation/Creative Expression Skills Group at 1400 when encouraged by RT due to resting in room. Pt was offered talk time as an alternative to group but declined.        Discipline Responsible: Psychoeducational Specialist      Signature:  Tania Lo

## 2024-03-20 NOTE — PLAN OF CARE
A245/A245 ANILTheodora Esquivel is a 63 y.o.female admitted on 3/19/2024 for Atherosclerotic PVD with intermittent claudication   Code Status: Full Code MRN: [id number]   Review of patient's allergies indicates:  No Known Allergies  Past Medical History:   Diagnosis Date    Atherosclerotic PVD with intermittent claudication 5/25/2020    Cervical cancer     Hyperlipidemia     Hypertension     Right carotid bruit     S/P PTCA (percutaneous transluminal coronary angioplasty) 05/25/2020    STEMI: stenting of LCx      PRN meds    chlorhexidine, 10 mL, On Call Procedure  HYDROcodone-acetaminophen, 1 tablet, Q4H PRN  morphine, 2 mg, Q6H PRN  ondansetron, 4 mg, Q12H PRN  oxyCODONE-acetaminophen, 1 tablet, Q4H PRN      Chart check completed. Will continue plan of care.         Homer Coma Scale Score: 15     Lead Monitored: Lead II Rhythm: normal sinus rhythm    Cardiac/Telemetry Box Number: pacu 1  VTE Required Core Measure: (SCDs) Sequential compression device initiated/maintained Last Bowel Movement: 03/19/24  Diet Adult Regular (IDDSI Level 7) Standard Tray  Voiding Characteristics: external catheter  Siva Score: 19  Fall Risk Score: 11  Accucheck []   Freq?      Lines/Drains/Airways       Peripheral Intravenous Line  Duration                  Peripheral IV - Single Lumen 03/19/24 1300 20 G Right Forearm 1 day         Peripheral IV - Single Lumen 03/19/24 1354 18 G Anterior;Left Wrist 1 day

## 2024-03-20 NOTE — ASSESSMENT & PLAN NOTE
Chronic, controlled. Latest blood pressure and vitals reviewed-     Temp:  [97.4 °F (36.3 °C)-98.6 °F (37 °C)]   Pulse:  [56-90]   Resp:  [10-21]   BP: ()/(51-74)   SpO2:  [92 %-100 %] .   Home meds for hypertension were reviewed and noted below.   Hypertension Medications               isosorbide mononitrate (IMDUR) 60 MG 24 hr tablet TAKE 1 TABLET(60 MG) BY MOUTH EVERY DAY    metoprolol succinate (TOPROL-XL) 25 MG 24 hr tablet Take 0.5 tablets (12.5 mg total) by mouth once daily.            While in the hospital, will manage blood pressure as follows; Continue home antihypertensive regimen    Will utilize p.r.n. blood pressure medication only if patient's blood pressure greater than 180/110 and she develops symptoms such as worsening chest pain or shortness of breath.

## 2024-03-20 NOTE — OR NURSING
Dressing to left lower extremity noted to be saturated upon arrival from OR, edema noted.  Dr. Perera notified, at BS for evaluation.  No new orders.

## 2024-03-20 NOTE — CONSULTS
[facility] Medicine  Consult Note    Patient Name: Iza Esquivel  MRN: [id number]  Admission Date: 3/19/2024  Hospital Length of Stay: 1 days  Attending Physician: Frederick Perera MD   Primary Care Provider: Park Gomez MD           Patient information was obtained from patient, past medical records, and ER records.       Subjective:     Principal Problem: Atherosclerotic PVD with intermittent claudication    Chief Complaint: No chief complaint on file.       HPI: 62 y/o female with PMHx of COPD, PVD, HTN, HLD, cervical cancer who is POD # 1 s/p left leg CFA endarterectomy and left common femoral to above-knee popliteal artery bypass 3/20/24 by vascular surgery, Dr. Frederick Perera. Patient reports LLE swelling, pain in left upper thigh, radiating down to knee. Reports numbness/tingling in left foot, chronic, able to wiggle toes, move foot, warm to touch. She denies chest pain, SOB, fevers/chills, nausea/vomiting or any other complaints. Discussed with Dr. Perera; [facility] consulted to assist with medical management.    Past Medical History:   Diagnosis Date    Atherosclerotic PVD with intermittent claudication 5/25/2020    Cervical cancer     Hyperlipidemia     Hypertension     Right carotid bruit     S/P PTCA (percutaneous transluminal coronary angioplasty) 05/25/2020    STEMI: stenting of LCx       Past Surgical History:   Procedure Laterality Date    ABDOMINAL AORTOGRAPHY N/A 5/25/2020    Procedure: Aortogram, Abdominal;  Surgeon: Shelly Jarvis MD;  Location: [facility] CATH LAB;  Service: Cardiology;  Laterality: N/A;    ANGIOGRAM, LOWER ARTERIAL, BILATERAL  2/12/2024    Procedure: Angiogram, Lower Arterial, Bilateral;  Surgeon: Shelly Jarvis MD;  Location: [facility] CATH LAB;  Service: Cardiology;;    AORTOGRAPHY WITH SERIALOGRAPHY N/A 6/30/2020    Procedure: AORTOGRAM, WITH RUNOFF;  Surgeon: Shelly Jarvis MD;  Location: [facility] CATH LAB;  Service: Cardiology;  Laterality:  N/A;    AORTOGRAPHY WITH SERIALOGRAPHY N/A 2/12/2024    Procedure: AORTOGRAM, WITH SERIALOGRAPHY;  Surgeon: Shelly Jarvis MD;  Location: [facility] CATH LAB;  Service: Cardiology;  Laterality: N/A;    CREATION OF FEMOROPOPLITEAL ARTERIAL BYPASS USING GRAFT Left 3/19/2024    Procedure: CREATION, BYPASS, ARTERIAL, FEMORAL TO POPLITEAL, USING GRAFT;  Surgeon: Frederick Perera MD;  Location: [facility] OR;  Service: Peripheral Vascular;  Laterality: Left;    ENDARTERECTOMY OF FEMORAL ARTERY Left 3/19/2024    Procedure: ENDARTERECTOMY, FEMORAL;  Surgeon: Frederick Perera MD;  Location: [facility] OR;  Service: Peripheral Vascular;  Laterality: Left;  iliofemoral    ENDOSCOPIC HARVEST OF VEIN Left 3/19/2024    Procedure: SURGICAL PROCUREMENT, VEIN, ENDOSCOPIC;  Surgeon: Frederick Perera MD;  Location: [facility];  Service: Peripheral Vascular;  Laterality: Left;    LEFT HEART CATHETERIZATION Left 5/25/2020    Procedure: CATHETERIZATION, HEART, LEFT;  Surgeon: Shelly Jarvis MD;  Location: [facility] CATH LAB;  Service: Cardiology;  Laterality: Left;    LEFT HEART CATHETERIZATION Left 6/11/2020    Procedure: CATHETERIZATION, HEART, LEFT;  Surgeon: Shelly Jarvis MD;  Location: [facility] CATH LAB;  Service: Cardiology;  Laterality: Left;    LYMPH NODE BIOPSY      PERCUTANEOUS TRANSLUMINAL BALLOON ANGIOPLASTY OF CORONARY ARTERY  5/25/2020    Procedure: Angioplasty-coronary;  Surgeon: Shelly Jarvis MD;  Location: [facility] CATH LAB;  Service: Cardiology;;    REPAIR OF BLOOD VESSEL WITH PATCH GRAFT Left 3/19/2024    Procedure: ANGIOPLASTY, USING PATCH GRAFT;  Surgeon: Frederick Perera MD;  Location: [facility];  Service: Peripheral Vascular;  Laterality: Left;       Review of patient's allergies indicates:  No Known Allergies    No current facility-administered medications on file prior to encounter.     Current Outpatient Medications on File Prior to Encounter   Medication Sig    aspirin (ECOTRIN) 81 MG EC tablet Take 1 tablet (81 mg total) by mouth once  "daily.    atorvastatin (LIPITOR) 80 MG tablet Take 1 tablet (80 mg total) by mouth once daily. (Patient taking differently: Take 80 mg by mouth every evening.)    clopidogreL (PLAVIX) 75 mg tablet Take 1 tablet (75 mg total) by mouth once daily.    isosorbide mononitrate (IMDUR) 60 MG 24 hr tablet TAKE 1 TABLET(60 MG) BY MOUTH EVERY DAY (Patient taking differently: Take 60 mg by mouth every evening.)    levothyroxine (SYNTHROID) 88 MCG tablet 1 tab po qd  before breakfast    metoprolol succinate (TOPROL-XL) 25 MG 24 hr tablet Take 0.5 tablets (12.5 mg total) by mouth once daily. (Patient taking differently: Take 12.5 mg by mouth every evening.)    pantoprazole (PROTONIX) 40 MG tablet Take 1 tablet (40 mg total) by mouth once daily. (Patient taking differently: Take 40 mg by mouth every evening.)    LINZESS 145 mcg Cap capsule TAKE 1 CAPSULE(145 MCG) BY MOUTH BEFORE BREAKFAST     Family History       Problem Relation (Age of Onset)    Breast cancer Paternal Aunt    Cancer Mother, Father, Paternal Aunt    Diabetes Brother    Hypertension Brother    Thyroid cancer Mother    Transient ischemic attack Father          Tobacco Use    Smoking status: Every Day     Current packs/day: 0.00     Average packs/day: 1.5 packs/day for 52.1 years (78.2 ttl pk-yrs)     Types: Cigarettes     Start date:      Last attempt to quit: 2022     Years since quittin.0    Smokeless tobacco: Never    Tobacco comments:     On 3/21/22 patient stated she quit "about a month ago"   Substance and Sexual Activity    Alcohol use: No    Drug use: Never    Sexual activity: Not Currently     Review of Systems   All other systems reviewed and are negative.    Objective:     Vital Signs (Most Recent):  Temp: 98.5 °F (36.9 °C) (24 1623)  Pulse: 77 (24 1623)  Resp: 19 (24 1623)  BP: (!) 101/52 (24 1623)  SpO2: 96 % (24 1623) Vital Signs (24h Range):  Temp:  [97.4 °F (36.3 °C)-98.6 °F (37 °C)] 98.5 °F (36.9 " °C)  Pulse:  [56-90] 77  Resp:  [10-21] 19  SpO2:  [92 %-100 %] 96 %  BP: ()/(51-74) 101/52     Weight: 75.6 kg (166 lb 10.7 oz)  Body mass index is 27.73 kg/m².     Physical Exam  Vitals and nursing note reviewed.   Constitutional:       General: She is not in acute distress.     Appearance: Normal appearance. She is normal weight.   Cardiovascular:      Rate and Rhythm: Normal rate and regular rhythm.      Heart sounds: No murmur heard.  Pulmonary:      Effort: Pulmonary effort is normal. No respiratory distress.      Breath sounds: No wheezing.      Comments: 3 L NC  Musculoskeletal:         General: Swelling present.      Left lower leg: Edema present.   Neurological:      General: No focal deficit present.      Mental Status: She is alert and oriented to person, place, and time.   Psychiatric:         Mood and Affect: Mood normal.         Behavior: Behavior normal.          Significant Labs: All pertinent labs within the past 24 hours have been reviewed.  Recent Lab Results         03/20/24  0429        Baso # 0.03       Basophil % 0.2       Differential Method Automated       Eos # 0.0       Eos % 0.0       Gran # (ANC) 11.0       Gran % 80.9       Hematocrit 31.0       Hemoglobin 9.8       Immature Grans (Abs) 0.06  Comment: Mild elevation in immature granulocytes is non specific and   can be seen in a variety of conditions including stress response,   acute inflammation, trauma and pregnancy. Correlation with other   laboratory and clinical findings is essential.         Immature Granulocytes 0.4       Lymph # 1.7       Lymph % 12.5       MCH 28.4       MCHC 31.6       MCV 90       Mono # 0.8       Mono % 6.0       MPV 10.3       nRBC 0       Platelet Count 300       RBC 3.45       RDW 14.0       WBC 13.64               Significant Imaging: I have reviewed all pertinent imaging results/findings within the past 24 hours.    No orders to display       Assessment/Plan:     * Atherosclerotic PVD with  intermittent claudication    3/20/24  s/p left leg CFA endarterectomy, left common femoral to above-knee popliteal artery bypass  Pain control, adjusted PRN regimen today  On ASA, Plavix, statin  Vascular Surgery following      Essential hypertension  Chronic, controlled. Latest blood pressure and vitals reviewed-     Temp:  [97.4 °F (36.3 °C)-98.6 °F (37 °C)]   Pulse:  [56-90]   Resp:  [10-21]   BP: ()/(51-74)   SpO2:  [92 %-100 %] .   Home meds for hypertension were reviewed and noted below.   Hypertension Medications               isosorbide mononitrate (IMDUR) 60 MG 24 hr tablet TAKE 1 TABLET(60 MG) BY MOUTH EVERY DAY    metoprolol succinate (TOPROL-XL) 25 MG 24 hr tablet Take 0.5 tablets (12.5 mg total) by mouth once daily.            While in the hospital, will manage blood pressure as follows; Continue home antihypertensive regimen    Will utilize p.r.n. blood pressure medication only if patient's blood pressure greater than 180/110 and she develops symptoms such as worsening chest pain or shortness of breath.    COPD, moderate  Patient's COPD is controlled currently.  Patient is currently off COPD Pathway. Continue scheduled inhalers Supplemental oxygen and monitor respiratory status closely.     Impaired functional mobility and activity tolerance  PT/OT consult        VTE Risk Mitigation (From admission, onward)           Ordered     IP VTE HIGH RISK PATIENT  Once         03/20/24 0422     Place sequential compression device  Until discontinued         03/20/24 0422                        Thank you for your consult. I will follow-up with patient. Please contact us if you have any additional questions.    Juan C Washington MD  Department of Hospital Medicine   [facility] - Telemetry ([facility])

## 2024-03-20 NOTE — PROGRESS NOTES
Patient s/p left leg CFA endarterectomy and left common femoral to above-knee popliteal artery bypass.  Has some surgical pain in left leg this afternoon.  All dressings intact.  These will be removed tomorrow.  Has excellent left PT signal.  Has some post-surgical edema.  OOB to chair.  Needs PT/OT.    Shahbaz Casey MD

## 2024-03-20 NOTE — OP NOTE
Operative Note    Date of Service: 3/19/2024     Pre-Operative Diagnosis:   Short distance claudication left leg  Peripheral vascular disease  Hyperlipidemia  Hypertension    Post-Operative Diagnosis: Same       Procedure:   Left iliofemoral endarterectomy with patch angioplasty  Left Endoscopic gsv harvest  Left common femoral to above knee popliteal bypass using left gsv    Anesthesia Type: General     Surgeon: Frederick Perera MD  Assistant: Rossy Gomez NP, JOSÉ MIGUEL skilled hands were essential to the the procedure as there was no available resident in the facility to assist.      Procedure:     Patient placed in supine position. The left leg was prepped and draped in sterile fashion. Time out performed. Small incision made on left medial leg and endoscopic vein harvest performed. I then made an incision on the left groin and exposed left cfa, profunda and sfa. These were isolated with vessel loops.    Next I made an incision on the above knee medial thigh at adductors hiatus and exposed the above knee popliteal artery.    The patient was heparinized and arteries clamped in groin, arteriotomy created on the left common femoral. This was heavily diseased. Endarterectomy was performed. Dissection was carried cephalad underneath the inguinal ligament. A bovine pericardial patch was used to perform the patch angioplasty.    Next I performed proximal anastomosis by created a patchotomy and using 6-0 prolene for this. I tunneled the inflated graft through a subsartorius plane down into the popliteal exposure. The above knee popliteal artery was now clamped and arteriotomy created and distal end to side anastomosis was completed using 6-0 prolene. The patient has excellent signals in her foot and the graft has pulsatile flow. We then ensured hemostasis and closed the wounds with layers of 2-0 vicryl and 3-0 vicryl. Skin closed with 4-0 monocryl and sterile dressings applied. Her family was updated.    Estimated  Blood Loss:   250 mL         Drains: none           Complications:   none           Disposition: PACU - hemodynamically stable.    Post-op Plan:  Will be admitted to the hospital for observation, medical management and pain control  PT/OT for evaluation and mobilization  Discharge will be determined after POD #1    Frederick Perera MD  Phone Number: [phone]      Frederick Perera MD   3/19/2024   7:10 PM

## 2024-03-21 LAB
ANION GAP SERPL CALC-SCNC: 7 MMOL/L (ref 8–16)
BASOPHILS # BLD AUTO: 0.03 K/UL (ref 0–0.2)
BASOPHILS # BLD AUTO: 0.06 K/UL (ref 0–0.2)
BASOPHILS NFR BLD: 0.3 % (ref 0–1.9)
BASOPHILS NFR BLD: 0.4 % (ref 0–1.9)
BUN SERPL-MCNC: 8 MG/DL (ref 8–23)
CALCIUM SERPL-MCNC: 8.4 MG/DL (ref 8.7–10.5)
CHLORIDE SERPL-SCNC: 103 MMOL/L (ref 95–110)
CO2 SERPL-SCNC: 27 MMOL/L (ref 23–29)
CREAT SERPL-MCNC: 0.8 MG/DL (ref 0.5–1.4)
DIFFERENTIAL METHOD BLD: ABNORMAL
DIFFERENTIAL METHOD BLD: ABNORMAL
EOSINOPHIL # BLD AUTO: 0 K/UL (ref 0–0.5)
EOSINOPHIL # BLD AUTO: 0 K/UL (ref 0–0.5)
EOSINOPHIL NFR BLD: 0.1 % (ref 0–8)
EOSINOPHIL NFR BLD: 0.2 % (ref 0–8)
ERYTHROCYTE [DISTWIDTH] IN BLOOD BY AUTOMATED COUNT: 14.1 % (ref 11.5–14.5)
ERYTHROCYTE [DISTWIDTH] IN BLOOD BY AUTOMATED COUNT: 14.2 % (ref 11.5–14.5)
EST. GFR  (NO RACE VARIABLE): >60 ML/MIN/1.73 M^2
GLUCOSE SERPL-MCNC: 89 MG/DL (ref 70–110)
HCT VFR BLD AUTO: 25.5 % (ref 37–48.5)
HCT VFR BLD AUTO: 25.9 % (ref 37–48.5)
HGB BLD-MCNC: 8.1 G/DL (ref 12–16)
HGB BLD-MCNC: 8.1 G/DL (ref 12–16)
IMM GRANULOCYTES # BLD AUTO: 0.06 K/UL (ref 0–0.04)
IMM GRANULOCYTES # BLD AUTO: 0.08 K/UL (ref 0–0.04)
IMM GRANULOCYTES NFR BLD AUTO: 0.5 % (ref 0–0.5)
IMM GRANULOCYTES NFR BLD AUTO: 0.5 % (ref 0–0.5)
LYMPHOCYTES # BLD AUTO: 2.3 K/UL (ref 1–4.8)
LYMPHOCYTES # BLD AUTO: 2.5 K/UL (ref 1–4.8)
LYMPHOCYTES NFR BLD: 14.9 % (ref 18–48)
LYMPHOCYTES NFR BLD: 22.2 % (ref 18–48)
MCH RBC QN AUTO: 28.4 PG (ref 27–31)
MCH RBC QN AUTO: 28.4 PG (ref 27–31)
MCHC RBC AUTO-ENTMCNC: 31.3 G/DL (ref 32–36)
MCHC RBC AUTO-ENTMCNC: 31.8 G/DL (ref 32–36)
MCV RBC AUTO: 90 FL (ref 82–98)
MCV RBC AUTO: 91 FL (ref 82–98)
MONOCYTES # BLD AUTO: 0.9 K/UL (ref 0.3–1)
MONOCYTES # BLD AUTO: 1 K/UL (ref 0.3–1)
MONOCYTES NFR BLD: 6.5 % (ref 4–15)
MONOCYTES NFR BLD: 7.9 % (ref 4–15)
NEUTROPHILS # BLD AUTO: 12 K/UL (ref 1.8–7.7)
NEUTROPHILS # BLD AUTO: 7.8 K/UL (ref 1.8–7.7)
NEUTROPHILS NFR BLD: 68.9 % (ref 38–73)
NEUTROPHILS NFR BLD: 77.6 % (ref 38–73)
NRBC BLD-RTO: 0 /100 WBC
NRBC BLD-RTO: 0 /100 WBC
PLATELET # BLD AUTO: 254 K/UL (ref 150–450)
PLATELET # BLD AUTO: 269 K/UL (ref 150–450)
PMV BLD AUTO: 10.2 FL (ref 9.2–12.9)
PMV BLD AUTO: 10.3 FL (ref 9.2–12.9)
POTASSIUM SERPL-SCNC: 3.8 MMOL/L (ref 3.5–5.1)
RBC # BLD AUTO: 2.85 M/UL (ref 4–5.4)
RBC # BLD AUTO: 2.85 M/UL (ref 4–5.4)
SODIUM SERPL-SCNC: 137 MMOL/L (ref 136–145)
WBC # BLD AUTO: 11.25 K/UL (ref 3.9–12.7)
WBC # BLD AUTO: 15.43 K/UL (ref 3.9–12.7)

## 2024-03-21 PROCEDURE — 21400001 HC TELEMETRY ROOM

## 2024-03-21 PROCEDURE — 97161 PT EVAL LOW COMPLEX 20 MIN: CPT

## 2024-03-21 PROCEDURE — 94761 N-INVAS EAR/PLS OXIMETRY MLT: CPT

## 2024-03-21 PROCEDURE — 25000003 PHARM REV CODE 250: Performed by: NURSE PRACTITIONER

## 2024-03-21 PROCEDURE — 25000003 PHARM REV CODE 250: Performed by: HOSPITALIST

## 2024-03-21 PROCEDURE — 97166 OT EVAL MOD COMPLEX 45 MIN: CPT

## 2024-03-21 PROCEDURE — 25000003 PHARM REV CODE 250: Performed by: STUDENT IN AN ORGANIZED HEALTH CARE EDUCATION/TRAINING PROGRAM

## 2024-03-21 PROCEDURE — 85025 COMPLETE CBC W/AUTO DIFF WBC: CPT | Mod: 91 | Performed by: HOSPITALIST

## 2024-03-21 PROCEDURE — 99900035 HC TECH TIME PER 15 MIN (STAT)

## 2024-03-21 PROCEDURE — 36415 COLL VENOUS BLD VENIPUNCTURE: CPT | Performed by: HOSPITALIST

## 2024-03-21 PROCEDURE — 97535 SELF CARE MNGMENT TRAINING: CPT

## 2024-03-21 PROCEDURE — 80048 BASIC METABOLIC PNL TOTAL CA: CPT | Performed by: HOSPITALIST

## 2024-03-21 PROCEDURE — 97110 THERAPEUTIC EXERCISES: CPT

## 2024-03-21 PROCEDURE — 27000221 HC OXYGEN, UP TO 24 HOURS

## 2024-03-21 RX ORDER — ACETAMINOPHEN 325 MG/1
650 TABLET ORAL EVERY 6 HOURS PRN
Status: DISCONTINUED | OUTPATIENT
Start: 2024-03-21 | End: 2024-03-22 | Stop reason: HOSPADM

## 2024-03-21 RX ADMIN — ATORVASTATIN CALCIUM 80 MG: 40 TABLET, FILM COATED ORAL at 08:03

## 2024-03-21 RX ADMIN — PANTOPRAZOLE SODIUM 40 MG: 40 TABLET, DELAYED RELEASE ORAL at 08:03

## 2024-03-21 RX ADMIN — HYDROCODONE BITARTRATE AND ACETAMINOPHEN 1 TABLET: 5; 325 TABLET ORAL at 06:03

## 2024-03-21 RX ADMIN — LEVOTHYROXINE SODIUM 88 MCG: 88 TABLET ORAL at 05:03

## 2024-03-21 RX ADMIN — OXYCODONE HYDROCHLORIDE AND ACETAMINOPHEN 1 TABLET: 5; 325 TABLET ORAL at 05:03

## 2024-03-21 RX ADMIN — OXYCODONE HYDROCHLORIDE AND ACETAMINOPHEN 1 TABLET: 5; 325 TABLET ORAL at 12:03

## 2024-03-21 RX ADMIN — OXYCODONE HYDROCHLORIDE AND ACETAMINOPHEN 1 TABLET: 5; 325 TABLET ORAL at 10:03

## 2024-03-21 RX ADMIN — HYDROCODONE BITARTRATE AND ACETAMINOPHEN 1 TABLET: 5; 325 TABLET ORAL at 08:03

## 2024-03-21 RX ADMIN — METOPROLOL SUCCINATE 12.5 MG: 25 TABLET, EXTENDED RELEASE ORAL at 08:03

## 2024-03-21 RX ADMIN — CLOPIDOGREL BISULFATE 75 MG: 75 TABLET ORAL at 08:03

## 2024-03-21 RX ADMIN — CHLORHEXIDINE GLUCONATE 0.12% ORAL RINSE 10 ML: 1.2 LIQUID ORAL at 08:03

## 2024-03-21 RX ADMIN — ACETAMINOPHEN 650 MG: 325 TABLET ORAL at 07:03

## 2024-03-21 RX ADMIN — ASPIRIN 81 MG: 81 TABLET, COATED ORAL at 08:03

## 2024-03-21 RX ADMIN — SODIUM CHLORIDE: 9 INJECTION, SOLUTION INTRAVENOUS at 12:03

## 2024-03-21 NOTE — PT/OT/SLP EVAL
Physical Therapy Evaluation    Patient Name:  Iza Esquivel   MRN:  [id number]    Recommendations:     Discharge Recommendations: Low Intensity Therapy   Discharge Equipment Recommendations: RW The mobility limitation cannot be sufficiently resolved by the use of a cane.   Patient's functional mobility deficit can be sufficiently resolved with the use of a rolling walker. Patient's mobility limitation significantly impairs their ability to participate in one of more activities of daily living. The use of a rolling walker will significantly improve the patient's ability to participate in MRADLS and the patient will use it on regular basis in the home.     This patient has a mobility limitation that   Prevents them entirely  from accomplishing MRADL's in customary locations in the home   Places them at reasonable determined heightened risk of mobility or mortality secondary to attempts to perform an MRADL   Prevents them from completing MRADL's in a reasonable time frame   Barriers to discharge: None    Assessment:     Iza Esquivel is a 63 y.o. female admitted with a medical diagnosis of Atherosclerotic PVD with intermittent claudication.  She presents with the following impairments/functional limitations: weakness, impaired endurance, impaired self care skills, impaired functional mobility, gait instability, impaired balance, pain, impaired cardiopulmonary response to activity, decreased ROM, edema .    Rehab Prognosis: Good; patient would benefit from acute skilled PT services to address these deficits and reach maximum level of function.    Recent Surgery: Procedure(s) (LRB):  ENDARTERECTOMY, FEMORAL (Left)  CREATION, BYPASS, ARTERIAL, FEMORAL TO POPLITEAL, USING GRAFT (Left)  ANGIOPLASTY, USING PATCH GRAFT (Left)  SURGICAL PROCUREMENT, VEIN, ENDOSCOPIC (Left) 2 Days Post-Op    Plan:     During this hospitalization, patient to be seen 3 x/week to address the identified rehab impairments via gait training,  therapeutic activities, therapeutic exercises and progress toward the following goals:    Plan of Care Expires:  04/04/24    Subjective     Chief Complaint: PAIN L LE   Patient/Family Comments/goals: GO HOME   Pain/Comfort:  Pain Rating 1: 4/10  Location - Side 1: Left  Location 1: leg  Pain Addressed 1: Cessation of Activity, Nurse notified  Pain Rating Post-Intervention 1: 5/10    Patients cultural, spiritual, Hinduism conflicts given the current situation:      Living Environment:   PT LIVES AT HOME WITH SISTER IN A ONE STORY HOME WITH 4 STEPS AND B RAILING.   Prior to admission, patients level of function was IND, DRIVES AND IS DISABLED.  Equipment used at home: none.  DME owned (not currently used): none.  Upon discharge, patient will have assistance from SISTER.    Objective:     Communicated with NURSE AND EPIC CHART REVIEW  prior to session.  Patient found supine with peripheral IV, telemetry, PureWick  upon PT entry to room.    General Precautions: Standard, fall  Orthopedic Precautions:N/A   Braces: N/A  Respiratory Status: Nasal cannula, flow 3 L/min    Exams:  Cognitive Exam:  Patient is oriented to Person, Place, Time, and Situation  Skin Integrity/Edema:      -       Edema: MODERATE L LE SWELLING  RLE ROM: WFL  RLE Strength: WFL  LLE ROM: LIMITED  LLE Strength: Deficits: LIMITED    Functional Mobility:  Bed Mobility:     Rolling Left:  stand by assistance  Scooting: stand by assistance  Supine to Sit: stand by assistance  Transfers:     Sit to Stand:  contact guard assistance with rolling walker  Bed to Chair: contact guard assistance with  rolling walker  using  Stand Pivot  Gait: PT GT TRAINED X 140' WITH RW AND SBA WITH STEP TO GT .       AM-PAC 6 CLICK MOBILITY  Total Score:19       Treatment & Education:  GT. BELT AND  SOCKS DONNED PRIOR TO OOB MOBILITY. PT STOOD X 2 TRIALS WITH CGA>SBA. PT GT TRAINED WITH STEP TO GT AND CUES GIVEN FOR INC ROM  OF L LE WITH GT. PT RETURNED TO RM T/F TO  "CHAIR WITH SBA. PT COMPLETED AP, TKE  AND MIP X 10 REPS TO INC ROM AND STRENGTH WITH EMPHASIS ON DEC SWELLING AS WELL. PT EDUCATED ON ROLE OF P.T. AND PT EDUCATED ON "CALL DON'T FALL", ENCOURAGED TO CALL FOR ASSISTANCE WITH ALL NEEDS FOR OOB MOBILITY.      Patient left up in chair with call button in reach and chair alarm on.    GOALS:   Multidisciplinary Problems       Physical Therapy Goals          Problem: Physical Therapy    Goal Priority Disciplines Outcome Goal Variances Interventions   Physical Therapy Goal     PT, PT/OT      Description: LT24  1. PT WILL COMPLETE BED MOBILITY MOD I.  2. PT WILL STAND T/F TO CHAIR WITH RW MOD I FOR OOB TOLERANCE.  3. PT WILL GT TRAIN  250' WITH RW MOD I TO PROGRESS GT.   4. PT WILL INC AMPAC SCORE BY 2 POINTS TO PROGRESS GROSS FUNC MOBILITY.                            History:     Past Medical History:   Diagnosis Date    Atherosclerotic PVD with intermittent claudication 2020    Cervical cancer     Hyperlipidemia     Hypertension     Right carotid bruit     S/P PTCA (percutaneous transluminal coronary angioplasty) 2020    STEMI: stenting of LCx       Past Surgical History:   Procedure Laterality Date    ABDOMINAL AORTOGRAPHY N/A 2020    Procedure: Aortogram, Abdominal;  Surgeon: Shelly Jarvis MD;  Location: [facility] CATH LAB;  Service: Cardiology;  Laterality: N/A;    ANGIOGRAM, LOWER ARTERIAL, BILATERAL  2024    Procedure: Angiogram, Lower Arterial, Bilateral;  Surgeon: Shelly Jarvis MD;  Location: [facility] CATH LAB;  Service: Cardiology;;    AORTOGRAPHY WITH SERIALOGRAPHY N/A 2020    Procedure: AORTOGRAM, WITH RUNOFF;  Surgeon: Shelly Jarvis MD;  Location: [facility] CATH LAB;  Service: Cardiology;  Laterality: N/A;    AORTOGRAPHY WITH SERIALOGRAPHY N/A 2024    Procedure: AORTOGRAM, WITH SERIALOGRAPHY;  Surgeon: Shelly Jarvis MD;  Location: [facility] CATH LAB;  Service: Cardiology;  Laterality: N/A;    CREATION OF FEMOROPOPLITEAL ARTERIAL " BYPASS USING GRAFT Left 3/19/2024    Procedure: CREATION, BYPASS, ARTERIAL, FEMORAL TO POPLITEAL, USING GRAFT;  Surgeon: Frederick Perera MD;  Location: [facility] OR;  Service: Peripheral Vascular;  Laterality: Left;    ENDARTERECTOMY OF FEMORAL ARTERY Left 3/19/2024    Procedure: ENDARTERECTOMY, FEMORAL;  Surgeon: Frederick Perera MD;  Location: [facility] OR;  Service: Peripheral Vascular;  Laterality: Left;  iliofemoral    ENDOSCOPIC HARVEST OF VEIN Left 3/19/2024    Procedure: SURGICAL PROCUREMENT, VEIN, ENDOSCOPIC;  Surgeon: Frederick Perera MD;  Location: [facility];  Service: Peripheral Vascular;  Laterality: Left;    LEFT HEART CATHETERIZATION Left 5/25/2020    Procedure: CATHETERIZATION, HEART, LEFT;  Surgeon: Shelly Jarvis MD;  Location: [facility] CATH LAB;  Service: Cardiology;  Laterality: Left;    LEFT HEART CATHETERIZATION Left 6/11/2020    Procedure: CATHETERIZATION, HEART, LEFT;  Surgeon: Shelly Jarvis MD;  Location: [facility] CATH LAB;  Service: Cardiology;  Laterality: Left;    LYMPH NODE BIOPSY      PERCUTANEOUS TRANSLUMINAL BALLOON ANGIOPLASTY OF CORONARY ARTERY  5/25/2020    Procedure: Angioplasty-coronary;  Surgeon: Shelly Jarvis MD;  Location: [facility] CATH LAB;  Service: Cardiology;;    REPAIR OF BLOOD VESSEL WITH PATCH GRAFT Left 3/19/2024    Procedure: ANGIOPLASTY, USING PATCH GRAFT;  Surgeon: Frederick Perera MD;  Location: [facility];  Service: Peripheral Vascular;  Laterality: Left;       Time Tracking:     PT Received On: 03/21/24  PT Start Time: 0830     PT Stop Time: 0855  PT Total Time (min): 25 min     Billable Minutes: Evaluation 15 and Therapeutic Exercise 10      03/21/2024

## 2024-03-21 NOTE — PROGRESS NOTES
"      No chief complaint on file.      HISTORY OF PRESENT ILLNESS:  63-year-old female status post left femoral to above knee popliteal artery bypass with endoscopically harvested GSV.    INTERVAL HISTORY:  Patient states left leg and foot feel much better since surgery.  She has some moderate swelling.    ROS:   10 point review of systems is negative except as mentioned in Interval History.    Objective:  Vital signs: (most recent): Blood pressure (!) 126/57, pulse 89, temperature 97.9 °F (36.6 °C), temperature source Oral, resp. rate 18, height 5' 5" (1.651 m), weight 80.8 kg (178 lb 2.1 oz), SpO2 97 %, not currently breastfeeding.        Assessment & Plan    Postop day 1. Left fem-pop bypass with good distal perfusion and triphasic Doppler signal at the DP and PT.  1+ postop left lower leg edema  Plan to wean oxygen and Hep-Lock IV fluids today  Home 1-2 days    Active Hospital Problems    Diagnosis  POA    *Atherosclerotic PVD with intermittent claudication [I70.219]  Yes    Centrilobular emphysema [J43.2]  Yes     6/1/2022 LDCT Lungs show findings consistent with emphysema.      Essential hypertension [I10]  Yes    Impaired functional mobility and activity tolerance [Z74.09]  Yes    COPD, moderate [J44.9]  Yes     Moderate COPD seen on 3/1/2021 CPFT. New treatment: controller ANORO and rescue Albuterol inhaler.   3/1/2021 cpft moderate obstructive airflow defect.   3/1/2021 normal ABG and 6mwd.   Complete smoking cessation strongly advised  1 - 1.5 packs day. Last quit attempt Nov 2020.          PVD (peripheral vascular disease) [I73.9]  Yes    Coronary artery disease of native artery of native heart with stable angina pectoris [I25.118]  Yes      Resolved Hospital Problems   No resolved problems to display.       Ranjith Escobar Jr  3/21/2024    "

## 2024-03-21 NOTE — PROGRESS NOTES
[facility] Emergency Medicine  Progress Note    Patient Name: Iza Esquivel  MRN: [id number]  Patient Class: IP- Surgery Admit   Admission Date: 3/19/2024  Length of Stay: 2 days  Attending Physician: Frederick Perera MD  Primary Care Provider: Park Gomez MD        Subjective:     Principal Problem:Atherosclerotic PVD with intermittent claudication        HPI:  64 y/o female with PMHx of COPD, PVD, HTN, HLD, cervical cancer who is POD # 1 s/p left leg CFA endarterectomy and left common femoral to above-knee popliteal artery bypass 3/20/24 by vascular surgery, Dr. Frederick Perera. Patient reports LLE swelling, pain in left upper thigh, radiating down to knee. Reports numbness/tingling in left foot, chronic, able to wiggle toes, move foot, warm to touch. She denies chest pain, SOB, fevers/chills, nausea/vomiting or any other complaints. Discussed with Dr. Perera; [facility] consulted to assist with medical management.    Overview/Hospital Course:  3/21  NAEON, reports left thigh pain and swelling better today  On 3 L NC, patient with underlying COPD, reports quitting roughly 3 months ago  Wean as able, may require home O2, ordered walk test for tomorrow morning    No new subjective & objective note has been filed under this hospital service since the last note was generated.      Assessment/Plan:      * Atherosclerotic PVD with intermittent claudication    3/20/24  s/p left leg CFA endarterectomy, left common femoral to above-knee popliteal artery bypass  Pain control, adjusted PRN regimen today  On ASA, Plavix, statin  Vascular Surgery following      Essential hypertension  Chronic, controlled. Latest blood pressure and vitals reviewed-     Temp:  [97.4 °F (36.3 °C)-98.6 °F (37 °C)]   Pulse:  [56-90]   Resp:  [10-21]   BP: ()/(51-74)   SpO2:  [92 %-100 %] .   Home meds for hypertension were reviewed and noted below.   Hypertension Medications               isosorbide mononitrate  (IMDUR) 60 MG 24 hr tablet TAKE 1 TABLET(60 MG) BY MOUTH EVERY DAY    metoprolol succinate (TOPROL-XL) 25 MG 24 hr tablet Take 0.5 tablets (12.5 mg total) by mouth once daily.            While in the hospital, will manage blood pressure as follows; Continue home antihypertensive regimen    Will utilize p.r.n. blood pressure medication only if patient's blood pressure greater than 180/110 and she develops symptoms such as worsening chest pain or shortness of breath.    COPD, moderate  Patient's COPD is controlled currently.  Patient is currently off COPD Pathway. Continue scheduled inhalers Supplemental oxygen and monitor respiratory status closely.     Impaired functional mobility and activity tolerance  PT/OT consult        VTE Risk Mitigation (From admission, onward)           Ordered     IP VTE HIGH RISK PATIENT  Once         03/20/24 0422     Place sequential compression device  Until discontinued         03/20/24 0422                    Discharge Planning   NATALIE:      Code Status: Full Code   Is the patient medically ready for discharge?:     Reason for patient still in hospital (select all that apply): Patient trending condition, Laboratory test, Consult recommendations, and Pending disposition  Discharge Plan A: Home                  Juan C Washington MD  Department of Hospital Medicine   [place] - Telemetry ([facility])

## 2024-03-21 NOTE — PT/OT/SLP EVAL
Occupational Therapy Evaluation and Treatment    Name: Iza Esquivel  MRN: [id number]  Admitting Diagnosis: Atherosclerotic PVD with intermittent claudication  Recent Surgery: Procedure(s) (LRB):  ENDARTERECTOMY, FEMORAL (Left)  CREATION, BYPASS, ARTERIAL, FEMORAL TO POPLITEAL, USING GRAFT (Left)  ANGIOPLASTY, USING PATCH GRAFT (Left)  SURGICAL PROCUREMENT, VEIN, ENDOSCOPIC (Left) 2 Days Post-Op    Recommendations:     Discharge Recommendations: Low Intensity Therapy  Level of Assistance Recommended: Intermittent assistance and Intermittent supervision  Discharge Equipment Recommendations: hip kit  Barriers to discharge: None    Assessment:     Iza Esquivel is a 63 y.o. female with a medical diagnosis of Atherosclerotic PVD with intermittent claudication. She presents with performance deficits affecting function including weakness, impaired endurance, impaired self care skills, impaired functional mobility, impaired balance, decreased safety awareness, pain, impaired skin.     Rehab Prognosis: Good; patient would benefit from acute OT services to address these deficits and reach maximum level of function.    Plan:     Patient to be seen 2 x/week to address the above listed problems via self-care/home management, therapeutic activities, therapeutic exercises, neuromuscular re-education  Plan of Care Expires: 04/04/24  Plan of Care Reviewed with: patient    Subjective     Chief Complaint: left LE pain following popliteal artery bypass  Patient Comments/Goals: DC home with family  Pain/Comfort:  Pain Rating 1: 6/10  Location - Side 1: Left  Location 1: leg  Pain Addressed 1: Reposition, Cessation of Activity, Nurse notified  Pain Rating Post-Intervention 1: 6/10    Patients cultural, spiritual, Mandaen conflicts given the current situation: no    Social History:  Living Environment: Patient lives with their sister in a single story home with number of outside stair(s): 0, number of inside stair(s): 0, and  tub-shower combo  Prior Level of Function: Prior to admission, patient was independent  Roles and Routines: Patient was driving and retired prior to admission.  Equipment Used at Home: none  DME owned (not currently used): none  Assistance Upon Discharge: family    Objective:     Communicated with nurse prior to session. Patient found up in chair with peripheral IV, telemetry, PureWick upon OT entry to room.    General Precautions: Standard, fall   Orthopedic Precautions: N/A   Braces: N/A    Respiratory Status: Room air    Occupational Performance    Gait belt applied - Yes    Bed Mobility:   Not observed    Functional Mobility/Transfers:  Sit <> Stand Transfer with stand by assistance with rolling walker  Bed <> Chair Transfer using Step Transfer technique with stand by assistance with rolling walker    Activities of Daily Living:  Grooming: set up assistance  Upper Body Dressing: set up assistance  Lower Body Dressing: minimum assistance  Toileting: minimum assistance    Cognitive/Visual Perceptual:  Cognitive/Psychosocial Skills:    -     Oriented to: Person, Place, Time, Situation  -     Follows Commands/attention: Follows multistep  commands  -     Safety awareness/insight to disability: intact  -     Mood/Affect/Coping skills/emotional control: Appropriate to situation and Cooperative  Visual/Perceptual:    -     Intact    Physical Exam:  Balance:    -     Sitting: stand by assistance  -     Standing: stand by assistance  Upper Extremity Range of Motion:     -       Right Upper Extremity: WNL  -       Left Upper Extremity: WFL  Upper Extremity Strength:    -       Right Upper Extremity: WFL  -       Left Upper Extremity: WFL    AMPAC 6 Click ADL:  AMPAC Total Score: 21    Treatment & Education:  Therapist provided facilitation and instruction of proper body mechanics, energy conservation, and fall prevention strategies during tasks listed above  Patient educated on role of OT, POC, and goals for  therapy  Patient educated on importance of OOB activities with staff member assistance and sitting OOB majority of the day    Patient left up in chair with all lines intact, call button in reach, and chair alarm on.    GOALS:   Multidisciplinary Problems       Occupational Therapy Goals          Problem: Occupational Therapy    Goal Priority Disciplines Outcome Interventions   Occupational Therapy Goal     OT, PT/OT     Description: Goals to be met by: 4/4/24     Patient will increase functional independence with ADLs by performing:    LE Dressing with Moderate Assistance.  Toileting from toilet with Minimal Assistance for hygiene and clothing management.   Toilet transfer to toilet with Stand-by Assistance.                         History:     Past Medical History:   Diagnosis Date    Atherosclerotic PVD with intermittent claudication 5/25/2020    Cervical cancer     Hyperlipidemia     Hypertension     Right carotid bruit     S/P PTCA (percutaneous transluminal coronary angioplasty) 05/25/2020    STEMI: stenting of LCx         Past Surgical History:   Procedure Laterality Date    ABDOMINAL AORTOGRAPHY N/A 5/25/2020    Procedure: Aortogram, Abdominal;  Surgeon: Shelly Jarvis MD;  Location: [facility] CATH LAB;  Service: Cardiology;  Laterality: N/A;    ANGIOGRAM, LOWER ARTERIAL, BILATERAL  2/12/2024    Procedure: Angiogram, Lower Arterial, Bilateral;  Surgeon: Shelly Jarvis MD;  Location: [facility] CATH LAB;  Service: Cardiology;;    AORTOGRAPHY WITH SERIALOGRAPHY N/A 6/30/2020    Procedure: AORTOGRAM, WITH RUNOFF;  Surgeon: Shelly Jarvis MD;  Location: [facility] CATH LAB;  Service: Cardiology;  Laterality: N/A;    AORTOGRAPHY WITH SERIALOGRAPHY N/A 2/12/2024    Procedure: AORTOGRAM, WITH SERIALOGRAPHY;  Surgeon: Shelly Jarvis MD;  Location: [facility] CATH LAB;  Service: Cardiology;  Laterality: N/A;    CREATION OF FEMOROPOPLITEAL ARTERIAL BYPASS USING GRAFT Left 3/19/2024    Procedure: CREATION, BYPASS, ARTERIAL, FEMORAL TO  POPLITEAL, USING GRAFT;  Surgeon: Frederick Perera MD;  Location: [facility];  Service: Peripheral Vascular;  Laterality: Left;    ENDARTERECTOMY OF FEMORAL ARTERY Left 3/19/2024    Procedure: ENDARTERECTOMY, FEMORAL;  Surgeon: Frederick Perera MD;  Location: [facility];  Service: Peripheral Vascular;  Laterality: Left;  iliofemoral    ENDOSCOPIC HARVEST OF VEIN Left 3/19/2024    Procedure: SURGICAL PROCUREMENT, VEIN, ENDOSCOPIC;  Surgeon: Frederick Perera MD;  Location: [facility];  Service: Peripheral Vascular;  Laterality: Left;    LEFT HEART CATHETERIZATION Left 5/25/2020    Procedure: CATHETERIZATION, HEART, LEFT;  Surgeon: Shelly Jarvis MD;  Location: [facility] CATH LAB;  Service: Cardiology;  Laterality: Left;    LEFT HEART CATHETERIZATION Left 6/11/2020    Procedure: CATHETERIZATION, HEART, LEFT;  Surgeon: Shelly Jarvis MD;  Location: [facility] CATH LAB;  Service: Cardiology;  Laterality: Left;    LYMPH NODE BIOPSY      PERCUTANEOUS TRANSLUMINAL BALLOON ANGIOPLASTY OF CORONARY ARTERY  5/25/2020    Procedure: Angioplasty-coronary;  Surgeon: Shelly Jarvis MD;  Location: [facility] CATH LAB;  Service: Cardiology;;    REPAIR OF BLOOD VESSEL WITH PATCH GRAFT Left 3/19/2024    Procedure: ANGIOPLASTY, USING PATCH GRAFT;  Surgeon: Frederick Perera MD;  Location: [facility];  Service: Peripheral Vascular;  Laterality: Left;       Time Tracking:     OT Date of Treatment: 03/21/24  OT Start Time: 0920  OT Stop Time: 0945  OT Total Time (min): 25 min    Billable Minutes: Evaluation 10 and Self Care/Home Management 15    3/21/2024

## 2024-03-21 NOTE — PLAN OF CARE
Goals to be met by: 4/4/24     Patient will increase functional independence with ADLs by performing:    LE Dressing with Moderate Assistance.  Toileting from toilet with Minimal Assistance for hygiene and clothing management.   Toilet transfer to toilet with Stand-by Assistance.    DC with low intensity OT and intermittent supervision indicated.

## 2024-03-21 NOTE — PLAN OF CARE
A245/A245 ANILTheodora Esquivel is a 63 y.o.female admitted on 3/19/2024 for Atherosclerotic PVD with intermittent claudication   Code Status: Full Code MRN: [id number]   Review of patient's allergies indicates:  No Known Allergies  Past Medical History:   Diagnosis Date    Atherosclerotic PVD with intermittent claudication 5/25/2020    Cervical cancer     Hyperlipidemia     Hypertension     Right carotid bruit     S/P PTCA (percutaneous transluminal coronary angioplasty) 05/25/2020    STEMI: stenting of LCx      PRN meds    chlorhexidine, 10 mL, On Call Procedure  HYDROcodone-acetaminophen, 1 tablet, Q4H PRN  morphine, 2 mg, Q6H PRN  ondansetron, 4 mg, Q12H PRN  oxyCODONE-acetaminophen, 1 tablet, Q4H PRN      Chart check completed. Will continue plan of care.      Orientation: oriented x 4  [place] Coma Scale Score: 15     Lead Monitored: Lead II Rhythm: normal sinus rhythm    Cardiac/Telemetry Box Number: pacu 1  VTE Required Core Measure: (SCDs) Sequential compression device initiated/maintained Last Bowel Movement: 03/19/24  Diet Adult Regular (IDDSI Level 7) Standard Tray  Voiding Characteristics: external catheter  Siva Score: 19  Fall Risk Score: 11  Accucheck []   Freq?      Lines/Drains/Airways       Peripheral Intravenous Line  Duration                  Peripheral IV - Single Lumen 03/19/24 1300 20 G Right Forearm 2 days         Peripheral IV - Single Lumen 03/19/24 1354 18 G Anterior;Left Wrist 2 days

## 2024-03-21 NOTE — HOSPITAL COURSE
3/21  ADRIÁN, reports left thigh pain and swelling better today  On 3 L NC, patient with underlying COPD, reports quitting roughly 3 months ago  Wean as able, may require home O2, ordered walk test for tomorrow morning

## 2024-03-22 ENCOUNTER — CLINICAL SUPPORT (OUTPATIENT)
Dept: SMOKING CESSATION | Facility: CLINIC | Age: 64
End: 2024-03-22
Payer: MEDICARE

## 2024-03-22 VITALS
HEART RATE: 81 BPM | SYSTOLIC BLOOD PRESSURE: 103 MMHG | TEMPERATURE: 98 F | OXYGEN SATURATION: 93 % | HEIGHT: 65 IN | DIASTOLIC BLOOD PRESSURE: 55 MMHG | RESPIRATION RATE: 20 BRPM | BODY MASS INDEX: 29.68 KG/M2 | WEIGHT: 178.13 LBS

## 2024-03-22 DIAGNOSIS — F17.200 NICOTINE DEPENDENCE: Primary | ICD-10-CM

## 2024-03-22 DIAGNOSIS — I70.219 ATHEROSCLEROTIC PVD WITH INTERMITTENT CLAUDICATION: Primary | ICD-10-CM

## 2024-03-22 LAB
APTT PPP: 31.7 SEC (ref 21–32)
BASOPHILS # BLD AUTO: 0.06 K/UL (ref 0–0.2)
BASOPHILS NFR BLD: 0.6 % (ref 0–1.9)
DIFFERENTIAL METHOD BLD: ABNORMAL
EOSINOPHIL # BLD AUTO: 0 K/UL (ref 0–0.5)
EOSINOPHIL NFR BLD: 0.3 % (ref 0–8)
ERYTHROCYTE [DISTWIDTH] IN BLOOD BY AUTOMATED COUNT: 14.1 % (ref 11.5–14.5)
FOLATE SERPL-MCNC: 8.4 NG/ML (ref 4–24)
HCT VFR BLD AUTO: 22.2 % (ref 37–48.5)
HCT VFR BLD AUTO: 24.9 % (ref 37–48.5)
HGB BLD-MCNC: 6.9 G/DL (ref 12–16)
HGB BLD-MCNC: 7.8 G/DL (ref 12–16)
IMM GRANULOCYTES # BLD AUTO: 0.04 K/UL (ref 0–0.04)
IMM GRANULOCYTES NFR BLD AUTO: 0.4 % (ref 0–0.5)
INR PPP: 1 (ref 0.8–1.2)
IRON SERPL-MCNC: <10 UG/DL (ref 30–160)
LYMPHOCYTES # BLD AUTO: 1.6 K/UL (ref 1–4.8)
LYMPHOCYTES NFR BLD: 15.2 % (ref 18–48)
MCH RBC QN AUTO: 27.8 PG (ref 27–31)
MCHC RBC AUTO-ENTMCNC: 31.1 G/DL (ref 32–36)
MCV RBC AUTO: 90 FL (ref 82–98)
MONOCYTES # BLD AUTO: 0.7 K/UL (ref 0.3–1)
MONOCYTES NFR BLD: 6 % (ref 4–15)
NEUTROPHILS # BLD AUTO: 8.3 K/UL (ref 1.8–7.7)
NEUTROPHILS NFR BLD: 77.5 % (ref 38–73)
NRBC BLD-RTO: 0 /100 WBC
PLATELET # BLD AUTO: 237 K/UL (ref 150–450)
PMV BLD AUTO: 10.5 FL (ref 9.2–12.9)
PROTHROMBIN TIME: 11.2 SEC (ref 9–12.5)
RBC # BLD AUTO: 2.48 M/UL (ref 4–5.4)
SATURATED IRON: ABNORMAL % (ref 20–50)
TOTAL IRON BINDING CAPACITY: 283 UG/DL (ref 250–450)
TRANSFERRIN SERPL-MCNC: 191 MG/DL (ref 200–375)
VIT B12 SERPL-MCNC: <148 PG/ML (ref 210–950)
WBC # BLD AUTO: 10.76 K/UL (ref 3.9–12.7)

## 2024-03-22 PROCEDURE — 82607 VITAMIN B-12: CPT | Performed by: HOSPITALIST

## 2024-03-22 PROCEDURE — 82746 ASSAY OF FOLIC ACID SERUM: CPT | Performed by: HOSPITALIST

## 2024-03-22 PROCEDURE — 94761 N-INVAS EAR/PLS OXIMETRY MLT: CPT

## 2024-03-22 PROCEDURE — 97530 THERAPEUTIC ACTIVITIES: CPT

## 2024-03-22 PROCEDURE — 99406 BEHAV CHNG SMOKING 3-10 MIN: CPT | Mod: S$GLB,,,

## 2024-03-22 PROCEDURE — 85014 HEMATOCRIT: CPT | Performed by: STUDENT IN AN ORGANIZED HEALTH CARE EDUCATION/TRAINING PROGRAM

## 2024-03-22 PROCEDURE — 97116 GAIT TRAINING THERAPY: CPT

## 2024-03-22 PROCEDURE — 85730 THROMBOPLASTIN TIME PARTIAL: CPT | Performed by: STUDENT IN AN ORGANIZED HEALTH CARE EDUCATION/TRAINING PROGRAM

## 2024-03-22 PROCEDURE — 85610 PROTHROMBIN TIME: CPT | Performed by: STUDENT IN AN ORGANIZED HEALTH CARE EDUCATION/TRAINING PROGRAM

## 2024-03-22 PROCEDURE — 25000003 PHARM REV CODE 250: Performed by: NURSE PRACTITIONER

## 2024-03-22 PROCEDURE — 85018 HEMOGLOBIN: CPT | Performed by: STUDENT IN AN ORGANIZED HEALTH CARE EDUCATION/TRAINING PROGRAM

## 2024-03-22 PROCEDURE — 36415 COLL VENOUS BLD VENIPUNCTURE: CPT | Performed by: STUDENT IN AN ORGANIZED HEALTH CARE EDUCATION/TRAINING PROGRAM

## 2024-03-22 PROCEDURE — 36415 COLL VENOUS BLD VENIPUNCTURE: CPT | Mod: XB | Performed by: HOSPITALIST

## 2024-03-22 PROCEDURE — 94618 PULMONARY STRESS TESTING: CPT

## 2024-03-22 PROCEDURE — 83540 ASSAY OF IRON: CPT | Performed by: HOSPITALIST

## 2024-03-22 PROCEDURE — 85025 COMPLETE CBC W/AUTO DIFF WBC: CPT | Performed by: HOSPITALIST

## 2024-03-22 PROCEDURE — 99900035 HC TECH TIME PER 15 MIN (STAT)

## 2024-03-22 PROCEDURE — 25000003 PHARM REV CODE 250: Performed by: HOSPITALIST

## 2024-03-22 RX ORDER — OXYCODONE AND ACETAMINOPHEN 5; 325 MG/1; MG/1
1 TABLET ORAL EVERY 8 HOURS PRN
Qty: 12 TABLET | Refills: 0
Start: 2024-03-22 | End: 2024-03-22

## 2024-03-22 RX ORDER — OXYCODONE AND ACETAMINOPHEN 5; 325 MG/1; MG/1
1 TABLET ORAL EVERY 8 HOURS PRN
Qty: 12 TABLET | Refills: 0 | Status: SHIPPED | OUTPATIENT
Start: 2024-03-22 | End: 2024-03-26

## 2024-03-22 RX ADMIN — ACETAMINOPHEN 650 MG: 325 TABLET ORAL at 04:03

## 2024-03-22 RX ADMIN — ATORVASTATIN CALCIUM 80 MG: 40 TABLET, FILM COATED ORAL at 09:03

## 2024-03-22 RX ADMIN — CHLORHEXIDINE GLUCONATE 0.12% ORAL RINSE 10 ML: 1.2 LIQUID ORAL at 09:03

## 2024-03-22 RX ADMIN — ASPIRIN 81 MG: 81 TABLET, COATED ORAL at 09:03

## 2024-03-22 RX ADMIN — OXYCODONE HYDROCHLORIDE AND ACETAMINOPHEN 1 TABLET: 5; 325 TABLET ORAL at 04:03

## 2024-03-22 RX ADMIN — LEVOTHYROXINE SODIUM 88 MCG: 88 TABLET ORAL at 05:03

## 2024-03-22 RX ADMIN — CLOPIDOGREL BISULFATE 75 MG: 75 TABLET ORAL at 09:03

## 2024-03-22 NOTE — SIGNIFICANT EVENT
AM labs revealed normocytic anemia, acute drop compared to previous labs drawn 18 hours prior. Rest of labs stable. Platelets 237k. Repeat H&H, iron studies, and coag loabs ordered for this AM.     FULL progress note to follow    Boubacar Duggan MD  [facility]

## 2024-03-22 NOTE — RESPIRATORY THERAPY
Home Oxygen Evaluation - [facility] - Cardiopulmonary Department      Date Performed: 3/22/2024      1) Patient's Home O2 Sat on room air, while at rest: Room Air SpO2 At Rest: 92 %        If O2 sats on room air at rest are 88% or below, patient qualifies.  Document O2 liter flow needed in Step 2.  If O2 sats are 89% or above, complete Step 3.        2)  If patient is not ambulated and O2 sats are <88%, what is the O2 liter flow required to meet ordered saturation?      If O2 sats on room air while exercising remain 89% or above patient does not qualify, no further testing needed Document N/A in step 3. If O2 sats on room air while exercising are 88% or below, continue to Step 4.    3) Patient's O2 Sat on room air while exercising: Room Air SpO2 During Ambulation: 91 %        4) Patient's O2 Sat while exercising on O2:   at           (Must show improvement from #4 for patients to qualify)

## 2024-03-22 NOTE — PLAN OF CARE
03/22/24 1149   Post-Acute Status   Post-Acute Authorization HME   HME Status Referrals Sent   Discharge Plan   Discharge Plan A Home with family;Home     [facility] reviewing rolling walker order.  SW to follow

## 2024-03-22 NOTE — PROGRESS NOTES
PLAN:  Repeat CBC in 2 weeks per hospital medicine team, I have ordered this lab work  Dressings down today   Elevate lower extremity when sedentary   Can wash surgical sites with soap and water  Dry gauze to left groin incision changed daily  RW prior to DC  DC home today   Will arrange outpatient f/u in 3-4 weeks     Uday Tompkins    POD 3:   Left iliofemoral endarterectomy with patch angioplasty  Left Endoscopic gsv harvest  Left common femoral to above knee popliteal bypass using left gsv    Interval History:  Patient sitting in chair with no acute distress. No acute events overnight. Her H/H trended down but on repeat labs seems to be normalizing. Pain seems well controlled. No complaints at this time.     Mobility: requires RW  Extremity: Warm and perfused feet, LLE edema   Wounds:  surgical incisions remain clean, dry and intact    Labs:   I have reviewed all pertinent lab results within the past 24 hours.  CBC:   Recent Labs   Lab 03/22/24  0556 03/22/24  0754   WBC 10.76  --    RBC 2.48*  --    HGB 6.9* 7.8*   HCT 22.2* 24.9*     --    MCV 90  --    MCH 27.8  --    MCHC 31.1*  --      BMP:   Recent Labs   Lab 03/21/24  0454   GLU 89      K 3.8      CO2 27   BUN 8   CREATININE 0.8   CALCIUM 8.4*         Imaging:   No orders to display       VITAL SIGNS: 24 HR MIN & MAX LAST    Temp  Min: 97.8 °F (36.6 °C)  Max: 100.8 °F (38.2 °C)  97.8 °F (36.6 °C)        BP  Min: 91/54  Max: 131/61  (!) 126/56     Pulse  Min: 77  Max: 102  77     Resp  Min: 17  Max: 18  18    SpO2  Min: 90 %  Max: 96 %  (!) 92 %      Intake/Output:  No intake/output data recorded.  I/O last 3 completed shifts:  In: 853 [I.V.:804.5; IV Piggyback:48.5]  Out: 2 [Urine:1; Stool:1]    Patient was seen today with  Dr. Perera , who personally examined the patient and formulated the plan of care.

## 2024-03-22 NOTE — PT/OT/SLP PROGRESS
"Physical Therapy  Treatment    Iza Esquivel   MRN: [id number]   Admitting Diagnosis: Atherosclerotic PVD with intermittent claudication    PT Received On: 03/22/24  PT Start Time: 0730     PT Stop Time: 0755    PT Total Time (min): 25 min       Billable Minutes:  Gait Training 15 and Therapeutic Activity 10    Treatment Type: Treatment  PT/PTA: PT     Number of PTA visits since last PT visit: 0       General Precautions: Standard, fall  Orthopedic Precautions: N/A  Braces: N/A  Respiratory Status: Room air         Subjective:  Communicated with NURSE AND EPIC CHART REVIEW  prior to session.   PT AGREED TO TX     Pain/Comfort  Pain Rating 1: 3/10  Location - Side 1: Left  Location 1: leg  Pain Rating Post-Intervention 1: 3/10    Objective:   Patient found with: peripheral IV, telemetry    Functional Mobility:  PT MET IN RM SUP>SIT EOB MOD I. PT SCOOTED TO EOB AND GT. BELT AND  SOCKS DONNED PRIOR TO OOB MOBILITY. PT STOOD WITH RW AND GT TRAIN TO BATHROOM FOR TOILETING WITH S  ON AND OFF COMMODE. PT GT TRAINED X 310' WITH STEP TO GT AND MILD REPORTS OF DIZZINESS. PT O2 SATS CHECKED @ 91-93 % ON ROOM AIR. PT RETURNED TO RM T/F TO CHAIR WITH S. PT LEFT SEATED FOR OOB TOLERANCE.     Treatment and Education:  P.T. REVIEWED HEP TE INCLUDING AP, TKE AND MIP AND PT DEMONSTRATED PROPERLY. PT LEFT SEATED WITH ALL NEEDS MET AND PT EDUCATED ON "CALL DON'T FALL", ENCOURAGED TO CALL FOR ASSISTANCE WITH ALL NEEDS FOR OOB MOBILITY.       AM-PAC 6 CLICK MOBILITY  How much help from another person does this patient currently need?   1 = Unable, Total/Dependent Assistance  2 = A lot, Maximum/Moderate Assistance  3 = A little, Minimum/Contact Guard/Supervision  4 = None, Modified [place]/Independent    Turning over in bed (including adjusting bedclothes, sheets and blankets)?: 4  Sitting down on and standing up from a chair with arms (e.g., wheelchair, bedside commode, etc.): 4  Moving from lying on back to sitting on the side " of the bed?: 4  Moving to and from a bed to a chair (including a wheelchair)?: 4  Need to walk in hospital room?: 4  Climbing 3-5 steps with a railing?: 1 (NT)  Basic Mobility Total Score: 21    AM-PAC Raw Score CMS G-Code Modifier Level of Impairment Assistance   6 % Total / Unable   7 - 9 CM 80 - 100% Maximal Assist   10 - 14 CL 60 - 80% Moderate Assist   15 - 19 CK 40 - 60% Moderate Assist   20 - 22 CJ 20 - 40% Minimal Assist   23 CI 1-20% SBA / CGA   24 CH 0% Independent/ Mod I     Patient left up in chair with call button in reach.    Assessment:  PT PROGRESSING WITH GT AND DEC ASSIST LEVEL TODAY. PT L LE ALSO NOTED TO HAVE DEC SWELLING TODAY.     Rehab identified problem list/impairments: weakness, impaired endurance, edema, gait instability, impaired balance, pain, impaired functional mobility, decreased ROM    Rehab potential is good.    Activity tolerance: Fair    Discharge recommendations: Low Intensity Therapy      Barriers to discharge:      Equipment recommendations: walker, rolling     GOALS:   Multidisciplinary Problems       Physical Therapy Goals          Problem: Physical Therapy    Goal Priority Disciplines Outcome Goal Variances Interventions   Physical Therapy Goal     PT, PT/OT Ongoing, Progressing     Description: LT24  1. PT WILL COMPLETE BED MOBILITY MOD I.  2. PT WILL STAND T/F TO CHAIR WITH RW MOD I FOR OOB TOLERANCE.  3. PT WILL GT TRAIN  250' WITH RW MOD I TO PROGRESS GT.   4. PT WILL INC AMPAC SCORE BY 2 POINTS TO PROGRESS GROSS FUNC MOBILITY.                            PLAN:    Patient to be seen 3 x/week to address the above listed problems via gait training, therapeutic activities, therapeutic exercises  Plan of Care expires: 24  Plan of Care reviewed with: patient         2024

## 2024-03-22 NOTE — PLAN OF CARE
[facility] - Telemetry (Hospital)  Discharge Final Note    Primary Care Provider: Park Gomez MD    Expected Discharge Date: 3/22/2024    Final Discharge Note (most recent)       Final Note - 03/22/24 1149          Post-Acute Status    Post-Acute Authorization HME     HME Status Referrals Sent                   Pt to discharge home today. Pt declined rolling walker d/t friend loaning her one. Pt's PCP follow up scheduled on AVS: Hospital Follow Up with PARK GOMEZ MD  Thursday Apr 4, 2024 8:00 AM    No additional CM consults for discharge.     Important Message from Medicare              Follow-up providers       Park Gomez MD   Specialty: Family Medicine   Relationship: PCP - General    [address] [place] [id number]   Phone: [phone]       Next Steps: Follow up              After-discharge care                Durable Medical Equipment       [facility]   Service: Durable Medical Equipment    [address] [place] [id number]   Phone: [phone]

## 2024-03-22 NOTE — PLAN OF CARE
Telemetry monitor removed and returned to monitor tech. PIV removed. Discharge instructions reviewed with patient including discharge medications, follow-up appointments, diet, and activity restrictions. Patient verbalizes understanding and voices no concerns. All personal belongings to leave with patient.  Awaiting ride home.

## 2024-03-23 LAB
BLD PROD TYP BPU: NORMAL
BLD PROD TYP BPU: NORMAL
BLOOD UNIT EXPIRATION DATE: NORMAL
BLOOD UNIT EXPIRATION DATE: NORMAL
BLOOD UNIT TYPE CODE: 5100
BLOOD UNIT TYPE CODE: 5100
BLOOD UNIT TYPE: NORMAL
BLOOD UNIT TYPE: NORMAL
CODING SYSTEM: NORMAL
CODING SYSTEM: NORMAL
CROSSMATCH INTERPRETATION: NORMAL
CROSSMATCH INTERPRETATION: NORMAL
DISPENSE STATUS: NORMAL
DISPENSE STATUS: NORMAL
NUM UNITS TRANS PACKED RBC: NORMAL
NUM UNITS TRANS PACKED RBC: NORMAL

## 2024-03-26 NOTE — DISCHARGE SUMMARY
"  [facility] - Tele[facility] ([facility])  Vascular Surgery  Discharge Summary      Patient Name: Iza Esquivel  MRN: [id number]  Admission Date: 3/19/2024  Hospital Length of Stay: 3 days  Discharge Date and Time: 3/22/2024  1:23 PM  Attending Physician: No att. providers found   Discharging Provider: Uday Tompkins PA-C  Primary Care Provider: Park Gomez MD     HPI: 62 y/o female who presented to clinic after Dr. Jarvis performed an angiogram and found a long segment SFA occlusion. Her GONZÁLEZ in clinic was 0.41 on the left and 0.77 on the right. She has been experiencing rest pain to her LLE and claudication to her BLE. She does smoke half pack per day. Does have a PMHx of kissing iliac artery stents that appeared patent. She has been compliant with statin and DAPT.     Procedure(s) (LRB):  ENDARTERECTOMY, FEMORAL (Left)  CREATION, BYPASS, ARTERIAL, FEMORAL TO POPLITEAL, USING GRAFT (Left)  ANGIOPLASTY, USING PATCH GRAFT (Left)  SURGICAL PROCUREMENT, VEIN, ENDOSCOPIC (Left)     Hospital Course: Patient underwent Left iliofemoral endarterectomy with patch angioplasty with left Endoscopic gsv harvest and left common femoral to above knee popliteal bypass using left gsv on 3/19/2024 by Dr. Perera. She was extubated and transferred to recovery as she tolerated the procedure well with no complications. [facility] [place] [facility] was consulted to assist with medical management. PT/OT evaluated and treated the patient with their discharge recommendations. Her lab work revealed anemia the day of discharge and repeat lab work that morning had trended upward. She remained asymptomatic. Discussed following up with PCP for repeat CBC in 2 weeks. She had reached the maximum benefit of her hospitalization. She was discharged home with the appropriate follow up appointments and medications.     Consults: Hospital medicine    Significant Diagnostic Studies: Labs: BMP: No results for input(s): "GLU", "NA", "K", "CL", "CO2", "BUN", "CREATININE", " ""CALCIUM", "MG" in the last 48 hours. and CBC No results for input(s): "WBC", "HGB", "HCT", "PLT" in the last 48 hours.    Pending Diagnostic Studies:       None          Final Active Diagnoses:    Diagnosis Date Noted POA    PRINCIPAL PROBLEM:  Atherosclerotic PVD with intermittent claudication [I70.219] 05/25/2020 Yes    Centrilobular emphysema [J43.2] 06/01/2022 Yes    Essential hypertension [I10] 08/17/2021 Yes    Impaired functional mobility and activity tolerance [Z74.09] 07/22/2021 Yes    COPD, moderate [J44.9] 03/01/2021 Yes    PVD (peripheral vascular disease) [I73.9] 07/28/2020 Yes    Coronary artery disease of native artery of native heart with stable angina pectoris [I25.118] 06/11/2020 Yes      Problems Resolved During this Admission:      Discharged Condition: stable    Disposition: Home or Self Care    Follow Up:   Contact information for follow-up providers       Park Gomez MD Follow up.    Specialty: Family Medicine  Contact information:  [address] [place] [id number]  [phone]                       Contact information for after-discharge care       Durable Medical Equipment       [facility] .    Service: Durable Medical Equipment  Contact information:  [address] [place] [address] [id number] [phone]                                   Patient Instructions:      WALKER FOR HOME USE     Order Specific Question Answer Comments   Type of Walker: Adult (5'4"-6'6")    With wheels? Yes    Height: 5' 5" (1.651 m)    Weight: 80.8 kg (178 lb 2.1 oz)    Length of need (1-99 months): 99    Does patient have medical equipment at home? none    Please check all that apply: Patient's condition impairs ambulation.    Please check all that apply: Patient is unable to safely ambulate without equipment.      Ambulatory referral/consult to Pulmonology   Standing Status: Future   Referral Priority: Routine Referral Type: Consultation   Referral Reason: " Specialty Services Required   Requested Specialty: Pulmonary Disease   Number of Visits Requested: 1     Ambulatory referral/consult to Smoking Cessation Program   Standing Status: Future   Referral Priority: Routine Referral Type: Consultation   Referral Reason: Specialty Services Required   Requested Specialty: CTTS   Number of Visits Requested: 1     Reason for not Prescribing Nicotine Replacement     Order Specific Question Answer Comments   Reason for not Prescribing: Patient refused      Medications:  Reconciled Home Medications:      Medication List        START taking these medications      oxyCODONE-acetaminophen 5-325 mg per tablet  Commonly known as: PERCOCET  Take 1 tablet by mouth every 8 (eight) hours as needed for Pain.            CHANGE how you take these medications      atorvastatin 80 MG tablet  Commonly known as: LIPITOR  Take 1 tablet (80 mg total) by mouth once daily.  What changed: when to take this     metoprolol succinate 25 MG 24 hr tablet  Commonly known as: TOPROL-XL  Take 0.5 tablets (12.5 mg total) by mouth once daily.  What changed: when to take this     pantoprazole 40 MG tablet  Commonly known as: PROTONIX  Take 1 tablet (40 mg total) by mouth once daily.  What changed: when to take this            CONTINUE taking these medications      aspirin 81 MG EC tablet  Commonly known as: ECOTRIN  Take 1 tablet (81 mg total) by mouth once daily.     clopidogreL 75 mg tablet  Commonly known as: PLAVIX  Take 1 tablet (75 mg total) by mouth once daily.     isosorbide mononitrate 60 MG 24 hr tablet  Commonly known as: IMDUR  TAKE 1 TABLET(60 MG) BY MOUTH EVERY DAY     levothyroxine 88 MCG tablet  Commonly known as: SYNTHROID  1 tab po qd  before breakfast     LINZESS 145 mcg Cap capsule  Generic drug: linaCLOtide  TAKE 1 CAPSULE(145 MCG) BY MOUTH BEFORE BREAKFAST              Uzair Tompkins PA-C   Vascular Surgery  '[place] - Tele[facility])

## 2024-03-29 ENCOUNTER — NURSE TRIAGE (OUTPATIENT)
Dept: ADMINISTRATIVE | Facility: CLINIC | Age: 64
End: 2024-03-29
Payer: MEDICARE

## 2024-03-30 NOTE — TELEPHONE ENCOUNTER
Boubacar returned call and report given of the call I received and Boubacar states he will call to patient.

## 2024-03-30 NOTE — TELEPHONE ENCOUNTER
Leg bypass surgery done on 3/19. Patient is calling with c/o a severe pain just above the incision and the feeling of a one inch knot. The pain is now at a 6/10. No redness,swelling or draining of any sort at the incision or and numbness,redness or pain down the operative leg.  I have called to [facility] service an left a message for oncall to call back.  Reason for Disposition   [1] Caller has URGENT question AND [2] triager unable to answer question    Additional Information   Negative: [1] Major abdominal surgical incision AND [2] wound gaping open AND [3] visible internal organs   Negative: Sounds like a life-threatening emergency to the triager   Negative: [1] Bleeding from incision AND [2] won't stop after 10 minutes of direct pressure   Negative: [1] Widespread rash AND [2] bright red, sunburn-like   Negative: Severe pain in the incision   Negative: [1] Suture came out early AND [2] wound gaping AND [3] < 48 hours since sutures placed   Negative: [1] Incision gaping open AND [2] length of opening > 2 inches (5 cm)   Negative: Patient sounds very sick or weak to the triager   Negative: Sounds like a serious complication to the triager   Negative: Fever > 100.5 F (38.1 C)   Negative: [1] Incision looks infected (spreading redness, pain) AND [2] fever > 99.5 F (37.5 C)   Negative: [1] Incision looks infected (spreading redness, pain) AND [2] large red area (> 2 in. or 5 cm)   Negative: [1] Incision looks infected (spreading redness, pain) AND [2] face wound   Negative: [1] Red streak runs from the incision AND [2] longer than 1 inch (2.5 cm)   Negative: [1] Pus or bad-smelling fluid draining from incision AND [2] no fever   Negative: [1] Post-op pain AND [2] not controlled with pain medications   Negative: Dressing soaked with blood or body fluid (e.g., drainage)    Protocols used: Post-Op Incision Symptoms-A-AH

## 2024-04-03 ENCOUNTER — PATIENT MESSAGE (OUTPATIENT)
Dept: PULMONOLOGY | Facility: CLINIC | Age: 64
End: 2024-04-03
Payer: MEDICARE

## 2024-04-09 ENCOUNTER — LAB VISIT (OUTPATIENT)
Dept: LAB | Facility: HOSPITAL | Age: 64
End: 2024-04-09
Payer: MEDICARE

## 2024-04-09 DIAGNOSIS — I70.219 ATHEROSCLEROTIC PVD WITH INTERMITTENT CLAUDICATION: ICD-10-CM

## 2024-04-09 LAB
BASOPHILS # BLD AUTO: 0.08 K/UL (ref 0–0.2)
BASOPHILS NFR BLD: 1.1 % (ref 0–1.9)
DIFFERENTIAL METHOD BLD: ABNORMAL
EOSINOPHIL # BLD AUTO: 0.1 K/UL (ref 0–0.5)
EOSINOPHIL NFR BLD: 1.7 % (ref 0–8)
ERYTHROCYTE [DISTWIDTH] IN BLOOD BY AUTOMATED COUNT: 14.8 % (ref 11.5–14.5)
HCT VFR BLD AUTO: 28.8 % (ref 37–48.5)
HGB BLD-MCNC: 8.7 G/DL (ref 12–16)
IMM GRANULOCYTES # BLD AUTO: 0.02 K/UL (ref 0–0.04)
IMM GRANULOCYTES NFR BLD AUTO: 0.3 % (ref 0–0.5)
LYMPHOCYTES # BLD AUTO: 2.2 K/UL (ref 1–4.8)
LYMPHOCYTES NFR BLD: 30.4 % (ref 18–48)
MCH RBC QN AUTO: 27.4 PG (ref 27–31)
MCHC RBC AUTO-ENTMCNC: 30.2 G/DL (ref 32–36)
MCV RBC AUTO: 91 FL (ref 82–98)
MONOCYTES # BLD AUTO: 0.5 K/UL (ref 0.3–1)
MONOCYTES NFR BLD: 6.8 % (ref 4–15)
NEUTROPHILS # BLD AUTO: 4.3 K/UL (ref 1.8–7.7)
NEUTROPHILS NFR BLD: 59.7 % (ref 38–73)
NRBC BLD-RTO: 0 /100 WBC
PLATELET # BLD AUTO: 467 K/UL (ref 150–450)
PMV BLD AUTO: 9.5 FL (ref 9.2–12.9)
RBC # BLD AUTO: 3.18 M/UL (ref 4–5.4)
WBC # BLD AUTO: 7.16 K/UL (ref 3.9–12.7)

## 2024-04-09 PROCEDURE — 36415 COLL VENOUS BLD VENIPUNCTURE: CPT | Mod: PO

## 2024-04-09 PROCEDURE — 85025 COMPLETE CBC W/AUTO DIFF WBC: CPT

## 2024-04-15 ENCOUNTER — OFFICE VISIT (OUTPATIENT)
Dept: VASCULAR SURGERY | Facility: CLINIC | Age: 64
End: 2024-04-15
Payer: MEDICARE

## 2024-04-15 ENCOUNTER — HOSPITAL ENCOUNTER (OUTPATIENT)
Dept: VASCULAR SURGERY | Facility: HOSPITAL | Age: 64
Discharge: HOME | End: 2024-04-15
Payer: MEDICARE

## 2024-04-15 DIAGNOSIS — I70.219 ATHEROSCLEROTIC PVD WITH INTERMITTENT CLAUDICATION: ICD-10-CM

## 2024-04-15 DIAGNOSIS — J44.9 COPD, MODERATE: Primary | ICD-10-CM

## 2024-04-15 DIAGNOSIS — I73.9 PVD (PERIPHERAL VASCULAR DISEASE): ICD-10-CM

## 2024-04-15 PROCEDURE — 93926 LOWER EXTREMITY STUDY: CPT | Mod: 26,LT,, | Performed by: STUDENT IN AN ORGANIZED HEALTH CARE EDUCATION/TRAINING PROGRAM

## 2024-04-15 PROCEDURE — 99999 PR PBB SHADOW E&M-EST. PATIENT-LVL III: CPT | Mod: PBBFAC,,, | Performed by: STUDENT IN AN ORGANIZED HEALTH CARE EDUCATION/TRAINING PROGRAM

## 2024-04-15 PROCEDURE — 99024 POSTOP FOLLOW-UP VISIT: CPT | Mod: S$GLB,,, | Performed by: STUDENT IN AN ORGANIZED HEALTH CARE EDUCATION/TRAINING PROGRAM

## 2024-04-15 PROCEDURE — 1159F MED LIST DOCD IN RCRD: CPT | Mod: CPTII,S$GLB,, | Performed by: STUDENT IN AN ORGANIZED HEALTH CARE EDUCATION/TRAINING PROGRAM

## 2024-04-15 PROCEDURE — 93926 LOWER EXTREMITY STUDY: CPT | Mod: LT

## 2024-04-15 PROCEDURE — 93922 UPR/L XTREMITY ART 2 LEVELS: CPT

## 2024-04-15 PROCEDURE — 1160F RVW MEDS BY RX/DR IN RCRD: CPT | Mod: CPTII,S$GLB,, | Performed by: STUDENT IN AN ORGANIZED HEALTH CARE EDUCATION/TRAINING PROGRAM

## 2024-04-15 PROCEDURE — 93922 UPR/L XTREMITY ART 2 LEVELS: CPT | Mod: 26,,, | Performed by: STUDENT IN AN ORGANIZED HEALTH CARE EDUCATION/TRAINING PROGRAM

## 2024-04-15 RX ORDER — GABAPENTIN 300 MG/1
300 CAPSULE ORAL NIGHTLY
Qty: 30 CAPSULE | Refills: 4 | Status: SHIPPED | OUTPATIENT
Start: 2024-04-15 | End: 2025-04-15

## 2024-04-15 NOTE — PROGRESS NOTES
Frederick Perera    OFFICE NOTE    DATE OF VISIT: 4/15/2024  PATIENT NAME: Iza Esquivel  : 1960  MRN: [id number]  PRIMARY CARE PHYSICIAN: Park Gomez MD  CARDIOLOGIST:   REFERRING PROVIDER: No ref. provider found    CHIEF COMPLAINT   Chief Complaint   Patient presents with    post op     Post op fem endart.       HISTORY OF PRESENT ILLNESS:  Iza Esquivel is a 63 y.o. female who presents to clinic today post op after her left gsv evh, left iliofemoral endart with patch and then a left cfa to above knee popliteal bypass using gsv graft. She left the hospital without any major issues. Since discharge has developed moderate swelling of the left leg. She has noticed some swelling and scarring in the left groin. She notices some numbness burning tingling particularly at night after she sleeps    ALLERGIES:  Review of patient's allergies indicates:  No Known Allergies    PAST MEDICAL HISTORY:  Past Medical History:   Diagnosis Date    Atherosclerotic PVD with intermittent claudication 2020    Cervical cancer     Hyperlipidemia     Hypertension     Right carotid bruit     S/P PTCA (percutaneous transluminal coronary angioplasty) 2020    STEMI: stenting of LCx       PAST SURGICAL HISTORY:  Past Surgical History:   Procedure Laterality Date    ABDOMINAL AORTOGRAPHY N/A 2020    Procedure: Aortogram, Abdominal;  Surgeon: Shelly Jarvis MD;  Location: [facility] CATH LAB;  Service: Cardiology;  Laterality: N/A;    ANGIOGRAM, LOWER ARTERIAL, BILATERAL  2024    Procedure: Angiogram, Lower Arterial, Bilateral;  Surgeon: Shelly Jarvis MD;  Location: [facility] CATH LAB;  Service: Cardiology;;    AORTOGRAPHY WITH SERIALOGRAPHY N/A 2020    Procedure: AORTOGRAM, WITH RUNOFF;  Surgeon: Shelly Jarvis MD;  Location: [facility] CATH LAB;  Service: Cardiology;  Laterality: N/A;    AORTOGRAPHY WITH SERIALOGRAPHY N/A 2024    Procedure: AORTOGRAM, WITH SERIALOGRAPHY;  Surgeon: Shelly Jarvis  "MD;  Location: [facility] CATH LAB;  Service: Cardiology;  Laterality: N/A;    CREATION OF FEMOROPOPLITEAL ARTERIAL BYPASS USING GRAFT Left 3/19/2024    Procedure: CREATION, BYPASS, ARTERIAL, FEMORAL TO POPLITEAL, USING GRAFT;  Surgeon: Frederick Perera MD;  Location: [facility] OR;  Service: Peripheral Vascular;  Laterality: Left;    ENDARTERECTOMY OF FEMORAL ARTERY Left 3/19/2024    Procedure: ENDARTERECTOMY, FEMORAL;  Surgeon: Frederick Perera MD;  Location: [facility];  Service: Peripheral Vascular;  Laterality: Left;  iliofemoral    ENDOSCOPIC HARVEST OF VEIN Left 3/19/2024    Procedure: SURGICAL PROCUREMENT, VEIN, ENDOSCOPIC;  Surgeon: Frederick Perera MD;  Location: [facility];  Service: Peripheral Vascular;  Laterality: Left;    LEFT HEART CATHETERIZATION Left 2020    Procedure: CATHETERIZATION, HEART, LEFT;  Surgeon: Shelly Jarvis MD;  Location: [facility] CATH LAB;  Service: Cardiology;  Laterality: Left;    LEFT HEART CATHETERIZATION Left 2020    Procedure: CATHETERIZATION, HEART, LEFT;  Surgeon: Shelly Jarvis MD;  Location: [facility] CATH LAB;  Service: Cardiology;  Laterality: Left;    LYMPH NODE BIOPSY      PERCUTANEOUS TRANSLUMINAL BALLOON ANGIOPLASTY OF CORONARY ARTERY  2020    Procedure: Angioplasty-coronary;  Surgeon: Shelly Jarvis MD;  Location: [facility] CATH LAB;  Service: Cardiology;;    REPAIR OF BLOOD VESSEL WITH PATCH GRAFT Left 3/19/2024    Procedure: ANGIOPLASTY, USING PATCH GRAFT;  Surgeon: Frederick Perera MD;  Location: [facility];  Service: Peripheral Vascular;  Laterality: Left;       SOCIAL HISTORY:   Social History     Tobacco Use    Smoking status: Every Day     Current packs/day: 0.00     Average packs/day: 1.5 packs/day for 52.1 years (78.2 ttl pk-yrs)     Types: Cigarettes     Start date:      Last attempt to quit: 2022     Years since quittin.1    Smokeless tobacco: Never    Tobacco comments:     On 3/21/22 patient stated she quit "about a month ago"   Substance Use Topics    " Alcohol use: No    Drug use: Never       FAMILY HISTORY:  Family History   Problem Relation Name Age of Onset    Cancer Mother          Thyroid    Thyroid cancer Mother      Cancer Father          ?    Transient ischemic attack Father          Carotid artery stenosis, CEA    Diabetes Brother      Hypertension Brother      Cancer Paternal Aunt          Breast ca    Breast cancer Paternal Aunt         REVIEW OF SYSTEMS:  ROS  10 pt ros otherwise negative    PHYSICAL EXAM:  There were no vitals filed for this visit.   Physical Exam      Gen nad alert oriented  Cv rrr  Lung ctab  Abd soft nt nd  Left groin incision healing appropriately expected degree of tissue swelling  Left medial thigh incision healed very well  Mild pitting edema entire left leg  VASCULAR LAB STUDIES:  GONZÁLEZ 1.07 on left with toe pressure 80, 0.77 on right with toe pressure 37    Distal EIA 255cm/s  In flow 301cm/s  Prox anast 267cm/s  Distal anast 132cm/s, out flow is 97cm/s      ASSESSMENT AND PLAN:  COPD, moderate  Continue to smoke but working on quitting. She did quit for 2 weeks after the surgery.    PVD (peripheral vascular disease)  S/p Left iliofemoral endarterectomy with patch angioplasty and Left common femoral to above knee popliteal bypass using left gsv, doing well overall her GONZÁLEZ have improved on the left, right side is still 0.77 she does have some limitations from claudication but not severe on the right leg. Her claudication symptoms have improved on left side. We can watch these symptoms for now in the right leg      Iza was seen today for post op.    Diagnoses and all orders for this visit:    COPD, moderate    PVD (peripheral vascular disease)    Other orders  -     gabapentin (NEURONTIN) 300 MG capsule; Take 1 capsule (300 mg total) by mouth every evening.        No follow-ups on file.        Frederick Perera  [facility]  Vascular Surgery  [phone] (Clinic Number)

## 2024-04-15 NOTE — ASSESSMENT & PLAN NOTE
S/p Left iliofemoral endarterectomy with patch angioplasty and Left common femoral to above knee popliteal bypass using left gsv, doing well overall her GONZÁLEZ have improved on the left, right side is still 0.77 she does have some limitations from claudication but not severe on the right leg. Her claudication symptoms have improved on left side. We can watch these symptoms for now in the right leg

## 2024-04-16 LAB
LEFT ABI: 1.07
LEFT ARM BP: 127 MMHG
LEFT CFA PSV: 301 CM/S
LEFT DORSALIS PEDIS: 144 MMHG
LEFT EXTERNAL ILIAC PSV: 255 CM/S
LEFT POPLITEAL PSV: 97 CM/S
LEFT POSTERIOR TIBIAL: 142 MMHG
LEFT TBI: 0.6
LEFT TOE PRESSURE: 80 MMHG
OHS CV LEFT LOWER EXTREMITY ABI (NO CALC): 1.07
OHS CV LOWER EXTREMITY GRAFT MEASUREMENTS - LEFT - G1 - D: 83
OHS CV LOWER EXTREMITY GRAFT MEASUREMENTS - LEFT - G1 - DA: 132
OHS CV LOWER EXTREMITY GRAFT MEASUREMENTS - LEFT - G1 - M: 140
OHS CV LOWER EXTREMITY GRAFT MEASUREMENTS - LEFT - G1 - P: 128
OHS CV LOWER EXTREMITY GRAFT MEASUREMENTS - LEFT - G1 - PA: 267
RIGHT ABI: 0.7
RIGHT ARM BP: 134 MMHG
RIGHT DORSALIS PEDIS: 88 MMHG
RIGHT POSTERIOR TIBIAL: 94 MMHG
RIGHT TBI: 0.28
RIGHT TOE PRESSURE: 37 MMHG

## 2024-04-28 ENCOUNTER — PATIENT MESSAGE (OUTPATIENT)
Dept: VASCULAR SURGERY | Facility: CLINIC | Age: 64
End: 2024-04-28
Payer: MEDICARE

## 2024-05-07 ENCOUNTER — PATIENT MESSAGE (OUTPATIENT)
Dept: VASCULAR SURGERY | Facility: CLINIC | Age: 64
End: 2024-05-07
Payer: MEDICARE

## 2024-05-13 ENCOUNTER — HOSPITAL ENCOUNTER (OUTPATIENT)
Dept: VASCULAR SURGERY | Facility: HOSPITAL | Age: 64
Discharge: HOME | End: 2024-05-13
Attending: SURGERY
Payer: MEDICARE

## 2024-05-13 ENCOUNTER — OFFICE VISIT (OUTPATIENT)
Dept: VASCULAR SURGERY | Facility: CLINIC | Age: 64
End: 2024-05-13
Payer: MEDICARE

## 2024-05-13 DIAGNOSIS — M79.89 LEG SWELLING: ICD-10-CM

## 2024-05-13 DIAGNOSIS — J44.9 CHRONIC OBSTRUCTIVE PULMONARY DISEASE, UNSPECIFIED COPD TYPE: ICD-10-CM

## 2024-05-13 DIAGNOSIS — R60.1 GENERALIZED EDEMA: ICD-10-CM

## 2024-05-13 DIAGNOSIS — M79.89 LEG SWELLING: Primary | ICD-10-CM

## 2024-05-13 DIAGNOSIS — I73.9 PAD (PERIPHERAL ARTERY DISEASE): Primary | ICD-10-CM

## 2024-05-13 DIAGNOSIS — I10 ESSENTIAL HYPERTENSION: ICD-10-CM

## 2024-05-13 DIAGNOSIS — E78.2 MIXED HYPERLIPIDEMIA: ICD-10-CM

## 2024-05-13 PROCEDURE — 99024 POSTOP FOLLOW-UP VISIT: CPT | Mod: S$GLB,,, | Performed by: SURGERY

## 2024-05-13 PROCEDURE — 99999 PR PBB SHADOW E&M-EST. PATIENT-LVL II: CPT | Mod: PBBFAC,,, | Performed by: SURGERY

## 2024-05-13 PROCEDURE — 93970 EXTREMITY STUDY: CPT

## 2024-05-13 PROCEDURE — 93970 EXTREMITY STUDY: CPT | Mod: 26,,, | Performed by: SURGERY

## 2024-05-13 PROCEDURE — 1159F MED LIST DOCD IN RCRD: CPT | Mod: CPTII,S$GLB,, | Performed by: SURGERY

## 2024-05-13 NOTE — PROGRESS NOTES
Shahbaz Casey    OFFICE NOTE    DATE OF VISIT: 2024  PATIENT NAME: Iza Esquivel  : 1960  MRN: [id number]  PRIMARY CARE PHYSICIAN: Park Gomez MD    CHIEF COMPLAINT   Chief Complaint   Patient presents with    Deep Vein Thrombosis    Leg Swelling     Patient c/o left leg swelling.        HISTORY OF PRESENT ILLNESS:  Iza Esquivel is a 63 y.o. female who presents to clinic today for evaluation of left leg swelling.  She underwent left iliofemoral endarterectomy and patch angioplasty with left femoral to above knee popliteal artery bypass with ipsilateral GSV.  She is walking much better since surgery.  She is concerned about some swelling around left ankle and dorsum of foot for last couple of weeks.  She reports she is ambulating and elevating her leg with sedentary.   around pre-tibial area of left leg.  Otherwise, doing well.  She had a venous duplex prior to clinic.    ALLERGIES:  Review of patient's allergies indicates:  No Known Allergies    PAST MEDICAL HISTORY:  Past Medical History:   Diagnosis Date    Atherosclerotic PVD with intermittent claudication 2020    Cervical cancer     Hyperlipidemia     Hypertension     Right carotid bruit     S/P PTCA (percutaneous transluminal coronary angioplasty) 2020    STEMI: stenting of LCx       PAST SURGICAL HISTORY:  Past Surgical History:   Procedure Laterality Date    ABDOMINAL AORTOGRAPHY N/A 2020    Procedure: Aortogram, Abdominal;  Surgeon: Shelly Jarvis MD;  Location: [facility] CATH LAB;  Service: Cardiology;  Laterality: N/A;    ANGIOGRAM, LOWER ARTERIAL, BILATERAL  2024    Procedure: Angiogram, Lower Arterial, Bilateral;  Surgeon: Shelly Jarvis MD;  Location: [facility] CATH LAB;  Service: Cardiology;;    AORTOGRAPHY WITH SERIALOGRAPHY N/A 2020    Procedure: AORTOGRAM, WITH RUNOFF;  Surgeon: Shelly Jarvis MD;  Location: [facility] CATH LAB;  Service: Cardiology;  Laterality: N/A;    AORTOGRAPHY WITH  SERIALOGRAPHY N/A 2024    Procedure: AORTOGRAM, WITH SERIALOGRAPHY;  Surgeon: Shelly Jarvis MD;  Location: [facility] CATH LAB;  Service: Cardiology;  Laterality: N/A;    CREATION OF FEMOROPOPLITEAL ARTERIAL BYPASS USING GRAFT Left 3/19/2024    Procedure: CREATION, BYPASS, ARTERIAL, FEMORAL TO POPLITEAL, USING GRAFT;  Surgeon: Frederick Perera MD;  Location: [facility] OR;  Service: Peripheral Vascular;  Laterality: Left;    ENDARTERECTOMY OF FEMORAL ARTERY Left 3/19/2024    Procedure: ENDARTERECTOMY, FEMORAL;  Surgeon: Frederick Perera MD;  Location: [facility] OR;  Service: Peripheral Vascular;  Laterality: Left;  iliofemoral    ENDOSCOPIC HARVEST OF VEIN Left 3/19/2024    Procedure: SURGICAL PROCUREMENT, VEIN, ENDOSCOPIC;  Surgeon: Frederick Perera MD;  Location: [facility];  Service: Peripheral Vascular;  Laterality: Left;    LEFT HEART CATHETERIZATION Left 2020    Procedure: CATHETERIZATION, HEART, LEFT;  Surgeon: Shelly Jarvis MD;  Location: [facility] CATH LAB;  Service: Cardiology;  Laterality: Left;    LEFT HEART CATHETERIZATION Left 2020    Procedure: CATHETERIZATION, HEART, LEFT;  Surgeon: Shelly Jarvis MD;  Location: [facility] CATH LAB;  Service: Cardiology;  Laterality: Left;    LYMPH NODE BIOPSY      PERCUTANEOUS TRANSLUMINAL BALLOON ANGIOPLASTY OF CORONARY ARTERY  2020    Procedure: Angioplasty-coronary;  Surgeon: Shelly Jarvis MD;  Location: [facility] CATH LAB;  Service: Cardiology;;    REPAIR OF BLOOD VESSEL WITH PATCH GRAFT Left 3/19/2024    Procedure: ANGIOPLASTY, USING PATCH GRAFT;  Surgeon: Frederick Perera MD;  Location: [facility];  Service: Peripheral Vascular;  Laterality: Left;       SOCIAL HISTORY:   Social History     Tobacco Use    Smoking status: Every Day     Current packs/day: 0.00     Average packs/day: 1.5 packs/day for 52.1 years (78.2 ttl pk-yrs)     Types: Cigarettes     Start date:      Last attempt to quit: 2022     Years since quittin.2    Smokeless tobacco: Never    Tobacco  "comments:     On 3/21/22 patient stated she quit "about a month ago"   Substance Use Topics    Alcohol use: No    Drug use: Never       FAMILY HISTORY:  Family History   Problem Relation Name Age of Onset    Cancer Mother          Thyroid    Thyroid cancer Mother      Cancer Father          ?    Transient ischemic attack Father          Carotid artery stenosis, CEA    Diabetes Brother      Hypertension Brother      Cancer Paternal Aunt          Breast ca    Breast cancer Paternal Aunt         REVIEW OF SYSTEMS:  Review of Systems   Constitutional: Negative.   HENT: Negative.     Eyes: Negative.    Cardiovascular:  Positive for leg swelling.        Mild swelling around left ankle   Respiratory: Negative.     Endocrine: Negative.    Hematologic/Lymphatic: Negative.    Skin: Negative.    Musculoskeletal: Negative.    Gastrointestinal: Negative.    Genitourinary: Negative.    Neurological:         Numbness/tingling left medial lower leg and pretibial area   Psychiatric/Behavioral: Negative.     Allergic/Immunologic: Negative.        PHYSICAL EXAM:  There were no vitals filed for this visit.   Physical Exam  Vitals reviewed.   Constitutional:       Appearance: Normal appearance. She is normal weight.   HENT:      Head: Normocephalic and atraumatic.      Nose: Nose normal.      Mouth/Throat:      Mouth: Mucous membranes are moist.   Eyes:      Pupils: Pupils are equal, round, and reactive to light.   Cardiovascular:      Rate and Rhythm: Normal rate and regular rhythm.      Comments: Both feet warm and perfused  Abdominal:      General: Bowel sounds are normal.      Palpations: Abdomen is soft.   Musculoskeletal:         General: Swelling present. Normal range of motion.      Cervical back: Normal range of motion.   Skin:     General: Skin is warm.      Capillary Refill: Capillary refill takes less than 2 seconds.   Neurological:      General: No focal deficit present.      Mental Status: She is alert and oriented to " person, place, and time. Mental status is at baseline.   Psychiatric:         Mood and Affect: Mood normal.         Behavior: Behavior normal.         Thought Content: Thought content normal.          VASCULAR LAB STUDIES:    Venous duplex negative for left leg DVT.    ASSESSMENT AND PLAN:  64 y/o female with left leg swelling specifically around left ankle and dorsum of foot.  Recently underwent left leg bypass.  I reassured the patient I believe this will improve with time.  Same with her pain, I believe this will improve as well.  I encouraged patient to ambulate as much as possible and elevate extremity with sedentary.    COPD: stable on home regimen    Hypertension: stable on home regimen    Hyperlipidemia: stable on home regimen    Shahbaz Casey  [facility]  Vascular Surgery  [phone] (Clinic Number)

## 2024-06-14 ENCOUNTER — OFFICE VISIT (OUTPATIENT)
Dept: CARDIOLOGY | Facility: CLINIC | Age: 64
End: 2024-06-14
Payer: MEDICARE

## 2024-06-14 VITALS
HEART RATE: 77 BPM | DIASTOLIC BLOOD PRESSURE: 68 MMHG | BODY MASS INDEX: 27.68 KG/M2 | WEIGHT: 166.13 LBS | SYSTOLIC BLOOD PRESSURE: 136 MMHG | HEIGHT: 65 IN | OXYGEN SATURATION: 98 %

## 2024-06-14 DIAGNOSIS — I70.0 ATHEROSCLEROSIS OF AORTA: ICD-10-CM

## 2024-06-14 DIAGNOSIS — I25.118 CORONARY ARTERY DISEASE OF NATIVE ARTERY OF NATIVE HEART WITH STABLE ANGINA PECTORIS: ICD-10-CM

## 2024-06-14 DIAGNOSIS — I70.219 ATHEROSCLEROTIC PVD WITH INTERMITTENT CLAUDICATION: ICD-10-CM

## 2024-06-14 DIAGNOSIS — R06.83 SNORING: Primary | ICD-10-CM

## 2024-06-14 DIAGNOSIS — I10 ESSENTIAL HYPERTENSION: ICD-10-CM

## 2024-06-14 DIAGNOSIS — E78.2 MIXED HYPERLIPIDEMIA: Chronic | ICD-10-CM

## 2024-06-14 DIAGNOSIS — R09.89 BRUIT OF RIGHT CAROTID ARTERY: ICD-10-CM

## 2024-06-14 PROCEDURE — 99999 PR PBB SHADOW E&M-EST. PATIENT-LVL IV: CPT | Mod: PBBFAC,,,

## 2024-06-14 PROCEDURE — 1125F AMNT PAIN NOTED PAIN PRSNT: CPT | Mod: CPTII,S$GLB,,

## 2024-06-14 PROCEDURE — 3288F FALL RISK ASSESSMENT DOCD: CPT | Mod: CPTII,S$GLB,,

## 2024-06-14 PROCEDURE — 3008F BODY MASS INDEX DOCD: CPT | Mod: CPTII,S$GLB,,

## 2024-06-14 PROCEDURE — 3078F DIAST BP <80 MM HG: CPT | Mod: CPTII,S$GLB,,

## 2024-06-14 PROCEDURE — 1101F PT FALLS ASSESS-DOCD LE1/YR: CPT | Mod: CPTII,S$GLB,,

## 2024-06-14 PROCEDURE — 1159F MED LIST DOCD IN RCRD: CPT | Mod: CPTII,S$GLB,,

## 2024-06-14 PROCEDURE — 3075F SYST BP GE 130 - 139MM HG: CPT | Mod: CPTII,S$GLB,,

## 2024-06-14 PROCEDURE — 99214 OFFICE O/P EST MOD 30 MIN: CPT | Mod: S$GLB,,,

## 2024-06-14 PROCEDURE — 1160F RVW MEDS BY RX/DR IN RCRD: CPT | Mod: CPTII,S$GLB,,

## 2024-06-14 RX ORDER — ALBUTEROL SULFATE 90 UG/1
2 AEROSOL, METERED RESPIRATORY (INHALATION) EVERY 6 HOURS PRN
COMMUNITY
Start: 2024-04-04

## 2024-06-14 RX ORDER — TIOTROPIUM BROMIDE AND OLODATEROL 3.124; 2.736 UG/1; UG/1
2 SPRAY, METERED RESPIRATORY (INHALATION)
COMMUNITY
Start: 2024-04-04 | End: 2024-10-01

## 2024-06-14 NOTE — PROGRESS NOTES
Subjective:   Patient ID:  Iza Esquivel is a 63 y.o. female who presents for evaluation of No chief complaint on file.      HPI 62y/o F with PMHx of PVD, hlp, HTN, tobacco abuse, CAD followed by Dr. Jarvis in cards clinic/vascular found to have worsening claudication in left side. Here today for HFU s/p peripheral angio Left cfa 99% stenosis involving profunda with occluded sfa bilaterally   3 vessel run off bilateral.   Patent bilateral iliac stents.   50% rt cfa   Vascular sx was consulted for cfa endarterectomy and fem pop bypass.  Has some atypical chest pains at rest and with activity also has SOB with eval with nuc stress and Echo  Poor historian unsure which medications she is taking, per PCP pura she is taking ToprolXL, is not taking losartan due to hypotension. BP stable in clinic today    Does not exercise  Smokes 1 pack over 3 days  No ETOH  FH HTN  Lipids controlled    6/14/24  Here today for CV follow up. She S/p Left iliofemoral endarterectomy with patch angioplasty and Left common femoral to above knee popliteal bypass using left gsv  with Dr. Perera. Here today denies any worsening SOB, c/o LLE pain (vasc following as well)  Denies any CP, palpitations or dizziness. BP stable in clinic, compliant with medications. No bleeding sxs on blood thinners    Nuc stress 3/1/24 neg for ischemia  Echo nml heart function  Past Medical History:   Diagnosis Date    Atherosclerotic PVD with intermittent claudication 5/25/2020    Cervical cancer     Hyperlipidemia     Hypertension     Right carotid bruit     S/P PTCA (percutaneous transluminal coronary angioplasty) 05/25/2020    STEMI: stenting of LCx       Past Surgical History:   Procedure Laterality Date    ABDOMINAL AORTOGRAPHY N/A 5/25/2020    Procedure: Aortogram, Abdominal;  Surgeon: Shelly Jarvis MD;  Location: [facility] CATH LAB;  Service: Cardiology;  Laterality: N/A;    ANGIOGRAM, LOWER ARTERIAL, BILATERAL  2/12/2024    Procedure: Angiogram, Lower  Arterial, Bilateral;  Surgeon: Shelly Jarvis MD;  Location: [facility] CATH LAB;  Service: Cardiology;;    AORTOGRAPHY WITH SERIALOGRAPHY N/A 6/30/2020    Procedure: AORTOGRAM, WITH RUNOFF;  Surgeon: Shelly Jarvis MD;  Location: [facility] CATH LAB;  Service: Cardiology;  Laterality: N/A;    AORTOGRAPHY WITH SERIALOGRAPHY N/A 2/12/2024    Procedure: AORTOGRAM, WITH SERIALOGRAPHY;  Surgeon: Shelly Jarvis MD;  Location: [facility] CATH LAB;  Service: Cardiology;  Laterality: N/A;    CREATION OF FEMOROPOPLITEAL ARTERIAL BYPASS USING GRAFT Left 3/19/2024    Procedure: CREATION, BYPASS, ARTERIAL, FEMORAL TO POPLITEAL, USING GRAFT;  Surgeon: Frederick Perera MD;  Location: [facility];  Service: Peripheral Vascular;  Laterality: Left;    ENDARTERECTOMY OF FEMORAL ARTERY Left 3/19/2024    Procedure: ENDARTERECTOMY, FEMORAL;  Surgeon: Frederick Perera MD;  Location: [facility];  Service: Peripheral Vascular;  Laterality: Left;  iliofemoral    ENDOSCOPIC HARVEST OF VEIN Left 3/19/2024    Procedure: SURGICAL PROCUREMENT, VEIN, ENDOSCOPIC;  Surgeon: Frederick Perera MD;  Location: [facility];  Service: Peripheral Vascular;  Laterality: Left;    LEFT HEART CATHETERIZATION Left 5/25/2020    Procedure: CATHETERIZATION, HEART, LEFT;  Surgeon: Shelly Jarvis MD;  Location: [facility] CATH LAB;  Service: Cardiology;  Laterality: Left;    LEFT HEART CATHETERIZATION Left 6/11/2020    Procedure: CATHETERIZATION, HEART, LEFT;  Surgeon: Shelly Jarvis MD;  Location: [facility] CATH LAB;  Service: Cardiology;  Laterality: Left;    LYMPH NODE BIOPSY      PERCUTANEOUS TRANSLUMINAL BALLOON ANGIOPLASTY OF CORONARY ARTERY  5/25/2020    Procedure: Angioplasty-coronary;  Surgeon: Shelly Jarvis MD;  Location: [facility] CATH LAB;  Service: Cardiology;;    REPAIR OF BLOOD VESSEL WITH PATCH GRAFT Left 3/19/2024    Procedure: ANGIOPLASTY, USING PATCH GRAFT;  Surgeon: Frederick Perera MD;  Location: [facility] OR;  Service: Peripheral Vascular;  Laterality: Left;       Social History  "    Tobacco Use    Smoking status: Every Day     Current packs/day: 0.00     Average packs/day: 1.5 packs/day for 52.1 years (78.2 ttl pk-yrs)     Types: Cigarettes     Start date:      Last attempt to quit: 2022     Years since quittin.3    Smokeless tobacco: Never    Tobacco comments:     On 3/21/22 patient stated she quit "about a month ago"   Substance Use Topics    Alcohol use: No    Drug use: Never       Family History   Problem Relation Name Age of Onset    Cancer Mother          Thyroid    Thyroid cancer Mother      Cancer Father          ?    Transient ischemic attack Father          Carotid artery stenosis, CEA    Diabetes Brother      Hypertension Brother      Cancer Paternal Aunt          Breast ca    Breast cancer Paternal Aunt         Current Outpatient Medications on File Prior to Visit   Medication Sig Dispense Refill    albuterol (PROVENTIL/VENTOLIN HFA) 90 mcg/actuation inhaler Inhale 2 puffs into the lungs every 6 (six) hours as needed.      aspirin (ECOTRIN) 81 MG EC tablet Take 1 tablet (81 mg total) by mouth once daily. 30 tablet 0    atorvastatin (LIPITOR) 80 MG tablet Take 1 tablet (80 mg total) by mouth once daily. (Patient taking differently: Take 80 mg by mouth every evening.) 90 tablet 3    clopidogreL (PLAVIX) 75 mg tablet Take 1 tablet (75 mg total) by mouth once daily. 90 tablet 3    gabapentin (NEURONTIN) 300 MG capsule Take 1 capsule (300 mg total) by mouth every evening. 30 capsule 4    isosorbide mononitrate (IMDUR) 60 MG 24 hr tablet TAKE 1 TABLET(60 MG) BY MOUTH EVERY DAY (Patient taking differently: Take 60 mg by mouth every evening.) 30 tablet 6    levothyroxine (SYNTHROID) 88 MCG tablet 1 tab po qd  before breakfast 30 tablet 1    LINZESS 145 mcg Cap capsule TAKE 1 CAPSULE(145 MCG) BY MOUTH BEFORE BREAKFAST 30 capsule 0    metoprolol succinate (TOPROL-XL) 25 MG 24 hr tablet Take 0.5 tablets (12.5 mg total) by mouth once daily. (Patient taking differently: Take " 12.5 mg by mouth every evening.) 15 tablet 11    pantoprazole (PROTONIX) 40 MG tablet Take 1 tablet (40 mg total) by mouth once daily. (Patient taking differently: Take 40 mg by mouth every evening.) 90 tablet 3    STIOLTO RESPIMAT 2.5-2.5 mcg/actuation Mist Inhale 2 Inhalations into the lungs.       Current Facility-Administered Medications on File Prior to Visit   Medication Dose Route Frequency Provider Last Rate Last Admin    chlorhexidine 0.12 % solution 10 mL  10 mL Mouth/Throat On Call Procedure Rossy Gomez NP        LIDOcaine (PF) 10 mg/ml (1%) injection 10 mg  1 mL Intradermal Once Rossy Gomez NP          Wt Readings from Last 3 Encounters:   06/14/24 75.4 kg (166 lb 1.9 oz)   03/21/24 80.8 kg (178 lb 2.1 oz)   03/01/24 75.3 kg (166 lb)     Temp Readings from Last 3 Encounters:   03/22/24 97.7 °F (36.5 °C) (Oral)   02/12/24 97.9 °F (36.6 °C) (Temporal)   03/23/23 98.8 °F (37.1 °C)     BP Readings from Last 3 Encounters:   06/14/24 136/68   03/22/24 (!) 103/55   03/01/24 125/63     Pulse Readings from Last 3 Encounters:   06/14/24 77   03/22/24 81   03/01/24 81        Review of Systems   Constitutional: Positive for malaise/fatigue.   HENT: Negative.     Eyes: Negative.    Cardiovascular:  Positive for chest pain.   Respiratory:  Positive for shortness of breath and snoring.    Skin: Negative.    Musculoskeletal:  Positive for arthritis, back pain, joint pain, muscle weakness and myalgias.   Gastrointestinal: Negative.    Genitourinary: Negative.    Neurological:  Positive for numbness.   Psychiatric/Behavioral: Negative.         Objective:   Physical Exam  Vitals and nursing note reviewed.   Constitutional:       Appearance: Normal appearance.   HENT:      Head: Normocephalic.   Eyes:      Pupils: Pupils are equal, round, and reactive to light.   Cardiovascular:      Rate and Rhythm: Normal rate and regular rhythm.      Heart sounds: Normal heart sounds, S1 normal and S2 normal. No  murmur heard.     No S3 or S4 sounds.   Pulmonary:      Effort: Pulmonary effort is normal.      Breath sounds: Normal breath sounds.   Abdominal:      General: Bowel sounds are normal.      Palpations: Abdomen is soft.   Musculoskeletal:         General: Tenderness present. Normal range of motion.      Cervical back: Normal range of motion.   Skin:     Capillary Refill: Capillary refill takes less than 2 seconds.      Comments: R groin site C/D/I   Neurological:      General: No focal deficit present.      Mental Status: She is alert and oriented to person, place, and time.      Motor: Weakness present.   Psychiatric:         Mood and Affect: Mood normal.         Behavior: Behavior normal.         Thought Content: Thought content normal.         Lab Results   Component Value Date    CHOL 124 08/31/2023    CHOL 125 08/18/2022    CHOL 131 12/02/2021     Lab Results   Component Value Date    HDL 42 08/31/2023    HDL 42 08/18/2022    HDL 50 12/02/2021     Lab Results   Component Value Date    LDLCALC 47.8 (L) 08/31/2023    LDLCALC 52.2 (L) 08/18/2022    LDLCALC 57.4 (L) 12/02/2021     Lab Results   Component Value Date    TRIG 171 (H) 08/31/2023    TRIG 154 (H) 08/18/2022    TRIG 118 12/02/2021     Lab Results   Component Value Date    CHOLHDL 33.9 08/31/2023    CHOLHDL 33.6 08/18/2022    CHOLHDL 38.2 12/02/2021       Chemistry        Component Value Date/Time     03/21/2024 0454    K 3.8 03/21/2024 0454     03/21/2024 0454    CO2 27 03/21/2024 0454    BUN 8 03/21/2024 0454    CREATININE 0.8 03/21/2024 0454    GLU 89 03/21/2024 0454        Component Value Date/Time    CALCIUM 8.4 (L) 03/21/2024 0454    ALKPHOS 100 08/31/2023 0803    AST 18 08/31/2023 0803    ALT 10 08/31/2023 0803    BILITOT 0.3 08/31/2023 0803    ESTGFRAFRICA >60.0 12/02/2021 0758    EGFRNONAA >60.0 12/02/2021 0758          Lab Results   Component Value Date    TSH 7.153 (H) 03/24/2023     Lab Results   Component Value Date    INR 1.0  03/22/2024    INR 1.0 02/08/2024    INR 1.0 09/12/2020     @RESUFAST(WBC,HGB,HCT,MCV,PLT)  @LABRCNTIP(BNP,BNPTRIAGEBLO)@  CrCl cannot be calculated (Patient's most recent lab result is older than the maximum 7 days allowed.).     Results for orders placed during the hospital encounter of 06/11/20    Echo Color Flow Doppler? Yes    Interpretation Summary  · Concentric left ventricular remodeling.  · Normal left ventricular systolic function. The estimated ejection fraction is 55%.  · Normal LV diastolic function.  · No wall motion abnormalities.  · Normal right ventricular systolic function.  · Normal central venous pressure (3 mmHg).  · The estimated PA systolic pressure is 24 mmHg.     Results for orders placed during the hospital encounter of 09/23/22    Nuclear Stress - Cardiology Interpreted    Interpretation Summary    Normal myocardial perfusion scan. There is no evidence of myocardial ischemia or infarction.    The gated perfusion images showed an ejection fraction of 76% at rest. The gated perfusion images showed an ejection fraction of 83% post stress.    There is normal wall motion at rest and post stress.    The EKG portion of this study is negative for ischemia.     Assessment:      1. Mixed hyperlipidemia    2. Bruit of right carotid artery    3. Atherosclerotic PVD with intermittent claudication    4. Coronary artery disease of native artery of native heart with stable angina pectoris    5. Essential hypertension    6. Atherosclerosis of aorta          Plan:   Mixed hyperlipidemia    Bruit of right carotid artery    Atherosclerotic PVD with intermittent claudication    Coronary artery disease of native artery of native heart with stable angina pectoris    Essential hypertension    Atherosclerosis of aorta      Cont current CV medications  RF modifications  Smoking cessation  Daily exercise as tolerated  Low na low fat diet  Ref for GET       RTC 6 or sooner if needed    Praveena Silveira,  FNP-C [facility], Cardiology

## 2024-07-15 ENCOUNTER — HOSPITAL ENCOUNTER (OUTPATIENT)
Dept: VASCULAR SURGERY | Facility: HOSPITAL | Age: 64
Discharge: HOME | End: 2024-07-15
Attending: STUDENT IN AN ORGANIZED HEALTH CARE EDUCATION/TRAINING PROGRAM
Payer: MEDICARE

## 2024-07-15 ENCOUNTER — OFFICE VISIT (OUTPATIENT)
Dept: VASCULAR SURGERY | Facility: CLINIC | Age: 64
End: 2024-07-15
Payer: MEDICARE

## 2024-07-15 DIAGNOSIS — I10 ESSENTIAL HYPERTENSION: ICD-10-CM

## 2024-07-15 DIAGNOSIS — I73.9 PVD (PERIPHERAL VASCULAR DISEASE): Primary | ICD-10-CM

## 2024-07-15 DIAGNOSIS — E78.2 MIXED HYPERLIPIDEMIA: Chronic | ICD-10-CM

## 2024-07-15 DIAGNOSIS — I73.9 PVD (PERIPHERAL VASCULAR DISEASE): ICD-10-CM

## 2024-07-15 DIAGNOSIS — J44.9 COPD, MODERATE: ICD-10-CM

## 2024-07-15 DIAGNOSIS — I73.9 PAD (PERIPHERAL ARTERY DISEASE): ICD-10-CM

## 2024-07-15 DIAGNOSIS — I67.81 ACUTE CEREBROVASCULAR INSUFFICIENCY: ICD-10-CM

## 2024-07-15 DIAGNOSIS — I25.2 HISTORY OF ST ELEVATION MYOCARDIAL INFARCTION (STEMI): ICD-10-CM

## 2024-07-15 LAB
LEFT ABI: 1.08
LEFT ARM BP: 150 MMHG
LEFT CFA PSV: 308 CM/S
LEFT DORSALIS PEDIS: 163 MMHG
LEFT EXTERNAL ILIAC PSV: 224 CM/S
LEFT POPLITEAL PSV: 111 CM/S
LEFT POSTERIOR TIBIAL: 166 MMHG
LEFT TBI: 0.73
LEFT TOE PRESSURE: 113 MMHG
OHS CV LEFT LOWER EXTREMITY ABI (NO CALC): 1.08
OHS CV LOWER EXTREMITY GRAFT MEASUREMENTS - LEFT - G1 - D: 94
OHS CV LOWER EXTREMITY GRAFT MEASUREMENTS - LEFT - G1 - DA: 148
OHS CV LOWER EXTREMITY GRAFT MEASUREMENTS - LEFT - G1 - M: 100
OHS CV LOWER EXTREMITY GRAFT MEASUREMENTS - LEFT - G1 - P: 233
OHS CV LOWER EXTREMITY GRAFT MEASUREMENTS - LEFT - G1 - PA: 298
RIGHT ABI: 0.8
RIGHT ARM BP: 154 MMHG
RIGHT DORSALIS PEDIS: 110 MMHG
RIGHT POSTERIOR TIBIAL: 123 MMHG
RIGHT TBI: 0.58
RIGHT TOE PRESSURE: 89 MMHG

## 2024-07-15 PROCEDURE — 99999 PR PBB SHADOW E&M-EST. PATIENT-LVL III: CPT | Mod: PBBFAC,,, | Performed by: STUDENT IN AN ORGANIZED HEALTH CARE EDUCATION/TRAINING PROGRAM

## 2024-07-15 PROCEDURE — 1159F MED LIST DOCD IN RCRD: CPT | Mod: CPTII,S$GLB,, | Performed by: STUDENT IN AN ORGANIZED HEALTH CARE EDUCATION/TRAINING PROGRAM

## 2024-07-15 PROCEDURE — 99024 POSTOP FOLLOW-UP VISIT: CPT | Mod: S$GLB,,, | Performed by: STUDENT IN AN ORGANIZED HEALTH CARE EDUCATION/TRAINING PROGRAM

## 2024-07-15 PROCEDURE — 93922 UPR/L XTREMITY ART 2 LEVELS: CPT | Mod: 26,,, | Performed by: STUDENT IN AN ORGANIZED HEALTH CARE EDUCATION/TRAINING PROGRAM

## 2024-07-15 PROCEDURE — 1160F RVW MEDS BY RX/DR IN RCRD: CPT | Mod: CPTII,S$GLB,, | Performed by: STUDENT IN AN ORGANIZED HEALTH CARE EDUCATION/TRAINING PROGRAM

## 2024-07-15 PROCEDURE — 93926 LOWER EXTREMITY STUDY: CPT | Mod: 26,LT,, | Performed by: STUDENT IN AN ORGANIZED HEALTH CARE EDUCATION/TRAINING PROGRAM

## 2024-07-15 PROCEDURE — 93926 LOWER EXTREMITY STUDY: CPT | Mod: LT

## 2024-07-15 PROCEDURE — 93922 UPR/L XTREMITY ART 2 LEVELS: CPT

## 2024-07-15 NOTE — PROGRESS NOTES
Frederick Perera    OFFICE NOTE    DATE OF VISIT: 7/15/2024  PATIENT NAME: Iza Esquivel  : 1960  MRN: [id number]  PRIMARY CARE PHYSICIAN: Park Gomez MD  CARDIOLOGIST:   REFERRING PROVIDER: No ref. provider found    CHIEF COMPLAINT   Chief Complaint   Patient presents with    Peripheral Vascular Disease     Patient in for follow up exam.        HISTORY OF PRESENT ILLNESS:  Iza Esquivel is a 63 y.o. female who presents to clinic today she is s/p left femoral endarterectomy and femoral above knee popliteal bypass with EVH. She has healed well and is now walking without claudication in the left leg. She has intermittent leg swelling which has responded well to compression therapy. She has some mild pain in the right leg but this is not inhibiting her activity like the left leg was.    ALLERGIES:  Review of patient's allergies indicates:  No Known Allergies    PAST MEDICAL HISTORY:  Past Medical History:   Diagnosis Date    Atherosclerotic PVD with intermittent claudication 2020    Cervical cancer     Hyperlipidemia     Hypertension     Right carotid bruit     S/P PTCA (percutaneous transluminal coronary angioplasty) 2020    STEMI: stenting of LCx       PAST SURGICAL HISTORY:  Past Surgical History:   Procedure Laterality Date    ABDOMINAL AORTOGRAPHY N/A 2020    Procedure: Aortogram, Abdominal;  Surgeon: Shelly Jarvis MD;  Location: [facility] CATH LAB;  Service: Cardiology;  Laterality: N/A;    ANGIOGRAM, LOWER ARTERIAL, BILATERAL  2024    Procedure: Angiogram, Lower Arterial, Bilateral;  Surgeon: Shelly Jarvis MD;  Location: [facility] CATH LAB;  Service: Cardiology;;    AORTOGRAPHY WITH SERIALOGRAPHY N/A 2020    Procedure: AORTOGRAM, WITH RUNOFF;  Surgeon: Shelly Jarvis MD;  Location: [facility] CATH LAB;  Service: Cardiology;  Laterality: N/A;    AORTOGRAPHY WITH SERIALOGRAPHY N/A 2024    Procedure: AORTOGRAM, WITH SERIALOGRAPHY;  Surgeon: Shelly Jarvis MD;   "Location: [facility] CATH LAB;  Service: Cardiology;  Laterality: N/A;    CREATION OF FEMOROPOPLITEAL ARTERIAL BYPASS USING GRAFT Left 3/19/2024    Procedure: CREATION, BYPASS, ARTERIAL, FEMORAL TO POPLITEAL, USING GRAFT;  Surgeon: Frederick Perera MD;  Location: [facility] OR;  Service: Peripheral Vascular;  Laterality: Left;    ENDARTERECTOMY OF FEMORAL ARTERY Left 3/19/2024    Procedure: ENDARTERECTOMY, FEMORAL;  Surgeon: Frederick Perera MD;  Location: [facility] OR;  Service: Peripheral Vascular;  Laterality: Left;  iliofemoral    ENDOSCOPIC HARVEST OF VEIN Left 3/19/2024    Procedure: SURGICAL PROCUREMENT, VEIN, ENDOSCOPIC;  Surgeon: Frederick Perera MD;  Location: [facility];  Service: Peripheral Vascular;  Laterality: Left;    LEFT HEART CATHETERIZATION Left 2020    Procedure: CATHETERIZATION, HEART, LEFT;  Surgeon: Shelly Jarvis MD;  Location: [facility] CATH LAB;  Service: Cardiology;  Laterality: Left;    LEFT HEART CATHETERIZATION Left 2020    Procedure: CATHETERIZATION, HEART, LEFT;  Surgeon: Shelly Jarvis MD;  Location: [facility] CATH LAB;  Service: Cardiology;  Laterality: Left;    LYMPH NODE BIOPSY      PERCUTANEOUS TRANSLUMINAL BALLOON ANGIOPLASTY OF CORONARY ARTERY  2020    Procedure: Angioplasty-coronary;  Surgeon: Shelly Jarvis MD;  Location: [facility] CATH LAB;  Service: Cardiology;;    REPAIR OF BLOOD VESSEL WITH PATCH GRAFT Left 3/19/2024    Procedure: ANGIOPLASTY, USING PATCH GRAFT;  Surgeon: Frederick Perera MD;  Location: [facility];  Service: Peripheral Vascular;  Laterality: Left;       SOCIAL HISTORY:   Social History     Tobacco Use    Smoking status: Every Day     Current packs/day: 0.00     Average packs/day: 1.5 packs/day for 52.1 years (78.2 ttl pk-yrs)     Types: Cigarettes     Start date:      Last attempt to quit: 2022     Years since quittin.3    Smokeless tobacco: Never    Tobacco comments:     On 3/21/22 patient stated she quit "about a month ago"   Substance Use Topics    Alcohol " use: No    Drug use: Never       FAMILY HISTORY:  Family History   Problem Relation Name Age of Onset    Cancer Mother          Thyroid    Thyroid cancer Mother      Cancer Father          ?    Transient ischemic attack Father          Carotid artery stenosis, CEA    Diabetes Brother      Hypertension Brother      Cancer Paternal Aunt          Breast ca    Breast cancer Paternal Aunt         REVIEW OF SYSTEMS:  ROS  10 pt ros otherwise negative    PHYSICAL EXAM:  There were no vitals filed for this visit.   Physical Exam      Gen nad alert oriented  Cv rrr  Lung ctab  Abd soft nt nd  Ext left leg incisions well healed, left groin incision well healed  No significant edema noted  Feet are warm and appear well perfused    VASCULAR LAB STUDIES:  GONZÁLEZ 1.08 left leg with toe pressure 113mmhg  Right leg 0.80 with toe pressure 89  Left leg duplex EIA 224cm/s  In flow cfa 308cm/s  Triphasic signals  Graft is biphasic, outflow 111cm/s    ASSESSMENT AND PLAN:  No problem-specific Assessment & Plan notes found for this encounter.      Iza was seen today for peripheral vascular disease.    Diagnoses and all orders for this visit:    PAD (peripheral artery disease)  -     Ankle Brachial Indices (GONZÁLEZ); Future        No follow-ups on file.        Frederick Perera  [facility]  Vascular Surgery  [phone] (Clinic Number)

## 2024-07-15 NOTE — ASSESSMENT & PLAN NOTE
S/p left leg cfa to above knee pop bypass with EVH, doing well and recovered nicely. She did stop smoking for few months but recently has had issues with family being in the hospital and this stress has caused her to relapse in smoking. She is trying to stop again. I will see her back in 6 months. Right leg symptoms are not that bad she is able to walk several hundred feet without stopping so we will watch this for now.

## 2024-12-17 DIAGNOSIS — E78.2 MIXED HYPERLIPIDEMIA: Chronic | ICD-10-CM

## 2024-12-17 DIAGNOSIS — I25.10 CORONARY ARTERY DISEASE INVOLVING NATIVE CORONARY ARTERY OF NATIVE HEART WITHOUT ANGINA PECTORIS: ICD-10-CM

## 2024-12-17 DIAGNOSIS — I70.219 ATHEROSCLEROTIC PVD WITH INTERMITTENT CLAUDICATION: ICD-10-CM

## 2024-12-17 DIAGNOSIS — R94.31 NONSPECIFIC ABNORMAL ELECTROCARDIOGRAM (ECG) (EKG): ICD-10-CM

## 2024-12-17 RX ORDER — ATORVASTATIN CALCIUM 80 MG/1
80 TABLET, FILM COATED ORAL NIGHTLY
Qty: 90 TABLET | Refills: 3 | Status: SHIPPED | OUTPATIENT
Start: 2024-12-17

## 2025-01-13 ENCOUNTER — HOSPITAL ENCOUNTER (OUTPATIENT)
Dept: VASCULAR SURGERY | Facility: HOSPITAL | Age: 65
Discharge: HOME | End: 2025-01-13
Attending: STUDENT IN AN ORGANIZED HEALTH CARE EDUCATION/TRAINING PROGRAM
Payer: MEDICARE

## 2025-01-13 ENCOUNTER — OFFICE VISIT (OUTPATIENT)
Dept: VASCULAR SURGERY | Facility: CLINIC | Age: 65
End: 2025-01-13
Payer: MEDICARE

## 2025-01-13 DIAGNOSIS — I73.9 PAD (PERIPHERAL ARTERY DISEASE): ICD-10-CM

## 2025-01-13 DIAGNOSIS — I73.9 PVD (PERIPHERAL VASCULAR DISEASE): ICD-10-CM

## 2025-01-13 DIAGNOSIS — E78.2 MIXED HYPERLIPIDEMIA: Chronic | ICD-10-CM

## 2025-01-13 DIAGNOSIS — I67.81 ACUTE CEREBROVASCULAR INSUFFICIENCY: ICD-10-CM

## 2025-01-13 DIAGNOSIS — I65.22 OCCLUSION AND STENOSIS OF LEFT CAROTID ARTERY: ICD-10-CM

## 2025-01-13 DIAGNOSIS — I10 ESSENTIAL HYPERTENSION: ICD-10-CM

## 2025-01-13 DIAGNOSIS — R09.89 BRUIT OF RIGHT CAROTID ARTERY: Primary | ICD-10-CM

## 2025-01-13 LAB
LEFT ABI: 1.14
LEFT ARM BP: 145 MMHG
LEFT ARM SYSTOLIC BLOOD PRESSURE: 146 MMHG
LEFT CCA DIST DIAS: 25 CM/S
LEFT CCA DIST SYS: 88 CM/S
LEFT CCA MID DIAS: 20 CM/S
LEFT CCA MID SYS: 93 CM/S
LEFT CCA PROX DIAS: 26 CM/S
LEFT CCA PROX SYS: 131 CM/S
LEFT CFA PSV: 254 CM/S
LEFT DORSALIS PEDIS: 155 MMHG
LEFT ECA DIAS: 30 CM/S
LEFT ECA SYS: 189 CM/S
LEFT ICA DIST DIAS: 24 CM/S
LEFT ICA DIST SYS: 67 CM/S
LEFT ICA MID DIAS: 44 CM/S
LEFT ICA MID SYS: 133 CM/S
LEFT ICA PROX DIAS: 46 CM/S
LEFT ICA PROX SYS: 168 CM/S
LEFT POPLITEAL PSV: 97 CM/S
LEFT POSTERIOR TIBIAL: 165 MMHG
LEFT TBI: 0.62
LEFT TOE PRESSURE: 90 MMHG
LEFT VERTEBRAL DIAS: 24 CM/S
LEFT VERTEBRAL SYS: 84 CM/S
OHS CV CAROTID RIGHT ICA EDV HIGHEST: 42
OHS CV CAROTID ULTRASOUND LEFT ICA/CCA RATIO: 1.91
OHS CV CAROTID ULTRASOUND RIGHT ICA/CCA RATIO: 1.59
OHS CV LOWER EXTREMITY GRAFT MEASUREMENTS - LEFT - G1 - D: 86
OHS CV LOWER EXTREMITY GRAFT MEASUREMENTS - LEFT - G1 - DA: 159
OHS CV LOWER EXTREMITY GRAFT MEASUREMENTS - LEFT - G1 - M: 88
OHS CV LOWER EXTREMITY GRAFT MEASUREMENTS - LEFT - G1 - P: 112
OHS CV LOWER EXTREMITY GRAFT MEASUREMENTS - LEFT - G1 - PA: 221
OHS CV PV CAROTID LEFT HIGHEST CCA: 131
OHS CV PV CAROTID LEFT HIGHEST ICA: 168
OHS CV PV CAROTID RIGHT HIGHEST CCA: 116
OHS CV PV CAROTID RIGHT HIGHEST ICA: 138
OHS CV US CAROTID LEFT HIGHEST EDV: 46
RIGHT ABI: 0.72
RIGHT ARM BP: 144 MMHG
RIGHT ARM SYSTOLIC BLOOD PRESSURE: 144 MMHG
RIGHT CCA DIST DIAS: 22 CM/S
RIGHT CCA DIST SYS: 87 CM/S
RIGHT CCA MID DIAS: 22 CM/S
RIGHT CCA MID SYS: 87 CM/S
RIGHT CCA PROX DIAS: 25 CM/S
RIGHT CCA PROX SYS: 116 CM/S
RIGHT DORSALIS PEDIS: 95 MMHG
RIGHT ECA DIAS: 35 CM/S
RIGHT ECA SYS: 232 CM/S
RIGHT ICA DIST DIAS: 34 CM/S
RIGHT ICA DIST SYS: 99 CM/S
RIGHT ICA MID DIAS: 42 CM/S
RIGHT ICA MID SYS: 138 CM/S
RIGHT ICA PROX DIAS: 34 CM/S
RIGHT ICA PROX SYS: 133 CM/S
RIGHT POSTERIOR TIBIAL: 104 MMHG
RIGHT TBI: 0.41
RIGHT TOE PRESSURE: 59 MMHG
RIGHT VERTEBRAL DIAS: 26 CM/S
RIGHT VERTEBRAL SYS: 96 CM/S

## 2025-01-13 PROCEDURE — 93880 EXTRACRANIAL BILAT STUDY: CPT

## 2025-01-13 PROCEDURE — 93922 UPR/L XTREMITY ART 2 LEVELS: CPT

## 2025-01-13 PROCEDURE — 93880 EXTRACRANIAL BILAT STUDY: CPT | Mod: 26,,, | Performed by: STUDENT IN AN ORGANIZED HEALTH CARE EDUCATION/TRAINING PROGRAM

## 2025-01-13 PROCEDURE — 99214 OFFICE O/P EST MOD 30 MIN: CPT | Mod: S$GLB,,, | Performed by: STUDENT IN AN ORGANIZED HEALTH CARE EDUCATION/TRAINING PROGRAM

## 2025-01-13 PROCEDURE — 93922 UPR/L XTREMITY ART 2 LEVELS: CPT | Mod: 26,,, | Performed by: STUDENT IN AN ORGANIZED HEALTH CARE EDUCATION/TRAINING PROGRAM

## 2025-01-13 PROCEDURE — 93926 LOWER EXTREMITY STUDY: CPT | Mod: 26,LT,, | Performed by: STUDENT IN AN ORGANIZED HEALTH CARE EDUCATION/TRAINING PROGRAM

## 2025-01-13 PROCEDURE — 93926 LOWER EXTREMITY STUDY: CPT | Mod: LT

## 2025-01-13 PROCEDURE — 1160F RVW MEDS BY RX/DR IN RCRD: CPT | Mod: CPTII,S$GLB,, | Performed by: STUDENT IN AN ORGANIZED HEALTH CARE EDUCATION/TRAINING PROGRAM

## 2025-01-13 PROCEDURE — 99999 PR PBB SHADOW E&M-EST. PATIENT-LVL II: CPT | Mod: PBBFAC,,, | Performed by: STUDENT IN AN ORGANIZED HEALTH CARE EDUCATION/TRAINING PROGRAM

## 2025-01-13 PROCEDURE — 1159F MED LIST DOCD IN RCRD: CPT | Mod: CPTII,S$GLB,, | Performed by: STUDENT IN AN ORGANIZED HEALTH CARE EDUCATION/TRAINING PROGRAM

## 2025-01-13 NOTE — PROGRESS NOTES
Frederick Perera    OFFICE NOTE    DATE OF VISIT: 2025  PATIENT NAME: Iza Esquivel  : 1960  MRN: [id number]  PRIMARY CARE PHYSICIAN: Park Gomez MD  CARDIOLOGIST:   REFERRING PROVIDER: No ref. provider found    CHIEF COMPLAINT   No chief complaint on file.      HISTORY OF PRESENT ILLNESS:  Iza Esquivel is a 64 y.o. female who presents to clinic today she is doing well from surgical standpoint. Leg swelling left leg has improved. She does have numbness at the plantar aspect of both feet. She finds that she gets tired after walking a few hundred feet and has to rest but this does not limit her lifestyle in any significant way. She is trying to cut down smoking. But this Is hard she recently lost her brother to pancreatic cancer and sister had knee surgery which she is recovering from    ALLERGIES:  Review of patient's allergies indicates:  No Known Allergies    PAST MEDICAL HISTORY:  Past Medical History:   Diagnosis Date    Atherosclerotic PVD with intermittent claudication 2020    Cervical cancer     Hyperlipidemia     Hypertension     Right carotid bruit     S/P PTCA (percutaneous transluminal coronary angioplasty) 2020    STEMI: stenting of LCx       PAST SURGICAL HISTORY:  Past Surgical History:   Procedure Laterality Date    ABDOMINAL AORTOGRAPHY N/A 2020    Procedure: Aortogram, Abdominal;  Surgeon: Shelly Jarvis MD;  Location: [facility] CATH LAB;  Service: Cardiology;  Laterality: N/A;    ANGIOGRAM, LOWER ARTERIAL, BILATERAL  2024    Procedure: Angiogram, Lower Arterial, Bilateral;  Surgeon: Shelly Jarvis MD;  Location: [facility] CATH LAB;  Service: Cardiology;;    AORTOGRAPHY WITH SERIALOGRAPHY N/A 2020    Procedure: AORTOGRAM, WITH RUNOFF;  Surgeon: Shelly Jarvis MD;  Location: [facility] CATH LAB;  Service: Cardiology;  Laterality: N/A;    AORTOGRAPHY WITH SERIALOGRAPHY N/A 2024    Procedure: AORTOGRAM, WITH SERIALOGRAPHY;  Surgeon: Shelly Jarvis MD;   "Location: [facility] CATH LAB;  Service: Cardiology;  Laterality: N/A;    CREATION OF FEMOROPOPLITEAL ARTERIAL BYPASS USING GRAFT Left 3/19/2024    Procedure: CREATION, BYPASS, ARTERIAL, FEMORAL TO POPLITEAL, USING GRAFT;  Surgeon: Frederick Perera MD;  Location: [facility] OR;  Service: Peripheral Vascular;  Laterality: Left;    ENDARTERECTOMY OF FEMORAL ARTERY Left 3/19/2024    Procedure: ENDARTERECTOMY, FEMORAL;  Surgeon: Frederick Perera MD;  Location: [facility] OR;  Service: Peripheral Vascular;  Laterality: Left;  iliofemoral    ENDOSCOPIC HARVEST OF VEIN Left 3/19/2024    Procedure: SURGICAL PROCUREMENT, VEIN, ENDOSCOPIC;  Surgeon: Frederick Perera MD;  Location: [facility];  Service: Peripheral Vascular;  Laterality: Left;    LEFT HEART CATHETERIZATION Left 2020    Procedure: CATHETERIZATION, HEART, LEFT;  Surgeon: Shelly Jarvis MD;  Location: [facility] CATH LAB;  Service: Cardiology;  Laterality: Left;    LEFT HEART CATHETERIZATION Left 2020    Procedure: CATHETERIZATION, HEART, LEFT;  Surgeon: Shelly Jarvis MD;  Location: [facility] CATH LAB;  Service: Cardiology;  Laterality: Left;    LYMPH NODE BIOPSY      PERCUTANEOUS TRANSLUMINAL BALLOON ANGIOPLASTY OF CORONARY ARTERY  2020    Procedure: Angioplasty-coronary;  Surgeon: Shelly Jarvis MD;  Location: [facility] CATH LAB;  Service: Cardiology;;    REPAIR OF BLOOD VESSEL WITH PATCH GRAFT Left 3/19/2024    Procedure: ANGIOPLASTY, USING PATCH GRAFT;  Surgeon: Frederick Perera MD;  Location: [facility];  Service: Peripheral Vascular;  Laterality: Left;       SOCIAL HISTORY:   Social History     Tobacco Use    Smoking status: Every Day     Current packs/day: 0.00     Average packs/day: 1.5 packs/day for 52.1 years (78.2 ttl pk-yrs)     Types: Cigarettes     Start date:      Last attempt to quit: 2022     Years since quittin.8    Smokeless tobacco: Never    Tobacco comments:     On 3/21/22 patient stated she quit "about a month ago"   Substance Use Topics    Alcohol " use: No    Drug use: Never       FAMILY HISTORY:  Family History   Problem Relation Name Age of Onset    Cancer Mother          Thyroid    Thyroid cancer Mother      Cancer Father          ?    Transient ischemic attack Father          Carotid artery stenosis, CEA    Diabetes Brother      Hypertension Brother      Cancer Paternal Aunt          Breast ca    Breast cancer Paternal Aunt         REVIEW OF SYSTEMS:  ROS  10 pt ros otherwise negative    PHYSICAL EXAM:  There were no vitals filed for this visit.   Physical Exam      Vss  Gen nad alert oriented  Cv rrr  Lung ctab  Abd soft nt nd  Left leg palpable pedal pulse  Right leg warm/well perfused  No open wounds    VASCULAR LAB STUDIES:  40-59% stenosis right ica  60-79% left ica  González right leg 0.72 toe pressure 59  Left leg gonzález is 1.1 with toe pressure 90  Graft duplex shows triphasic flow, no elevated velocities within the graft  In flow has slight stenosis psv 254cm/s    ASSESSMENT AND PLAN:  PVD (peripheral vascular disease)  S/p left fem to above knee bypass evh. She is doing well. Mild claudication symptoms right leg. Not lifestyle limiting. Return in 6 months given her carotid disease. She is on asa/plavix/statin for risk factor modification. Leg swelling is improved, compression as needed    Bruit of right carotid artery  60-79% stenosis left ica will monitor this for now. Will need intervention if she crosses into critical stenosis range.      Diagnoses and all orders for this visit:    Bruit of right carotid artery    PAD (peripheral artery disease)  -     CV Ultrasound Bilateral Doppler Carotid; Future  -     Ankle Brachial Indices (GONZÁLEZ); Future  -     CV Ultrasound doppler arterial leg left; Future    Occlusion and stenosis of left carotid artery  -     CV Ultrasound Bilateral Doppler Carotid; Future    PVD (peripheral vascular disease)    Mixed hyperlipidemia        No follow-ups on file.        Frederick Perera  [facility]  Vascular  Surgery  [phone] (Clinic Number)

## 2025-01-13 NOTE — ASSESSMENT & PLAN NOTE
S/p left fem to above knee bypass evh. She is doing well. Mild claudication symptoms right leg. Not lifestyle limiting. Return in 6 months given her carotid disease. She is on asa/plavix/statin for risk factor modification. Leg swelling is improved, compression as needed

## 2025-02-27 ENCOUNTER — OFFICE VISIT (OUTPATIENT)
Dept: CARDIOLOGY | Facility: CLINIC | Age: 65
End: 2025-02-27
Payer: MEDICARE

## 2025-02-27 ENCOUNTER — HOSPITAL ENCOUNTER (OUTPATIENT)
Dept: CARDIOLOGY | Facility: HOSPITAL | Age: 65
Discharge: HOME | End: 2025-02-27
Attending: INTERNAL MEDICINE
Payer: MEDICARE

## 2025-02-27 VITALS
OXYGEN SATURATION: 98 % | HEART RATE: 65 BPM | BODY MASS INDEX: 26.87 KG/M2 | WEIGHT: 161.25 LBS | DIASTOLIC BLOOD PRESSURE: 80 MMHG | HEIGHT: 65 IN | SYSTOLIC BLOOD PRESSURE: 144 MMHG

## 2025-02-27 DIAGNOSIS — R79.9 ABNORMAL FINDING OF BLOOD CHEMISTRY, UNSPECIFIED: ICD-10-CM

## 2025-02-27 DIAGNOSIS — J43.2 CENTRILOBULAR EMPHYSEMA: ICD-10-CM

## 2025-02-27 DIAGNOSIS — R94.31 NONSPECIFIC ABNORMAL ELECTROCARDIOGRAM (ECG) (EKG): ICD-10-CM

## 2025-02-27 DIAGNOSIS — E78.2 MIXED HYPERLIPIDEMIA: Chronic | ICD-10-CM

## 2025-02-27 DIAGNOSIS — I25.118 CORONARY ARTERY DISEASE OF NATIVE ARTERY OF NATIVE HEART WITH STABLE ANGINA PECTORIS: Primary | ICD-10-CM

## 2025-02-27 DIAGNOSIS — R55 NEAR SYNCOPE: ICD-10-CM

## 2025-02-27 DIAGNOSIS — I25.10 CORONARY ARTERY DISEASE INVOLVING NATIVE CORONARY ARTERY OF NATIVE HEART WITHOUT ANGINA PECTORIS: ICD-10-CM

## 2025-02-27 DIAGNOSIS — I70.219 ATHEROSCLEROTIC PVD WITH INTERMITTENT CLAUDICATION: ICD-10-CM

## 2025-02-27 DIAGNOSIS — J44.9 COPD, MODERATE: ICD-10-CM

## 2025-02-27 DIAGNOSIS — F17.210 NICOTINE DEPENDENCE, CIGARETTES, UNCOMPLICATED: Chronic | ICD-10-CM

## 2025-02-27 DIAGNOSIS — R09.89 BRUIT OF RIGHT CAROTID ARTERY: ICD-10-CM

## 2025-02-27 DIAGNOSIS — I25.2 HISTORY OF ST ELEVATION MYOCARDIAL INFARCTION (STEMI): ICD-10-CM

## 2025-02-27 DIAGNOSIS — I70.219 ATHEROSCLEROTIC PVD WITH INTERMITTENT CLAUDICATION: Primary | ICD-10-CM

## 2025-02-27 DIAGNOSIS — I10 ESSENTIAL HYPERTENSION: ICD-10-CM

## 2025-02-27 LAB
OHS QRS DURATION: 84 MS
OHS QTC CALCULATION: 391 MS

## 2025-02-27 PROCEDURE — 93010 ELECTROCARDIOGRAM REPORT: CPT | Mod: ,,, | Performed by: INTERNAL MEDICINE

## 2025-02-27 PROCEDURE — 99999 PR PBB SHADOW E&M-EST. PATIENT-LVL IV: CPT | Mod: PBBFAC,,, | Performed by: INTERNAL MEDICINE

## 2025-02-27 PROCEDURE — 93005 ELECTROCARDIOGRAM TRACING: CPT

## 2025-02-27 RX ORDER — CLOPIDOGREL BISULFATE 75 MG/1
75 TABLET ORAL DAILY
Qty: 90 TABLET | Refills: 3 | Status: SHIPPED | OUTPATIENT
Start: 2025-02-27

## 2025-02-27 RX ORDER — ATORVASTATIN CALCIUM 80 MG/1
80 TABLET, FILM COATED ORAL NIGHTLY
Qty: 90 TABLET | Refills: 3 | Status: SHIPPED | OUTPATIENT
Start: 2025-02-27

## 2025-02-27 NOTE — PROGRESS NOTES
Subjective:   Patient ID:  Iza Esquivel is a 64 y.o. female who presents for follow up of No chief complaint on file.      HPI  8/25/2020  A 59 yo female with old mi nstemi tobacco use has stopped smoking in July had lcx stent she also had severe limiting claudication with multilevel disease limiting her rehabilitation from her mi. She has iliac stenting performed subsequently she still has residual claudication on the rt calfb she can  Walk 1 mile w/o stopping has gained weight still not compliant with diet . Has been taking meds regularily. seh wants to go back to work no angina has some residual shortness of breath.      12/3/2020  Still not smoking can walk still has some residual calf claudication that is non limiting. Has bilatearl sfa disease s/p bilateral iliac stenting. Has noc hest pain or shortness of breath has swollen rt knee. Has been  compliant with meds and diet.   Her bp is controlled at home. she gained weight.has been snacking a lot walks 4 times weekly. Lipids on target     6/3/2021  Here for f/u cad no angina getting over shingles . Has been able top cut grass for 15 minutes before her legs hurt. No discoloration she has been compliant with meds still smoking now going through cessation program.       8/17/2021  Had hypotension symptomatic then rebound her medical therapy is appropriate no further dizziness of lightheadedness or low blood pressure she is compliant.has noticed her leg symptoms worse she is not able to walk as much as she can she was very limited in her trip      12/9/2021   STILL SMOKING TRIES TO BE COMPLAINT WITH DIET NOT WALKING FOR EXERCISE KIND OF ON AND OFF. NO DISCOLORATION IN FEET HAS GAINED WEIGHT NO ANGINA. NO TIA PALPITATION CHF SYNCOPE. HAS CHEST PAIN WHEN SHE COUGHS OR MOVES   HAS NUMBNESS IN RT BIG TOE NO WEAKNESS.     8/25/2022   here fro f/u  had multiple death in family  Had a fall on her face . She tripped. She still has claudication at half mile sometimes can  walk more. She is smoking. Has chest pain  That is mostly at rest occurs at times with exercise.no discoloration in feet. No ulceration.  Her bp controlled tries to be compliant with diet      3/2/2023   Still smoking had 2 falls due to mechanical issues no cardiac etiology has no chest pain no arrythmias . Has lost 4 lbs .MAC plans to quit smoking compliant with meds. Has rt knee pain . Needs an injection in her back.  Has rt knee pain her knee gives up on her.   Has foot pain upon flexion has also rt calf pain with walking that is not consistent.      9/7/2023  No change in status. Has no chest pain no shortness of breath her blood pressure fluctuates based on he rsalt intake. Her lipids diabetes on target . Denies any limiting claudication.still  smoking      2/8/2024   Earlier eda xpectyed visit had health assessment her ishan on the left is 0.32 which is a drop from previous. Has  worsening claudication on the left side very limited activity. Has to stop for relief affecting lifestyle and daily activities. Has managed to stop smoking has been losing weight more compliant with diet and meds.     LIDIA OLSON NP  6/14/2024  62y/o F with PMHx of PVD, hlp, HTN, tobacco abuse, CAD followed by Dr. Jarvis in cards clinic/vascular found to have worsening claudication in left side. Here today for HFU s/p peripheral angio Left cfa 99% stenosis involving profunda with occluded sfa bilaterally   3 vessel run off bilateral.   Patent bilateral iliac stents.   50% rt cfa   Vascular sx was consulted for cfa endarterectomy and fem pop bypass.  Has some atypical chest pains at rest and with activity also has SOB with eval with nuc stress and Echo  Poor historian unsure which medications she is taking, per PCP pura she is taking ToprolXL, is not taking losartan due to hypotension. BP stable in clinic today     Does not exercise  Smokes 1 pack over 3 days  No ETOH  FH HTN  Lipids controlled     6/14/24  Here today for CV follow  up. She S/p Left iliofemoral endarterectomy with patch angioplasty and Left common femoral to above knee popliteal bypass using left gsv  with Dr. Perera. Here today denies any worsening SOB, c/o LLE pain (vasc following as well)  Denies any CP, palpitations or dizziness. BP stable in clinic, compliant with medications. No bleeding sxs on blood thinners     Nuc stress 3/1/24 neg for ischemia  Echo nml heart function    2/27/2025   HAD CFA ENDARTERECTOMY AND LE FET FEM POP BYPASS STILL HAS LEG PAIN WAS TOLD SHE MAY HAVE NEUROPATHY STILL SMOKING. OCCASIONAL CHEST PAIN CARDIOLITE NEGATIVE. EKG UNREMARKABLE. BACK TO SMOKING.  Past Medical History:   Diagnosis Date    Atherosclerotic PVD with intermittent claudication 5/25/2020    Cervical cancer     Hyperlipidemia     Hypertension     Right carotid bruit     S/P PTCA (percutaneous transluminal coronary angioplasty) 05/25/2020    STEMI: stenting of LCx       Past Surgical History:   Procedure Laterality Date    ABDOMINAL AORTOGRAPHY N/A 5/25/2020    Procedure: Aortogram, Abdominal;  Surgeon: Shelly Jarvis MD;  Location: [facility] CATH LAB;  Service: Cardiology;  Laterality: N/A;    ANGIOGRAM, LOWER ARTERIAL, BILATERAL  2/12/2024    Procedure: Angiogram, Lower Arterial, Bilateral;  Surgeon: Shelly Jarvis MD;  Location: [facility] CATH LAB;  Service: Cardiology;;    AORTOGRAPHY WITH SERIALOGRAPHY N/A 6/30/2020    Procedure: AORTOGRAM, WITH RUNOFF;  Surgeon: Shelly Jarvis MD;  Location: [facility] CATH LAB;  Service: Cardiology;  Laterality: N/A;    AORTOGRAPHY WITH SERIALOGRAPHY N/A 2/12/2024    Procedure: AORTOGRAM, WITH SERIALOGRAPHY;  Surgeon: Shelly Jarvis MD;  Location: [facility] CATH LAB;  Service: Cardiology;  Laterality: N/A;    CREATION OF FEMOROPOPLITEAL ARTERIAL BYPASS USING GRAFT Left 3/19/2024    Procedure: CREATION, BYPASS, ARTERIAL, FEMORAL TO POPLITEAL, USING GRAFT;  Surgeon: Frederick Perera MD;  Location: [facility] OR;  Service: Peripheral Vascular;  Laterality: Left;     ENDARTERECTOMY OF FEMORAL ARTERY Left 3/19/2024    Procedure: ENDARTERECTOMY, FEMORAL;  Surgeon: Frederick Perera MD;  Location: [facility] OR;  Service: Peripheral Vascular;  Laterality: Left;  iliofemoral    ENDOSCOPIC HARVEST OF VEIN Left 3/19/2024    Procedure: SURGICAL PROCUREMENT, VEIN, ENDOSCOPIC;  Surgeon: Frederick Perera MD;  Location: [facility] OR;  Service: Peripheral Vascular;  Laterality: Left;    LEFT HEART CATHETERIZATION Left 5/25/2020    Procedure: CATHETERIZATION, HEART, LEFT;  Surgeon: Shelly Jarvis MD;  Location: [facility] CATH LAB;  Service: Cardiology;  Laterality: Left;    LEFT HEART CATHETERIZATION Left 6/11/2020    Procedure: CATHETERIZATION, HEART, LEFT;  Surgeon: Shelly Jarvis MD;  Location: [facility] CATH LAB;  Service: Cardiology;  Laterality: Left;    LYMPH NODE BIOPSY      PERCUTANEOUS TRANSLUMINAL BALLOON ANGIOPLASTY OF CORONARY ARTERY  5/25/2020    Procedure: Angioplasty-coronary;  Surgeon: Shelly Jarvis MD;  Location: [facility] CATH LAB;  Service: Cardiology;;    REPAIR OF BLOOD VESSEL WITH PATCH GRAFT Left 3/19/2024    Procedure: ANGIOPLASTY, USING PATCH GRAFT;  Surgeon: Frederick Perera MD;  Location: [facility] OR;  Service: Peripheral Vascular;  Laterality: Left;       Social History[1]    Family History   Problem Relation Name Age of Onset    Cancer Mother          Thyroid    Thyroid cancer Mother      Cancer Father          ?    Transient ischemic attack Father          Carotid artery stenosis, CEA    Diabetes Brother      Hypertension Brother      Cancer Paternal Aunt          Breast ca    Breast cancer Paternal Aunt         Current Outpatient Medications   Medication Sig    aspirin (ECOTRIN) 81 MG EC tablet Take 1 tablet (81 mg total) by mouth once daily.    atorvastatin (LIPITOR) 80 MG tablet Take 1 tablet (80 mg total) by mouth every evening.    clopidogreL (PLAVIX) 75 mg tablet Take 1 tablet (75 mg total) by mouth once daily.    gabapentin (NEURONTIN) 300 MG capsule Take 1 capsule (300 mg total)  by mouth every evening.    isosorbide mononitrate (IMDUR) 60 MG 24 hr tablet TAKE 1 TABLET(60 MG) BY MOUTH EVERY DAY (Patient taking differently: Take 60 mg by mouth every evening.)    levothyroxine (SYNTHROID) 88 MCG tablet 1 tab po qd  before breakfast    pantoprazole (PROTONIX) 40 MG tablet Take 1 tablet (40 mg total) by mouth once daily. (Patient taking differently: Take 40 mg by mouth every evening.)    albuterol (PROVENTIL/VENTOLIN HFA) 90 mcg/actuation inhaler Inhale 2 puffs into the lungs every 6 (six) hours as needed. (Patient not taking: Reported on 2/27/2025)    LINZESS 145 mcg Cap capsule TAKE 1 CAPSULE(145 MCG) BY MOUTH BEFORE BREAKFAST (Patient not taking: Reported on 2/27/2025)    metoprolol succinate (TOPROL-XL) 25 MG 24 hr tablet Take 0.5 tablets (12.5 mg total) by mouth once daily. (Patient taking differently: Take 12.5 mg by mouth every evening.)     No current facility-administered medications for this visit.     Facility-Administered Medications Ordered in Other Visits   Medication    chlorhexidine 0.12 % solution 10 mL    LIDOcaine (PF) 10 mg/ml (1%) injection 10 mg     Current Outpatient Medications on File Prior to Visit   Medication Sig    aspirin (ECOTRIN) 81 MG EC tablet Take 1 tablet (81 mg total) by mouth once daily.    atorvastatin (LIPITOR) 80 MG tablet Take 1 tablet (80 mg total) by mouth every evening.    clopidogreL (PLAVIX) 75 mg tablet Take 1 tablet (75 mg total) by mouth once daily.    gabapentin (NEURONTIN) 300 MG capsule Take 1 capsule (300 mg total) by mouth every evening.    isosorbide mononitrate (IMDUR) 60 MG 24 hr tablet TAKE 1 TABLET(60 MG) BY MOUTH EVERY DAY (Patient taking differently: Take 60 mg by mouth every evening.)    levothyroxine (SYNTHROID) 88 MCG tablet 1 tab po qd  before breakfast    pantoprazole (PROTONIX) 40 MG tablet Take 1 tablet (40 mg total) by mouth once daily. (Patient taking differently: Take 40 mg by mouth every evening.)    albuterol  (PROVENTIL/VENTOLIN HFA) 90 mcg/actuation inhaler Inhale 2 puffs into the lungs every 6 (six) hours as needed. (Patient not taking: Reported on 2/27/2025)    LINZESS 145 mcg Cap capsule TAKE 1 CAPSULE(145 MCG) BY MOUTH BEFORE BREAKFAST (Patient not taking: Reported on 2/27/2025)    metoprolol succinate (TOPROL-XL) 25 MG 24 hr tablet Take 0.5 tablets (12.5 mg total) by mouth once daily. (Patient taking differently: Take 12.5 mg by mouth every evening.)     Current Facility-Administered Medications on File Prior to Visit   Medication    chlorhexidine 0.12 % solution 10 mL    LIDOcaine (PF) 10 mg/ml (1%) injection 10 mg     Review of patient's allergies indicates:  No Known Allergies   Review of Systems   Constitutional: Negative for diaphoresis, malaise/fatigue and weight gain.   HENT:  Negative for hoarse voice.    Eyes:  Negative for double vision and visual disturbance.   Cardiovascular:  Positive for claudication. Negative for chest pain, cyanosis, dyspnea on exertion, irregular heartbeat, leg swelling, near-syncope, orthopnea, palpitations, paroxysmal nocturnal dyspnea and syncope.   Respiratory:  Negative for cough, hemoptysis, shortness of breath and snoring.    Hematologic/Lymphatic: Negative for bleeding problem. Does not bruise/bleed easily.   Skin:  Negative for color change and poor wound healing.   Musculoskeletal:  Negative for muscle cramps, muscle weakness and myalgias.   Gastrointestinal:  Negative for bloating, abdominal pain, change in bowel habit, diarrhea, heartburn, hematemesis, hematochezia, melena and nausea.   Neurological:  Negative for excessive daytime sleepiness, dizziness, headaches, light-headedness, loss of balance, numbness and weakness.   Psychiatric/Behavioral:  Negative for memory loss. The patient does not have insomnia.    Allergic/Immunologic: Negative for hives.       Objective:   Physical Exam  Constitutional:       General: She is not in acute distress.     Appearance: Normal  appearance. She is well-developed. She is obese. She is not ill-appearing.   HENT:      Head: Normocephalic and atraumatic.   Eyes:      General: No scleral icterus.     Pupils: Pupils are equal, round, and reactive to light.   Neck:      Thyroid: No thyromegaly.      Vascular: Normal carotid pulses. Carotid bruit present. No hepatojugular reflux or JVD.      Trachea: No tracheal deviation.   Cardiovascular:      Rate and Rhythm: Normal rate and regular rhythm.      Pulses:           Carotid pulses are 2+ on the right side with bruit and 2+ on the left side with bruit.       Radial pulses are 1+ on the right side and 1+ on the left side.        Femoral pulses are 2+ on the right side with bruit and  on the left side with bruit.       Popliteal pulses are 2+ on the right side and 2+ on the left side.        Dorsalis pedis pulses are 0 on the right side and 1+ on the left side.        Posterior tibial pulses are 0 on the right side and 2+ on the left side.      Heart sounds: Normal heart sounds. No murmur heard.     No friction rub. No gallop.      Comments: CAPILLARY REFILL NORMAL.  Pulmonary:      Effort: Pulmonary effort is normal. No respiratory distress.      Breath sounds: Normal breath sounds. No wheezing, rhonchi or rales.   Chest:      Chest wall: No tenderness.   Abdominal:      General: Bowel sounds are normal. There is no abdominal bruit.      Palpations: Abdomen is soft. There is no hepatomegaly or pulsatile mass.      Tenderness: There is no abdominal tenderness.   Musculoskeletal:      Right shoulder: No deformity.      Cervical back: Normal range of motion and neck supple.      Right lower leg: No edema.      Left lower leg: No edema.   Skin:     General: Skin is warm and dry.      Findings: No erythema or rash.      Nails: There is no clubbing.   Neurological:      General: No focal deficit present.      Mental Status: She is alert and oriented to person, place, and time.      Cranial Nerves: No  "cranial nerve deficit.      Coordination: Coordination normal.   Psychiatric:         Mood and Affect: Mood normal.         Speech: Speech normal.         Behavior: Behavior normal.       Vitals:    02/27/25 1042 02/27/25 1045   BP: 132/76 (!) 144/80   BP Location: Left arm Right arm   Patient Position: Sitting Sitting   Pulse: 65    SpO2: 98%    Weight: 73.1 kg (161 lb 4.3 oz)    Height: 5' 5" (1.651 m)      Lab Results   Component Value Date    CHOL 124 08/31/2023    CHOL 125 08/18/2022    CHOL 131 12/02/2021      Body mass index is 26.84 kg/m².   Lab Results   Component Value Date    HGBA1C 5.7 (H) 11/23/2020      BMP  Lab Results   Component Value Date     03/21/2024    K 3.8 03/21/2024     03/21/2024    CO2 27 03/21/2024    BUN 8 03/21/2024    CREATININE 0.8 03/21/2024    CALCIUM 8.4 (L) 03/21/2024    ANIONGAP 7 (L) 03/21/2024    EGFRNORACEVR >60 03/21/2024      Lab Results   Component Value Date    HDL 42 08/31/2023    HDL 42 08/18/2022    HDL 50 12/02/2021     Lab Results   Component Value Date    LDLCALC 47.8 (L) 08/31/2023    LDLCALC 52.2 (L) 08/18/2022    LDLCALC 57.4 (L) 12/02/2021     Lab Results   Component Value Date    TRIG 171 (H) 08/31/2023    TRIG 154 (H) 08/18/2022    TRIG 118 12/02/2021     Lab Results   Component Value Date    CHOLHDL 33.9 08/31/2023    CHOLHDL 33.6 08/18/2022    CHOLHDL 38.2 12/02/2021       Chemistry        Component Value Date/Time     03/21/2024 0454    K 3.8 03/21/2024 0454     03/21/2024 0454    CO2 27 03/21/2024 0454    BUN 8 03/21/2024 0454    CREATININE 0.8 03/21/2024 0454    GLU 89 03/21/2024 0454        Component Value Date/Time    CALCIUM 8.4 (L) 03/21/2024 0454    ALKPHOS 100 08/31/2023 0803    AST 18 08/31/2023 0803    ALT 10 08/31/2023 0803    BILITOT 0.3 08/31/2023 0803    ESTGFRAFRICA >60.0 12/02/2021 0758    EGFRNONAA >60.0 12/02/2021 0758          Lab Results   Component Value Date    TSH 7.153 (H) 03/24/2023     Lab Results   Component " Value Date    INR 1.0 03/22/2024    INR 1.0 02/08/2024    INR 1.0 09/12/2020     Lab Results   Component Value Date    WBC 7.16 04/09/2024    HGB 8.7 (L) 04/09/2024    HCT 28.8 (L) 04/09/2024    MCV 91 04/09/2024     (H) 04/09/2024     BMP  Sodium   Date Value Ref Range Status   03/21/2024 137 136 - 145 mmol/L Final     Potassium   Date Value Ref Range Status   03/21/2024 3.8 3.5 - 5.1 mmol/L Final     Chloride   Date Value Ref Range Status   03/21/2024 103 95 - 110 mmol/L Final     CO2   Date Value Ref Range Status   03/21/2024 27 23 - 29 mmol/L Final     BUN   Date Value Ref Range Status   03/21/2024 8 8 - 23 mg/dL Final     Creatinine   Date Value Ref Range Status   03/21/2024 0.8 0.5 - 1.4 mg/dL Final     Calcium   Date Value Ref Range Status   03/21/2024 8.4 (L) 8.7 - 10.5 mg/dL Final     Anion Gap   Date Value Ref Range Status   03/21/2024 7 (L) 8 - 16 mmol/L Final     eGFR if    Date Value Ref Range Status   12/02/2021 >60.0 >60 mL/min/1.73 m^2 Final     eGFR if non    Date Value Ref Range Status   12/02/2021 >60.0 >60 mL/min/1.73 m^2 Final     Comment:     Calculation used to obtain the estimated glomerular filtration  rate (eGFR) is the CKD-EPI equation.        CrCl cannot be calculated (Patient's most recent lab result is older than the maximum 7 days allowed.).    Assessment:     1. Coronary artery disease of native artery of native heart with stable angina pectoris    2. Nicotine dependence, cigarettes, uncomplicated    3. Bruit of right carotid artery    4. Near syncope    5. History of ST elevation myocardial infarction (STEMI)    6. Atherosclerotic PVD with intermittent claudication    7. COPD, moderate    8. Essential hypertension    9. Centrilobular emphysema      CAD WISE NO ANGINA CARDIOLITE NEGATIVE DISCUSSED SMOKING CESSATION CONTINUED MEDS EXERCISE.   HTN BORDERLINE LOW SALT DIET EMPHASIZED CONTINUE SAME    PVD S/P LE FEM POP STILL WITH RESIDUAL PAIN SEEMS  "TO HAVE MUSCULOSKELETAL VS NEUROLOGIC / NEUROPATHY PAIN CONTINUE RF MODIFICATION.  CAROTID BRUIT ION LIPID THERAPY ANTIPLATELETS CONTINUE SURVEILLANCE SMOKING CESSATION DISCUSSED.  ELEVATED A1C NO RECENT REPEAT COUNSELED LOW CARB DIET REPEAT ON F/U  COPD MODERATE CONTINUE INHALERS DISCUSSED SMOKING SMOKING CESSATION.  TOBACCO USE COUNSELED ABOUT SMOKING CESSATION.  HLP ON STATINS LIPIDS ON TARGET CONTINUE SAME.   Plan:   Continue current therapy  Cardiac low salt diet.  Risk factor modification and excercise program./WEIGHT LOSS  Smoking cessation counseling  F/u in 6 months with lipid cmp A1C.              [1]   Social History  Tobacco Use    Smoking status: Every Day     Current packs/day: 0.00     Average packs/day: 1.5 packs/day for 52.1 years (78.2 ttl pk-yrs)     Types: Cigarettes     Start date: 1970     Last attempt to quit: 2/21/2022     Years since quitting: 3.0    Smokeless tobacco: Never    Tobacco comments:     On 3/21/22 patient stated she quit "about a month ago"   Substance Use Topics    Alcohol use: No    Drug use: Never     "

## 2025-04-21 DIAGNOSIS — R94.31 NONSPECIFIC ABNORMAL ELECTROCARDIOGRAM (ECG) (EKG): ICD-10-CM

## 2025-04-21 DIAGNOSIS — E78.2 MIXED HYPERLIPIDEMIA: Chronic | ICD-10-CM

## 2025-04-21 DIAGNOSIS — Z01.818 PRE-OP EVALUATION: ICD-10-CM

## 2025-04-21 DIAGNOSIS — Z72.0 TOBACCO ABUSE: Chronic | ICD-10-CM

## 2025-04-21 DIAGNOSIS — R09.89 BRUIT OF RIGHT CAROTID ARTERY: ICD-10-CM

## 2025-04-21 DIAGNOSIS — I70.219 ATHEROSCLEROTIC PVD WITH INTERMITTENT CLAUDICATION: ICD-10-CM

## 2025-04-21 DIAGNOSIS — I25.10 CORONARY ARTERY DISEASE INVOLVING NATIVE CORONARY ARTERY OF NATIVE HEART WITHOUT ANGINA PECTORIS: ICD-10-CM

## 2025-04-22 ENCOUNTER — PATIENT MESSAGE (OUTPATIENT)
Dept: CARDIOLOGY | Facility: CLINIC | Age: 65
End: 2025-04-22
Payer: MEDICARE

## 2025-04-22 DIAGNOSIS — Z72.0 TOBACCO ABUSE: Chronic | ICD-10-CM

## 2025-04-22 DIAGNOSIS — R09.89 BRUIT OF RIGHT CAROTID ARTERY: ICD-10-CM

## 2025-04-22 DIAGNOSIS — E78.2 MIXED HYPERLIPIDEMIA: Chronic | ICD-10-CM

## 2025-04-22 DIAGNOSIS — Z01.818 PRE-OP EVALUATION: ICD-10-CM

## 2025-04-22 DIAGNOSIS — I70.219 ATHEROSCLEROTIC PVD WITH INTERMITTENT CLAUDICATION: ICD-10-CM

## 2025-04-22 DIAGNOSIS — R94.31 NONSPECIFIC ABNORMAL ELECTROCARDIOGRAM (ECG) (EKG): ICD-10-CM

## 2025-04-22 DIAGNOSIS — I25.10 CORONARY ARTERY DISEASE INVOLVING NATIVE CORONARY ARTERY OF NATIVE HEART WITHOUT ANGINA PECTORIS: ICD-10-CM

## 2025-04-22 RX ORDER — PANTOPRAZOLE SODIUM 40 MG/1
40 TABLET, DELAYED RELEASE ORAL DAILY
Qty: 90 TABLET | Refills: 3 | OUTPATIENT
Start: 2025-04-22

## 2025-04-22 RX ORDER — PANTOPRAZOLE SODIUM 40 MG/1
40 TABLET, DELAYED RELEASE ORAL
Qty: 90 TABLET | Refills: 0 | Status: SHIPPED | OUTPATIENT
Start: 2025-04-22

## 2025-07-14 ENCOUNTER — OFFICE VISIT (OUTPATIENT)
Dept: VASCULAR SURGERY | Facility: CLINIC | Age: 65
End: 2025-07-14
Payer: MEDICARE

## 2025-07-14 ENCOUNTER — HOSPITAL ENCOUNTER (OUTPATIENT)
Facility: HOSPITAL | Age: 65
Discharge: HOME | End: 2025-07-14
Attending: STUDENT IN AN ORGANIZED HEALTH CARE EDUCATION/TRAINING PROGRAM
Payer: MEDICARE

## 2025-07-14 DIAGNOSIS — I65.22 OCCLUSION AND STENOSIS OF LEFT CAROTID ARTERY: ICD-10-CM

## 2025-07-14 DIAGNOSIS — I73.9 PAD (PERIPHERAL ARTERY DISEASE): ICD-10-CM

## 2025-07-14 DIAGNOSIS — E78.2 MIXED HYPERLIPIDEMIA: Chronic | ICD-10-CM

## 2025-07-14 DIAGNOSIS — M54.16 LUMBAR RADICULOPATHY: ICD-10-CM

## 2025-07-14 DIAGNOSIS — I10 ESSENTIAL HYPERTENSION: ICD-10-CM

## 2025-07-14 DIAGNOSIS — I65.23 CAROTID STENOSIS, ASYMPTOMATIC, BILATERAL: ICD-10-CM

## 2025-07-14 DIAGNOSIS — I70.219 ATHEROSCLEROTIC PVD WITH INTERMITTENT CLAUDICATION: Primary | ICD-10-CM

## 2025-07-14 LAB
LEFT ABI: 1.06
LEFT ARM BP: 134 MMHG
LEFT ARM SYSTOLIC BLOOD PRESSURE: 134 MMHG
LEFT CCA DIST DIAS: 20.97 CM/S
LEFT CCA DIST SYS: 80.49 CM/S
LEFT CCA MID DIAS: 27.05 CM/S
LEFT CCA MID SYS: 68.31 CM/S
LEFT CCA PROX DIAS: 26.77 CM/S
LEFT CCA PROX SYS: 106.02 CM/S
LEFT CFA PSV: 235 CM/S
LEFT DORSALIS PEDIS: 130 MMHG
LEFT ECA DIAS: 30.68 CM/S
LEFT ECA SYS: 237.44 CM/S
LEFT ICA DIST DIAS: 37.24 CM/S
LEFT ICA DIST SYS: 98.18 CM/S
LEFT ICA MID DIAS: 25.34 CM/S
LEFT ICA MID SYS: 104.05 CM/S
LEFT ICA PROX DIAS: 46.69 CM/S
LEFT ICA PROX SYS: 166.74 CM/S
LEFT ICA/CCA SYS: 2.07
LEFT POPLITEAL PSV: 117 CM/S
LEFT POSTERIOR TIBIAL: 142 MMHG
LEFT TBI: 0.64
LEFT TOE PRESSURE: 86 MMHG
LEFT VERTEBRAL DIAS: 34.68 CM/S
LEFT VERTEBRAL SYS: 89.37 CM/S
OHS CV CAROTID RIGHT ICA EDV HIGHEST: 44.98
OHS CV CAROTID ULTRASOUND LEFT ICA/CCA RATIO: 2.07
OHS CV CAROTID ULTRASOUND RIGHT ICA/CCA RATIO: 1.22
OHS CV LEFT LOWER EXTREMITY ABI (NO CALC): 1.06
OHS CV LOWER EXTREMITY GRAFT MEASUREMENTS - LEFT - G1 - D: 84
OHS CV LOWER EXTREMITY GRAFT MEASUREMENTS - LEFT - G1 - DA: 160
OHS CV LOWER EXTREMITY GRAFT MEASUREMENTS - LEFT - G1 - M: 82
OHS CV LOWER EXTREMITY GRAFT MEASUREMENTS - LEFT - G1 - P: 91
OHS CV LOWER EXTREMITY GRAFT MEASUREMENTS - LEFT - G1 - PA: 252
OHS CV PV CAROTID LEFT HIGHEST CCA: 106
OHS CV PV CAROTID LEFT HIGHEST ICA: 166.74
OHS CV PV CAROTID RIGHT HIGHEST CCA: 113
OHS CV PV CAROTID RIGHT HIGHEST ICA: 123.15
OHS CV US CAROTID LEFT HIGHEST EDV: 46.69
RIGHT ABI: 0.73
RIGHT ARM BP: 131 MMHG
RIGHT ARM SYSTOLIC BLOOD PRESSURE: 131 MMHG
RIGHT CCA DIST DIAS: 29.87 CM/S
RIGHT CCA DIST SYS: 100.57 CM/S
RIGHT CCA MID DIAS: 21.91 CM/S
RIGHT CCA MID SYS: 85.63 CM/S
RIGHT CCA PROX DIAS: 23.9 CM/S
RIGHT CCA PROX SYS: 112.52 CM/S
RIGHT DORSALIS PEDIS: 94 MMHG
RIGHT ECA DIAS: 42.99 CM/S
RIGHT ECA SYS: 261.82 CM/S
RIGHT ICA DIST DIAS: 30.31 CM/S
RIGHT ICA DIST SYS: 95.34 CM/S
RIGHT ICA MID DIAS: 44.98 CM/S
RIGHT ICA MID SYS: 115.66 CM/S
RIGHT ICA PROX DIAS: 34.27 CM/S
RIGHT ICA PROX SYS: 123.15 CM/S
RIGHT ICA/CCA SYS: 1.22
RIGHT POSTERIOR TIBIAL: 98 MMHG
RIGHT PROX SUBCLAVIAN ARTERY: 345.69 CM/S
RIGHT TBI: 0.39
RIGHT TOE PRESSURE: 52 MMHG
RIGHT VERTEBRAL DIAS: 26.77 CM/S
RIGHT VERTEBRAL SYS: 67.47 CM/S

## 2025-07-14 PROCEDURE — 1160F RVW MEDS BY RX/DR IN RCRD: CPT | Mod: CPTII,S$GLB,, | Performed by: PHYSICIAN ASSISTANT

## 2025-07-14 PROCEDURE — 99214 OFFICE O/P EST MOD 30 MIN: CPT | Mod: S$GLB,,, | Performed by: PHYSICIAN ASSISTANT

## 2025-07-14 PROCEDURE — 93880 EXTRACRANIAL BILAT STUDY: CPT | Mod: 26,,, | Performed by: STUDENT IN AN ORGANIZED HEALTH CARE EDUCATION/TRAINING PROGRAM

## 2025-07-14 PROCEDURE — 99999 PR PBB SHADOW E&M-EST. PATIENT-LVL III: CPT | Mod: PBBFAC,,, | Performed by: PHYSICIAN ASSISTANT

## 2025-07-14 PROCEDURE — 93922 UPR/L XTREMITY ART 2 LEVELS: CPT

## 2025-07-14 PROCEDURE — 1159F MED LIST DOCD IN RCRD: CPT | Mod: CPTII,S$GLB,, | Performed by: PHYSICIAN ASSISTANT

## 2025-07-14 PROCEDURE — 93926 LOWER EXTREMITY STUDY: CPT | Mod: 26,LT,, | Performed by: STUDENT IN AN ORGANIZED HEALTH CARE EDUCATION/TRAINING PROGRAM

## 2025-07-14 PROCEDURE — 93880 EXTRACRANIAL BILAT STUDY: CPT

## 2025-07-14 PROCEDURE — 93926 LOWER EXTREMITY STUDY: CPT | Mod: LT

## 2025-07-14 PROCEDURE — 93922 UPR/L XTREMITY ART 2 LEVELS: CPT | Mod: 26,,, | Performed by: STUDENT IN AN ORGANIZED HEALTH CARE EDUCATION/TRAINING PROGRAM

## 2025-07-14 NOTE — ASSESSMENT & PLAN NOTE
Patient is s/p left femoral to above knee bypass with GSV with Dr. Little on 03/18/2024. Repeat GONZÁLEZ and left LE graft scan unchanged from previous; GONZÁLEZ normal on left and mild-moderate disease on right, graft scan with triphasic flow. Discussed continuing 6month surveillance with repeat GONZÁLEZ and graft scan. Continue with daily ASA 81, Plavix, and statin therapy.

## 2025-07-14 NOTE — PROGRESS NOTES
[facility], [place]  [facility]    OFFICE NOTE    DATE OF VISIT: 2025  PATIENT NAME: Iza Esquivel  : 1960  MRN: [id number]  PRIMARY CARE PHYSICIAN: Park Gomez MD  CARDIOLOGIST: Shelly Jarvis MD  REFERRING PROVIDER: No ref. provider found    CHIEF COMPLAINT   Chief Complaint   Patient presents with    Follow-up     6m cve, GONZÁLEZ, arterial left duplex       HISTORY OF PRESENT ILLNESS:  Iza Esquivel is a 64 y.o. female who presents to clinic today for routine follow up visit for carotid stenosis and PAD. Patient has hx of left femoral to popliteal bypass this past year.  It visit, she complains of continued numbness into her left lower leg.  She states it to radiating pain whenever she does have pain into the left leg it is mostly numbness sensation.  She denies any pain consistent with claudication to either lower extremity.  She states her prior surgical incisions from her left lower leg bypass are well healed.  She denies any wounds or sores to her lower legs, feet or toes.  Patient states she does have history of lumbar spine issues in his seen a provider at the [facility] in the past.  She was scheduled for a lumbar injection however did not follow through with it.  She has not seen a lumbar spine provider since that time, which was over a year ago.  She has never had issues or complaints at today's visit.    Prior Vascular Procedures:  3/19/24 - left fem endart, femoral to AK pop bypass w/ GSV    ALLERGIES:  Review of patient's allergies indicates:  No Known Allergies    PAST MEDICAL HISTORY:  Past Medical History:   Diagnosis Date    Atherosclerotic PVD with intermittent claudication 2020    Cervical cancer     Hyperlipidemia     Hypertension     Right carotid bruit     S/P PTCA (percutaneous transluminal coronary angioplasty) 2020    STEMI: stenting of LCx       PAST SURGICAL HISTORY:  Past Surgical History:   Procedure Laterality Date    ABDOMINAL  AORTOGRAPHY N/A 5/25/2020    Procedure: Aortogram, Abdominal;  Surgeon: Shelly Jarvis MD;  Location: [facility] CATH LAB;  Service: Cardiology;  Laterality: N/A;    ANGIOGRAM, LOWER ARTERIAL, BILATERAL  2/12/2024    Procedure: Angiogram, Lower Arterial, Bilateral;  Surgeon: Shelly Jarvis MD;  Location: [facility] CATH LAB;  Service: Cardiology;;    AORTOGRAPHY WITH SERIALOGRAPHY N/A 6/30/2020    Procedure: AORTOGRAM, WITH RUNOFF;  Surgeon: Shelly Jarvis MD;  Location: [facility] CATH LAB;  Service: Cardiology;  Laterality: N/A;    AORTOGRAPHY WITH SERIALOGRAPHY N/A 2/12/2024    Procedure: AORTOGRAM, WITH SERIALOGRAPHY;  Surgeon: Shelly Jarvis MD;  Location: [facility] CATH LAB;  Service: Cardiology;  Laterality: N/A;    CREATION OF FEMOROPOPLITEAL ARTERIAL BYPASS USING GRAFT Left 3/19/2024    Procedure: CREATION, BYPASS, ARTERIAL, FEMORAL TO POPLITEAL, USING GRAFT;  Surgeon: Frederick ePrera MD;  Location: [facility];  Service: Peripheral Vascular;  Laterality: Left;    ENDARTERECTOMY OF FEMORAL ARTERY Left 3/19/2024    Procedure: ENDARTERECTOMY, FEMORAL;  Surgeon: Frederick Perera MD;  Location: [facility] OR;  Service: Peripheral Vascular;  Laterality: Left;  iliofemoral    ENDOSCOPIC HARVEST OF VEIN Left 3/19/2024    Procedure: SURGICAL PROCUREMENT, VEIN, ENDOSCOPIC;  Surgeon: Frederick Perera MD;  Location: [facility] OR;  Service: Peripheral Vascular;  Laterality: Left;    LEFT HEART CATHETERIZATION Left 5/25/2020    Procedure: CATHETERIZATION, HEART, LEFT;  Surgeon: Shelly Jarvis MD;  Location: [facility] CATH LAB;  Service: Cardiology;  Laterality: Left;    LEFT HEART CATHETERIZATION Left 6/11/2020    Procedure: CATHETERIZATION, HEART, LEFT;  Surgeon: Shelly Jarvis MD;  Location: [facility] CATH LAB;  Service: Cardiology;  Laterality: Left;    LYMPH NODE BIOPSY      PERCUTANEOUS TRANSLUMINAL BALLOON ANGIOPLASTY OF CORONARY ARTERY  5/25/2020    Procedure: Angioplasty-coronary;  Surgeon: Shelly Jarvis MD;  Location: [facility] CATH LAB;  Service:  Cardiology;;    REPAIR OF BLOOD VESSEL WITH PATCH GRAFT Left 3/19/2024    Procedure: ANGIOPLASTY, USING PATCH GRAFT;  Surgeon: Frederick Perera MD;  Location: [facility];  Service: Peripheral Vascular;  Laterality: Left;       SOCIAL HISTORY:   Social History[1]    FAMILY HISTORY:  Family History   Problem Relation Name Age of Onset    Cancer Mother          Thyroid    Thyroid cancer Mother      Cancer Father          ?    Transient ischemic attack Father          Carotid artery stenosis, CEA    Diabetes Brother      Hypertension Brother      Cancer Paternal Aunt          Breast ca    Breast cancer Paternal Aunt         REVIEW OF SYSTEMS:  Review of Systems   All other systems reviewed and are negative.      PHYSICAL EXAM:  There were no vitals filed for this visit.   Physical Exam  Vitals and nursing note reviewed.   Constitutional:       Appearance: Normal appearance.   HENT:      Head: Normocephalic.      Nose: Nose normal.   Eyes:      Pupils: Pupils are equal, round, and reactive to light.   Cardiovascular:      Rate and Rhythm: Normal rate.   Pulmonary:      Effort: Pulmonary effort is normal.   Abdominal:      General: Abdomen is flat. There is no distension.   Musculoskeletal:      Cervical back: Normal range of motion.   Skin:     General: Skin is warm and dry.   Neurological:      General: No focal deficit present.      Mental Status: She is alert and oriented to person, place, and time. Mental status is at baseline.   Psychiatric:         Mood and Affect: Mood normal.         Behavior: Behavior normal.         Judgment: Judgment normal.             VASCULAR LAB STUDIES:  Dr. Perera and I have personally reviewed the studies.  GONZÁLEZ performed today in clinic:   Right is 0.73 with a TP of 52   Left is 1.06 with a TP of 86   No change since prior exam on 1/183/25  LLE graft scan performed today in clinic:   The graft is patent w/ elevated velocities at prox anastomosis   Triphasic waveforms throughout   Carotid  duplex performed in clinic today:   Right at 40-59% w/ PSV of 123cm/s   Left at 60-79% w/ PSV of 167cm/s   No significant changes from previous exam on 1/13/25   Vertebral arteries are antegrade, bilaterally      ASSESSMENT AND PLAN:  Mixed hyperlipidemia  Compliant with current medication regimen.     Carotid stenosis, asymptomatic, bilateral  Patient with stable and asymptomatic carotid stenosis. Repeat carotid duplex today in clinic unchanged from previous; right at 40-59% stenosis and left at 60-79% stenosis. Will continue with 6 month surveillance and repeat carotid duplex. Discussed continuing daily ASA 81, Plavix and statin therapy.    Atherosclerotic PVD with intermittent claudication  Patient is s/p left femoral to above knee bypass with GSV with Dr. Little on 03/18/2024. Repeat GONZÁLEZ and left LE graft scan unchanged from previous; GONZÁLEZ normal on left and mild-moderate disease on right, graft scan with triphasic flow. Discussed continuing 6month surveillance with repeat GONZÁLEZ and graft scan. Continue with daily ASA 81, Plavix, and statin therapy.     Essential hypertension  Compliant with current medication regimen.     Lumbar radiculopathy  Patient states history of lumbar spine issues.  Has seen prior provider at [facility] a year or more ago and was scheduled for lumbar CARLIE did not go through with the procedure.  She states she has not followed up with a spine provider in over a year.  She continues to complain of numbness that radiates down her left lower leg.  She denies any other pain consistent with claudication to left lower leg.  Discussed repeat GONZÁLEZ graft scan of the left leg are without issue and graft remains patent.  We will send a repeat referral to Dr. Joseph for evaluation.      Iza was seen today for follow-up.    Diagnoses and all orders for this visit:    Atherosclerotic PVD with intermittent claudication  -     CV Ankle Brachial Indices (GONZÁLEZ); Future  -     CV Ultrasound doppler arterial leg  "left; Future    Mixed hyperlipidemia    Essential hypertension    Carotid stenosis, asymptomatic, bilateral  -     CV Ultrasound Bilateral Doppler Carotid; Future    Lumbar radiculopathy  -     X-Ray Lumbar Spine Ap And Lateral; Future  -     Ambulatory referral/consult to Orthopedics; Future        Follow up in about 6 months (around 1/14/2026) for PAD, carotid stenosis.        [facility]ou[facility]ac[facility] Gonzalez  [facility]  Vascular Surgery  [phone] (Clinic Number)         [1]   Social History  Tobacco Use    Smoking status: Every Day     Current packs/day: 0.00     Average packs/day: 1.5 packs/day for 52.1 years (78.2 ttl pk-yrs)     Types: Cigarettes     Start date: 1970     Last attempt to quit: 2/21/2022     Years since quitting: 3.3    Smokeless tobacco: Never    Tobacco comments:     On 3/21/22 patient stated she quit "about a month ago"   Substance Use Topics    Alcohol use: No    Drug use: Never     "

## 2025-07-14 NOTE — ASSESSMENT & PLAN NOTE
Patient with stable and asymptomatic carotid stenosis. Repeat carotid duplex today in clinic unchanged from previous; right at 40-59% stenosis and left at 60-79% stenosis. Will continue with 6 month surveillance and repeat carotid duplex. Discussed continuing daily ASA 81, Plavix and statin therapy.

## 2025-07-14 NOTE — ASSESSMENT & PLAN NOTE
Patient states history of lumbar spine issues.  Has seen prior provider at [facility] a year or more ago and was scheduled for lumbar CARLIE did not go through with the procedure.  She states she has not followed up with a spine provider in over a year.  She continues to complain of numbness that radiates down her left lower leg.  She denies any other pain consistent with claudication to left lower leg.  Discussed repeat GONZÁLEZ graft scan of the left leg are without issue and graft remains patent.  We will send a repeat referral to Dr. Joseph for evaluation.

## 2025-07-16 DIAGNOSIS — Z01.818 PRE-OP EVALUATION: ICD-10-CM

## 2025-07-16 DIAGNOSIS — I25.10 CORONARY ARTERY DISEASE INVOLVING NATIVE CORONARY ARTERY OF NATIVE HEART WITHOUT ANGINA PECTORIS: ICD-10-CM

## 2025-07-16 DIAGNOSIS — R09.89 BRUIT OF RIGHT CAROTID ARTERY: ICD-10-CM

## 2025-07-16 DIAGNOSIS — I70.219 ATHEROSCLEROTIC PVD WITH INTERMITTENT CLAUDICATION: ICD-10-CM

## 2025-07-16 DIAGNOSIS — E78.2 MIXED HYPERLIPIDEMIA: Chronic | ICD-10-CM

## 2025-07-16 DIAGNOSIS — R94.31 NONSPECIFIC ABNORMAL ELECTROCARDIOGRAM (ECG) (EKG): ICD-10-CM

## 2025-07-16 DIAGNOSIS — Z72.0 TOBACCO ABUSE: Chronic | ICD-10-CM

## 2025-07-17 RX ORDER — PANTOPRAZOLE SODIUM 40 MG/1
40 TABLET, DELAYED RELEASE ORAL
Qty: 90 TABLET | Refills: 3 | Status: SHIPPED | OUTPATIENT
Start: 2025-07-17

## (undated) DEVICE — CONTRAST VISIPAQUE 150ML

## (undated) DEVICE — APPLICATOR CHLORAPREP ORN 26ML

## (undated) DEVICE — PACK BASIC SETUP SC BR

## (undated) DEVICE — WIRE GUIDE TEFLON 3CM .035 145

## (undated) DEVICE — CATH JL3.5 5FR

## (undated) DEVICE — SPONGE COTTON TRAY 4X4IN

## (undated) DEVICE — SOL NORMAL USPCA 0.9%

## (undated) DEVICE — SUT 2-0 12-18IN SILK

## (undated) DEVICE — CATH INFINITI 4F 3DRC 100CM

## (undated) DEVICE — KIT GLIDESHEATH SLEND 6FR 10CM

## (undated) DEVICE — BAND TR COMP DEVICE REG 24CM

## (undated) DEVICE — HEMOSTAT SURGICEL 4X8IN

## (undated) DEVICE — APPLIER LIGACLIP SM 9.38IN

## (undated) DEVICE — CATH IMPULSE PIGTAIL 6F 110CM

## (undated) DEVICE — APPLIER CLIP LIAGCLIP 9.375IN

## (undated) DEVICE — CATH DIAG IMPULSE 6FR FL4

## (undated) DEVICE — KIT MANIFOLD LOW PRESS TUBING

## (undated) DEVICE — KIT SYR REUSABLE

## (undated) DEVICE — ANGIOTOUCH KIT

## (undated) DEVICE — DRAPE ANGIO BRACH 38X44IN

## (undated) DEVICE — SHEATH INTRODUCER 5FR 10CM

## (undated) DEVICE — PACK CATH LAB CUSTOM BR

## (undated) DEVICE — Device

## (undated) DEVICE — GOWN POLY REINF BRTH SLV XL

## (undated) DEVICE — STAPLER SKIN PROXIMATE REG

## (undated) DEVICE — CATH PIG145 INFINITI 5X110CM

## (undated) DEVICE — CATH IMPULSE 6FR MULTI-PAK

## (undated) DEVICE — CLIP LIGATING TITANIUM SMALL

## (undated) DEVICE — SPONGE LAP 18X18 PREWASHED

## (undated) DEVICE — BOWL STERILE LARGE 32OZ

## (undated) DEVICE — GUIDEWIRE STF .035X260CM ANG

## (undated) DEVICE — BANDAGE MATRIX HK LOOP 4IN 5YD

## (undated) DEVICE — SYR 30CC LUER LOCK

## (undated) DEVICE — TAPE UMBILICAL 1/8X36IN WHITE

## (undated) DEVICE — GEL AQUASONIC 100 STERILE20GM

## (undated) DEVICE — DRAPE THREE-QTR REINF 53X77IN

## (undated) DEVICE — PACK ANGIOPLASTY ACCESS PLUS

## (undated) DEVICE — ARROW SHEATH SAF 7FR X 11CM

## (undated) DEVICE — GLOVE SURG BIOGEL LATEX SZ 7.5

## (undated) DEVICE — TRAY CATH FOL SIL URIMTR 16FR

## (undated) DEVICE — CATH ANGIO SOFT VU 5FR 65CM

## (undated) DEVICE — CATH JR4 5FR

## (undated) DEVICE — CONTRAST OMNIPAQUE 240 50ML

## (undated) DEVICE — BANDAGE ACE DOUBLE STER 6IN

## (undated) DEVICE — SUT 7/0 18IN PROLENE BL MO

## (undated) DEVICE — SUT PROLENE 6-0 BV-1

## (undated) DEVICE — SUT VICRYL 2-0 27 CT-1

## (undated) DEVICE — CATH JL4 5FR

## (undated) DEVICE — CATH IV 20GAX1.25 JELCO

## (undated) DEVICE — PACK HEART CATH BR

## (undated) DEVICE — SUT SILK 2-0 STRANDS 30IN

## (undated) DEVICE — DRAPE IOBAN 6635

## (undated) DEVICE — SUT MCRYL PLUS 4-0 PS2 27IN

## (undated) DEVICE — SHEATH INTRODUCER 6FR 11CM

## (undated) DEVICE — GUIDEWIRE WHOLEY HI TORQ 175CM

## (undated) DEVICE — SYR 1CC TB SG 27GX1/2

## (undated) DEVICE — CONTRAST OMNIPAQUE 240 150ML

## (undated) DEVICE — UNDERGLOVES BIOGEL PI SIZE 7.5

## (undated) DEVICE — KIT WATCHDOG HEMSTAS VALVE 8FR

## (undated) DEVICE — SHEATH PINNACLE 7FR 25CM

## (undated) DEVICE — SOCKINETTE IMPERVIOUS 12X48IN

## (undated) DEVICE — TUBING MEDI-VAC 20FT .25IN

## (undated) DEVICE — COVER PROXIMA MAYO STAND

## (undated) DEVICE — SET EXTENSION STERILE 30IN

## (undated) DEVICE — SOL NACL 0.9% INJ 500ML BG

## (undated) DEVICE — GUIDE LAUNCHER 6FR EBU 3.5

## (undated) DEVICE — OMNIPAQUE 350MG 150ML VIAL

## (undated) DEVICE — CATH DIAG IMPULSE 6FR FR4

## (undated) DEVICE — WIRE X-SUP CHOICE PT .014X182

## (undated) DEVICE — CATH ULTRAVERSE 035 6X60X75

## (undated) DEVICE — PACK ECLIPSE UNIVERSAL STERILE

## (undated) DEVICE — SYS VIRTUOSAPH PLUS EVM

## (undated) DEVICE — SUT SILK 3-0 STRANDS 30IN

## (undated) DEVICE — SYR ONLY LUER LOCK 20CC

## (undated) DEVICE — COVER LIGHT HANDLE 80/CA

## (undated) DEVICE — CATH MPA2 INFINITI 5FR 125CM

## (undated) DEVICE — CATH IMPRESS 5F 65CM MOD HOOK

## (undated) DEVICE — DRAPE ORTH SPLIT 77X108IN

## (undated) DEVICE — GAUZE SPONGE PEANUT STRL

## (undated) DEVICE — CATH IMPULSE IM CRV 100CM 5FR

## (undated) DEVICE — DRAPE INSTR MAGNETIC 10X16IN

## (undated) DEVICE — SUT 6/0 18 PROLENE C-1

## (undated) DEVICE — GUIDEWIRE ALLSTAR .014X190

## (undated) DEVICE — CATH INFINITI MULTIPAK JR4 5FR

## (undated) DEVICE — BOOT SUTURE AID

## (undated) DEVICE — CATH FOGARTY ART EMB 3FR 80CM

## (undated) DEVICE — SYR LUER LOCK STERILE 10ML

## (undated) DEVICE — TOWEL OR DISP STRL BLUE 4/PK

## (undated) DEVICE — INFLATOR ENCORE 26 BLLN INFL

## (undated) DEVICE — CATH PIGTAIL 5FX110CM

## (undated) DEVICE — LOOP VESSEL BLUE MINI 2/CARD

## (undated) DEVICE — GLIDESHEATH SLENDER SS 5FR10CM

## (undated) DEVICE — BLADE SURG CARBON STEEL SZ11

## (undated) DEVICE — MANIFOLD 4 PORT

## (undated) DEVICE — ELECTRODE REM PLYHSV RETURN 9

## (undated) DEVICE — BANDAGE ROLL COTTN 4.5INX4.1YD

## (undated) DEVICE — CATH IMPULSE 5FR PIGTAIL 125CM

## (undated) DEVICE — DRAPE THYROID SOFT STERILE

## (undated) DEVICE — SUT 2/0 30IN SILK BLK BRAI

## (undated) DEVICE — GUIDE WIRE WHOLEY EXCHANGE 300

## (undated) DEVICE — DEV-O-LOOPS MAXI RED

## (undated) DEVICE — KIT SITE-RITE NDL GUIDE 21G

## (undated) DEVICE — SUT PROLENE 6-0 BV-1 30IN

## (undated) DEVICE — CATH ULTRAVERSE 035 8X40X75

## (undated) DEVICE — CLIP LIGACLIP XTRA TITANIUM

## (undated) DEVICE — SEE MEDLINE ITEM 157187

## (undated) DEVICE — GUIDEWIRE FIELDER XT.014X190CM